# Patient Record
Sex: FEMALE | Race: WHITE | Employment: OTHER | URBAN - METROPOLITAN AREA
[De-identification: names, ages, dates, MRNs, and addresses within clinical notes are randomized per-mention and may not be internally consistent; named-entity substitution may affect disease eponyms.]

---

## 2017-01-16 ENCOUNTER — OFFICE VISIT (OUTPATIENT)
Dept: CARDIOLOGY | Age: 67
End: 2017-01-16

## 2017-01-16 VITALS
DIASTOLIC BLOOD PRESSURE: 82 MMHG | RESPIRATION RATE: 16 BRPM | WEIGHT: 128.2 LBS | SYSTOLIC BLOOD PRESSURE: 134 MMHG | OXYGEN SATURATION: 98 % | HEART RATE: 110 BPM | BODY MASS INDEX: 23.59 KG/M2 | HEIGHT: 62 IN

## 2017-01-16 DIAGNOSIS — I48.91 ATRIAL FIBRILLATION, NEW ONSET (HCC): Primary | ICD-10-CM

## 2017-01-16 DIAGNOSIS — R60.0 BILATERAL EDEMA OF LOWER EXTREMITY: ICD-10-CM

## 2017-01-16 DIAGNOSIS — I48.91 ATRIAL FIBRILLATION, NEW ONSET (HCC): ICD-10-CM

## 2017-01-16 DIAGNOSIS — E78.5 HYPERLIPIDEMIA WITH TARGET LDL LESS THAN 130: ICD-10-CM

## 2017-01-16 LAB
T4 FREE: 1.5 NG/DL (ref 0.93–1.7)
TSH SERPL DL<=0.05 MIU/L-ACNC: 2.15 UIU/ML (ref 0.27–4.2)

## 2017-01-16 PROCEDURE — 99214 OFFICE O/P EST MOD 30 MIN: CPT | Performed by: INTERNAL MEDICINE

## 2017-01-16 ASSESSMENT — ENCOUNTER SYMPTOMS
SHORTNESS OF BREATH: 0
CHEST TIGHTNESS: 0

## 2017-01-17 LAB — T3 UPTAKE: 35 % (ref 28–41)

## 2017-01-24 ENCOUNTER — ANESTHESIA EVENT (OUTPATIENT)
Dept: CARDIAC CATH/INVASIVE PROCEDURES | Age: 67
End: 2017-01-24
Payer: COMMERCIAL

## 2017-01-24 ENCOUNTER — ANESTHESIA (OUTPATIENT)
Dept: CARDIAC CATH/INVASIVE PROCEDURES | Age: 67
End: 2017-01-24
Payer: COMMERCIAL

## 2017-01-24 VITALS — DIASTOLIC BLOOD PRESSURE: 62 MMHG | OXYGEN SATURATION: 94 % | SYSTOLIC BLOOD PRESSURE: 109 MMHG

## 2017-01-24 PROCEDURE — 6360000002 HC RX W HCPCS: Performed by: NURSE ANESTHETIST, CERTIFIED REGISTERED

## 2017-01-24 PROCEDURE — 2500000003 HC RX 250 WO HCPCS: Performed by: NURSE ANESTHETIST, CERTIFIED REGISTERED

## 2017-01-24 RX ORDER — LIDOCAINE HYDROCHLORIDE 20 MG/ML
INJECTION, SOLUTION INFILTRATION; PERINEURAL PRN
Status: DISCONTINUED | OUTPATIENT
Start: 2017-01-24 | End: 2017-01-24 | Stop reason: SDUPTHER

## 2017-01-24 RX ORDER — PROPOFOL 10 MG/ML
INJECTION, EMULSION INTRAVENOUS PRN
Status: DISCONTINUED | OUTPATIENT
Start: 2017-01-24 | End: 2017-01-24 | Stop reason: SDUPTHER

## 2017-01-24 RX ADMIN — LIDOCAINE HYDROCHLORIDE 20 MG: 20 INJECTION, SOLUTION INFILTRATION; PERINEURAL at 14:32

## 2017-01-24 RX ADMIN — PROPOFOL 30 MG: 10 INJECTION, EMULSION INTRAVENOUS at 14:32

## 2017-02-06 ENCOUNTER — OFFICE VISIT (OUTPATIENT)
Dept: CARDIOLOGY | Age: 67
End: 2017-02-06

## 2017-02-06 VITALS
OXYGEN SATURATION: 94 % | SYSTOLIC BLOOD PRESSURE: 122 MMHG | BODY MASS INDEX: 24.35 KG/M2 | HEIGHT: 61 IN | WEIGHT: 129 LBS | DIASTOLIC BLOOD PRESSURE: 60 MMHG | HEART RATE: 73 BPM

## 2017-02-06 DIAGNOSIS — I48.91 ATRIAL FIBRILLATION STATUS POST CARDIOVERSION (HCC): ICD-10-CM

## 2017-02-06 DIAGNOSIS — J43.9 PULMONARY EMPHYSEMA, UNSPECIFIED EMPHYSEMA TYPE (HCC): ICD-10-CM

## 2017-02-06 DIAGNOSIS — E78.5 HYPERLIPIDEMIA WITH TARGET LDL LESS THAN 130: ICD-10-CM

## 2017-02-06 DIAGNOSIS — I48.91 ATRIAL FIBRILLATION, NEW ONSET (HCC): Primary | ICD-10-CM

## 2017-02-06 PROCEDURE — 99213 OFFICE O/P EST LOW 20 MIN: CPT | Performed by: INTERNAL MEDICINE

## 2017-02-06 ASSESSMENT — ENCOUNTER SYMPTOMS: RESPIRATORY NEGATIVE: 1

## 2017-04-03 ENCOUNTER — OFFICE VISIT (OUTPATIENT)
Dept: CARDIOLOGY | Age: 67
End: 2017-04-03

## 2017-04-03 VITALS
DIASTOLIC BLOOD PRESSURE: 62 MMHG | RESPIRATION RATE: 14 BRPM | SYSTOLIC BLOOD PRESSURE: 136 MMHG | HEART RATE: 81 BPM | HEIGHT: 61 IN | OXYGEN SATURATION: 97 % | BODY MASS INDEX: 24.55 KG/M2 | WEIGHT: 130 LBS

## 2017-04-03 DIAGNOSIS — R53.83 FATIGUE, UNSPECIFIED TYPE: ICD-10-CM

## 2017-04-03 DIAGNOSIS — Z79.899 ON AMIODARONE THERAPY: ICD-10-CM

## 2017-04-03 DIAGNOSIS — I48.91 ATRIAL FIBRILLATION, NEW ONSET (HCC): Primary | ICD-10-CM

## 2017-04-03 DIAGNOSIS — R60.0 BILATERAL EDEMA OF LOWER EXTREMITY: ICD-10-CM

## 2017-04-03 LAB
T3 TOTAL: 0.97 NG/ML (ref 0.8–2)
T4 FREE: 1.4 NG/DL (ref 0.93–1.7)
TSH SERPL DL<=0.05 MIU/L-ACNC: 1.8 UIU/ML (ref 0.27–4.2)

## 2017-04-03 PROCEDURE — 99213 OFFICE O/P EST LOW 20 MIN: CPT | Performed by: INTERNAL MEDICINE

## 2017-04-03 ASSESSMENT — ENCOUNTER SYMPTOMS
CHEST TIGHTNESS: 0
SHORTNESS OF BREATH: 0

## 2017-05-01 ENCOUNTER — OFFICE VISIT (OUTPATIENT)
Dept: CARDIOLOGY | Age: 67
End: 2017-05-01

## 2017-05-01 VITALS
BODY MASS INDEX: 24.55 KG/M2 | HEIGHT: 61 IN | DIASTOLIC BLOOD PRESSURE: 70 MMHG | WEIGHT: 130 LBS | SYSTOLIC BLOOD PRESSURE: 120 MMHG | HEART RATE: 72 BPM

## 2017-05-01 DIAGNOSIS — J43.9 PULMONARY EMPHYSEMA, UNSPECIFIED EMPHYSEMA TYPE (HCC): ICD-10-CM

## 2017-05-01 DIAGNOSIS — Z72.0 TOBACCO ABUSE: ICD-10-CM

## 2017-05-01 DIAGNOSIS — I48.91 ATRIAL FIBRILLATION, NEW ONSET (HCC): Primary | ICD-10-CM

## 2017-05-01 DIAGNOSIS — R60.0 BILATERAL EDEMA OF LOWER EXTREMITY: ICD-10-CM

## 2017-05-01 DIAGNOSIS — R53.83 FATIGUE, UNSPECIFIED TYPE: ICD-10-CM

## 2017-05-01 PROCEDURE — 99213 OFFICE O/P EST LOW 20 MIN: CPT | Performed by: INTERNAL MEDICINE

## 2017-05-01 RX ORDER — DILTIAZEM HYDROCHLORIDE 240 MG/1
240 CAPSULE, COATED, EXTENDED RELEASE ORAL DAILY
Qty: 90 CAPSULE | Refills: 3 | Status: SHIPPED | OUTPATIENT
Start: 2017-05-01 | End: 2017-06-14 | Stop reason: SDUPTHER

## 2017-05-01 RX ORDER — DILTIAZEM HYDROCHLORIDE 240 MG/1
240 CAPSULE, COATED, EXTENDED RELEASE ORAL DAILY
Qty: 30 CAPSULE | Refills: 6 | Status: SHIPPED | OUTPATIENT
Start: 2017-05-01 | End: 2017-05-01 | Stop reason: SDUPTHER

## 2017-05-01 ASSESSMENT — ENCOUNTER SYMPTOMS
APNEA: 0
CHEST TIGHTNESS: 0
COLOR CHANGE: 0
RESPIRATORY NEGATIVE: 1
SHORTNESS OF BREATH: 0

## 2017-05-04 ENCOUNTER — OFFICE VISIT (OUTPATIENT)
Dept: INTERNAL MEDICINE | Age: 67
End: 2017-05-04

## 2017-05-04 VITALS
SYSTOLIC BLOOD PRESSURE: 140 MMHG | HEART RATE: 70 BPM | DIASTOLIC BLOOD PRESSURE: 80 MMHG | WEIGHT: 128 LBS | TEMPERATURE: 97.3 F | HEIGHT: 62 IN | BODY MASS INDEX: 23.55 KG/M2 | OXYGEN SATURATION: 98 %

## 2017-05-04 DIAGNOSIS — R21 RASH AND NONSPECIFIC SKIN ERUPTION: ICD-10-CM

## 2017-05-04 DIAGNOSIS — B35.1 FUNGAL NAIL INFECTION: Primary | ICD-10-CM

## 2017-05-04 PROCEDURE — 99213 OFFICE O/P EST LOW 20 MIN: CPT | Performed by: INTERNAL MEDICINE

## 2017-05-04 RX ORDER — AMMONIUM LACTATE 12 G/100G
CREAM TOPICAL
Qty: 140 G | Refills: 4 | Status: SHIPPED | OUTPATIENT
Start: 2017-05-04 | End: 2017-06-03

## 2017-05-04 ASSESSMENT — ENCOUNTER SYMPTOMS
DIARRHEA: 0
RHINORRHEA: 0
RESPIRATORY NEGATIVE: 1
NAIL CHANGES: 1
GASTROINTESTINAL NEGATIVE: 1

## 2017-05-04 ASSESSMENT — PATIENT HEALTH QUESTIONNAIRE - PHQ9
2. FEELING DOWN, DEPRESSED OR HOPELESS: 0
SUM OF ALL RESPONSES TO PHQ QUESTIONS 1-9: 0
1. LITTLE INTEREST OR PLEASURE IN DOING THINGS: 0
SUM OF ALL RESPONSES TO PHQ9 QUESTIONS 1 & 2: 0

## 2017-06-14 RX ORDER — TERBINAFINE HYDROCHLORIDE 250 MG/1
TABLET ORAL
Refills: 0 | COMMUNITY
Start: 2017-06-03 | End: 2017-11-03 | Stop reason: ALTCHOICE

## 2017-06-14 RX ORDER — DILTIAZEM HYDROCHLORIDE 240 MG/1
240 CAPSULE, COATED, EXTENDED RELEASE ORAL DAILY
Qty: 30 CAPSULE | Refills: 3 | Status: SHIPPED | OUTPATIENT
Start: 2017-06-14 | End: 2017-11-13 | Stop reason: SDUPTHER

## 2017-06-20 LAB
ALT SERPL-CCNC: 14 U/L (ref 0–33)
AST SERPL-CCNC: 17 U/L (ref 0–35)

## 2017-07-18 ENCOUNTER — HOSPITAL ENCOUNTER (OUTPATIENT)
Dept: PULMONOLOGY | Age: 67
Discharge: HOME | End: 2017-07-18
Payer: COMMERCIAL

## 2017-07-18 ENCOUNTER — HOSPITAL ENCOUNTER (OUTPATIENT)
Dept: GENERAL RADIOLOGY | Age: 67
Discharge: HOME | End: 2017-07-18
Payer: COMMERCIAL

## 2017-07-18 DIAGNOSIS — J44.9 CHRONIC OBSTRUCTIVE PULMONARY DISEASE, UNSPECIFIED COPD TYPE (HCC): ICD-10-CM

## 2017-07-18 PROCEDURE — 71020 XR CHEST STANDARD TWO VW: CPT

## 2017-07-18 PROCEDURE — 6360000002 HC RX W HCPCS: Performed by: INTERNAL MEDICINE

## 2017-07-18 PROCEDURE — 94726 PLETHYSMOGRAPHY LUNG VOLUMES: CPT

## 2017-07-18 PROCEDURE — 94729 DIFFUSING CAPACITY: CPT

## 2017-07-18 PROCEDURE — 94060 EVALUATION OF WHEEZING: CPT

## 2017-07-18 RX ORDER — ALBUTEROL SULFATE 2.5 MG/3ML
2.5 SOLUTION RESPIRATORY (INHALATION) ONCE
Status: COMPLETED | OUTPATIENT
Start: 2017-07-18 | End: 2017-07-18

## 2017-07-18 RX ADMIN — ALBUTEROL SULFATE 2.5 MG: 2.5 SOLUTION RESPIRATORY (INHALATION) at 09:11

## 2017-09-28 ENCOUNTER — OFFICE VISIT (OUTPATIENT)
Dept: PULMONOLOGY | Age: 67
End: 2017-09-28

## 2017-09-28 VITALS
HEART RATE: 91 BPM | SYSTOLIC BLOOD PRESSURE: 122 MMHG | TEMPERATURE: 97.4 F | WEIGHT: 129 LBS | HEIGHT: 62 IN | DIASTOLIC BLOOD PRESSURE: 60 MMHG | BODY MASS INDEX: 23.74 KG/M2 | OXYGEN SATURATION: 91 %

## 2017-09-28 DIAGNOSIS — J44.1 COPD EXACERBATION (HCC): ICD-10-CM

## 2017-09-28 DIAGNOSIS — J20.9 ACUTE BRONCHITIS, UNSPECIFIED ORGANISM: ICD-10-CM

## 2017-09-28 DIAGNOSIS — R09.02 HYPOXEMIA: ICD-10-CM

## 2017-09-28 PROBLEM — J44.9 COPD (CHRONIC OBSTRUCTIVE PULMONARY DISEASE) (HCC): Status: ACTIVE | Noted: 2017-09-28

## 2017-09-28 PROCEDURE — 99215 OFFICE O/P EST HI 40 MIN: CPT | Performed by: INTERNAL MEDICINE

## 2017-09-28 RX ORDER — ALBUTEROL SULFATE 2.5 MG/3ML
2.5 SOLUTION RESPIRATORY (INHALATION) EVERY 6 HOURS PRN
COMMUNITY
End: 2018-09-13

## 2017-09-28 RX ORDER — ALBUTEROL SULFATE 90 UG/1
2 AEROSOL, METERED RESPIRATORY (INHALATION) EVERY 6 HOURS PRN
COMMUNITY
End: 2018-02-08 | Stop reason: ALTCHOICE

## 2017-09-28 RX ORDER — CEFUROXIME AXETIL 250 MG/1
250 TABLET ORAL 2 TIMES DAILY
Qty: 20 TABLET | Refills: 0 | Status: SHIPPED | OUTPATIENT
Start: 2017-09-28 | End: 2017-10-08

## 2017-09-28 RX ORDER — PREDNISONE 10 MG/1
TABLET ORAL
Qty: 30 TABLET | Refills: 0 | Status: SHIPPED | OUTPATIENT
Start: 2017-09-28 | End: 2017-11-03 | Stop reason: ALTCHOICE

## 2017-09-28 ASSESSMENT — ENCOUNTER SYMPTOMS
SINUS PRESSURE: 0
TROUBLE SWALLOWING: 0
SORE THROAT: 0
EYE ITCHING: 0
EYE DISCHARGE: 0
ABDOMINAL PAIN: 0
VOMITING: 0
DIARRHEA: 0
WHEEZING: 1
COUGH: 1
CHEST TIGHTNESS: 0
RHINORRHEA: 0
SHORTNESS OF BREATH: 1
NAUSEA: 0
VOICE CHANGE: 0

## 2017-10-11 ENCOUNTER — TELEPHONE (OUTPATIENT)
Dept: PULMONOLOGY | Age: 67
End: 2017-10-11

## 2017-10-11 NOTE — TELEPHONE ENCOUNTER
Pt finished the prednisone 10mg and cefuroxme axetil 250mg and she is still coughing and bringing up mucus.      Also she is having trouble having a bowel movement  Burning around the belly button and while walking, she is having pain in her lower back

## 2017-11-03 ENCOUNTER — OFFICE VISIT (OUTPATIENT)
Dept: INTERNAL MEDICINE | Age: 67
End: 2017-11-03

## 2017-11-03 VITALS
SYSTOLIC BLOOD PRESSURE: 130 MMHG | WEIGHT: 126 LBS | HEART RATE: 79 BPM | BODY MASS INDEX: 23.19 KG/M2 | TEMPERATURE: 98.1 F | OXYGEN SATURATION: 95 % | HEIGHT: 62 IN | DIASTOLIC BLOOD PRESSURE: 70 MMHG

## 2017-11-03 DIAGNOSIS — Z23 NEED FOR INFLUENZA VACCINATION: ICD-10-CM

## 2017-11-03 DIAGNOSIS — K59.00 CONSTIPATION, UNSPECIFIED CONSTIPATION TYPE: ICD-10-CM

## 2017-11-03 DIAGNOSIS — M54.50 ACUTE MIDLINE LOW BACK PAIN WITHOUT SCIATICA: Primary | ICD-10-CM

## 2017-11-03 PROCEDURE — 90662 IIV NO PRSV INCREASED AG IM: CPT | Performed by: INTERNAL MEDICINE

## 2017-11-03 PROCEDURE — 99213 OFFICE O/P EST LOW 20 MIN: CPT | Performed by: INTERNAL MEDICINE

## 2017-11-03 PROCEDURE — 90471 IMMUNIZATION ADMIN: CPT | Performed by: INTERNAL MEDICINE

## 2017-11-03 ASSESSMENT — ENCOUNTER SYMPTOMS
CHEST TIGHTNESS: 0
BLOOD IN STOOL: 0
CONSTIPATION: 1
HEMATOCHEZIA: 0
COUGH: 0
ABDOMINAL DISTENTION: 0
WHEEZING: 0
BACK PAIN: 1
BOWEL INCONTINENCE: 0
SHORTNESS OF BREATH: 1
ABDOMINAL PAIN: 0
NAUSEA: 0
BLOATING: 0

## 2017-11-03 NOTE — PATIENT INSTRUCTIONS
Patient Education        Constipation: Care Instructions  Your Care Instructions  Constipation means that you have a hard time passing stools (bowel movements). People pass stools from 3 times a day to once every 3 days. What is normal for you may be different. Constipation may occur with pain in the rectum and cramping. The pain may get worse when you try to pass stools. Sometimes there are small amounts of bright red blood on toilet paper or the surface of stools. This is because of enlarged veins near the rectum (hemorrhoids). A few changes in your diet and lifestyle may help you avoid ongoing constipation. Your doctor may also prescribe medicine to help loosen your stool. Some medicines can cause constipation. These include pain medicines and antidepressants. Tell your doctor about all the medicines you take. Your doctor may want to make a medicine change to ease your symptoms. Follow-up care is a key part of your treatment and safety. Be sure to make and go to all appointments, and call your doctor if you are having problems. It's also a good idea to know your test results and keep a list of the medicines you take. How can you care for yourself at home? · Drink plenty of fluids, enough so that your urine is light yellow or clear like water. If you have kidney, heart, or liver disease and have to limit fluids, talk with your doctor before you increase the amount of fluids you drink. · Include high-fiber foods in your diet each day. These include fruits, vegetables, beans, and whole grains. · Get at least 30 minutes of exercise on most days of the week. Walking is a good choice. You also may want to do other activities, such as running, swimming, cycling, or playing tennis or team sports. · Take a fiber supplement, such as Citrucel or Metamucil, every day. Read and follow all instructions on the label. · Schedule time each day for a bowel movement. A daily routine may help.  Take your time having your bowel movement. · Support your feet with a small step stool when you sit on the toilet. This helps flex your hips and places your pelvis in a squatting position. · Your doctor may recommend an over-the-counter laxative to relieve your constipation. Examples are Milk of Magnesia and MiraLax. Read and follow all instructions on the label. Do not use laxatives on a long-term basis. When should you call for help? Call your doctor now or seek immediate medical care if:  · You have new or worse belly pain. · You have new or worse nausea or vomiting. · You have blood in your stools. Watch closely for changes in your health, and be sure to contact your doctor if:  · Your constipation is getting worse. · You do not get better as expected. Where can you learn more? Go to https://[employer]. org and sign in to your [employer] account. Enter [phone] in the [employer] box to learn more about \"Constipation: Care Instructions. \"     If you do not have an account, please click on the \"Sign Up Now\" link. Current as of: March 20, 2017  Content Version: 11.3  © 9328-8723 [employer]. Care instructions adapted under license by [facility] ([facility]). If you have questions about a medical condition or this instruction, always ask your healthcare professional. [place] [address] any warranty or liability for your use of this information. Patient Education        Learning About Relief for Back Pain  What is back tension and strain? Back strain happens when you overstretch, or pull, a muscle in your back. You may hurt your back in an accident or when you exercise or lift something. Most back pain will get better with rest and time. You can take care of yourself at home to help your back heal.  What can you do first to relieve back pain? When you first feel back pain, try these steps:  · Walk.  Take a short walk (10 to 20 minutes) on a level surface (no slopes, hills, or stairs) every 2 to 3 hours. Walk only distances you can manage without pain, especially leg pain. · Relax. Find a comfortable position for rest. Some people are comfortable on the floor or a medium-firm bed with a small pillow under their head and another under their knees. Some people prefer to lie on their side with a pillow between their knees. Don't stay in one position for too long. · Try heat or ice. Try using a heating pad on a low or medium setting, or take a warm shower, for 15 to 20 minutes every 2 to 3 hours. Or you can buy single-use heat wraps that last up to 8 hours. You can also try an ice pack for 10 to 15 minutes every 2 to 3 hours. You can use an ice pack or a bag of frozen vegetables wrapped in a thin towel. There is not strong evidence that either heat or ice will help, but you can try them to see if they help. You may also want to try switching between heat and cold. · Take pain medicine exactly as directed. ¨ If the doctor gave you a prescription medicine for pain, take it as prescribed. ¨ If you are not taking a prescription pain medicine, ask your doctor if you can take an over-the-counter medicine. What else can you do? · Stretch and exercise. Exercises that increase flexibility may relieve your pain and make it easier for your muscles to keep your spine in a good, neutral position. And don't forget to keep walking. · Do self-massage. You can use self-massage to unwind after work or school or to energize yourself in the morning. You can easily massage your feet, hands, or neck. Self-massage works best if you are in comfortable clothes and are sitting or lying in a comfortable position. Use oil or lotion to massage bare skin. · Reduce stress. Back pain can lead to a vicious [place]aver: Distress about the pain tenses the muscles in your back, which in turn causes more pain. Learn how to relax your mind and your muscles to lower your stress. Where can you learn more?   Go to 30 seconds each time. 7. If you feel stable and secure with your leg raised, try raising the opposite arm straight out in front of you at the same time. Knee-to-chest exercise    1. Lie on your back with your knees bent and your feet flat on the floor. 2. Bring one knee to your chest, keeping the other foot flat on the floor (or keeping the other leg straight, whichever feels better on your lower back). 3. Keep your lower back pressed to the floor. Hold for at least 15 to 30 seconds. 4. Relax, and lower the knee to the starting position. 5. Repeat with the other leg. Repeat 2 to 4 times with each leg. 6. To get more stretch, put your other leg flat on the floor while pulling your knee to your chest.  Curl-ups    1. Lie on the floor on your back with your knees bent at a 90-degree angle. Your feet should be flat on the floor, about 12 inches from your buttocks. 2. Cross your arms over your chest. If this bothers your neck, try putting your hands behind your neck (not your head), with your elbows spread apart. 3. Slowly tighten your belly muscles and raise your shoulder blades off the floor. 4. Keep your head in line with your body, and do not press your chin to your chest.  5. Hold this position for 1 or 2 seconds, then slowly lower yourself back down to the floor. 6. Repeat 8 to 12 times. Pelvic tilt exercise    1. Lie on your back with your knees bent. 2. \"Brace\" your stomach. This means to tighten your muscles by pulling in and imagining your belly button moving toward your spine. You should feel like your back is pressing to the floor and your hips and pelvis are rocking back. 3. Hold for about 6 seconds while you breathe smoothly. 4. Repeat 8 to 12 times. Heel dig bridging    1. Lie on your back with both knees bent and your ankles bent so that only your heels are digging into the floor. Your knees should be bent about 90 degrees.   2. Then push your heels into the floor, squeeze your buttocks, \"Sign Up Now\" link. Current as of: March 21, 2017  Content Version: 11.3  © 5429-0440 [employer]. Care instructions adapted under license by [facility] ([facility]). If you have questions about a medical condition or this instruction, always ask your healthcare professional. [address] any warranty or liability for your use of this information. Patient Education        Acute Low Back Pain: Exercises  Your Care Instructions  Here are some examples of typical rehabilitation exercises for your condition. Start each exercise slowly. Ease off the exercise if you start to have pain. Your doctor or physical therapist will tell you when you can start these exercises and which ones will work best for you. When you are not being active, find a comfortable position for rest. Some people are comfortable on the floor or a medium-firm bed with a small pillow under their head and another under their knees. Some people prefer to lie on their side with a pillow between their knees. Don't stay in one position for too long. Take short walks (10 to 20 minutes) every 2 to 3 hours. Avoid slopes, hills, and stairs until you feel better. Walk only distances you can manage without pain, especially leg pain. How to do the exercises  Back stretches    5. Get down on your hands and knees on the floor. 6. Relax your head and allow it to droop. Round your back up toward the ceiling until you feel a nice stretch in your upper, middle, and lower back. Hold this stretch for as long as it feels comfortable, or about 15 to 30 seconds. 7. Return to the starting position with a flat back while you are on your hands and knees. 8. Let your back sway by pressing your stomach toward the floor. Lift your buttocks toward the ceiling. 9. Hold this position for 15 to 30 seconds. 10. Repeat 2 to 4 times. Follow-up care is a key part of your treatment and safety.  Be sure to make and go to all appointments, and call

## 2017-11-03 NOTE — PROGRESS NOTES
Gilda Jimenez is a 74 y.o. female with COPD, atrial fibrillation, who presents with     Chief Complaint   Patient presents with    Back Pain     new onset in the context of an episode of COPD exacerbation    Constipation     had some results with OTC docusate, questions the medication for COPD exacerbation    Immunizations     Since the past office visit, the patient has been seen by her pulmonologist for an episode of COPD exacerbation. She was treated with steroids, antibiotic. She feels better, breathing is stable. The following laboratory reports since the past visit were reviewed (the ones pertinent to today's visit were discussed with the patient):    No visits with results within 3 Month(s) from this visit. Latest known visit with results is:   Orders Only on 06/20/2017   Component Date Value Ref Range Status    ALT 06/20/2017 14  0 - 33 U/L Final       HPI:    Back Pain   This is a new problem. The current episode started more than 1 month ago. The problem occurs daily. The problem has been waxing and waning since onset. The pain is present in the lumbar spine and sacro-iliac. The quality of the pain is described as aching and cramping. The pain does not radiate. The pain is moderate. The pain is worse during the day. The symptoms are aggravated by position and twisting. Pertinent negatives include no abdominal pain, bladder incontinence, bowel incontinence, chest pain, numbness, tingling or weakness. Risk factors include menopause and sedentary lifestyle. She has tried bed rest for the symptoms. The treatment provided no relief. Constipation   The current episode started 1 to 4 weeks ago. The problem has been gradually improving since onset. The stool is described as firm. She does not exercise regularly. Associated symptoms include back pain. Pertinent negatives include no abdominal pain, bloating, difficulty urinating, hematochezia, melena or nausea.  Risk factors include change in medication Vitals:    11/03/17 1225 11/03/17 1252   BP: (!) 150/80 130/70   Site: Right Arm    Position: Sitting    Cuff Size: Medium Adult    Pulse: 79    Temp: 98.1 °F (36.7 °C)    TempSrc: Tympanic    SpO2: 95%    Weight: 126 lb (57.2 kg)    Height: 5' 1.75\" (1.568 m)        Physical Exam   Constitutional: She is oriented to person, place, and time. She appears well-nourished. She appears distressed (anxious). HENT:   Head: Atraumatic. Eyes: No scleral icterus. Neck: Neck supple. Cardiovascular: Regular rhythm, normal heart sounds and intact distal pulses. Pulmonary/Chest: Effort normal. She has no wheezes. She has no rales. Diminished breath sounds bilaterally     Abdominal: Soft. She exhibits no distension. There is no tenderness. There is no guarding. Musculoskeletal: Normal range of motion. Cervical back: Normal.        Thoracic back: Normal. She exhibits normal range of motion and no tenderness. Lumbar back: She exhibits tenderness (with palpation of the sacroiliac joints areas and above, lower lumbar back area, bilaterally). She exhibits normal range of motion, no deformity and no spasm. Back:    Neurological: She is alert and oriented to person, place, and time. Skin: Skin is warm. Psychiatric: Her behavior is normal. Her mood appears anxious. Her speech is rapid and/or pressured. Cognition and memory are normal. She does not express inappropriate judgment. She does not exhibit a depressed mood. She exhibits normal recent memory and normal remote memory. Good insight, motivation        Assessment:    Calli Alexandra was seen today for back pain, constipation and immunizations.     Diagnoses and all orders for this visit:    Acute midline low back pain without sciatica  Comments:  possible strain form cough  home back exercises, start massage with salicylate cream, call if no results  follow to resolution    Constipation, unspecified constipation type  Comments:  episodic, possibly due to less mobility during the acute illness  To focus on improving water and fiber intake, may use over-the-counter Senokot-S as needed    Need for influenza vaccination  -     INFLUENZA, HIGH DOSE, 65 YRS +, IM, PF, PREFILL SYR, 0.5ML (FLUZONE HD)        Plan:    Reviewed with the patient (/and caregiver if present): current health status, medications, activities and diet. See also orders and comments in the assessment section. The current medical regimen is effective;  continue present plan and medications. Today's diagnosis (in the context of chronic problems) was discussed with patient (/and caregiver if present), questions answered. Further workup and plan will be determined based on clinical progression and follow up test/ treatment results. Orders Placed This Encounter   Procedures    INFLUENZA, HIGH DOSE, 65 YRS +, IM, PF, PREFILL SYR, 0.5ML (FLUZONE HD)       Close follow up needed to evaluate treatment results and for care coordination. Return if symptoms worsen or fail to improve. I have reviewed the patient's medical and surgical, family and social history, health maintenance schedule, and updated the computerized patient record. Please note this report has been partially produced by using speech recognition hardware. It may contain errors related to the system, including grammar, punctuation and spelling as well as words and phrases that may seem inaccurate. For any questions or concerns, please feel free to contact me for clarification.         Electronically signed by Jeri Anderson MD

## 2017-11-13 ENCOUNTER — OFFICE VISIT (OUTPATIENT)
Dept: CARDIOLOGY | Age: 67
End: 2017-11-13

## 2017-11-13 VITALS
DIASTOLIC BLOOD PRESSURE: 70 MMHG | BODY MASS INDEX: 23.55 KG/M2 | OXYGEN SATURATION: 94 % | HEART RATE: 80 BPM | WEIGHT: 128 LBS | HEIGHT: 62 IN | SYSTOLIC BLOOD PRESSURE: 130 MMHG

## 2017-11-13 DIAGNOSIS — R60.0 BILATERAL EDEMA OF LOWER EXTREMITY: ICD-10-CM

## 2017-11-13 DIAGNOSIS — I48.91 ATRIAL FIBRILLATION, UNSPECIFIED TYPE (HCC): Primary | ICD-10-CM

## 2017-11-13 DIAGNOSIS — Z72.0 TOBACCO ABUSE: ICD-10-CM

## 2017-11-13 PROCEDURE — 99213 OFFICE O/P EST LOW 20 MIN: CPT | Performed by: INTERNAL MEDICINE

## 2017-11-13 RX ORDER — DILTIAZEM HYDROCHLORIDE 240 MG/1
240 CAPSULE, COATED, EXTENDED RELEASE ORAL DAILY
Qty: 30 CAPSULE | Refills: 11 | Status: SHIPPED | OUTPATIENT
Start: 2017-11-13 | End: 2018-04-11 | Stop reason: SDUPTHER

## 2017-11-13 RX ORDER — DILTIAZEM HYDROCHLORIDE 240 MG/1
240 CAPSULE, COATED, EXTENDED RELEASE ORAL DAILY
Qty: 30 CAPSULE | Refills: 5 | Status: SHIPPED | OUTPATIENT
Start: 2017-11-13 | End: 2017-11-13 | Stop reason: SDUPTHER

## 2017-11-13 ASSESSMENT — ENCOUNTER SYMPTOMS
SHORTNESS OF BREATH: 0
APNEA: 0
COLOR CHANGE: 0
CHEST TIGHTNESS: 0
RESPIRATORY NEGATIVE: 1

## 2017-11-13 NOTE — PROGRESS NOTES
her mother; Osteoporosis in her mother. Current Medications:    Current Outpatient Prescriptions:     rivaroxaban (XARELTO) 20 MG TABS tablet, Take 1 tablet by mouth daily (with breakfast), Disp: 30 tablet, Rfl: 11    diltiazem (CARDIZEM CD) 240 MG extended release capsule, Take 1 capsule by mouth daily, Disp: 30 capsule, Rfl: 11    albuterol (PROVENTIL) (2.5 MG/3ML) 0.083% nebulizer solution, Take 2.5 mg by nebulization every 6 hours as needed for Wheezing, Disp: , Rfl:     albuterol sulfate  (90 Base) MCG/ACT inhaler, Inhale 2 puffs into the lungs every 6 hours as needed for Wheezing, Disp: , Rfl:     umeclidinium-vilanterol (ANORO ELLIPTA) 62.5-25 MCG/INH AEPB inhaler, Inhale 1 puff into the lungs daily, Disp: 1 each, Rfl: 3    Vitamin D (CHOLECALCIFEROL) 1000 UNITS CAPS capsule, Take 1,000 Units by mouth daily. , Disp: , Rfl:       Review of Systems:  Review of Systems   Constitutional: Negative for appetite change, diaphoresis and fatigue. Respiratory: Negative. Negative for apnea, chest tightness and shortness of breath. Cardiovascular: Positive for palpitations. Negative for chest pain and leg swelling. Skin: Negative for color change, pallor, rash and wound. Neurological: Negative. Negative for dizziness, syncope, weakness, light-headedness and headaches. Psychiatric/Behavioral: Negative for agitation, behavioral problems and confusion. The patient is not nervous/anxious and is not hyperactive. All other systems reviewed and are negative. Objective  Vitals:    11/13/17 1523   BP: 130/70   Site: Left Arm   Position: Sitting   Cuff Size: Small Adult   Pulse: 80   SpO2: 94%   Weight: 128 lb (58.1 kg)   Height: 5' 1.75\" (1.568 m)         Physical Exam:  Physical Exam   Constitutional: She is oriented to person, place, and time. She appears well-developed and well-nourished. Cardiovascular: Normal rate, regular rhythm, normal heart sounds and intact distal pulses.

## 2017-12-05 ENCOUNTER — HOSPITAL ENCOUNTER (OUTPATIENT)
Dept: PULMONOLOGY | Age: 67
Discharge: HOME | End: 2017-12-05
Payer: COMMERCIAL

## 2017-12-05 PROCEDURE — 94729 DIFFUSING CAPACITY: CPT

## 2017-12-05 PROCEDURE — 94726 PLETHYSMOGRAPHY LUNG VOLUMES: CPT

## 2017-12-05 PROCEDURE — 94060 EVALUATION OF WHEEZING: CPT

## 2017-12-05 PROCEDURE — 6360000002 HC RX W HCPCS: Performed by: INTERNAL MEDICINE

## 2017-12-05 RX ORDER — ALBUTEROL SULFATE 2.5 MG/3ML
2.5 SOLUTION RESPIRATORY (INHALATION) ONCE
Status: COMPLETED | OUTPATIENT
Start: 2017-12-05 | End: 2017-12-05

## 2017-12-05 RX ADMIN — ALBUTEROL SULFATE 2.5 MG: 2.5 SOLUTION RESPIRATORY (INHALATION) at 16:23

## 2017-12-10 RX ORDER — IPRATROPIUM BROMIDE AND ALBUTEROL SULFATE 2.5; .5 MG/3ML; MG/3ML
SOLUTION RESPIRATORY (INHALATION)
Qty: 360 ML | Refills: 0 | Status: SHIPPED | OUTPATIENT
Start: 2017-12-10 | End: 2018-06-08 | Stop reason: ALTCHOICE

## 2017-12-12 PROCEDURE — 94726 PLETHYSMOGRAPHY LUNG VOLUMES: CPT | Performed by: INTERNAL MEDICINE

## 2017-12-12 PROCEDURE — 94729 DIFFUSING CAPACITY: CPT | Performed by: INTERNAL MEDICINE

## 2017-12-12 PROCEDURE — 94060 EVALUATION OF WHEEZING: CPT | Performed by: INTERNAL MEDICINE

## 2017-12-12 NOTE — PROCEDURES
[address]                       [address], [address] [facility]                                PULMONARY FUNCTION    PATIENT NAME: Vladislav Scruggs                    :        1950  MED REC NO:   [id number]                            ROOM:  ACCOUNT NO:   [de-identified]                           ADMIT DATE: 2017  PROVIDER:     Megan Patel MD            DATE OF PROCEDURE:  2017    REASON FOR STUDY:  Shortness of breath and cough. INTERPRETATION:  FVC is 1.88, 67% of predicted. FEV1 is 0.95, 44% of  predicted. FEV1/FVC 50%. FEF 25-75% 0.35, 19% of predicted. Postbronchodilator study shows no improvement. Lung volume study shows  residual volume 2.54, 122% of predicted. TLC 4.55, 95% of predicted. Diffusion capacity 8.93, 45% of predicted. Airway resistance is 1.95, 94%  of predicted. SUMMARY:  Severe obstructive pulmonary disease. There is significant  response to bronchodilator. Static lung volume study suggests  hyperinflation. Diffusion capacity severely impaired. Airway resistance  is normal.  Flow volume loop suggests obstructive pulmonary disease. Clinical correlation is requested.         Dionne Reyes MD    D: 2017 21:05:04       T: 2017 22:32:41     [id number]  Job#: [id number]     Doc#: [id number]    CC:

## 2017-12-13 ENCOUNTER — HOSPITAL ENCOUNTER (OUTPATIENT)
Dept: WOMENS IMAGING | Age: 67
Discharge: HOME | End: 2017-12-13
Payer: COMMERCIAL

## 2017-12-13 DIAGNOSIS — Z12.31 ENCOUNTER FOR SCREENING MAMMOGRAM FOR BREAST CANCER: ICD-10-CM

## 2017-12-13 PROCEDURE — 77063 BREAST TOMOSYNTHESIS BI: CPT

## 2017-12-19 ENCOUNTER — OFFICE VISIT (OUTPATIENT)
Dept: PULMONOLOGY | Age: 67
End: 2017-12-19

## 2017-12-19 VITALS
BODY MASS INDEX: 23.19 KG/M2 | HEART RATE: 82 BPM | OXYGEN SATURATION: 96 % | SYSTOLIC BLOOD PRESSURE: 118 MMHG | HEIGHT: 62 IN | TEMPERATURE: 95.6 F | WEIGHT: 126 LBS | DIASTOLIC BLOOD PRESSURE: 70 MMHG

## 2017-12-19 DIAGNOSIS — J44.1 COPD EXACERBATION (HCC): ICD-10-CM

## 2017-12-19 DIAGNOSIS — J44.9 CHRONIC OBSTRUCTIVE PULMONARY DISEASE, UNSPECIFIED COPD TYPE (HCC): Primary | ICD-10-CM

## 2017-12-19 PROCEDURE — 99214 OFFICE O/P EST MOD 30 MIN: CPT | Performed by: INTERNAL MEDICINE

## 2017-12-19 ASSESSMENT — ENCOUNTER SYMPTOMS
TROUBLE SWALLOWING: 0
NAUSEA: 0
VOICE CHANGE: 0
CHEST TIGHTNESS: 0
EYE DISCHARGE: 0
WHEEZING: 0
VOMITING: 0
RHINORRHEA: 0
ABDOMINAL PAIN: 0
SHORTNESS OF BREATH: 1
EYE ITCHING: 0
DIARRHEA: 0
COUGH: 0
SORE THROAT: 0
SINUS PRESSURE: 0

## 2017-12-19 NOTE — PROGRESS NOTES
spouse     Worked for [employer] and at [employer], fashion, family life     Caregiver of ill mother          Review of Systems   Constitutional: Negative for chills, diaphoresis, fatigue and fever. HENT: Negative for congestion, mouth sores, nosebleeds, postnasal drip, rhinorrhea, sinus pressure, sneezing, sore throat, trouble swallowing and voice change. Eyes: Negative for discharge, itching and visual disturbance. Respiratory: Positive for shortness of breath. Negative for cough, chest tightness and wheezing. Cardiovascular: Negative for chest pain, palpitations and leg swelling. Gastrointestinal: Negative for abdominal pain, diarrhea, nausea and vomiting. Genitourinary: Negative for difficulty urinating and hematuria. Musculoskeletal: Negative for arthralgias, joint swelling and myalgias. Skin: Negative for rash. Allergic/Immunologic: Negative for environmental allergies. Neurological: Negative for dizziness, tremors, weakness and headaches. Psychiatric/Behavioral: Negative for behavioral problems and sleep disturbance. Objective:     Vitals:    12/19/17 1101   BP: 118/70   Site: Left Arm   Position: Sitting   Cuff Size: Medium Adult   Pulse: 82   Temp: 95.6 °F (35.3 °C)   TempSrc: Temporal   SpO2: 96%   Weight: 126 lb (57.2 kg)   Height: 5' 1.75\" (1.568 m)         Physical Exam   Constitutional: She is oriented to person, place, and time. She appears well-developed and well-nourished. No distress. HENT:   Head: Normocephalic and atraumatic. Nose: Nose normal.   Mouth/Throat: Oropharynx is clear and moist.   Eyes: EOM are normal. Pupils are equal, round, and reactive to light. Neck: No JVD present. No tracheal deviation present. No thyromegaly present. Cardiovascular: Normal rate and regular rhythm. Exam reveals no gallop and no friction rub. No murmur heard. Pulmonary/Chest: No respiratory distress. She has no wheezes. She has no rales.  She trachea. No focal infiltrates. No effusions. Pneumothoraces. Impression NO ACUTE ACTIVE CARDIOPULMONARY PROCESS. RADIOGRAPHIC FINDINGS SUGGESTIVE OF COPD. CORRELATE CLINICALLY. PULMONARY FUNCTION     PATIENT NAME: Gerard DOMÍNGUEZ                    :        1950  MED REC NO:   [id number]                            ROOM:  ACCOUNT NO:   [de-identified]                           ADMIT DATE: 2017  PROVIDER:     Halima Rm MD                 DATE OF PROCEDURE:  2017     REASON FOR STUDY:  Shortness of breath and cough.     INTERPRETATION:  FVC is 1.88, 67% of predicted. FEV1 is 0.95, 44% of  predicted. FEV1/FVC 50%. FEF 25-75% 0.35, 19% of predicted. Postbronchodilator study shows no improvement. Lung volume study shows  residual volume 2.54, 122% of predicted. TLC 4.55, 95% of predicted. Diffusion capacity 8.93, 45% of predicted. Airway resistance is 1.95, 94%  of predicted.     SUMMARY:  Severe obstructive pulmonary disease. There is significant  response to bronchodilator. Static lung volume study suggests  hyperinflation. Diffusion capacity severely impaired. Airway resistance  is normal.  Flow volume loop suggests obstructive pulmonary disease. Clinical correlation is requested.           Odalys Ocampo MD     D: 2017 21:05:04       T: 2017 22:32:41     [id number]  Job#: [id number]     Doc#: [id number]               Assessment/Plan:     1. Chronic obstructive pulmonary disease, unspecified [facility] type [facility]  Patient had a chest x-ray shows COPD no active disease. PFT done shows severe obstructive pulmonary disease with FEV1 0.9 51% predicted. Both tests reviewed with patient. She is having short of breath with exertion no wheezing. She is currently on Anoro ellipta 1 puff daily and nebulizer with DuoNeb when necessary. Continue same.       Return in about 6 months (around 6/19/2018) for COPD.       Han Wallace MD

## 2017-12-20 RX ORDER — UMECLIDINIUM BROMIDE AND VILANTEROL TRIFENATATE 62.5; 25 UG/1; UG/1
1 POWDER RESPIRATORY (INHALATION) DAILY
Qty: 1 EACH | Refills: 5 | Status: SHIPPED | OUTPATIENT
Start: 2017-12-20 | End: 2018-09-22 | Stop reason: SDUPTHER

## 2018-02-08 ENCOUNTER — OFFICE VISIT (OUTPATIENT)
Dept: INTERNAL MEDICINE CLINIC | Age: 68
End: 2018-02-08
Payer: COMMERCIAL

## 2018-02-08 VITALS
SYSTOLIC BLOOD PRESSURE: 130 MMHG | OXYGEN SATURATION: 96 % | TEMPERATURE: 98 F | WEIGHT: 128 LBS | BODY MASS INDEX: 23.6 KG/M2 | DIASTOLIC BLOOD PRESSURE: 80 MMHG | HEART RATE: 74 BPM

## 2018-02-08 DIAGNOSIS — R10.9 FLANK PAIN: ICD-10-CM

## 2018-02-08 DIAGNOSIS — R10.9 FLANK PAIN: Primary | ICD-10-CM

## 2018-02-08 DIAGNOSIS — Z23 NEED FOR 23-POLYVALENT PNEUMOCOCCAL POLYSACCHARIDE VACCINE: ICD-10-CM

## 2018-02-08 LAB
BACTERIA: ABNORMAL /HPF
BILIRUBIN URINE: NEGATIVE
BLOOD, URINE: NEGATIVE
CLARITY: CLEAR
COLOR: YELLOW
GLUCOSE URINE: NEGATIVE MG/DL
KETONES, URINE: ABNORMAL MG/DL
LEUKOCYTE ESTERASE, URINE: ABNORMAL
NITRITE, URINE: NEGATIVE
PH UA: 6.5 (ref 5–9)
PROTEIN UA: NEGATIVE MG/DL
RBC UA: ABNORMAL /HPF (ref 0–2)
SPECIFIC GRAVITY UA: 1.01 (ref 1–1.03)
UROBILINOGEN, URINE: 0.2 E.U./DL
WBC UA: ABNORMAL /HPF (ref 0–5)

## 2018-02-08 PROCEDURE — 99212 OFFICE O/P EST SF 10 MIN: CPT | Performed by: INTERNAL MEDICINE

## 2018-02-08 ASSESSMENT — ENCOUNTER SYMPTOMS
BACK PAIN: 0
GASTROINTESTINAL NEGATIVE: 1
ABDOMINAL PAIN: 0
RESPIRATORY NEGATIVE: 1

## 2018-02-09 PROCEDURE — 90732 PPSV23 VACC 2 YRS+ SUBQ/IM: CPT | Performed by: INTERNAL MEDICINE

## 2018-02-09 PROCEDURE — 90471 IMMUNIZATION ADMIN: CPT | Performed by: INTERNAL MEDICINE

## 2018-02-09 NOTE — PROGRESS NOTES
time.   Psychiatric: Judgment normal. Her mood appears anxious. Her speech is rapid and/or pressured. Cognition and memory are normal. Cognition and memory are not impaired. She does not express inappropriate judgment. She does not exhibit a depressed mood. Assessment:    Angeline Frost was seen today for flank pain and immunizations. Diagnoses and all orders for this visit:    Flank pain  Comments:  probably musculoskeletal  advised stretching, massage with salycilate cream  UA to check for blood  Orders:  -     Urinalysis; Future    Need for 23-polyvalent pneumococcal polysaccharide vaccine    Other orders  -     Pneumococcal polysaccharide vaccine 23-valent greater than or equal to 3yo subcutaneous/IM        Plan:    Reviewed with the patient (/and caregiver if present): current health status, medications, activities and diet. See also orders and comments in the assessment section. Today's diagnosis (in the context of chronic problems) was discussed with patient (/and caregiver if present), questions answered. The current medical regimen is effective;  continue present plan and medications. Orders Placed This Encounter   Procedures    Urinalysis     Standing Status:   Future     Number of Occurrences:   1     Standing Expiration Date:   2/8/2019     Order Specific Question:   SPECIFY(EX-CATH,MIDSTREAM,CYSTO,ETC)? Answer:   Midstream   Further workup and plan will be determined based on clinical progression and follow up test/ treatment results. Close follow up needed to evaluate treatment results and for care coordination. Return in about 7 months (around 9/8/2018), or if symptoms worsen or fail to improve. I have reviewed the patient's medical and surgical, family and social history, health maintenance schedule, and updated the computerized patient record. Please note this report has been partially produced by using speech recognition hardware.  It may contain errors related to the

## 2018-03-14 ENCOUNTER — APPOINTMENT (OUTPATIENT)
Dept: GENERAL RADIOLOGY | Age: 68
End: 2018-03-14
Payer: COMMERCIAL

## 2018-03-14 ENCOUNTER — HOSPITAL ENCOUNTER (EMERGENCY)
Age: 68
Discharge: HOME | End: 2018-03-14
Attending: EMERGENCY MEDICINE
Payer: COMMERCIAL

## 2018-03-14 VITALS
HEART RATE: 80 BPM | SYSTOLIC BLOOD PRESSURE: 158 MMHG | HEIGHT: 62 IN | TEMPERATURE: 97.3 F | WEIGHT: 125 LBS | RESPIRATION RATE: 16 BRPM | OXYGEN SATURATION: 96 % | DIASTOLIC BLOOD PRESSURE: 74 MMHG | BODY MASS INDEX: 23 KG/M2

## 2018-03-14 DIAGNOSIS — S42.292A HUMERAL HEAD FRACTURE, LEFT, CLOSED, INITIAL ENCOUNTER: Primary | ICD-10-CM

## 2018-03-14 PROCEDURE — 73030 X-RAY EXAM OF SHOULDER: CPT

## 2018-03-14 PROCEDURE — 99283 EMERGENCY DEPT VISIT LOW MDM: CPT

## 2018-03-14 RX ORDER — IBUPROFEN 400 MG/1
800 TABLET ORAL ONCE
Status: DISCONTINUED | OUTPATIENT
Start: 2018-03-14 | End: 2018-03-14 | Stop reason: HOSPADM

## 2018-03-14 RX ORDER — HYDROCODONE BITARTRATE AND ACETAMINOPHEN 5; 325 MG/1; MG/1
1-2 TABLET ORAL EVERY 4 HOURS PRN
Qty: 15 TABLET | Refills: 0 | Status: SHIPPED | OUTPATIENT
Start: 2018-03-14 | End: 2018-03-17

## 2018-03-14 ASSESSMENT — PAIN SCALES - GENERAL
PAINLEVEL_OUTOF10: 3
PAINLEVEL_OUTOF10: 9
PAINLEVEL_OUTOF10: 4

## 2018-03-14 ASSESSMENT — ENCOUNTER SYMPTOMS
ABDOMINAL PAIN: 0
SHORTNESS OF BREATH: 0
SORE THROAT: 0
VOMITING: 0
CHEST TIGHTNESS: 0
NAUSEA: 0
EYE PAIN: 0

## 2018-03-14 ASSESSMENT — PAIN DESCRIPTION - PAIN TYPE
TYPE: ACUTE PAIN
TYPE: ACUTE PAIN

## 2018-03-14 ASSESSMENT — PAIN DESCRIPTION - ORIENTATION
ORIENTATION: LEFT
ORIENTATION: LEFT

## 2018-03-14 ASSESSMENT — PAIN DESCRIPTION - DESCRIPTORS: DESCRIPTORS: ACHING;TIGHTNESS;CONSTANT

## 2018-03-14 NOTE — ED PROVIDER NOTES
[address] [facility] ED  eMERGENCY dEPARTMENT eNCOUnter      Pt Name: Jeannine Rodríguez  MRN: [id number]  [place] 1950  Date of evaluation: 3/14/2018  Provider: Teodoro Rebollar DO    CHIEF COMPLAINT       Chief Complaint   Patient presents with    Shoulder Injury     left shoulder injury tripped at work, heard something pop in left shoulder no obvious deformity noted         HISTORY OF PRESENT ILLNESS   (Location/Symptom, Timing/Onset, Context/Setting, Quality, Duration, Modifying Factors, Severity)  Note limiting factors. Jeannine Rodríguez is a 69 y.o. female who presents to the emergency department . Patient presents after tripping at work on a cord. She landed on her left shoulder. She has a lot of pain and left shoulder and it hurts more when she tries to move it. She is right-hand dominant. Not injure anything else and did not hit her head. 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 REVIEW OF SYSTEMS    (2-9 systems for level 4, 10 or more for level 5)     Review of Systems   Constitutional: Negative for activity change, appetite change and fatigue. HENT: Negative for congestion and sore throat. Eyes: Negative for pain and visual disturbance. Respiratory: Negative for chest tightness and shortness of breath. Cardiovascular: Negative for chest pain. Gastrointestinal: Negative for abdominal pain, nausea and vomiting. Endocrine: Negative for polydipsia. Genitourinary: Negative for flank pain and urgency. Musculoskeletal: Positive for arthralgias and myalgias. Negative for gait problem and neck stiffness. Skin: Negative for rash. Neurological: Negative for weakness, light-headedness and headaches. Psychiatric/Behavioral: Negative for confusion and sleep disturbance. Except as noted above the remainder of the review of systems was reviewed and negative.        PAST MEDICAL HISTORY     Past Medical History:   Diagnosis Date    Bilateral edema of wrist.   Neurological: She is alert and oriented to person, place, and time. No cranial nerve deficit. Skin: Skin is warm and dry. No rash noted. She is not diaphoretic. Psychiatric: She has a normal mood and affect. Her behavior is normal.       DIAGNOSTIC RESULTS     EKG: All EKG's are interpreted by the Emergency Department Physician who either signs or Co-signs this chart in the absence of a cardiologist.        RADIOLOGY:   Non-plain film images such as CT, Ultrasound and MRI are read by the radiologist. Plain radiographic images are visualized and preliminarily interpreted by the emergency physician with the below findings:    Left shoulder shows a humeral head avulsion fracture. Interpretation per the Radiologist below, if available at the time of this note:    XR SHOULDER LEFT (MIN 2 VIEWS)   Final Result   ACUTE MINIMALLY DISPLACED HAIRLINE FRACTURE OF THE LATERAL ASPECT OF THE HUMERAL HEAD AND INVOLVING THE GREATER TUBEROSITY            ED BEDSIDE ULTRASOUND:   Performed by ED Physician - none    LABS:  Labs Reviewed - No data to display    All other labs were within normal range or not returned as of this dictation. EMERGENCY DEPARTMENT COURSE and DIFFERENTIAL DIAGNOSIS/MDM:   Vitals:    Vitals:    03/14/18 0923   BP: (!) 158/74   Pulse: 80   Resp: 16   Temp: 97.3 °F (36.3 °C)   TempSrc: Oral   SpO2: 96%   Weight: 125 lb (56.7 kg)   Height: 5' 1.75\" (1.568 m)       Patient presents after falling at work. She landed on her left shoulder and incurred a fracture to the greater tuberosity of the humeral head. Patient was sent directly to see orthopedics in the office. MDM      REASSESSMENT     ED Course          CRITICAL CARE TIME   Total Critical Care time was 0 minutes, excluding separately reportable procedures. There was a high probability of clinically significant/life threatening deterioration in the patient's condition which required my urgent intervention.

## 2018-03-14 NOTE — ED NOTES
called back, pt does NOT need to go to lab after D/C for any testing.      Leo Dwyer, JACKELYN  03/14/18 [id number]

## 2018-04-11 RX ORDER — DILTIAZEM HYDROCHLORIDE 240 MG/1
240 CAPSULE, COATED, EXTENDED RELEASE ORAL DAILY
Qty: 30 CAPSULE | Refills: 0 | Status: SHIPPED | OUTPATIENT
Start: 2018-04-11 | End: 2018-05-08 | Stop reason: SDUPTHER

## 2018-05-08 RX ORDER — RIVAROXABAN 20 MG/1
20 TABLET, FILM COATED ORAL
Qty: 30 TABLET | Refills: 11 | Status: SHIPPED | OUTPATIENT
Start: 2018-05-08 | End: 2018-05-14 | Stop reason: SDUPTHER

## 2018-05-08 RX ORDER — DILTIAZEM HYDROCHLORIDE 240 MG/1
240 CAPSULE, COATED, EXTENDED RELEASE ORAL DAILY
Qty: 90 CAPSULE | Refills: 3 | Status: SHIPPED | OUTPATIENT
Start: 2018-05-08 | End: 2018-12-12 | Stop reason: ALTCHOICE

## 2018-05-14 ENCOUNTER — OFFICE VISIT (OUTPATIENT)
Dept: CARDIOLOGY CLINIC | Age: 68
End: 2018-05-14
Payer: COMMERCIAL

## 2018-05-14 VITALS
BODY MASS INDEX: 22.82 KG/M2 | DIASTOLIC BLOOD PRESSURE: 80 MMHG | RESPIRATION RATE: 12 BRPM | HEIGHT: 62 IN | HEART RATE: 76 BPM | WEIGHT: 124 LBS | SYSTOLIC BLOOD PRESSURE: 112 MMHG

## 2018-05-14 DIAGNOSIS — I48.91 ATRIAL FIBRILLATION, UNSPECIFIED TYPE (HCC): Primary | ICD-10-CM

## 2018-05-14 PROCEDURE — 99213 OFFICE O/P EST LOW 20 MIN: CPT | Performed by: INTERNAL MEDICINE

## 2018-05-14 RX ORDER — PHENOL 1.4 %
1 AEROSOL, SPRAY (ML) MUCOUS MEMBRANE DAILY
COMMUNITY
End: 2020-05-22 | Stop reason: ALTCHOICE

## 2018-05-14 RX ORDER — MAGNESIUM OXIDE 400 MG/1
400 TABLET ORAL 2 TIMES DAILY
COMMUNITY

## 2018-06-08 ENCOUNTER — OFFICE VISIT (OUTPATIENT)
Dept: PULMONOLOGY | Age: 68
End: 2018-06-08
Payer: COMMERCIAL

## 2018-06-08 VITALS
HEART RATE: 75 BPM | HEIGHT: 61 IN | TEMPERATURE: 97.4 F | BODY MASS INDEX: 24.92 KG/M2 | SYSTOLIC BLOOD PRESSURE: 134 MMHG | OXYGEN SATURATION: 96 % | WEIGHT: 132 LBS | RESPIRATION RATE: 16 BRPM | DIASTOLIC BLOOD PRESSURE: 76 MMHG

## 2018-06-08 DIAGNOSIS — J44.9 CHRONIC OBSTRUCTIVE PULMONARY DISEASE, UNSPECIFIED COPD TYPE (HCC): Primary | ICD-10-CM

## 2018-06-08 PROCEDURE — 99213 OFFICE O/P EST LOW 20 MIN: CPT | Performed by: INTERNAL MEDICINE

## 2018-06-08 ASSESSMENT — ENCOUNTER SYMPTOMS
VOICE CHANGE: 0
ABDOMINAL PAIN: 0
COUGH: 0
WHEEZING: 0
SINUS PRESSURE: 0
CHEST TIGHTNESS: 0
SHORTNESS OF BREATH: 1
VOMITING: 0
SORE THROAT: 0
DIARRHEA: 0
NAUSEA: 0
RHINORRHEA: 0
TROUBLE SWALLOWING: 0
EYE ITCHING: 0
EYE DISCHARGE: 0

## 2018-07-10 ENCOUNTER — OFFICE VISIT (OUTPATIENT)
Dept: GASTROENTEROLOGY | Age: 68
End: 2018-07-10
Payer: COMMERCIAL

## 2018-07-10 VITALS
WEIGHT: 134 LBS | SYSTOLIC BLOOD PRESSURE: 144 MMHG | DIASTOLIC BLOOD PRESSURE: 74 MMHG | HEART RATE: 78 BPM | BODY MASS INDEX: 25.32 KG/M2

## 2018-07-10 DIAGNOSIS — Z79.01 LONG-TERM (CURRENT) USE OF ANTICOAGULANTS: ICD-10-CM

## 2018-07-10 DIAGNOSIS — Z80.0 FAMILY HISTORY OF COLON CANCER: ICD-10-CM

## 2018-07-10 DIAGNOSIS — Z86.010 HX OF COLONIC POLYPS: Primary | ICD-10-CM

## 2018-07-10 PROCEDURE — 99214 OFFICE O/P EST MOD 30 MIN: CPT | Performed by: INTERNAL MEDICINE

## 2018-07-13 ENCOUNTER — ANESTHESIA EVENT (OUTPATIENT)
Dept: ENDOSCOPY | Age: 68
End: 2018-07-13
Payer: COMMERCIAL

## 2018-07-13 ENCOUNTER — HOSPITAL ENCOUNTER (OUTPATIENT)
Age: 68
Setting detail: OUTPATIENT SURGERY
Discharge: HOME | End: 2018-07-13
Attending: INTERNAL MEDICINE | Admitting: INTERNAL MEDICINE
Payer: COMMERCIAL

## 2018-07-13 ENCOUNTER — ANESTHESIA (OUTPATIENT)
Dept: ENDOSCOPY | Age: 68
End: 2018-07-13
Payer: COMMERCIAL

## 2018-07-13 VITALS
BODY MASS INDEX: 24.17 KG/M2 | HEIGHT: 61 IN | WEIGHT: 128 LBS | TEMPERATURE: 98.4 F | RESPIRATION RATE: 16 BRPM | OXYGEN SATURATION: 99 % | HEART RATE: 83 BPM | DIASTOLIC BLOOD PRESSURE: 74 MMHG | SYSTOLIC BLOOD PRESSURE: 120 MMHG

## 2018-07-13 VITALS
RESPIRATION RATE: 10 BRPM | OXYGEN SATURATION: 99 % | DIASTOLIC BLOOD PRESSURE: 62 MMHG | SYSTOLIC BLOOD PRESSURE: 125 MMHG

## 2018-07-13 PROCEDURE — 7100000011 HC PHASE II RECOVERY - ADDTL 15 MIN: Performed by: INTERNAL MEDICINE

## 2018-07-13 PROCEDURE — 2500000003 HC RX 250 WO HCPCS: Performed by: NURSE ANESTHETIST, CERTIFIED REGISTERED

## 2018-07-13 PROCEDURE — 45385 COLONOSCOPY W/LESION REMOVAL: CPT | Performed by: INTERNAL MEDICINE

## 2018-07-13 PROCEDURE — 3700000000 HC ANESTHESIA ATTENDED CARE: Performed by: INTERNAL MEDICINE

## 2018-07-13 PROCEDURE — 7100000010 HC PHASE II RECOVERY - FIRST 15 MIN: Performed by: INTERNAL MEDICINE

## 2018-07-13 PROCEDURE — 3609027000 HC COLONOSCOPY: Performed by: INTERNAL MEDICINE

## 2018-07-13 PROCEDURE — 3700000001 HC ADD 15 MINUTES (ANESTHESIA): Performed by: INTERNAL MEDICINE

## 2018-07-13 PROCEDURE — 6360000002 HC RX W HCPCS: Performed by: NURSE ANESTHETIST, CERTIFIED REGISTERED

## 2018-07-13 RX ORDER — PROPOFOL 10 MG/ML
INJECTION, EMULSION INTRAVENOUS PRN
Status: DISCONTINUED | OUTPATIENT
Start: 2018-07-13 | End: 2018-07-13 | Stop reason: SDUPTHER

## 2018-07-13 RX ORDER — GLYCOPYRROLATE 1 MG/5 ML
SYRINGE (ML) INTRAVENOUS PRN
Status: DISCONTINUED | OUTPATIENT
Start: 2018-07-13 | End: 2018-07-13 | Stop reason: SDUPTHER

## 2018-07-13 RX ORDER — SODIUM CHLORIDE 9 MG/ML
INJECTION, SOLUTION INTRAVENOUS CONTINUOUS
Status: DISCONTINUED | OUTPATIENT
Start: 2018-07-13 | End: 2018-07-13 | Stop reason: HOSPADM

## 2018-07-13 RX ORDER — LIDOCAINE HYDROCHLORIDE 20 MG/ML
INJECTION, SOLUTION INFILTRATION; PERINEURAL PRN
Status: DISCONTINUED | OUTPATIENT
Start: 2018-07-13 | End: 2018-07-13 | Stop reason: SDUPTHER

## 2018-07-13 RX ORDER — ONDANSETRON 2 MG/ML
4 INJECTION INTRAMUSCULAR; INTRAVENOUS
Status: DISCONTINUED | OUTPATIENT
Start: 2018-07-13 | End: 2018-07-13 | Stop reason: HOSPADM

## 2018-07-13 RX ADMIN — PROPOFOL 20 MG: 10 INJECTION, EMULSION INTRAVENOUS at 11:03

## 2018-07-13 RX ADMIN — PROPOFOL 20 MG: 10 INJECTION, EMULSION INTRAVENOUS at 10:58

## 2018-07-13 RX ADMIN — PROPOFOL 20 MG: 10 INJECTION, EMULSION INTRAVENOUS at 11:01

## 2018-07-13 RX ADMIN — PROPOFOL 70 MG: 10 INJECTION, EMULSION INTRAVENOUS at 10:57

## 2018-07-13 RX ADMIN — PROPOFOL 20 MG: 10 INJECTION, EMULSION INTRAVENOUS at 11:05

## 2018-07-13 RX ADMIN — PROPOFOL 30 MG: 10 INJECTION, EMULSION INTRAVENOUS at 10:59

## 2018-07-13 RX ADMIN — Medication 0.1 MG: at 11:10

## 2018-07-13 RX ADMIN — LIDOCAINE HYDROCHLORIDE 50 MG: 20 INJECTION, SOLUTION INFILTRATION; PERINEURAL at 10:57

## 2018-07-13 ASSESSMENT — PAIN - FUNCTIONAL ASSESSMENT: PAIN_FUNCTIONAL_ASSESSMENT: 0-10

## 2018-07-13 ASSESSMENT — ENCOUNTER SYMPTOMS: SHORTNESS OF BREATH: 1

## 2018-07-13 NOTE — ANESTHESIA PRE PROCEDURE
Department of Anesthesiology  Preprocedure Note       Name:  Kathleen Mayo   Age:  69 y.o.  :  1950                                          MRN:  [id number]         Date:  2018      Surgeon: Casey Rico):  Deepak Garcia MD    Procedure: Procedure(s):  COLONOSCOPY    Medications prior to admission:   Prior to Admission medications    Medication Sig Start Date End Date Taking? Authorizing Provider   calcium carbonate 600 MG TABS tablet Take 1 tablet by mouth daily   Yes Historical Provider, MD   magnesium oxide (MAG-OX) 400 MG tablet Take 400 mg by mouth daily   Yes Historical Provider, MD   rivaroxaban (XARELTO) 20 MG TABS tablet Take 1 tablet by mouth daily (with breakfast) 18  Yes Sadia Mejia MD   diltiazem (CARDIZEM CD) 240 MG extended release capsule TAKE 1 CAPSULE BY MOUTH DAILY 18  Yes Sadia Mejia MD   ANORO ELLIPTA 62.5-25 MCG/INH AEPB inhaler INHALE 1 PUFF INTO THE LUNGS DAILY 17  Yes Krystal Lynn MD   Vitamin D (CHOLECALCIFEROL) 1000 UNITS CAPS capsule Take 1,000 Units by mouth daily.      Yes Historical Provider, MD   albuterol (PROVENTIL) (2.5 MG/3ML) 0.083% nebulizer solution Take 2.5 mg by nebulization every 6 hours as needed for Wheezing     Historical Provider, MD       Current medications:    Current Facility-Administered Medications   Medication Dose Route Frequency Provider Last Rate Last Dose    0.9 % sodium chloride infusion   Intravenous Continuous Deepak Garcia MD           Allergies:  No Known Allergies    Problem List:    Patient Active Problem List   Diagnosis Code    Hyperlipidemia with target LDL less than 130 E78.5    Vitamin D deficiency E55.9    Atrial fibrillation (HCC) I48.91    Pulmonary emphysema ([facility] [address] J43.9    Bilateral edema of lower extremity R60.0    Other plastic surgery for unacceptable cosmetic appearance Z41.1    Tobacco abuse Z72.0    Fatigue R53.83    COPD (chronic obstructive pulmonary disease) ([facility] [address] J44.9   

## 2018-09-13 ENCOUNTER — OFFICE VISIT (OUTPATIENT)
Dept: INTERNAL MEDICINE CLINIC | Age: 68
End: 2018-09-13
Payer: COMMERCIAL

## 2018-09-13 VITALS
BODY MASS INDEX: 25.3 KG/M2 | DIASTOLIC BLOOD PRESSURE: 86 MMHG | HEIGHT: 61 IN | HEART RATE: 70 BPM | WEIGHT: 134 LBS | SYSTOLIC BLOOD PRESSURE: 130 MMHG | TEMPERATURE: 97.1 F | OXYGEN SATURATION: 98 %

## 2018-09-13 DIAGNOSIS — M85.851 OSTEOPENIA OF NECKS OF BOTH FEMURS: ICD-10-CM

## 2018-09-13 DIAGNOSIS — Z91.81 AT HIGH RISK FOR FALLS: ICD-10-CM

## 2018-09-13 DIAGNOSIS — E55.9 VITAMIN D DEFICIENCY: ICD-10-CM

## 2018-09-13 DIAGNOSIS — E78.5 HYPERLIPIDEMIA WITH TARGET LDL LESS THAN 130: Primary | ICD-10-CM

## 2018-09-13 DIAGNOSIS — Z23 NEED FOR INFLUENZA VACCINATION: ICD-10-CM

## 2018-09-13 DIAGNOSIS — M85.852 OSTEOPENIA OF NECKS OF BOTH FEMURS: ICD-10-CM

## 2018-09-13 PROCEDURE — 99213 OFFICE O/P EST LOW 20 MIN: CPT | Performed by: INTERNAL MEDICINE

## 2018-09-13 PROCEDURE — 90471 IMMUNIZATION ADMIN: CPT | Performed by: INTERNAL MEDICINE

## 2018-09-13 PROCEDURE — 90662 IIV NO PRSV INCREASED AG IM: CPT | Performed by: INTERNAL MEDICINE

## 2018-09-13 ASSESSMENT — ENCOUNTER SYMPTOMS
SHORTNESS OF BREATH: 0
CHEST TIGHTNESS: 0
TROUBLE SWALLOWING: 0
ABDOMINAL PAIN: 0
COUGH: 0
WHEEZING: 0
HEARTBURN: 0
CHOKING: 0
SORE THROAT: 0
VOICE CHANGE: 0
BLOOD IN STOOL: 0

## 2018-09-13 ASSESSMENT — COPD QUESTIONNAIRES: COPD: 1

## 2018-09-13 ASSESSMENT — PATIENT HEALTH QUESTIONNAIRE - PHQ9
2. FEELING DOWN, DEPRESSED OR HOPELESS: 0
SUM OF ALL RESPONSES TO PHQ QUESTIONS 1-9: 0
1. LITTLE INTEREST OR PLEASURE IN DOING THINGS: 0
SUM OF ALL RESPONSES TO PHQ9 QUESTIONS 1 & 2: 0
SUM OF ALL RESPONSES TO PHQ QUESTIONS 1-9: 0

## 2018-09-13 NOTE — PROGRESS NOTES
Negative mg/dL Final    Urobilinogen, Urine 02/08/2018 0.2  <2.0 E.U./dL Final    Nitrite, Urine 02/08/2018 Negative  Negative Final    Leukocyte Esterase, Urine 02/08/2018 TRACE* Negative Final    WBC, UA 02/08/2018 6-10* 0 - 5 /HPF Final    RBC, UA 02/08/2018 0-2  0 - 2 /HPF Final    Bacteria, UA 02/08/2018 Few  /HPF Final       HPI:    Hyperlipidemia   This is a chronic problem. The current episode started more than 1 year ago. Recent lipid tests were reviewed and are variable. She has no history of diabetes, hypothyroidism or obesity. Pertinent negatives include no chest pain, focal weakness, leg pain, myalgias or shortness of breath. Current antihyperlipidemic treatment includes diet change. There are no compliance problems. Risk factors for coronary artery disease include post-menopausal, a sedentary lifestyle and stress. COPD   There is no cough, shortness of breath or wheezing. This is a chronic problem. The current episode started more than 1 year ago. The problem has been gradually improving. Pertinent negatives include no chest pain, dyspnea on exertion, fever, headaches, heartburn, malaise/fatigue, myalgias, orthopnea, PND, postnasal drip, sore throat, trouble swallowing or weight loss. Her symptoms are aggravated by nothing. Her symptoms are alleviated by beta-agonist and steroid inhaler. She reports complete improvement on treatment. Risk factors for lung disease include smoking/tobacco exposure. Her past medical history is significant for COPD. There is no history of bronchiectasis. Other   Pertinent negatives include no abdominal pain, chest pain, chills, congestion, coughing, fatigue, fever, headaches, myalgias, neck pain, rash, sore throat or weakness. More detail above in the chief complaint(s), interim history and below in the review of systems.      Past Medical History:   Diagnosis Date    COPD (chronic obstructive pulmonary disease) ([facility] [address] 2015    Dr Yuliet Sam Cyst of neck Medication Sig Dispense Refill    calcium carbonate 600 MG TABS tablet Take 1 tablet by mouth daily      magnesium oxide (MAG-OX) 400 MG tablet Take 400 mg by mouth daily      rivaroxaban (XARELTO) 20 MG TABS tablet Take 1 tablet by mouth daily (with breakfast) 30 tablet 11    diltiazem (CARDIZEM CD) 240 MG extended release capsule TAKE 1 CAPSULE BY MOUTH DAILY 90 capsule 3    ANORO ELLIPTA 62.5-25 MCG/INH AEPB inhaler INHALE 1 PUFF INTO THE LUNGS DAILY 1 each 5    Vitamin D (CHOLECALCIFEROL) 1000 UNITS CAPS capsule Take 1,000 Units by mouth daily. No current facility-administered medications on file prior to visit. Review of Systems   Constitutional: Negative for chills, fatigue, fever, malaise/fatigue and weight loss. HENT: Negative for congestion, nosebleeds, postnasal drip, sore throat, trouble swallowing and voice change. Respiratory: Negative for cough, choking, chest tightness, shortness of breath and wheezing. Cardiovascular: Negative for chest pain, dyspnea on exertion, palpitations, leg swelling and PND. Gastrointestinal: Negative for abdominal pain, blood in stool and heartburn. Endocrine: Negative for cold intolerance and heat intolerance. Genitourinary: Negative for dysuria, frequency, hematuria, pelvic pain and urgency. Musculoskeletal: Negative for myalgias, neck pain and neck stiffness. Skin: Negative for rash. Neurological: Negative for dizziness, focal weakness, syncope, weakness, light-headedness and headaches. Hematological: Does not bruise/bleed easily. Psychiatric/Behavioral: Negative for confusion, decreased concentration, dysphoric mood and sleep disturbance. The patient is nervous/anxious.         Vitals:    09/13/18 1546   BP: 130/86   Site: Right Upper Arm   Position: Sitting   Cuff Size: Medium Adult   Pulse: 70   Temp: 97.1 °F (36.2 °C)   TempSrc: Tympanic   SpO2: 98%   Weight: 134 lb (60.8 kg)   Height: 5' 1\" (1.549 m)       Physical Exam Constitutional: She is oriented to person, place, and time. She appears well-nourished. No distress. HENT:   Head: Atraumatic. Eyes: Pupils are equal, round, and reactive to light. Conjunctivae and EOM are normal. No scleral icterus. Neck: Normal range of motion. Neck supple. Normal carotid pulses and no JVD present. Carotid bruit is not present. No thyromegaly present. Cardiovascular: Regular rhythm, normal heart sounds and intact distal pulses. No extrasystoles are present. PMI is not displaced. Exam reveals no gallop. Pulses:       Carotid pulses are 2+ on the right side, and 2+ on the left side. Radial pulses are 2+ on the right side, and 2+ on the left side. Pulmonary/Chest: Effort normal. She has no rales. Diminished breath sounds bilaterally     Abdominal: Soft. She exhibits no distension. There is no tenderness. Musculoskeletal: Normal range of motion. Lymphadenopathy:     She has no cervical adenopathy. Neurological: She is alert and oriented to person, place, and time. Coordination normal.   Stance, gait well balanced and stable, Romberg negative. No focal motor neurological deficits. Skin: Skin is warm. No rash noted. Psychiatric: Her speech is normal and behavior is normal. Judgment normal. Her mood appears anxious. She is not slowed and not withdrawn. Thought content is not delusional. She does not exhibit a depressed mood. She exhibits normal recent memory and normal remote memory. Mood improved, appears calmer  She is attentive. Nursing note and vitals reviewed. Assessment:    Kimi Odell was seen today for hyperlipidemia, copd and immunizations. Diagnoses and all orders for this visit:    Hyperlipidemia with target LDL less than 130              No statin at this time due to no additional risk factors (such as diabetes, smoking or hypertension), continue to monitor lipids  -     Lipid Panel; Future  -     Cancel: Basic Metabolic Panel;  Future  -     CBC; Future  -     Comprehensive Metabolic Panel; Future    Osteopenia of necks of both femurs              Continue screening for osteoporosis, continue vitamin D supplement and increase calcium from dietary sources  -     DEXA BONE DENSITY AXIAL SKELETON; Future    Need for influenza vaccination  -     INFLUENZA, HIGH DOSE, 65 YRS +, IM, PF, PREFILL SYR, 0.5ML (FLUZONE HD)    Vitamin D deficiency               Has been taking supplements on a regular basis        Plan:    Reviewed with the patient (/and caregiver if present): current health status, medications, activities and diet. See also orders and comments in the assessment section. Today's diagnosis (in the context of chronic problems) was discussed with patient (/and caregiver if present), questions answered. The current medical regimen is effective;  continue present plan and medications. Laboratories ordered to monitor chronic illness status and to help direct management. Age appropriate screening tests ordered per current clinical guidelines and based on patient's age and risk factors. Orders Placed This Encounter   Procedures    DEXA BONE DENSITY AXIAL SKELETON     Standing Status:   Future     Standing Expiration Date:   9/13/2019    INFLUENZA, HIGH DOSE, 65 YRS +, IM, PF, PREFILL SYR, 0.5ML (FLUZONE HD)    Lipid Panel     Standing Status:   Future     Standing Expiration Date:   9/13/2019     Order Specific Question:   Is Patient Fasting?/# of Hours     Answer:   Yes, 12 Hours    CBC     Standing Status:   Future     Standing Expiration Date:   9/13/2019    Comprehensive Metabolic Panel     Standing Status:   Future     Standing Expiration Date:   9/13/2019     Further workup and plan will be determined based on clinical progression and follow up test/ treatment results. Close follow up needed to evaluate treatment results and for care coordination.    Return in about 6 months (around 3/13/2019), or if symptoms worsen or fail to

## 2018-09-15 DIAGNOSIS — E78.5 HYPERLIPIDEMIA WITH TARGET LDL LESS THAN 130: ICD-10-CM

## 2018-09-15 LAB
ALBUMIN SERPL-MCNC: 4.1 G/DL (ref 3.9–4.9)
ALP BLD-CCNC: 78 U/L (ref 40–130)
ALT SERPL-CCNC: 13 U/L (ref 0–33)
ANION GAP SERPL CALCULATED.3IONS-SCNC: 13 MEQ/L (ref 7–13)
AST SERPL-CCNC: 16 U/L (ref 0–35)
BILIRUB SERPL-MCNC: 0.3 MG/DL (ref 0–1.2)
BUN BLDV-MCNC: 17 MG/DL (ref 8–23)
CALCIUM SERPL-MCNC: 9.4 MG/DL (ref 8.6–10.2)
CHLORIDE BLD-SCNC: 105 MEQ/L (ref 98–107)
CHOLESTEROL, TOTAL: 229 MG/DL (ref 0–199)
CO2: 23 MEQ/L (ref 22–29)
CREAT SERPL-MCNC: 0.7 MG/DL (ref 0.5–0.9)
GFR AFRICAN AMERICAN: >60
GFR NON-AFRICAN AMERICAN: >60
GLOBULIN: 2.7 G/DL (ref 2.3–3.5)
GLUCOSE BLD-MCNC: 96 MG/DL (ref 74–109)
HCT VFR BLD CALC: 43.4 % (ref 37–47)
HDLC SERPL-MCNC: 78 MG/DL (ref 40–59)
HEMOGLOBIN: 14.8 G/DL (ref 12–16)
LDL CHOLESTEROL CALCULATED: 138 MG/DL (ref 0–129)
MCH RBC QN AUTO: 32.1 PG (ref 27–31.3)
MCHC RBC AUTO-ENTMCNC: 34.1 % (ref 33–37)
MCV RBC AUTO: 94 FL (ref 82–100)
PDW BLD-RTO: 13.3 % (ref 11.5–14.5)
PLATELET # BLD: 327 K/UL (ref 130–400)
POTASSIUM SERPL-SCNC: 4.4 MEQ/L (ref 3.5–5.1)
RBC # BLD: 4.62 M/UL (ref 4.2–5.4)
SODIUM BLD-SCNC: 141 MEQ/L (ref 132–144)
TOTAL PROTEIN: 6.8 G/DL (ref 6.4–8.1)
TRIGL SERPL-MCNC: 67 MG/DL (ref 0–200)
WBC # BLD: 5.2 K/UL (ref 4.8–10.8)

## 2018-09-19 ENCOUNTER — HOSPITAL ENCOUNTER (OUTPATIENT)
Dept: WOMENS IMAGING | Age: 68
Discharge: HOME | End: 2018-09-21
Payer: COMMERCIAL

## 2018-09-19 DIAGNOSIS — M85.851 OSTEOPENIA OF NECKS OF BOTH FEMURS: ICD-10-CM

## 2018-09-19 DIAGNOSIS — M85.852 OSTEOPENIA OF NECKS OF BOTH FEMURS: ICD-10-CM

## 2018-09-19 PROCEDURE — 77080 DXA BONE DENSITY AXIAL: CPT

## 2018-09-22 DIAGNOSIS — J44.1 COPD EXACERBATION (HCC): ICD-10-CM

## 2018-09-25 RX ORDER — UMECLIDINIUM BROMIDE AND VILANTEROL TRIFENATATE 62.5; 25 UG/1; UG/1
1 POWDER RESPIRATORY (INHALATION) DAILY
Qty: 1 EACH | Refills: 5 | Status: SHIPPED | OUTPATIENT
Start: 2018-09-25 | End: 2019-03-20 | Stop reason: SDUPTHER

## 2018-11-19 ENCOUNTER — OFFICE VISIT (OUTPATIENT)
Dept: CARDIOLOGY CLINIC | Age: 68
End: 2018-11-19
Payer: COMMERCIAL

## 2018-11-19 VITALS
BODY MASS INDEX: 25.3 KG/M2 | WEIGHT: 134 LBS | SYSTOLIC BLOOD PRESSURE: 157 MMHG | OXYGEN SATURATION: 90 % | HEART RATE: 99 BPM | HEIGHT: 61 IN | RESPIRATION RATE: 20 BRPM | DIASTOLIC BLOOD PRESSURE: 89 MMHG

## 2018-11-19 DIAGNOSIS — I48.91 ATRIAL FIBRILLATION, UNSPECIFIED TYPE (HCC): Primary | ICD-10-CM

## 2018-11-19 PROCEDURE — 99213 OFFICE O/P EST LOW 20 MIN: CPT | Performed by: INTERNAL MEDICINE

## 2018-11-19 ASSESSMENT — ENCOUNTER SYMPTOMS
APNEA: 0
COLOR CHANGE: 0
CHEST TIGHTNESS: 0
DIARRHEA: 0
NAUSEA: 0
BLOOD IN STOOL: 0
VOMITING: 0
SHORTNESS OF BREATH: 0

## 2018-11-19 NOTE — PROGRESS NOTES
[facility] CARDIOLOGY OFFICE FOLLOW-UP      Patient: Theresa Castanon  YOB: 1950  MRN: [id number]    Chief Complaint:  Chief Complaint   Patient presents with    6 Month Follow-Up    Atrial Fibrillation         Subjective/HPI:  11/19/18: Patient presents today for Follow-up of atrial fibrillation. Remains in sinus rhythm. Continues to work. On Cardizem and Xarelto only. No bleeding. She'll see me in 6 months. He wants to get off blood thinners. I told her not to do it right now. We will reconsider that option in 6 months. 5/14/18: Patient presents today for Follow-up of atrial fibrillation. Remains in sinus rhythm. On Xarelto. No bleeding. She is off amiodarone. Doing well. She fell down and hurt her left shoulder. Is healing up well. No syncope. See me in 6 months     11/13/17: Patient presents today for atrial fibrillation. In sinus rhythm. On Xarelto and Cardizem. No bleeding. Off amiodarone. See me in 6 months.     5/1/17: For follow-up of atrial fibrillation. Thyroid profile was normal. Amiodarone DC'd. Plan Cardizem and Xarelto. She is going to be going to [place] for road trip with her . Feels well. She is  status post cardioversion and remains in sinus rhythm.  See me in 6 months.              Past Medical History:   Diagnosis Date    COPD (chronic obstructive pulmonary disease) ([facility] [address] 2015    Dr Margret Laws Cyst of neck 2012    after plastic surgery    History of atrial fibrillation 2015    Dr Johnson Sánchez History of humerus fracture     LEFT    Hyperlipidemia LDL goal < 130     Osteopenia 2014    S/P colonoscopic polypectomy 2008, 2015, 2018    Dr Kenny Gonsalves    Smoking history     Vitamin D deficiency        Past Surgical History:   Procedure Laterality Date    ATRIAL ABLATION SURGERY      BREAST SURGERY  1975    staph infection, postpartum    COLONOSCOPY  7/22/15    w/polypectomy Dr Patricia Bell  2012    Dr Konstantin Ortega; HI RISK IND normal motor skills and normal reflexes. Gait normal.   Skin: Skin is warm and dry.        LABS:  CBC:   Lab Results   Component Value Date    WBC 5.2 09/15/2018    RBC 4.62 09/15/2018    HGB 14.8 09/15/2018    HCT 43.4 09/15/2018    MCV 94.0 09/15/2018    MCH 32.1 09/15/2018    MCHC 34.1 09/15/2018    RDW 13.3 09/15/2018     09/15/2018    MPV 9.3 10/24/2014     Lipids:  Lab Results   Component Value Date    CHOL 229 (H) 09/15/2018    CHOL 269 (H) 10/24/2014    CHOL 252 (H) 09/05/2013     Lab Results   Component Value Date    TRIG 67 09/15/2018    TRIG 65 10/24/2014    TRIG 86 09/05/2013     Lab Results   Component Value Date    HDL 78 (H) 09/15/2018    HDL 87 (H) 10/24/2014    HDL 87 (H) 09/05/2013     Lab Results   Component Value Date    LDLCALC 138 (H) 09/15/2018    LDLCALC 169 (H) 10/24/2014    LDLCALC 148 (H) 09/05/2013     No results found for: LABVLDL, VLDL  Lab Results   Component Value Date    CHOLHDLRATIO 3.2 07/10/2012     CMP:    Lab Results   Component Value Date     09/15/2018    K 4.4 09/15/2018     09/15/2018    CO2 23 09/15/2018    BUN 17 09/15/2018    CREATININE 0.70 09/15/2018    GFRAA >60.0 09/15/2018    LABGLOM >60.0 09/15/2018    GLUCOSE 96 09/15/2018    PROT 6.8 09/15/2018    LABALBU 4.1 09/15/2018    CALCIUM 9.4 09/15/2018    BILITOT 0.3 09/15/2018    ALKPHOS 78 09/15/2018    AST 16 09/15/2018    ALT 13 09/15/2018     BMP:    Lab Results   Component Value Date     09/15/2018    K 4.4 09/15/2018     09/15/2018    CO2 23 09/15/2018    BUN 17 09/15/2018    LABALBU 4.1 09/15/2018    CREATININE 0.70 09/15/2018    CALCIUM 9.4 09/15/2018    GFRAA >60.0 09/15/2018    LABGLOM >60.0 09/15/2018    GLUCOSE 96 09/15/2018     Magnesium:    Lab Results   Component Value Date    MG 2.3 12/26/2015     TSH:  Lab Results   Component Value Date    TSH 1.800 04/03/2017       Patient Active Problem List   Diagnosis    Hyperlipidemia with target LDL less than 130    Vitamin D

## 2018-11-21 ENCOUNTER — HOSPITAL ENCOUNTER (OUTPATIENT)
Dept: PULMONOLOGY | Age: 68
Discharge: HOME | End: 2018-11-21
Payer: COMMERCIAL

## 2018-11-21 DIAGNOSIS — J44.9 CHRONIC OBSTRUCTIVE PULMONARY DISEASE, UNSPECIFIED COPD TYPE (HCC): ICD-10-CM

## 2018-11-21 PROCEDURE — 94726 PLETHYSMOGRAPHY LUNG VOLUMES: CPT

## 2018-11-21 PROCEDURE — 94729 DIFFUSING CAPACITY: CPT

## 2018-11-21 PROCEDURE — 6360000002 HC RX W HCPCS: Performed by: INTERNAL MEDICINE

## 2018-11-21 PROCEDURE — 94060 EVALUATION OF WHEEZING: CPT

## 2018-11-21 PROCEDURE — 94729 DIFFUSING CAPACITY: CPT | Performed by: INTERNAL MEDICINE

## 2018-11-21 PROCEDURE — 94726 PLETHYSMOGRAPHY LUNG VOLUMES: CPT | Performed by: INTERNAL MEDICINE

## 2018-11-21 PROCEDURE — 94060 EVALUATION OF WHEEZING: CPT | Performed by: INTERNAL MEDICINE

## 2018-11-21 RX ORDER — ALBUTEROL SULFATE 2.5 MG/3ML
2.5 SOLUTION RESPIRATORY (INHALATION) ONCE
Status: COMPLETED | OUTPATIENT
Start: 2018-11-21 | End: 2018-11-21

## 2018-11-21 RX ADMIN — ALBUTEROL SULFATE 2.5 MG: 2.5 SOLUTION RESPIRATORY (INHALATION) at 09:08

## 2018-12-12 ENCOUNTER — OFFICE VISIT (OUTPATIENT)
Dept: PULMONOLOGY | Age: 68
End: 2018-12-12
Payer: COMMERCIAL

## 2018-12-12 VITALS
BODY MASS INDEX: 25.68 KG/M2 | HEIGHT: 61 IN | HEART RATE: 86 BPM | SYSTOLIC BLOOD PRESSURE: 134 MMHG | DIASTOLIC BLOOD PRESSURE: 64 MMHG | RESPIRATION RATE: 16 BRPM | OXYGEN SATURATION: 95 % | WEIGHT: 136 LBS | TEMPERATURE: 97.8 F

## 2018-12-12 DIAGNOSIS — Z87.891 PERSONAL HISTORY OF TOBACCO USE: ICD-10-CM

## 2018-12-12 DIAGNOSIS — J44.9 CHRONIC OBSTRUCTIVE PULMONARY DISEASE, UNSPECIFIED COPD TYPE (HCC): Primary | ICD-10-CM

## 2018-12-12 PROCEDURE — 99214 OFFICE O/P EST MOD 30 MIN: CPT | Performed by: INTERNAL MEDICINE

## 2018-12-12 PROCEDURE — G0296 VISIT TO DETERM LDCT ELIG: HCPCS | Performed by: INTERNAL MEDICINE

## 2018-12-12 ASSESSMENT — ENCOUNTER SYMPTOMS
SHORTNESS OF BREATH: 1
EYE DISCHARGE: 0
EYE ITCHING: 0
WHEEZING: 0
CHEST TIGHTNESS: 0
RHINORRHEA: 0
ABDOMINAL PAIN: 0
SINUS PRESSURE: 0
NAUSEA: 0
VOICE CHANGE: 0
SORE THROAT: 0
TROUBLE SWALLOWING: 0
COUGH: 0
DIARRHEA: 0
VOMITING: 0

## 2018-12-14 ENCOUNTER — HOSPITAL ENCOUNTER (OUTPATIENT)
Dept: WOMENS IMAGING | Age: 68
Discharge: HOME | End: 2018-12-16
Payer: COMMERCIAL

## 2018-12-14 DIAGNOSIS — Z12.39 ENCOUNTER FOR SCREENING BREAST EXAMINATION: ICD-10-CM

## 2018-12-14 PROCEDURE — 77067 SCR MAMMO BI INCL CAD: CPT

## 2019-01-02 ENCOUNTER — CLINICAL DOCUMENTATION (OUTPATIENT)
Dept: CASE MANAGEMENT | Age: 69
End: 2019-01-02

## 2019-01-04 ENCOUNTER — HOSPITAL ENCOUNTER (OUTPATIENT)
Dept: CT IMAGING | Age: 69
Discharge: HOME | End: 2019-01-06
Payer: COMMERCIAL

## 2019-01-04 DIAGNOSIS — Z87.891 PERSONAL HISTORY OF TOBACCO USE: ICD-10-CM

## 2019-01-04 DIAGNOSIS — J44.9 CHRONIC OBSTRUCTIVE PULMONARY DISEASE, UNSPECIFIED COPD TYPE (HCC): ICD-10-CM

## 2019-01-04 PROCEDURE — G0297 LDCT FOR LUNG CA SCREEN: HCPCS

## 2019-03-20 ENCOUNTER — OFFICE VISIT (OUTPATIENT)
Dept: PULMONOLOGY | Age: 69
End: 2019-03-20
Payer: COMMERCIAL

## 2019-03-20 VITALS
DIASTOLIC BLOOD PRESSURE: 74 MMHG | BODY MASS INDEX: 25.86 KG/M2 | OXYGEN SATURATION: 91 % | HEIGHT: 61 IN | WEIGHT: 137 LBS | SYSTOLIC BLOOD PRESSURE: 126 MMHG | RESPIRATION RATE: 16 BRPM | TEMPERATURE: 98 F | HEART RATE: 81 BPM

## 2019-03-20 DIAGNOSIS — J44.1 COPD EXACERBATION (HCC): ICD-10-CM

## 2019-03-20 DIAGNOSIS — J44.9 CHRONIC OBSTRUCTIVE PULMONARY DISEASE, UNSPECIFIED COPD TYPE (HCC): Primary | ICD-10-CM

## 2019-03-20 PROCEDURE — 99214 OFFICE O/P EST MOD 30 MIN: CPT | Performed by: INTERNAL MEDICINE

## 2019-03-20 ASSESSMENT — ENCOUNTER SYMPTOMS
ABDOMINAL PAIN: 0
EYE DISCHARGE: 0
VOMITING: 0
TROUBLE SWALLOWING: 0
SHORTNESS OF BREATH: 1
CHEST TIGHTNESS: 0
RHINORRHEA: 0
VOICE CHANGE: 0
COUGH: 0
NAUSEA: 0
EYE ITCHING: 0
DIARRHEA: 0
SORE THROAT: 0
SINUS PRESSURE: 0
WHEEZING: 0

## 2019-03-25 ENCOUNTER — OFFICE VISIT (OUTPATIENT)
Dept: INTERNAL MEDICINE CLINIC | Age: 69
End: 2019-03-25
Payer: COMMERCIAL

## 2019-03-25 VITALS
OXYGEN SATURATION: 95 % | DIASTOLIC BLOOD PRESSURE: 73 MMHG | TEMPERATURE: 98.2 F | HEIGHT: 61 IN | BODY MASS INDEX: 25.98 KG/M2 | HEART RATE: 82 BPM | SYSTOLIC BLOOD PRESSURE: 145 MMHG | WEIGHT: 137.6 LBS | RESPIRATION RATE: 16 BRPM

## 2019-03-25 DIAGNOSIS — M85.851 OSTEOPENIA OF NECKS OF BOTH FEMURS: ICD-10-CM

## 2019-03-25 DIAGNOSIS — S39.012A STRAIN OF LUMBAR REGION, INITIAL ENCOUNTER: Primary | ICD-10-CM

## 2019-03-25 DIAGNOSIS — E55.9 VITAMIN D DEFICIENCY: ICD-10-CM

## 2019-03-25 DIAGNOSIS — M85.852 OSTEOPENIA OF NECKS OF BOTH FEMURS: ICD-10-CM

## 2019-03-25 LAB — VITAMIN D 25-HYDROXY: 41.4 NG/ML (ref 30–100)

## 2019-03-25 PROCEDURE — 99213 OFFICE O/P EST LOW 20 MIN: CPT | Performed by: INTERNAL MEDICINE

## 2019-03-25 ASSESSMENT — ENCOUNTER SYMPTOMS
CHOKING: 0
BLOOD IN STOOL: 0
ABDOMINAL PAIN: 0
COUGH: 0
CHEST TIGHTNESS: 0
BACK PAIN: 1
SHORTNESS OF BREATH: 0
WHEEZING: 0

## 2019-03-25 ASSESSMENT — PATIENT HEALTH QUESTIONNAIRE - PHQ9
SUM OF ALL RESPONSES TO PHQ QUESTIONS 1-9: 0
2. FEELING DOWN, DEPRESSED OR HOPELESS: 0
SUM OF ALL RESPONSES TO PHQ QUESTIONS 1-9: 0
SUM OF ALL RESPONSES TO PHQ9 QUESTIONS 1 & 2: 0
1. LITTLE INTEREST OR PLEASURE IN DOING THINGS: 0

## 2019-05-20 ENCOUNTER — OFFICE VISIT (OUTPATIENT)
Dept: CARDIOLOGY CLINIC | Age: 69
End: 2019-05-20
Payer: COMMERCIAL

## 2019-05-20 VITALS
HEIGHT: 61 IN | BODY MASS INDEX: 25.86 KG/M2 | SYSTOLIC BLOOD PRESSURE: 136 MMHG | OXYGEN SATURATION: 96 % | WEIGHT: 137 LBS | HEART RATE: 88 BPM | DIASTOLIC BLOOD PRESSURE: 84 MMHG | RESPIRATION RATE: 22 BRPM

## 2019-05-20 DIAGNOSIS — I10 ESSENTIAL HYPERTENSION: Primary | ICD-10-CM

## 2019-05-20 DIAGNOSIS — I48.91 ATRIAL FIBRILLATION, UNSPECIFIED TYPE (HCC): ICD-10-CM

## 2019-05-20 DIAGNOSIS — R60.0 BILATERAL EDEMA OF LOWER EXTREMITY: ICD-10-CM

## 2019-05-20 DIAGNOSIS — R53.83 FATIGUE, UNSPECIFIED TYPE: ICD-10-CM

## 2019-05-20 DIAGNOSIS — E78.5 HYPERLIPIDEMIA WITH TARGET LDL LESS THAN 130: ICD-10-CM

## 2019-05-20 PROCEDURE — 99213 OFFICE O/P EST LOW 20 MIN: CPT | Performed by: INTERNAL MEDICINE

## 2019-05-20 ASSESSMENT — ENCOUNTER SYMPTOMS
DIARRHEA: 0
NAUSEA: 0
SHORTNESS OF BREATH: 0
VOMITING: 0
CHEST TIGHTNESS: 0
APNEA: 0
BLOOD IN STOOL: 0

## 2019-05-20 NOTE — PROGRESS NOTES
[facility] CARDIOLOGY OFFICE FOLLOW-UP      Patient: Greta Argueta  YOB: 1950  MRN: [id number]    Chief Complaint:  Chief Complaint   Patient presents with    6 Month Follow-Up    Atrial Fibrillation         Subjective/HPI:  5/20/19: Patient presents today for follow-up of atrial fibrillation. Remains in sinus rhythm. He wants to stop the Xarelto I told her not to do it at this point. Probably do a 30 day event monitor. And then decide at that point. He is retired from regular work. No chest pain angina congestive heart failure symptoms. See me in 3-6 months     11/19/18: Patient presents today for Follow-up of atrial fibrillation. Remains in sinus rhythm. Continues to work. On Cardizem and Xarelto only. No bleeding. She'll see me in 6 months. He wants to get off blood thinners. I told her not to do it right now. We will reconsider that option in 6 months. 5/14/18: Patient presents today for Follow-up of atrial fibrillation. Remains in sinus rhythm. On Xarelto. No bleeding. She is off amiodarone. Doing well. She fell down and hurt her left shoulder. Is healing up well. No syncope. See me in 6 months     11/13/17: Patient presents today for atrial fibrillation. In sinus rhythm. On Xarelto and Cardizem. No bleeding. Off amiodarone. See me in 6 months.     5/1/17: For follow-up of atrial fibrillation. Thyroid profile was normal. Amiodarone DC'd. Plan Cardizem and Xarelto. She is going to be going to [place] for road trip with her . Feels well. She is  status post cardioversion and remains in sinus rhythm.  See me in 6 months.         Past Medical History:   Diagnosis Date    COPD (chronic obstructive pulmonary disease) ([facility] [address] 2015    Dr Guy Fields Cyst of neck 2012    after plastic surgery    Emphysema of lung ([facility] [address]     History of atrial fibrillation 2015    Dr Eleazar Nicole History of humerus fracture     LEFT    Hyperlipidemia LDL goal < 130     Osteopenia 2014    S/P colonoscopic polypectomy 2008, 2015, 2018    Dr Corbin Beal    Smoking history     Vitamin D deficiency        Past Surgical History:   Procedure Laterality Date    ATRIAL ABLATION SURGERY      BREAST SURGERY  1975    staph infection, postpartum    COLONOSCOPY  7/22/15    w/polypectomy Dr Stone Navarro  2012    Dr Shaheen Zelaya; HI RISK IND N/A 7/13/2018    COLON[facility] by Rodolfo [facility]ulo [facility] at [facility]       Family History   Problem Relation Age of Onset    Osteoarthritis Mother     Osteoporosis Mother     Cancer Mother         anorectal     Colon Cancer Mother        Social History     Socioeconomic History    Marital status:      Spouse name: None    Number of children: 2    Years of education: None    Highest education level: None   Occupational History    Occupation: [employer], retired   Social Needs    Financial resource strain: None    Food insecurity:     Worry: None     Inability: None    Transportation needs:     Medical: None     Non-medical: None   Tobacco Use    Smoking status: Former Smoker     Packs/day: 1.00     Years: 50.00     Pack years: 50.00     Types: Cigarettes     Start date: 1964     Last attempt to quit: 12/16/2015     Years since quitting: 3.4    Smokeless tobacco: Never Used    Tobacco comment: pt will try smoking cessation   Substance and Sexual Activity    Alcohol use: No    Drug use: No    Sexual activity: None   Lifestyle    Physical activity:     Days per week: None     Minutes per session: None    Stress: None   Relationships    Social connections:     Talks on phone: None     Gets together: None     Attends Alevism service: None     Active member of club or organization: None     Attends meetings of clubs or organizations: None     Relationship status: None    Intimate partner violence:     Fear of current or ex partner: None     Emotionally abused: None     Physically abused: None     Forced sexual activity: None   Other Topics Concern    None   Social History Narrative    Born in [place], one brother in [place], keeps in touch    , 2 children, one son in [place], one daughter in [place]    5 grandchildren     Lives in a house in [place] with spouse     Worked for [employer] and full time at [employer], fashion, family life     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        No Known Allergies    Current Outpatient Medications   Medication Sig Dispense Refill    umeclidinium-vilanterol (ANORO ELLIPTA) 62.5-25 MCG/INH AEPB inhaler Inhale 1 puff into the lungs daily 1 each 5    calcium carbonate 600 MG TABS tablet Take 1 tablet by mouth daily      magnesium oxide (MAG-OX) 400 MG tablet Take 400 mg by mouth daily      rivaroxaban (XARELTO) 20 MG TABS tablet Take 1 tablet by mouth daily (with breakfast) 30 tablet 11    Vitamin D (CHOLECALCIFEROL) 1000 UNITS CAPS capsule Take 1,000 Units by mouth daily. No current facility-administered medications for this visit. Review of Systems:   Review of Systems   Constitutional: Negative for appetite change, diaphoresis and fatigue. HENT: Negative for nosebleeds. Respiratory: Negative for apnea, chest tightness and shortness of breath. Cardiovascular: Negative for chest pain, palpitations and leg swelling. Gastrointestinal: Negative for blood in stool, diarrhea, nausea and vomiting. Musculoskeletal: Negative for myalgias, neck pain and neck stiffness. Skin: Negative for color change, pallor, rash and wound. Neurological: Negative for dizziness, seizures, syncope, weakness, light-headedness, numbness and headaches. Hematological: Does not bruise/bleed easily. Psychiatric/Behavioral: Negative for agitation, behavioral problems and confusion. The patient is not nervous/anxious and is not hyperactive. All other systems reviewed and are negative.       Review of System is negative except for as mentioned above.      Physical Examination:    BP (!) 148/64 (Site: Right Upper Arm, Position: Sitting, Cuff Size: Medium Adult) Comment: Using patient's automatic bp machine  Pulse 88   Resp 22   Ht 5' 1\" (1.549 m)   Wt 137 lb (62.1 kg)   LMP  (LMP Unknown)   SpO2 96%   BMI 25.89 kg/m²    Physical Exam    LABS:  CBC:   Lab Results   Component Value Date    WBC 5.2 09/15/2018    RBC 4.62 09/15/2018    HGB 14.8 09/15/2018    HCT 43.4 09/15/2018    MCV 94.0 09/15/2018    MCH 32.1 09/15/2018    MCHC 34.1 09/15/2018    RDW 13.3 09/15/2018     09/15/2018    MPV 9.3 10/24/2014     Lipids:  Lab Results   Component Value Date    CHOL 229 (H) 09/15/2018    CHOL 269 (H) 10/24/2014    CHOL 252 (H) 09/05/2013     Lab Results   Component Value Date    TRIG 67 09/15/2018    TRIG 65 10/24/2014    TRIG 86 09/05/2013     Lab Results   Component Value Date    HDL 78 (H) 09/15/2018    HDL 87 (H) 10/24/2014    HDL 87 (H) 09/05/2013     Lab Results   Component Value Date    LDLCALC 138 (H) 09/15/2018    LDLCALC 169 (H) 10/24/2014    LDLCALC 148 (H) 09/05/2013     No results found for: LABVLDL, VLDL  Lab Results   Component Value Date    CHOLHDLRATIO 3.2 07/10/2012     CMP:    Lab Results   Component Value Date     09/15/2018    K 4.4 09/15/2018     09/15/2018    CO2 23 09/15/2018    BUN 17 09/15/2018    CREATININE 0.70 09/15/2018    GFRAA >60.0 09/15/2018    LABGLOM >60.0 09/15/2018    GLUCOSE 96 09/15/2018    PROT 6.8 09/15/2018    LABALBU 4.1 09/15/2018    CALCIUM 9.4 09/15/2018    BILITOT 0.3 09/15/2018    ALKPHOS 78 09/15/2018    AST 16 09/15/2018    ALT 13 09/15/2018     BMP:    Lab Results   Component Value Date     09/15/2018    K 4.4 09/15/2018     09/15/2018    CO2 23 09/15/2018    BUN 17 09/15/2018    LABALBU 4.1 09/15/2018    CREATININE 0.70 09/15/2018    CALCIUM 9.4 09/15/2018    GFRAA >60.0 09/15/2018    LABGLOM >60.0 09/15/2018    GLUCOSE 96 09/15/2018     Magnesium:    Lab Results   Component

## 2019-05-21 DIAGNOSIS — R53.83 FATIGUE, UNSPECIFIED TYPE: ICD-10-CM

## 2019-05-21 DIAGNOSIS — R60.0 BILATERAL EDEMA OF LOWER EXTREMITY: ICD-10-CM

## 2019-05-21 DIAGNOSIS — E78.5 HYPERLIPIDEMIA WITH TARGET LDL LESS THAN 130: ICD-10-CM

## 2019-05-21 LAB
ALBUMIN SERPL-MCNC: 4.1 G/DL (ref 3.5–4.6)
ALP BLD-CCNC: 71 U/L (ref 40–130)
ALT SERPL-CCNC: 14 U/L (ref 0–33)
ANION GAP SERPL CALCULATED.3IONS-SCNC: 13 MEQ/L (ref 9–15)
AST SERPL-CCNC: 18 U/L (ref 0–35)
BILIRUB SERPL-MCNC: 0.4 MG/DL (ref 0.2–0.7)
BUN BLDV-MCNC: 13 MG/DL (ref 8–23)
CALCIUM SERPL-MCNC: 9.7 MG/DL (ref 8.5–9.9)
CHLORIDE BLD-SCNC: 105 MEQ/L (ref 95–107)
CHOLESTEROL, TOTAL: 245 MG/DL (ref 0–199)
CO2: 25 MEQ/L (ref 20–31)
CREAT SERPL-MCNC: 0.76 MG/DL (ref 0.5–0.9)
GFR AFRICAN AMERICAN: >60
GFR NON-AFRICAN AMERICAN: >60
GLOBULIN: 2.8 G/DL (ref 2.3–3.5)
GLUCOSE BLD-MCNC: 94 MG/DL (ref 70–99)
HCT VFR BLD CALC: 43.8 % (ref 37–47)
HDLC SERPL-MCNC: 75 MG/DL (ref 40–59)
HEMOGLOBIN: 15.2 G/DL (ref 12–16)
LDL CHOLESTEROL CALCULATED: 148 MG/DL (ref 0–129)
MCH RBC QN AUTO: 32.3 PG (ref 27–31.3)
MCHC RBC AUTO-ENTMCNC: 34.6 % (ref 33–37)
MCV RBC AUTO: 93.1 FL (ref 82–100)
PDW BLD-RTO: 13.1 % (ref 11.5–14.5)
PLATELET # BLD: 334 K/UL (ref 130–400)
POTASSIUM SERPL-SCNC: 4 MEQ/L (ref 3.4–4.9)
RBC # BLD: 4.71 M/UL (ref 4.2–5.4)
SODIUM BLD-SCNC: 143 MEQ/L (ref 135–144)
T3 FREE: 3.3 PG/ML (ref 2–4.4)
T4 TOTAL: 7.7 UG/DL (ref 4.5–11.7)
TOTAL PROTEIN: 6.9 G/DL (ref 6.3–8)
TRIGL SERPL-MCNC: 111 MG/DL (ref 0–150)
TSH SERPL DL<=0.05 MIU/L-ACNC: 2.29 UIU/ML (ref 0.44–3.86)
WBC # BLD: 6.6 K/UL (ref 4.8–10.8)

## 2019-05-22 ASSESSMENT — ENCOUNTER SYMPTOMS: COLOR CHANGE: 0

## 2019-08-29 ENCOUNTER — OFFICE VISIT (OUTPATIENT)
Dept: CARDIOLOGY CLINIC | Age: 69
End: 2019-08-29
Payer: COMMERCIAL

## 2019-08-29 VITALS
HEART RATE: 86 BPM | OXYGEN SATURATION: 97 % | BODY MASS INDEX: 26.43 KG/M2 | WEIGHT: 140 LBS | DIASTOLIC BLOOD PRESSURE: 76 MMHG | SYSTOLIC BLOOD PRESSURE: 138 MMHG | RESPIRATION RATE: 22 BRPM | HEIGHT: 61 IN

## 2019-08-29 DIAGNOSIS — Z79.01 ANTICOAGULATED: ICD-10-CM

## 2019-08-29 DIAGNOSIS — I48.91 ATRIAL FIBRILLATION, UNSPECIFIED TYPE (HCC): ICD-10-CM

## 2019-08-29 DIAGNOSIS — I10 ESSENTIAL HYPERTENSION: Primary | ICD-10-CM

## 2019-08-29 PROCEDURE — 99214 OFFICE O/P EST MOD 30 MIN: CPT | Performed by: INTERNAL MEDICINE

## 2019-08-29 ASSESSMENT — ENCOUNTER SYMPTOMS
COLOR CHANGE: 0
NAUSEA: 0
BLOOD IN STOOL: 0
CHEST TIGHTNESS: 0
APNEA: 0
ABDOMINAL DISTENTION: 0
SHORTNESS OF BREATH: 0
TROUBLE SWALLOWING: 0
WHEEZING: 0
FACIAL SWELLING: 0
VOMITING: 0
DIARRHEA: 0
VOICE CHANGE: 0
ANAL BLEEDING: 0

## 2019-08-29 NOTE — PROGRESS NOTES
[facility] CARDIOLOGY OFFICE FOLLOW-UP      Patient: Kaylyn Gonzalez  YOB: 1950  MRN: [id number]    Chief Complaint:  Chief Complaint   Patient presents with    3 Month Follow-Up    Atrial Fibrillation    Hypertension         Subjective/HPI:  8/29/19: Patient presents today for follow-up of atrial fibrillation. She went to [place] to get stem cell injection. She had to pay $6000 cash there is an outfit in [place]. Run by a registered nurse practitioner. Cannot stop Xarelto. 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 Told her the risk of embolic event is high. Will leave at this dose and see me in 6 months     5/20/19: Patient presents today for follow-up of atrial fibrillation. Remains in sinus rhythm. He wants to stop the Xarelto I told her not to do it at this point. Probably do a 30 day event monitor. And then decide at that point. He is retired from regular work. No chest pain angina congestive heart failure symptoms. See me in 3-6 months     11/19/18: Patient presents today for Follow-up of atrial fibrillation. Remains in sinus rhythm. Continues to work. On Cardizem and Xarelto only. No bleeding. She'll see me in 6 months. He wants to get off blood thinners. I told her not to do it right now. We will reconsider that option in 6 months.     5/14/18: Patient presents today for Follow-up of atrial fibrillation. Remains in sinus rhythm. On Xarelto. No bleeding. She is off amiodarone. Doing well. She fell down and hurt her left shoulder. Is healing up well. No syncope. See me in 6 months     11/13/17: Patient presents today for atrial fibrillation. In sinus rhythm. On Xarelto and Cardizem. No bleeding. Off amiodarone. See me in 6 months.     5/1/17: For follow-up of atrial fibrillation. Thyroid profile was normal. Amiodarone DC'd. Plan Cardizem and Xarelto. She is going to be going to [place] for road trip with her . Feels well. stool, diarrhea, nausea and vomiting. Genitourinary: Negative for decreased urine volume and dysuria. Musculoskeletal: Negative for gait problem, myalgias, neck pain and neck stiffness. Skin: Negative for color change, pallor, rash and wound. Neurological: Negative for dizziness, seizures, syncope, facial asymmetry, weakness, light-headedness, numbness and headaches. Hematological: Does not bruise/bleed easily. Psychiatric/Behavioral: Negative for agitation, behavioral problems, confusion, hallucinations and suicidal ideas. The patient is not nervous/anxious. All other systems reviewed and are negative. Review of System is negative except for as mentioned above. Physical Examination:    /76 (Site: Left Upper Arm, Position: Sitting, Cuff Size: Medium Adult) Comment: with patient automatic BP machine  Pulse 86   Resp 22   Ht 5' 1\" (1.549 m)   Wt 140 lb (63.5 kg)   LMP  (LMP Unknown)   SpO2 97%   BMI 26.45 kg/m²    Physical Exam        Patient Active Problem List   Diagnosis    Hyperlipidemia with target LDL less than 130    Vitamin D deficiency    Atrial fibrillation (HCC)    Pulmonary emphysema (HCC)    Bilateral edema of lower extremity    Other plastic surgery for unacceptable cosmetic appearance    Tobacco abuse    Fatigue    COPD (chronic obstructive pulmonary disease) (HCC)    Acute bronchitis    Hypoxemia    SOB (shortness of breath)    Fracture of humerus, proximal    Benign neoplasm of sigmoid colon    Other hemorrhoids    Hx of colonic polyps    Family history of colon cancer    Osteopenia of necks of both femurs           No orders of the defined types were placed in this encounter. No orders of the defined types were placed in this encounter. Assessment:    1. Essential hypertension    2. Atrial fibrillation, unspecified type ([address]    3. Anticoagulated       Plan:   Stay on same medications. See me in 6 months.       This note was partially generated using Dragon voice recognition system, and there may be some incorrect words, spellings, punctuation that were not noticed in checking the note before saving.         Electronically signed by Teresa Marlow MD on 8/30/2019 at 3:29 PM

## 2019-09-24 ENCOUNTER — OFFICE VISIT (OUTPATIENT)
Dept: INTERNAL MEDICINE CLINIC | Age: 69
End: 2019-09-24
Payer: COMMERCIAL

## 2019-09-24 VITALS
TEMPERATURE: 97.5 F | HEART RATE: 69 BPM | DIASTOLIC BLOOD PRESSURE: 65 MMHG | OXYGEN SATURATION: 93 % | SYSTOLIC BLOOD PRESSURE: 133 MMHG | BODY MASS INDEX: 26.23 KG/M2 | WEIGHT: 138.8 LBS

## 2019-09-24 DIAGNOSIS — I48.0 PAROXYSMAL ATRIAL FIBRILLATION (HCC): ICD-10-CM

## 2019-09-24 DIAGNOSIS — R53.83 FATIGUE, UNSPECIFIED TYPE: Primary | ICD-10-CM

## 2019-09-24 DIAGNOSIS — R53.83 FATIGUE, UNSPECIFIED TYPE: ICD-10-CM

## 2019-09-24 LAB
ALBUMIN SERPL-MCNC: 4.1 G/DL (ref 3.5–4.6)
ALP BLD-CCNC: 76 U/L (ref 40–130)
ALT SERPL-CCNC: 28 U/L (ref 0–33)
ANION GAP SERPL CALCULATED.3IONS-SCNC: 14 MEQ/L (ref 9–15)
AST SERPL-CCNC: 19 U/L (ref 0–35)
BILIRUB SERPL-MCNC: 0.5 MG/DL (ref 0.2–0.7)
BUN BLDV-MCNC: 11 MG/DL (ref 8–23)
CALCIUM SERPL-MCNC: 9.7 MG/DL (ref 8.5–9.9)
CHLORIDE BLD-SCNC: 102 MEQ/L (ref 95–107)
CO2: 24 MEQ/L (ref 20–31)
CREAT SERPL-MCNC: 0.67 MG/DL (ref 0.5–0.9)
GFR AFRICAN AMERICAN: >60
GFR NON-AFRICAN AMERICAN: >60
GLOBULIN: 3.5 G/DL (ref 2.3–3.5)
GLUCOSE BLD-MCNC: 90 MG/DL (ref 70–99)
HCT VFR BLD CALC: 43.4 % (ref 37–47)
HEMOGLOBIN: 14.4 G/DL (ref 12–16)
MAGNESIUM: 2.4 MG/DL (ref 1.7–2.4)
MCH RBC QN AUTO: 31 PG (ref 27–31.3)
MCHC RBC AUTO-ENTMCNC: 33.2 % (ref 33–37)
MCV RBC AUTO: 93.4 FL (ref 82–100)
PDW BLD-RTO: 13.9 % (ref 11.5–14.5)
PLATELET # BLD: 449 K/UL (ref 130–400)
POTASSIUM SERPL-SCNC: 3.6 MEQ/L (ref 3.4–4.9)
RBC # BLD: 4.65 M/UL (ref 4.2–5.4)
SODIUM BLD-SCNC: 140 MEQ/L (ref 135–144)
TOTAL PROTEIN: 7.6 G/DL (ref 6.3–8)
WBC # BLD: 9.3 K/UL (ref 4.8–10.8)

## 2019-09-24 PROCEDURE — 99214 OFFICE O/P EST MOD 30 MIN: CPT | Performed by: INTERNAL MEDICINE

## 2019-09-24 RX ORDER — PRAVASTATIN SODIUM 20 MG
20 TABLET ORAL NIGHTLY
Qty: 90 TABLET | Refills: 1 | Status: SHIPPED | OUTPATIENT
Start: 2019-09-24 | End: 2019-09-30

## 2019-09-24 RX ORDER — METOPROLOL SUCCINATE 50 MG/1
50 TABLET, EXTENDED RELEASE ORAL DAILY
COMMUNITY
Start: 2019-09-16 | End: 2019-09-30 | Stop reason: DRUGHIGH

## 2019-09-24 ASSESSMENT — ENCOUNTER SYMPTOMS
SHORTNESS OF BREATH: 1
COUGH: 0
BLOOD IN STOOL: 0
CHOKING: 0
NAUSEA: 0
WHEEZING: 0
ABDOMINAL PAIN: 0
VOMITING: 0
TROUBLE SWALLOWING: 0

## 2019-09-25 ENCOUNTER — HOSPITAL ENCOUNTER (OUTPATIENT)
Dept: GENERAL RADIOLOGY | Age: 69
Discharge: HOME | End: 2019-09-27
Payer: COMMERCIAL

## 2019-09-25 ENCOUNTER — OFFICE VISIT (OUTPATIENT)
Dept: PULMONOLOGY | Age: 69
End: 2019-09-25
Payer: COMMERCIAL

## 2019-09-25 VITALS
TEMPERATURE: 99.5 F | BODY MASS INDEX: 25.79 KG/M2 | DIASTOLIC BLOOD PRESSURE: 68 MMHG | HEIGHT: 61 IN | WEIGHT: 136.6 LBS | SYSTOLIC BLOOD PRESSURE: 120 MMHG | RESPIRATION RATE: 16 BRPM | OXYGEN SATURATION: 95 % | HEART RATE: 84 BPM

## 2019-09-25 DIAGNOSIS — R06.02 SHORTNESS OF BREATH: ICD-10-CM

## 2019-09-25 DIAGNOSIS — I48.91 ATRIAL FIBRILLATION, UNSPECIFIED TYPE (HCC): ICD-10-CM

## 2019-09-25 DIAGNOSIS — J44.9 CHRONIC OBSTRUCTIVE PULMONARY DISEASE, UNSPECIFIED COPD TYPE (HCC): Primary | ICD-10-CM

## 2019-09-25 PROCEDURE — 99214 OFFICE O/P EST MOD 30 MIN: CPT | Performed by: INTERNAL MEDICINE

## 2019-09-25 PROCEDURE — 71046 X-RAY EXAM CHEST 2 VIEWS: CPT

## 2019-09-25 ASSESSMENT — ENCOUNTER SYMPTOMS
VOMITING: 0
SINUS PRESSURE: 0
DIARRHEA: 0
EYE ITCHING: 0
COUGH: 1
SHORTNESS OF BREATH: 1
CHEST TIGHTNESS: 0
TROUBLE SWALLOWING: 0
WHEEZING: 0
NAUSEA: 0
RHINORRHEA: 0
ABDOMINAL PAIN: 0
SORE THROAT: 0
EYE DISCHARGE: 0
VOICE CHANGE: 0

## 2019-09-25 NOTE — PROGRESS NOTES
Subjective:     Dereck Ellsworth is a 77 y.o. female who complains today of:     Chief Complaint   Patient presents with    Follow-up     6 Month F/U for COPD       HPI  She said she went for vacation and develop atrial fibrillation and she was in hospital.   She is started on metoprolol. She said she is more short of breat since she is on metoprolol. She is also taking xarelto. She  Is on Anoro ellipta 1 puff daily. C/o shortness of breath , worse with exertion. No  Wheezing   Dry cough   No Hemoptysis  No Chest tightness   No Chest pain with radiation  or pleuritic pain  No Fever or chills. No Rhinorrhea and postnasal drip. Allergies:  Patient has no known allergies.   Past Medical History:   Diagnosis Date    COPD (chronic obstructive pulmonary disease) ([facility] [address] 2015    Dr Nora Rivera Current use of long term anticoagulation     Cyst of neck 2012    after plastic surgery    Emphysema of lung ([facility] [address]     History of humerus fracture     LEFT    Hyperlipidemia LDL goal < 130     Intermittent atrial fibrillation [facility]) 2015    Dr Haroldo Hardy, Dr Juventino Ash Osteopenia 2014    S/P colonoscopic polypectomy 2008, 2015, 2018    Dr Sarah Thorne    Smoking history     Vitamin D deficiency      Past Surgical History:   Procedure Laterality Date    ATRIAL ABLATION SURGERY      BREAST SURGERY  1975    staph infection, postpartum    COLONOSCOPY  7/22/15    w/polypectomy Dr Fady Manrique  2012    Dr Sharon North; HI RISK IND N/A 7/13/2018    COLONOSCOPY performed by Pastor Beto MD at [facility]     Family History   Problem Relation Age of Onset    Osteoarthritis Mother     Osteoporosis Mother     Cancer Mother         anorectal     Colon Cancer Mother      Social History     Socioeconomic History    Marital status:      Spouse name: Not on file    Number of children: 2    Years of education: Not on file    Highest education level: Not on file Eyes: Negative for discharge, itching and visual disturbance. Respiratory: Positive for cough and shortness of breath. Negative for chest tightness and wheezing. Cardiovascular: Negative for chest pain, palpitations and leg swelling. Gastrointestinal: Negative for abdominal pain, diarrhea, nausea and vomiting. Genitourinary: Negative for difficulty urinating and hematuria. Musculoskeletal: Negative for arthralgias, joint swelling and myalgias. Skin: Negative for rash. Allergic/Immunologic: Negative for environmental allergies and food allergies. Neurological: Negative for dizziness, tremors, weakness and headaches. Psychiatric/Behavioral: Negative for behavioral problems and sleep disturbance. Objective:     Vitals:    09/25/19 1444   BP: 120/68   Site: Right Upper Arm   Position: Sitting   Cuff Size: Medium Adult   Pulse: 84   Resp: 16   Temp: 99.5 °F (37.5 °C)   TempSrc: Tympanic   SpO2: 95%   Weight: 136 lb 9.6 oz (62 kg)   Height: 5' 1\" (1.549 m)         Physical Exam   Constitutional: She is oriented to person, place, and time. She appears well-developed and well-nourished. No distress. HENT:   Head: Normocephalic and atraumatic. Nose: Nose normal.   Mouth/Throat: Oropharynx is clear and moist.   Eyes: Pupils are equal, round, and reactive to light. EOM are normal.   Neck: No JVD present. No tracheal deviation present. No thyromegaly present. Cardiovascular: Normal rate and regular rhythm. Exam reveals no gallop and no friction rub. No murmur heard. Pulmonary/Chest: No respiratory distress. She has no wheezes. She has no rales. She exhibits no tenderness. diminished Breath sound bilaterally. Abdominal: She exhibits no distension. There is no tenderness. There is no rebound. Musculoskeletal: Normal range of motion. She exhibits no edema. Lymphadenopathy:     She has no cervical adenopathy. Neurological: She is alert and oriented to person, place, and time. She will have CXR . Continue bronchodilator as before     2. Shortness of breath  Patient is having  Chronic shortness of breath which is likely due to Afib with RVR, initially and now probably medication related , continue  Bronchodilator, as before keep  Spo2 90% or above. - XR CHEST STANDARD (2 VW); Future    3. Atrial fibrillation, unspecified type ([facility] [address]  she is metoprolol, she is having more shortness of breath , she was on calcium channel blocker in past  , advised to discuss with cardiology to see if it can be switched again     Return in about 3 months (around 12/25/2019) for COPD, shortness of breath.       Cornelia Lino MD

## 2019-09-30 ENCOUNTER — TELEPHONE (OUTPATIENT)
Dept: PULMONOLOGY | Age: 69
End: 2019-09-30

## 2019-09-30 ENCOUNTER — OFFICE VISIT (OUTPATIENT)
Dept: CARDIOLOGY CLINIC | Age: 69
End: 2019-09-30
Payer: COMMERCIAL

## 2019-09-30 VITALS
RESPIRATION RATE: 18 BRPM | HEART RATE: 72 BPM | DIASTOLIC BLOOD PRESSURE: 82 MMHG | BODY MASS INDEX: 26.06 KG/M2 | HEIGHT: 61 IN | WEIGHT: 138 LBS | OXYGEN SATURATION: 91 % | SYSTOLIC BLOOD PRESSURE: 122 MMHG

## 2019-09-30 DIAGNOSIS — E78.5 HYPERLIPIDEMIA WITH TARGET LDL LESS THAN 130: ICD-10-CM

## 2019-09-30 DIAGNOSIS — Z79.01 ANTICOAGULATED: ICD-10-CM

## 2019-09-30 DIAGNOSIS — I48.91 ATRIAL FIBRILLATION, UNSPECIFIED TYPE (HCC): ICD-10-CM

## 2019-09-30 DIAGNOSIS — I10 ESSENTIAL HYPERTENSION: Primary | ICD-10-CM

## 2019-09-30 PROCEDURE — 99214 OFFICE O/P EST MOD 30 MIN: CPT | Performed by: INTERNAL MEDICINE

## 2019-09-30 RX ORDER — METOPROLOL SUCCINATE 50 MG/1
75 TABLET, EXTENDED RELEASE ORAL NIGHTLY
Qty: 135 TABLET | Refills: 3 | Status: SHIPPED | COMMUNITY
Start: 2019-09-30 | End: 2019-10-01 | Stop reason: SDUPTHER

## 2019-09-30 ASSESSMENT — ENCOUNTER SYMPTOMS
FACIAL SWELLING: 0
COLOR CHANGE: 0
VOMITING: 0
WHEEZING: 0
DIARRHEA: 0
CHEST TIGHTNESS: 0
SHORTNESS OF BREATH: 0
VOICE CHANGE: 0
NAUSEA: 0
APNEA: 0
BLOOD IN STOOL: 0
ANAL BLEEDING: 0
TROUBLE SWALLOWING: 0
ABDOMINAL DISTENTION: 0

## 2019-10-01 DIAGNOSIS — I48.91 ATRIAL FIBRILLATION, UNSPECIFIED TYPE (HCC): Primary | ICD-10-CM

## 2019-10-01 RX ORDER — METOPROLOL SUCCINATE 50 MG/1
TABLET, EXTENDED RELEASE ORAL
Qty: 135 TABLET | Refills: 3 | Status: SHIPPED | OUTPATIENT
Start: 2019-10-01 | End: 2019-10-08

## 2019-10-01 RX ORDER — METOPROLOL SUCCINATE 50 MG/1
75 TABLET, EXTENDED RELEASE ORAL NIGHTLY
Qty: 135 TABLET | Refills: 3 | Status: SHIPPED | OUTPATIENT
Start: 2019-10-01 | End: 2019-11-01 | Stop reason: DRUGHIGH

## 2019-10-08 ENCOUNTER — OFFICE VISIT (OUTPATIENT)
Dept: INTERNAL MEDICINE CLINIC | Age: 69
End: 2019-10-08
Payer: COMMERCIAL

## 2019-10-08 VITALS
RESPIRATION RATE: 14 BRPM | HEART RATE: 58 BPM | DIASTOLIC BLOOD PRESSURE: 79 MMHG | WEIGHT: 137.8 LBS | TEMPERATURE: 98.1 F | BODY MASS INDEX: 26.04 KG/M2 | SYSTOLIC BLOOD PRESSURE: 124 MMHG | OXYGEN SATURATION: 96 %

## 2019-10-08 DIAGNOSIS — R53.83 OTHER FATIGUE: Primary | ICD-10-CM

## 2019-10-08 DIAGNOSIS — Z23 NEED FOR INFLUENZA VACCINATION: ICD-10-CM

## 2019-10-08 DIAGNOSIS — E78.5 HYPERLIPIDEMIA LDL GOAL <100: Chronic | ICD-10-CM

## 2019-10-08 PROCEDURE — 90653 IIV ADJUVANT VACCINE IM: CPT | Performed by: INTERNAL MEDICINE

## 2019-10-08 PROCEDURE — 99212 OFFICE O/P EST SF 10 MIN: CPT | Performed by: INTERNAL MEDICINE

## 2019-10-08 PROCEDURE — 90471 IMMUNIZATION ADMIN: CPT | Performed by: INTERNAL MEDICINE

## 2019-10-08 ASSESSMENT — ENCOUNTER SYMPTOMS
VOICE CHANGE: 0
NAUSEA: 0
SHORTNESS OF BREATH: 1
TROUBLE SWALLOWING: 0
COUGH: 0
CHEST TIGHTNESS: 0
COLOR CHANGE: 0
CHANGE IN BOWEL HABIT: 0
ABDOMINAL PAIN: 0
BLOOD IN STOOL: 0
BACK PAIN: 0
WHEEZING: 0

## 2019-10-10 ENCOUNTER — TELEPHONE (OUTPATIENT)
Dept: CARDIOLOGY CLINIC | Age: 69
End: 2019-10-10

## 2019-11-01 ENCOUNTER — OFFICE VISIT (OUTPATIENT)
Dept: CARDIOLOGY CLINIC | Age: 69
End: 2019-11-01
Payer: COMMERCIAL

## 2019-11-01 VITALS
BODY MASS INDEX: 25.86 KG/M2 | OXYGEN SATURATION: 97 % | HEIGHT: 61 IN | DIASTOLIC BLOOD PRESSURE: 74 MMHG | RESPIRATION RATE: 12 BRPM | SYSTOLIC BLOOD PRESSURE: 127 MMHG | WEIGHT: 137 LBS | HEART RATE: 74 BPM

## 2019-11-01 DIAGNOSIS — I48.91 ATRIAL FIBRILLATION, UNSPECIFIED TYPE (HCC): Primary | ICD-10-CM

## 2019-11-01 DIAGNOSIS — I10 HYPERTENSION, UNSPECIFIED TYPE: ICD-10-CM

## 2019-11-01 PROCEDURE — 99214 OFFICE O/P EST MOD 30 MIN: CPT | Performed by: INTERNAL MEDICINE

## 2019-11-01 RX ORDER — METOPROLOL SUCCINATE 25 MG/1
12.5 TABLET, EXTENDED RELEASE ORAL NIGHTLY
COMMUNITY
End: 2020-03-06

## 2019-11-01 ASSESSMENT — ENCOUNTER SYMPTOMS
SHORTNESS OF BREATH: 0
ANAL BLEEDING: 0
ABDOMINAL PAIN: 0
VOICE CHANGE: 0
COLOR CHANGE: 0
NAUSEA: 0
BLOOD IN STOOL: 0
DIARRHEA: 0
VOMITING: 0
COUGH: 0
TROUBLE SWALLOWING: 0
CHEST TIGHTNESS: 0
ABDOMINAL DISTENTION: 0
WHEEZING: 0
FACIAL SWELLING: 0
APNEA: 0

## 2019-11-11 ENCOUNTER — HOSPITAL ENCOUNTER (OUTPATIENT)
Dept: NON INVASIVE DIAGNOSTICS | Age: 69
Discharge: HOME | End: 2019-11-11
Payer: COMMERCIAL

## 2019-11-11 ENCOUNTER — HOSPITAL ENCOUNTER (OUTPATIENT)
Dept: NUCLEAR MEDICINE | Age: 69
Discharge: HOME | End: 2019-11-13
Payer: COMMERCIAL

## 2019-11-11 VITALS — DIASTOLIC BLOOD PRESSURE: 60 MMHG | SYSTOLIC BLOOD PRESSURE: 98 MMHG

## 2019-11-11 DIAGNOSIS — I10 HYPERTENSION, UNSPECIFIED TYPE: ICD-10-CM

## 2019-11-11 DIAGNOSIS — I48.91 ATRIAL FIBRILLATION, UNSPECIFIED TYPE (HCC): ICD-10-CM

## 2019-11-11 LAB
LV EF: 60 %
LVEF MODALITY: NORMAL

## 2019-11-11 PROCEDURE — 6360000004 HC RX CONTRAST MEDICATION: Performed by: INTERNAL MEDICINE

## 2019-11-11 PROCEDURE — A9502 TC99M TETROFOSMIN: HCPCS | Performed by: INTERNAL MEDICINE

## 2019-11-11 PROCEDURE — 78452 HT MUSCLE IMAGE SPECT MULT: CPT

## 2019-11-11 PROCEDURE — 93306 TTE W/DOPPLER COMPLETE: CPT

## 2019-11-11 PROCEDURE — 93017 CV STRESS TEST TRACING ONLY: CPT

## 2019-11-11 PROCEDURE — 2580000003 HC RX 258: Performed by: INTERNAL MEDICINE

## 2019-11-11 PROCEDURE — 3430000000 HC RX DIAGNOSTIC RADIOPHARMACEUTICAL: Performed by: INTERNAL MEDICINE

## 2019-11-11 RX ORDER — SODIUM CHLORIDE 0.9 % (FLUSH) 0.9 %
10 SYRINGE (ML) INJECTION PRN
Status: DISCONTINUED | OUTPATIENT
Start: 2019-11-11 | End: 2019-11-14 | Stop reason: HOSPADM

## 2019-11-11 RX ADMIN — REGADENOSON 0.4 MG: 0.08 INJECTION, SOLUTION INTRAVENOUS at 09:39

## 2019-11-11 RX ADMIN — Medication 10 ML: at 09:40

## 2019-11-11 RX ADMIN — Medication 10 ML: at 09:39

## 2019-11-11 RX ADMIN — Medication 10 ML: at 08:21

## 2019-11-11 RX ADMIN — TETROFOSMIN 34.3 MILLICURIE: 1.38 INJECTION, POWDER, LYOPHILIZED, FOR SOLUTION INTRAVENOUS at 09:40

## 2019-11-11 RX ADMIN — TETROFOSMIN 11.9 MILLICURIE: 1.38 INJECTION, POWDER, LYOPHILIZED, FOR SOLUTION INTRAVENOUS at 08:21

## 2019-11-13 PROCEDURE — 93018 CV STRESS TEST I&R ONLY: CPT | Performed by: INTERNAL MEDICINE

## 2019-11-18 ENCOUNTER — TELEPHONE (OUTPATIENT)
Dept: CARDIOLOGY CLINIC | Age: 69
End: 2019-11-18

## 2019-12-16 ENCOUNTER — HOSPITAL ENCOUNTER (OUTPATIENT)
Dept: WOMENS IMAGING | Age: 69
Discharge: HOME | End: 2019-12-18
Payer: COMMERCIAL

## 2019-12-16 DIAGNOSIS — Z12.31 ENCOUNTER FOR SCREENING MAMMOGRAM FOR BREAST CANCER: ICD-10-CM

## 2019-12-16 PROCEDURE — 77067 SCR MAMMO BI INCL CAD: CPT

## 2019-12-17 ENCOUNTER — OFFICE VISIT (OUTPATIENT)
Dept: PULMONOLOGY | Age: 69
End: 2019-12-17
Payer: COMMERCIAL

## 2019-12-17 VITALS
HEART RATE: 83 BPM | DIASTOLIC BLOOD PRESSURE: 70 MMHG | OXYGEN SATURATION: 92 % | RESPIRATION RATE: 16 BRPM | SYSTOLIC BLOOD PRESSURE: 138 MMHG | TEMPERATURE: 98 F | BODY MASS INDEX: 27 KG/M2 | WEIGHT: 143 LBS | HEIGHT: 61 IN

## 2019-12-17 DIAGNOSIS — J44.9 CHRONIC OBSTRUCTIVE PULMONARY DISEASE, UNSPECIFIED COPD TYPE (HCC): Primary | ICD-10-CM

## 2019-12-17 DIAGNOSIS — I48.91 ATRIAL FIBRILLATION, UNSPECIFIED TYPE (HCC): ICD-10-CM

## 2019-12-17 DIAGNOSIS — J44.1 COPD EXACERBATION (HCC): ICD-10-CM

## 2019-12-17 DIAGNOSIS — R06.02 SHORTNESS OF BREATH: ICD-10-CM

## 2019-12-17 PROCEDURE — 99214 OFFICE O/P EST MOD 30 MIN: CPT | Performed by: INTERNAL MEDICINE

## 2019-12-17 ASSESSMENT — ENCOUNTER SYMPTOMS
VOICE CHANGE: 0
RHINORRHEA: 0
EYE DISCHARGE: 0
SHORTNESS OF BREATH: 1
VOMITING: 0
DIARRHEA: 0
COUGH: 1
CHEST TIGHTNESS: 0
ABDOMINAL PAIN: 0
TROUBLE SWALLOWING: 0
SINUS PRESSURE: 0
SORE THROAT: 0
WHEEZING: 0
NAUSEA: 0
EYE ITCHING: 0

## 2020-02-10 ENCOUNTER — TELEPHONE (OUTPATIENT)
Dept: PULMONOLOGY | Age: 70
End: 2020-02-10

## 2020-02-17 ENCOUNTER — TELEPHONE (OUTPATIENT)
Dept: CASE MANAGEMENT | Age: 70
End: 2020-02-17

## 2020-02-17 NOTE — TELEPHONE ENCOUNTER
Physician documentation on smoking history and CT Lung Screening reviewed. All required documentation complete. Patient is a former smoker (quit 2015- 5 years) with a 50 pack year history (1 ppd x 50 years) per physician documentation.     Clearance Boor left voicemail requesting return call if she has any questions regarding her upcoming CT Lung Screening exam.

## 2020-02-18 ENCOUNTER — HOSPITAL ENCOUNTER (OUTPATIENT)
Dept: PULMONOLOGY | Age: 70
Discharge: HOME | End: 2020-02-18
Payer: COMMERCIAL

## 2020-02-18 PROCEDURE — 94729 DIFFUSING CAPACITY: CPT | Performed by: INTERNAL MEDICINE

## 2020-02-18 PROCEDURE — 94729 DIFFUSING CAPACITY: CPT

## 2020-02-18 PROCEDURE — 94726 PLETHYSMOGRAPHY LUNG VOLUMES: CPT | Performed by: INTERNAL MEDICINE

## 2020-02-18 PROCEDURE — 6360000002 HC RX W HCPCS: Performed by: INTERNAL MEDICINE

## 2020-02-18 PROCEDURE — 94726 PLETHYSMOGRAPHY LUNG VOLUMES: CPT

## 2020-02-18 PROCEDURE — 94060 EVALUATION OF WHEEZING: CPT

## 2020-02-18 PROCEDURE — 94060 EVALUATION OF WHEEZING: CPT | Performed by: INTERNAL MEDICINE

## 2020-02-18 RX ORDER — ALBUTEROL SULFATE 2.5 MG/3ML
2.5 SOLUTION RESPIRATORY (INHALATION) ONCE
Status: COMPLETED | OUTPATIENT
Start: 2020-02-18 | End: 2020-02-18

## 2020-02-18 RX ADMIN — ALBUTEROL SULFATE 2.5 MG: 2.5 SOLUTION RESPIRATORY (INHALATION) at 12:57

## 2020-03-05 ENCOUNTER — HOSPITAL ENCOUNTER (OUTPATIENT)
Dept: CT IMAGING | Age: 70
Discharge: HOME | End: 2020-03-07
Payer: COMMERCIAL

## 2020-03-05 ENCOUNTER — TELEPHONE (OUTPATIENT)
Dept: ENDOCRINOLOGY | Age: 70
End: 2020-03-05

## 2020-03-05 PROCEDURE — G0297 LDCT FOR LUNG CA SCREEN: HCPCS

## 2020-03-05 NOTE — TELEPHONE ENCOUNTER
Patient calling to inform you that she has been constipated for over a wekk. She wanted to know if you can write a RX for a stool softener. Send RX to listed [facility].

## 2020-03-06 ENCOUNTER — OFFICE VISIT (OUTPATIENT)
Dept: INTERNAL MEDICINE CLINIC | Age: 70
End: 2020-03-06
Payer: COMMERCIAL

## 2020-03-06 VITALS
OXYGEN SATURATION: 98 % | RESPIRATION RATE: 16 BRPM | HEIGHT: 61 IN | BODY MASS INDEX: 27.11 KG/M2 | WEIGHT: 143.6 LBS | DIASTOLIC BLOOD PRESSURE: 60 MMHG | TEMPERATURE: 98.2 F | HEART RATE: 76 BPM | SYSTOLIC BLOOD PRESSURE: 118 MMHG

## 2020-03-06 DIAGNOSIS — R53.83 OTHER FATIGUE: ICD-10-CM

## 2020-03-06 LAB — TSH REFLEX: 2.05 UIU/ML (ref 0.44–3.86)

## 2020-03-06 PROCEDURE — 99214 OFFICE O/P EST MOD 30 MIN: CPT | Performed by: INTERNAL MEDICINE

## 2020-03-06 RX ORDER — POTASSIUM CHLORIDE 20 MEQ/1
20 TABLET, EXTENDED RELEASE ORAL DAILY
Qty: 3 TABLET | Refills: 0 | Status: SHIPPED | OUTPATIENT
Start: 2020-03-06 | End: 2020-04-07 | Stop reason: SDUPTHER

## 2020-03-06 RX ORDER — PRAVASTATIN SODIUM 20 MG
TABLET ORAL
COMMUNITY
Start: 2020-02-29 | End: 2020-05-05

## 2020-03-06 RX ORDER — METHYLPREDNISOLONE 4 MG/1
TABLET ORAL
COMMUNITY
Start: 2020-02-28 | End: 2020-03-10

## 2020-03-06 RX ORDER — LORAZEPAM 0.5 MG/1
0.5 TABLET ORAL EVERY 8 HOURS PRN
Qty: 15 TABLET | Refills: 0 | Status: SHIPPED | OUTPATIENT
Start: 2020-03-06 | End: 2020-03-11

## 2020-03-06 RX ORDER — FUROSEMIDE 20 MG/1
20 TABLET ORAL DAILY
Qty: 3 TABLET | Refills: 0 | Status: SHIPPED | OUTPATIENT
Start: 2020-03-06 | End: 2020-03-23 | Stop reason: DRUGHIGH

## 2020-03-06 RX ORDER — AMOXICILLIN AND CLAVULANATE POTASSIUM 875; 125 MG/1; MG/1
TABLET, FILM COATED ORAL
COMMUNITY
Start: 2020-02-28 | End: 2020-03-10

## 2020-03-06 RX ORDER — AMIODARONE HYDROCHLORIDE 200 MG/1
TABLET ORAL
COMMUNITY
Start: 2020-02-28 | End: 2020-06-16 | Stop reason: SDUPTHER

## 2020-03-06 ASSESSMENT — PATIENT HEALTH QUESTIONNAIRE - PHQ9
1. LITTLE INTEREST OR PLEASURE IN DOING THINGS: 1
SUM OF ALL RESPONSES TO PHQ QUESTIONS 1-9: 2
2. FEELING DOWN, DEPRESSED OR HOPELESS: 1
SUM OF ALL RESPONSES TO PHQ9 QUESTIONS 1 & 2: 2
SUM OF ALL RESPONSES TO PHQ QUESTIONS 1-9: 2

## 2020-03-06 ASSESSMENT — ENCOUNTER SYMPTOMS
CHEST TIGHTNESS: 0
ABDOMINAL DISTENTION: 0
CHOKING: 0
NAUSEA: 0
CHANGE IN BOWEL HABIT: 0
TROUBLE SWALLOWING: 0
WHEEZING: 0
VOICE CHANGE: 0
SHORTNESS OF BREATH: 1
VOMITING: 0
COUGH: 0
EYE PAIN: 0
ABDOMINAL PAIN: 0
CONSTIPATION: 1
COLOR CHANGE: 1
DIARRHEA: 0

## 2020-03-10 RX ORDER — DILTIAZEM HYDROCHLORIDE 240 MG/1
240 CAPSULE, COATED, EXTENDED RELEASE ORAL EVERY MORNING
Qty: 90 CAPSULE | Refills: 1
Start: 2020-03-10 | End: 2020-05-26

## 2020-03-10 RX ORDER — DILTIAZEM HYDROCHLORIDE 120 MG/1
120 CAPSULE, COATED, EXTENDED RELEASE ORAL NIGHTLY
Qty: 30 CAPSULE | Refills: 3
Start: 2020-03-10 | End: 2020-07-29 | Stop reason: SDUPTHER

## 2020-03-18 ENCOUNTER — TELEPHONE (OUTPATIENT)
Dept: ENDOCRINOLOGY | Age: 70
End: 2020-03-18

## 2020-03-18 NOTE — TELEPHONE ENCOUNTER
Patient was recently admitted to [facility] due to water rention on her heart admitted 03/13-03/17    Scheduled appointment for 03/24/2020 at 10:45     Labs done yesterday 03/17/2020

## 2020-03-19 ENCOUNTER — OFFICE VISIT (OUTPATIENT)
Dept: PULMONOLOGY | Age: 70
End: 2020-03-19
Payer: COMMERCIAL

## 2020-03-19 VITALS
OXYGEN SATURATION: 92 % | RESPIRATION RATE: 16 BRPM | TEMPERATURE: 99 F | DIASTOLIC BLOOD PRESSURE: 60 MMHG | WEIGHT: 135 LBS | SYSTOLIC BLOOD PRESSURE: 120 MMHG | BODY MASS INDEX: 25.49 KG/M2 | HEIGHT: 61 IN | HEART RATE: 77 BPM

## 2020-03-19 PROBLEM — I50.9 CONGESTIVE HEART FAILURE (HCC): Status: ACTIVE | Noted: 2020-03-19

## 2020-03-19 PROCEDURE — 99215 OFFICE O/P EST HI 40 MIN: CPT | Performed by: INTERNAL MEDICINE

## 2020-03-19 RX ORDER — UMECLIDINIUM BROMIDE AND VILANTEROL TRIFENATATE 62.5; 25 UG/1; UG/1
1 POWDER RESPIRATORY (INHALATION) DAILY
Qty: 1 EACH | Refills: 5 | Status: SHIPPED | OUTPATIENT
Start: 2020-03-19 | End: 2020-12-18

## 2020-03-19 ASSESSMENT — ENCOUNTER SYMPTOMS
EYE DISCHARGE: 0
SORE THROAT: 0
NAUSEA: 0
EYE ITCHING: 0
SHORTNESS OF BREATH: 1
ABDOMINAL PAIN: 0
SINUS PRESSURE: 0
TROUBLE SWALLOWING: 0
VOMITING: 0
COUGH: 0
VOICE CHANGE: 0
RHINORRHEA: 0
WHEEZING: 0
CHEST TIGHTNESS: 0
DIARRHEA: 0

## 2020-03-19 NOTE — PROGRESS NOTES
Subjective:     Hanna Selby is a 68 y.o. female who complains today of:     Chief Complaint   Patient presents with    Follow-up     three month f/u for PFT results. HPI  Patient said she was seen by dr. Lugene Paget due to shortness of breath and CHF. She is on life vest. She was in [facility] and she was in lasix and lost lot of fluid , feeling better. She had CXR done on 3/15/20 show Significant improvement in the findings of congestive failure with pulmonary edema compared to the prior exam. There are residual relatively small bilateral pleural effusions    C/o shortness of breath with exertion, climbing stair. No  Wheezing   No Cough  Sputum  No Hemoptysis  No Chest tightness   No Chest pain with radiation  or pleuritic pain  No Fever or chills. No Rhinorrhea and postnasal drip. No orthopnea or leg edema    Using bronchodilator with anoro ellipta 1 puff daily. Allergies:  Patient has no known allergies. Past Medical History:   Diagnosis Date    Abnormal echocardiogram 2020    possible PFO?  further eval suggested    Chronic atrial fibrillation 2015    Dr Azeem Self, Dr Barrington Bryant, Dr Michelle Nails (chronic obstructive pulmonary disease) [facility]) 2015    Dr Marifer Delgado Current use of long term anticoagulation     Cyst of neck 2012    after plastic surgery    Emphysema of lung ([address]     History of compression fracture of vertebral column 2020    T2, L1    History of humerus fracture     LEFT    History of left heart catheterization 03/2020    at [facility], normal LVEF with impaired relaxation, mild AV calcification and mild MV thickening, normal RV    History of sustained ventricular tachycardia 03/2020    had to wear a LifeVest, Dr Cass Jordan Hyperlipidemia LDL goal < 130     Osteopenia 2014    S/P colonoscopic polypectomy 2008, 2015, 2018    Dr Laury Flood    Smoking history     Vitamin D deficiency      Past Surgical History:   Procedure Laterality Date    ATRIAL ABLATION SURGERY  BREAST SURGERY  1975    staph infection, postpartum    COLONOSCOPY  7/22/15    w/polypectomy Dr Geneva Blackburn      Dr Jen Elliott; HI RISK IND N/A 2018    COLONOSCOPY performed by Alexsandra Oconnor [facility] at [facility]     Family History   Problem Relation Age of Onset    Osteoarthritis Mother     Osteoporosis Mother     Cancer Mother         anorectal     Colon Cancer Mother      Social History     Socioeconomic History    Marital status:      Spouse name: Not on file    Number of children: 2    Years of education: Not on file    Highest education level: Not on file   Occupational History    Occupation: [email or web address], retired   Social Needs    Financial resource strain: Not on file    Food insecurity     Worry: Not on file     Inability: Not on file   [employer] needs     Medical: Not on file     Non-medical: Not on file   Tobacco Use    Smoking status: Former Smoker     Packs/day: 1.00     Years: 50.00     Pack years: 50.00     Types: Cigarettes     Start date:      Last attempt to quit: 2015     Years since quittin.2    Smokeless tobacco: Never Used    Tobacco comment: pt will try smoking cessation   Substance and Sexual Activity    Alcohol use: No    Drug use: No    Sexual activity: Not on file   Lifestyle    Physical activity     Days per week: Not on file     Minutes per session: Not on file    Stress: Not on file   Relationships    Social connections     Talks on phone: Not on file     Gets together: Not on file     Attends Zoroastrianism service: Not on file     Active member of club or organization: Not on file     Attends meetings of clubs or organizations: Not on file     Relationship status: Not on file    Intimate partner violence     Fear of current or ex partner: Not on file     Emotionally abused: Not on file     Physically abused: Not on file     Forced sexual activity: Not on file   Other Topics Concern    Not on file   Social History Narrative    Born in [place], one brother in [facility], keeps in touch    , 2 children, one son in [facility], one daughter in [place]    5 grandchildren     Lives in a house in [place] with spouse     Worked for [employer] and full time at [employer], fashion, 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 HENT: Negative for congestion, mouth sores, nosebleeds, postnasal drip, rhinorrhea, sinus pressure, sneezing, sore throat, trouble swallowing and voice change. Eyes: Negative for discharge, itching and visual disturbance. Respiratory: Positive for shortness of breath. Negative for cough, chest tightness and wheezing. Cardiovascular: Negative for chest pain, palpitations and leg swelling. Gastrointestinal: Negative for abdominal pain, diarrhea, nausea and vomiting. Genitourinary: Negative for difficulty urinating and hematuria. Musculoskeletal: Negative for arthralgias, joint swelling and myalgias. Skin: Negative for rash. Allergic/Immunologic: Negative for environmental allergies and food allergies. Neurological: Negative for dizziness, tremors, weakness and headaches. Psychiatric/Behavioral: Negative for behavioral problems and sleep disturbance.         :     Vitals:    03/19/20 0849   BP: 120/60   Pulse: 77   Resp: 16   Temp: 99 °F (37.2 °C)   TempSrc: Temporal   SpO2: 92%   Weight: 135 lb (61.2 kg)   Height: 5' 1\" (1.549 m)     Wt Readings from Last 3 Encounters:   03/19/20 135 lb (61.2 kg)   03/06/20 143 lb 9.6 oz (65.1 kg)   12/17/19 143 lb (64.9 kg)         Physical Exam  Constitutional:       General: She is not in acute distress. Appearance: She is well-developed. She is not diaphoretic. HENT:      Head: Normocephalic and atraumatic.       Nose: Nose normal.   Eyes:      Pupils: Pupils are equal, round, and reactive to light. Neck:      Thyroid: No thyromegaly. Vascular: No JVD. Trachea: No tracheal deviation. Cardiovascular:      Rate and Rhythm: Normal rate and regular rhythm. Heart sounds: No murmur. No friction rub. No gallop. Pulmonary:      Effort: No respiratory distress. Breath sounds: Rales (few bibasilar ) present. No wheezing. Comments: diminished Breath sound bilaterally. Chest:      Chest wall: No tenderness. Abdominal:      General: There is no distension. Tenderness: There is no abdominal tenderness. There is no rebound. Musculoskeletal: Normal range of motion. Lymphadenopathy:      Cervical: No cervical adenopathy. Skin:     General: Skin is warm and dry. Neurological:      Mental Status: She is alert and oriented to person, place, and time. Coordination: Coordination normal.         Current Outpatient Medications   Medication Sig Dispense Refill    dilTIAZem (CARDIZEM CD) 120 MG extended release capsule Take 1 capsule by mouth nightly 30 capsule 3    dilTIAZem (CARDIZEM CD) 240 MG extended release capsule Take 1 capsule by mouth every morning 90 capsule 1    amiodarone (CORDARONE) 200 MG tablet 1 TAB BY MOUTH 2 TIMES A DAY THRU 3/4/20 THEN 1 TABLET DAILY STARTING 3/5/20      pravastatin (PRAVACHOL) 20 MG tablet TAKE 1 TABLET BY MOUTH EVERY DAY AT NIGHT      umeclidinium-vilanterol (ANORO ELLIPTA) 62.5-25 MCG/INH AEPB inhaler Inhale 1 puff into the lungs daily 1 each 5    rivaroxaban (XARELTO) 20 MG TABS tablet TAKE 1 TABLET BY MOUTH EVERY DAY WITH BREAKFAST 90 tablet 3    b complex vitamins tablet Take 1 tablet by mouth daily      calcium carbonate 600 MG TABS tablet Take 1 tablet by mouth daily      magnesium oxide (MAG-OX) 400 MG tablet Take 400 mg by mouth daily      Vitamin D (CHOLECALCIFEROL) 1000 UNITS CAPS capsule Take 1,000 Units by mouth daily.         furosemide (LASIX) 20 MG tablet Take 1 tablet by mouth daily for 3 days 3 tablet 0    potassium chloride (KLOR-CON M) 20 MEQ extended release tablet Take 1 tablet by mouth daily for 3 days 3 tablet 0     No current facility-administered medications for this visit. Results for orders placed during the hospital encounter of 19   XR CHEST STANDARD (2 VW)    Narrative Patient MRN: [id number]    : 1950    Age:  77 years    Gender: Female    Order Date: 2019 3:34 PM.     Exam: XR CHEST (2 VW)    Number of Views: 2     Indication:  Shortness of breath    Comparison: 2017    Findings: Thoracic aortic vascular calcifications. Streaky opacification bilateral lung bases. No pneumothorax. Cardiac mediastinal silhouette within normal limits. Impression Impression:    1. Streaky bibasilar opacifications in the lung bases could be reflective of developing infiltrate/pneumonia and/or scarring/atelectasis. 2. Thoracic aortic vascular calcifications. Results for orders placed during the hospital encounter of 16   CT Chest W Contrast    Narrative CT CHEST     REASON FOR EXAM  HEMOPTYSIS     COMPARISONS  none     FINDINGS  Multiplanar images were obtained following the IV injection  of 75 cc Isovue-370. A focal area of consolidation involving the right  lower lobe posteriorly is seen. There is a suggestion of air  bronchogram present. Mild atelectasis at the left lung base is seen. Remainder lungs are clear. No pleural effusions are seen. No definite  pulmonary nodules are identified. Emphysematous changes are seen. No  pneumothorax is noted. Heart appears normal in size. No pericardial  effusion is seen. No mediastinal, hilar or axillary adenopathy is  noted. Bone windows demonstrate no gross osseous abnormalities. Surrounding soft tissues are unremarkable. Limited images of the upper  abdomen demonstrate no contributory findings. IMPRESSION  FOCAL AREA OF CONSOLIDATION INVOLVING THE RIGHT LOWER LOBE  IS SEEN.  SHORT-TERM INTERVAL FOLLOWUP CT WITHOUT

## 2020-03-23 ENCOUNTER — OFFICE VISIT (OUTPATIENT)
Dept: CARDIOLOGY CLINIC | Age: 70
End: 2020-03-23
Payer: COMMERCIAL

## 2020-03-23 VITALS
WEIGHT: 128.2 LBS | SYSTOLIC BLOOD PRESSURE: 124 MMHG | BODY MASS INDEX: 24.2 KG/M2 | RESPIRATION RATE: 22 BRPM | HEIGHT: 61 IN | OXYGEN SATURATION: 97 % | HEART RATE: 86 BPM | DIASTOLIC BLOOD PRESSURE: 84 MMHG

## 2020-03-23 PROBLEM — J90 PLEURAL EFFUSION: Status: ACTIVE | Noted: 2020-03-23

## 2020-03-23 PROBLEM — R91.8 BILATERAL PULMONARY INFILTRATES ON CXR: Status: ACTIVE | Noted: 2020-03-23

## 2020-03-23 PROBLEM — I31.8 PERICARDIAL CALCIFICATION: Status: ACTIVE | Noted: 2020-03-23

## 2020-03-23 PROBLEM — I31.39 PERICARDIAL EFFUSION: Status: ACTIVE | Noted: 2020-03-23

## 2020-03-23 PROCEDURE — 99214 OFFICE O/P EST MOD 30 MIN: CPT | Performed by: INTERNAL MEDICINE

## 2020-03-23 RX ORDER — FUROSEMIDE 40 MG/1
TABLET ORAL
COMMUNITY
Start: 2020-03-17 | End: 2020-04-07 | Stop reason: SDUPTHER

## 2020-03-23 ASSESSMENT — ENCOUNTER SYMPTOMS
CHEST TIGHTNESS: 0
VOICE CHANGE: 0
FACIAL SWELLING: 0
BLOOD IN STOOL: 0
SHORTNESS OF BREATH: 0
ANAL BLEEDING: 0
TROUBLE SWALLOWING: 0
DIARRHEA: 0
APNEA: 0
VOMITING: 0
NAUSEA: 0
ABDOMINAL DISTENTION: 0
COLOR CHANGE: 0
WHEEZING: 0

## 2020-03-23 NOTE — PROGRESS NOTES
[facility] CARDIOLOGY OFFICE FOLLOW-UP      Patient: Peyton Cisneros  YOB: 1950  MRN: [id number]    Chief Complaint:  Chief Complaint   Patient presents with    Follow-Up from [place] [address] ER    Chest Pain    Congestive Heart Failure         Subjective/HPI:  3/23/2020: Patient presents today for follow-up of recent hospitalization at [facility] for CHF atrial fibrillation. Was seen by multiple people. Christophersen cath which was negative. Was evaluated by Dr. Paulette Mae. Echocardiogram was read by . Showed preserved LV ejection fraction. Had a cardiac MRI. Bottom line is currently on amiodarone 200 mg a day he used to take that before and quit taking it 3 4 years ago. On Xarelto. She is somewhat noncompliant with medication. Could not tolerate beta-blocker. Currently she is on Cardizem 240 in the morning and 120 at night and was put on Lasix and potassium. She is feeling better. Supposed to see  for follow-up of event monitor. \"  Keep the medication the same. I will see her in 2 weeks. 11/1/19: Patient presents today for follow-up of atrial fibrillation. Remains in sinus rhythm. Could not tolerate the Toprol dose. Currently only on 12.5 daily. Makes extremely tired. Blood pressure remains well controlled. On Xarelto. I told her not to take it off no bleeding. She has a history of being noncompliant in the past with medication. I had long talk with her about the need to take anticoagulants or risk of stroke.  She will have a echo test and lexiscan stress test.  She will see me in February 9/30/19: Patient presents today for follow-up of recent hospitalization in [place] when she was admitted for rapid atrial fibrillation.  Reportedly had cardioversion.  Was discharged home on Toprol 50 mg 1 and half tablet a day.  And Xarelto.  Please her shortness of breath with Toprol.  Reportedly had a history of COPD which I doubt.  The rest of the trip to [place] was unremarkable she will continue with the same medication.  Take Toprol 50 mg at night and 25 mg in the morning.  She will see me in 1 month     8/29/19: Patient presents today for follow-up of atrial fibrillation.  [facility] to get stem cell injection.  She had to pay $6000 cash there is an outfit in [address] [place] [address] by a registered nurse practitioner. Moi [place]ks stop [address] she works hard at think she does go into atrial fibrillation weakness at the mall.  Told her the risk of embolic event is high.  Will leave at this dose and see me in 6 months     5/20/19: Patient presents today for follow-up of atrial fibrillation. Remains in sinus rhythm. He wants to stop the Xarelto I told her not to do it at this point. Probably do a 30 day event monitor. And then decide at that point. He is retired from regular work. No chest pain angina congestive heart failure symptoms. See me in 3-6 months     11/19/18: Patient presents today for Follow-up of atrial fibrillation. Remains in sinus rhythm. Continues to work. On Cardizem and Xarelto only. No bleeding. She'll see me in 6 months. He wants to get off blood thinners. I told her not to do it right now. We will reconsider that option in 6 months.     5/14/18: Patient presents today for Follow-up of atrial fibrillation. Remains in sinus rhythm. On Xarelto. No bleeding. She is off amiodarone. Doing well. She fell down and hurt her left shoulder. Is healing up well. No syncope. See me in 6 months     11/13/17: Patient presents today for atrial fibrillation. In sinus rhythm. On Xarelto and Cardizem. No bleeding. Off amiodarone. See me in 6 months.     5/1/17: For follow-up of atrial fibrillation. Thyroid profile was normal. Amiodarone DC'd. Plan Cardizem and Xarelto. She is going to be going to [place] for road trip with her . Feels well. She is  status post cardioversion and remains in sinus rhythm.  See me in 6 months.         Past Medical History:   Diagnosis Date    Abnormal echocardiogram 2020    possible PFO?  further eval suggested    Chronic atrial fibrillation 2015    Dr Celia Romo, Dr Arturo Salazar, Dr Carolyn Escoto (chronic obstructive pulmonary disease) [facility]) 2015    Dr Jil Jackson Current use of long term anticoagulation     Cyst of neck 2012    after plastic surgery    Emphysema of lung ([address]     History of compression fracture of vertebral column 2020    T2, L1    History of humerus fracture     LEFT    History of left heart catheterization 03/2020    at [facility], normal LVEF with impaired relaxation, mild AV calcification and mild MV thickening, normal RV    History of sustained ventricular tachycardia 03/2020    had to wear a LifeVest, Dr Ines Riley Hyperlipidemia LDL goal < 130     Osteopenia 2014    S/P colonoscopic polypectomy 2008, 2015, 2018    Dr Pam Armstrong    Smoking history     Vitamin D deficiency        Past Surgical History:   Procedure Laterality Date    ATRIAL ABLATION SURGERY      BREAST SURGERY  1975    staph infection, postpartum    COLONOSCOPY  7/22/15    w/polypectomy Dr Ritu Glasgow  2012    Dr Marcia Kumari; HI RISK IND N/A 7/13/2018    COLON[facility] by De[facility] at [facility]       Family History   Problem Relation Age of Onset    Osteoarthritis Mother     Osteoporosis Mother     Cancer Mother         anorectal     Colon Cancer Mother        Social History     Socioeconomic History    Marital status:      Spouse name: None    Number of children: 2    Years of education: None    Highest education level: None   Occupational History    Occupation: [employer], retired   Social Needs    Financial resource strain: None    Food insecurity     Worry: None     Inability: None    Transportation needs     Medical: None     Non-medical: None   Tobacco Use    Smoking status: Former Smoker     Packs/day: 1.00     Years: 50.00 MEQ extended release tablet Take 1 tablet by mouth daily for 3 days 3 tablet 0    rivaroxaban (XARELTO) 20 MG TABS tablet TAKE 1 TABLET BY MOUTH EVERY DAY WITH BREAKFAST 90 tablet 3    b complex vitamins tablet Take 1 tablet by mouth daily      calcium carbonate 600 MG TABS tablet Take 1 tablet by mouth daily      magnesium oxide (MAG-OX) 400 MG tablet Take 400 mg by mouth daily      Vitamin D (CHOLECALCIFEROL) 1000 UNITS CAPS capsule Take 1,000 Units by mouth daily. No current facility-administered medications for this visit. Review of Systems:   Review of Systems   Constitutional: Negative for activity change, appetite change, diaphoresis, fatigue and unexpected weight change. HENT: Negative for facial swelling, nosebleeds, trouble swallowing and voice change. Respiratory: Negative for apnea, chest tightness, shortness of breath and wheezing. Cardiovascular: Negative for chest pain, palpitations and leg swelling. Gastrointestinal: Negative for abdominal distention, anal bleeding, blood in stool, diarrhea, nausea and vomiting. Genitourinary: Negative for decreased urine volume and dysuria. Musculoskeletal: Negative for gait problem, myalgias, neck pain and neck stiffness. Skin: Negative for color change, pallor, rash and wound. Neurological: Negative for dizziness, seizures, syncope, facial asymmetry, weakness, light-headedness, numbness and headaches. Hematological: Does not bruise/bleed easily. Psychiatric/Behavioral: Negative for agitation, behavioral problems, confusion, hallucinations and suicidal ideas. The patient is not nervous/anxious. All other systems reviewed and are negative. Review of System is negative except for as mentioned above.       Physical Examination:    /84 (Site: Left Upper Arm, Position: Sitting, Cuff Size: Medium Adult)   Pulse 86   Resp 22   Ht 5' 1\" (1.549 m)   Wt 128 lb 3.2 oz (58.2 kg)   LMP  (LMP Unknown)   SpO2 97%   BMI 24.22 kg/m²    Physical Exam   Constitutional: She appears healthy. No distress. HENT:   Nose: Nose normal.   Mouth/Throat: Dentition is normal. Oropharynx is clear. Eyes: Pupils are equal, round, and reactive to light. Conjunctivae are normal.   Neck: Normal range of motion and thyroid normal. Neck supple. Cardiovascular: Regular rhythm, S1 normal, S2 normal, normal heart sounds, intact distal pulses and normal pulses. PMI is not displaced. No murmur heard. Pulmonary/Chest: She has no wheezes. She has no rales. She exhibits no tenderness. Abdominal: Soft. Bowel sounds are normal. She exhibits no distension and no mass. There is no splenomegaly or hepatomegaly. There is no abdominal tenderness. No hernia. Neurological: She is alert and oriented to person, place, and time. She has normal motor skills. Gait normal.   Skin: Skin is warm and dry. No cyanosis. No jaundice. Nails show no clubbing. Patient Active Problem List   Diagnosis    Hyperlipidemia with target LDL less than 130    Vitamin D deficiency    Atrial fibrillation ([address]    Pulmonary emphysema (HCC)    Bilateral edema of lower extremity    Other plastic surgery for unacceptable cosmetic appearance    Tobacco abuse    Fatigue    Chronic obstructive pulmonary disease (HCC)    Acute bronchitis    Hypoxemia    Shortness of breath    Fracture of humerus, proximal    Benign neoplasm of sigmoid colon    Other hemorrhoids    Hx of colonic polyps    Family history of colon cancer    Osteopenia of necks of both femurs    Congestive heart failure (HCC)    Bilateral pulmonary infiltrates on CXR    Pericardial calcification    Pericardial effusion    Pleural effusion           No orders of the defined types were placed in this encounter. No orders of the defined types were placed in this encounter. Assessment:    1. Atrial fibrillation, unspecified type ([address]    2. Hypertension, unspecified type    3. Anticoagulated    4. Non-compliance       Plan:   Stay on same medications. See me in 2 weeks. This note was partially generated using Dragon voice recognition system, and there may be some incorrect words, spellings, punctuation that were not noticed in checking the note before saving.         Electronically signed by Carolee Scott MD on 3/24/2020 at 10:12 AM

## 2020-03-24 ENCOUNTER — OFFICE VISIT (OUTPATIENT)
Dept: INTERNAL MEDICINE CLINIC | Age: 70
End: 2020-03-24
Payer: COMMERCIAL

## 2020-03-24 VITALS
RESPIRATION RATE: 12 BRPM | WEIGHT: 133 LBS | DIASTOLIC BLOOD PRESSURE: 71 MMHG | BODY MASS INDEX: 25.13 KG/M2 | HEART RATE: 79 BPM | OXYGEN SATURATION: 95 % | TEMPERATURE: 98.7 F | SYSTOLIC BLOOD PRESSURE: 136 MMHG

## 2020-03-24 PROCEDURE — 99213 OFFICE O/P EST LOW 20 MIN: CPT | Performed by: INTERNAL MEDICINE

## 2020-03-24 ASSESSMENT — ENCOUNTER SYMPTOMS
RHINORRHEA: 0
SHORTNESS OF BREATH: 1
CHEST TIGHTNESS: 0
ABDOMINAL DISTENTION: 0
TROUBLE SWALLOWING: 0
ABDOMINAL PAIN: 0
HEARTBURN: 0
COUGH: 0

## 2020-03-24 ASSESSMENT — COPD QUESTIONNAIRES: COPD: 1

## 2020-03-24 NOTE — PROGRESS NOTES
g/dL Final    Platelets 93/15/2307 414  150 - 450 x10E9/L Final    RDW-CV 03/14/2020 15.6* 11.5 - 14 % Final    Glucose 03/14/2020 140* 74 - 99 mg/dL Final    Sodium 03/14/2020 138  136 - 145 mmol/L Final    Potassium 03/14/2020 3.9  3.5 - 5.3 mmol/L Final    Chloride 03/14/2020 103  98 - 107 mmol/L Final    HCO3 03/14/2020 27  21 - 32 mmol/L Final    Anion Gap 03/14/2020 12  10 - 20 mmol/L Final    Urea Nitrogen 03/14/2020 17  6 - 23 mg/dL Final    CREATININE 03/14/2020 0.93  0.50 - 1.0 mg/dL Final    GFR Non- 03/14/2020 59* >60 mL/min/1.73m2 Final    GFR  03/14/2020 71  >60 mL/min/1.73m2 Final    Calcium 03/14/2020 8.6  8.6 - 10.3 mg/dL Final    WBC 03/15/2020 15.2* 4.4 - 11.3 x10E9/L Final    RBC 03/15/2020 2.93* 4.00 - 5.2 x10E12/L Final    Hemoglobin 03/15/2020 9.3* 12.0 - 16 g/dL Final    Hematocrit 03/15/2020 29.8* 36.0 - 46 % Final    MCV 03/15/2020 102* 80 - 100 fL Final    MCHC 03/15/2020 31.2* 32.0 - 36 g/dL Final    Platelets 77/47/4732 445  150 - 450 x10E9/L Final    RDW-CV 03/15/2020 16.0* 11.5 - 14 % Final    Glucose 03/15/2020 102* 74 - 99 mg/dL Final    Sodium 03/15/2020 140  136 - 145 mmol/L Final    Potassium 03/15/2020 3.8  3.5 - 5.3 mmol/L Final    Chloride 03/15/2020 105  98 - 107 mmol/L Final    HCO3 03/15/2020 32  21 - 32 mmol/L Final    Anion Gap 03/15/2020 7* 10 - 20 mmol/L Final    Urea Nitrogen 03/15/2020 18  6 - 23 mg/dL Final    CREATININE 03/15/2020 0.81  0.50 - 1.0 mg/dL Final    GFR Non- 03/15/2020 >60  >60 mL/min/1.73m2 Final    GFR  03/15/2020 >60  >60 mL/min/1.73m2 Final    Calcium 03/15/2020 8.6  8.6 - 10.3 mg/dL Final    Glucose 03/15/2020 105* 74 - 99 mg/dL Final    Sodium 03/15/2020 139  136 - 145 mmol/L Final    Potassium 03/15/2020 3.4* 3.5 - 5.3 mmol/L Final    Chloride 03/15/2020 99  98 - 107 mmol/L Final    HCO3 03/15/2020 31  21 - 32 mmol/L Final    Anion Gap 03/15/2020 12  10 - 20 mmol/L Final    Urea Nitrogen 03/15/2020 26* 6 - 23 mg/dL Final    CREATININE 03/15/2020 1.02  0.50 - 1.0 mg/dL Final    GFR Non- 03/15/2020 53* >60 mL/min/1.73m2 Final    GFR  03/15/2020 64  >60 mL/min/1.73m2 Final    Calcium 03/15/2020 8.9  8.6 - 10.3 mg/dL Final    WBC 03/16/2020 10.3  4.4 - 11.3 x10E9/L Final    RBC 03/16/2020 3.43* 4.00 - 5.2 x10E12/L Final    Hemoglobin 03/16/2020 10.9* 12.0 - 16 g/dL Final    Hematocrit 03/16/2020 35.1* 36.0 - 46 % Final    MCV 03/16/2020 102* 80 - 100 fL Final    MCHC 03/16/2020 31.1* 32.0 - 36 g/dL Final    Platelets 55/43/9880 542* 150 - 450 x10E9/L Final    RDW-CV 03/16/2020 15.7* 11.5 - 14 % Final    Glucose 03/16/2020 98  74 - 99 mg/dL Final    Sodium 03/16/2020 139  136 - 145 mmol/L Final    Potassium 03/16/2020 3.3* 3.5 - 5.3 mmol/L Final    Chloride 03/16/2020 99  98 - 107 mmol/L Final    HCO3 03/16/2020 33* 21 - 32 mmol/L Final    Anion Gap 03/16/2020 10  10 - 20 mmol/L Final    Urea Nitrogen 03/16/2020 23  6 - 23 mg/dL Final    CREATININE 03/16/2020 0.87  0.50 - 1.0 mg/dL Final    GFR Non- 03/16/2020 >60  >60 mL/min/1.73m2 Final    GFR  03/16/2020 >60  >60 mL/min/1.73m2 Final    Calcium 03/16/2020 8.9  8.6 - 10.3 mg/dL Final    WBC 03/17/2020 7.4  4.4 - 11.3 x10E9/L Final    RBC 03/17/2020 3.54* 4.00 - 5.2 x10E12/L Final    Hemoglobin 03/17/2020 11.1* 12.0 - 16 g/dL Final    Hematocrit 03/17/2020 35.6* 36.0 - 46 % Final    MCV 03/17/2020 101* 80 - 100 fL Final    MCHC 03/17/2020 31.2* 32.0 - 36 g/dL Final    Platelets 00/19/0889 522* 150 - 450 x10E9/L Final    RDW-CV 03/17/2020 15.3* 11.5 - 14 % Final    Glucose 03/17/2020 104* 74 - 99 mg/dL Final    Sodium 03/17/2020 139  136 - 145 mmol/L Final    Potassium 03/17/2020 3.5  3.5 - 5.3 mmol/L Final    Chloride 03/17/2020 99  98 - 107 mmol/L Final    HCO3 03/17/2020 32  21 - 32 mmol/L Final    Anion Gap cessation   Substance and Sexual Activity    Alcohol use: No    Drug use: No    Sexual activity: Not on file   Lifestyle    Physical activity     Days per week: Not on file     Minutes per session: Not on file    Stress: Not on file   Relationships    Social connections     Talks on phone: Not on file     Gets together: Not on file     Attends Advent service: Not on file     Active member of club or organization: Not on file     Attends meetings of clubs or organizations: Not on file     Relationship status: Not on file    Intimate partner violence     Fear of current or ex partner: Not on file     Emotionally abused: Not on file     Physically abused: Not on file     Forced sexual activity: Not on file   Other Topics Concern    Not on file   Social History Narrative    Born in [place], one brother in [facility], keeps in touch    , 2 children, one son in [place], one daughter in [place]    5 grandchildren     Lives in a house in [place] with spouse     Worked for [employer] and full time at [employer], [employer], fashion, family life     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        Family History   Problem Relation Age of Onset    Osteoarthritis Mother     Osteoporosis Mother     Cancer Mother         anorectal     Colon Cancer Mother        Family and socialhistory were reviewed and pertinent changes documented. ALLERGIES    Patient has no known allergies.     Current Outpatient Medications on File Prior to Visit   Medication Sig Dispense Refill    furosemide (LASIX) 40 MG tablet TAKE 1 TABLET BY MOUTH EVERY DAY      umeclidinium-vilanterol (ANORO ELLIPTA) 62.5-25 MCG/INH AEPB inhaler Inhale 1 puff into the lungs daily 1 each 5    dilTIAZem (CARDIZEM CD) 120 MG extended release capsule Take 1 capsule by mouth nightly 30 capsule 3    dilTIAZem (CARDIZEM CD) 240 MG extended release capsule Take 1 capsule by mouth every morning 90 capsule 1    amiodarone (CORDARONE) 200 MG tablet 1 TAB BY Exam  Constitutional:       General: She is not in acute distress. Appearance: She is normal weight. She is not ill-appearing. HENT:      Head: Atraumatic. Nose: No congestion. Mouth/Throat:      Mouth: Mucous membranes are moist.   Eyes:      Extraocular Movements: Extraocular movements intact. Conjunctiva/sclera: Conjunctivae normal.   Neck:      Musculoskeletal: Neck supple. Cardiovascular:      Rate and Rhythm: Normal rate and regular rhythm. Occasional extrasystoles are present. Pulses: Normal pulses. Heart sounds: Normal heart sounds. No gallop. Pulmonary:      Effort: Pulmonary effort is normal. No respiratory distress. Breath sounds: No wheezing or rales. Comments: Diminished breath sounds bilaterally    Abdominal:      General: There is no distension. Skin:     General: Skin is warm. Neurological:      General: No focal deficit present. Mental Status: Mental status is at baseline. Motor: No weakness. Gait: Gait normal.   Psychiatric:         Attention and Perception: Attention normal.         Mood and Affect: Mood is anxious. Mood is not depressed. Affect is not labile. Speech: Speech is rapid and pressured. Behavior: Behavior is cooperative. Thought Content: Thought content is not delusional.         Cognition and Memory: Cognition is not impaired. Memory is not impaired. Judgment: Judgment is not inappropriate. Assessment:    Hoda Pineda was seen today for follow-up from hospital, copd and hyperlipidemia. Diagnoses and all orders for this visit:    Chronic obstructive pulmonary disease, unspecified COPD type ([address]            Stable, continue current inhalers, patient advised to stay in the house as much as possible due to the current coronavirus pandemic. Hyperlipidemia, unspecified hyperlipidemia type              Controlled on the current low-dose pravastatin which is well-tolerated.   Continue to

## 2020-04-07 ENCOUNTER — VIRTUAL VISIT (OUTPATIENT)
Dept: CARDIOLOGY CLINIC | Age: 70
End: 2020-04-07
Payer: COMMERCIAL

## 2020-04-07 PROCEDURE — 99443 PR PHYS/QHP TELEPHONE EVALUATION 21-30 MIN: CPT | Performed by: INTERNAL MEDICINE

## 2020-04-07 RX ORDER — POTASSIUM CHLORIDE 20 MEQ/1
20 TABLET, EXTENDED RELEASE ORAL DAILY
Qty: 30 TABLET | Refills: 5 | Status: SHIPPED | OUTPATIENT
Start: 2020-04-07 | End: 2020-05-14 | Stop reason: SDUPTHER

## 2020-04-07 RX ORDER — FUROSEMIDE 40 MG/1
TABLET ORAL
Qty: 30 TABLET | Refills: 5 | Status: SHIPPED | OUTPATIENT
Start: 2020-04-07 | End: 2020-08-13 | Stop reason: SDUPTHER

## 2020-04-07 NOTE — PROGRESS NOTES
Aletha Castrejon is a 68 y.o. female evaluated via telephone on 4/7/2020. Follow-up of atrial fibrillation. Doing better. Reduce Lasix to 40 mg and potassium 20 mEq Monday Wednesday and Friday. Continue with amiodarone for now. Diltiazem 240 and 120 in the evening. No swelling no chest pain no syncope dizziness. We will refer her to cardiac rehab. See me in 3 weeks       Consent:  She and/or health care decision maker is aware that that she may receive a bill for this telephone service, depending on her insurance coverage, and has provided verbal consent to proceed: Yes      Documentation:  I communicated with the patient and/or health care decision maker about telephone call visit. Details of this discussion including any medical advice provided:  Dr. Miguel Wong      I affirm this is a Patient Initiated Episode with an Established Patient who has not had a related appointment within my department in the past 7 days or scheduled within the next 24 hours. Total Time: minutes: 21-30 minutes    Note: not billable if this call serves to triage the patient into an appointment for the relevant concern      [address] [place] [address]      Patient: Aletha Castrejon  YOB: 1950  MRN: [id number]    Chief Complaint:  Chief Complaint   Patient presents with    Follow-up     2 week f/u    Atrial Fibrillation    Hypertension    Numbness     Bilateral Leg numbness and tingling for about 1 week now         Subjective/HPI:  3/23/2020: Patient presents today for follow-up of recent hospitalization at [facility] for CHF atrial fibrillation. Was seen by multiple people. Christophersen cath which was negative. Was evaluated by Dr. Levy Rodas. Echocardiogram was read by . Showed preserved LV ejection fraction. Had a cardiac MRI. Bottom line is currently on amiodarone 200 mg a day he used to take that before and quit taking it 3 4 years ago. On Xarelto.   She is swelling, nosebleeds, trouble swallowing and voice change. Respiratory: Negative for apnea, chest tightness, shortness of breath and wheezing. Cardiovascular: Negative for chest pain, palpitations and leg swelling. Gastrointestinal: Negative for abdominal distention, anal bleeding, blood in stool, diarrhea, nausea and vomiting. Genitourinary: Negative for decreased urine volume and dysuria. Musculoskeletal: Negative for gait problem, myalgias, neck pain and neck stiffness. Skin: Negative for color change, pallor, rash and wound. Neurological: Negative for dizziness, seizures, syncope, facial asymmetry, weakness, light-headedness, numbness and headaches. Hematological: Does not bruise/bleed easily. Psychiatric/Behavioral: Negative for agitation, behavioral problems, confusion, hallucinations and suicidal ideas. The patient is not nervous/anxious. All other systems reviewed and are negative. Review of System is negative except for as mentioned above. Physical Examination:    LMP  (LMP Unknown)    Physical Exam   Constitutional: She appears healthy. No distress. HENT:   Nose: Nose normal.   Mouth/Throat: Dentition is normal. Oropharynx is clear. Eyes: Pupils are equal, round, and reactive to light. Conjunctivae are normal.   Neck: Normal range of motion and thyroid normal. Neck supple. Cardiovascular: Regular rhythm, S1 normal, S2 normal, normal heart sounds, intact distal pulses and normal pulses. PMI is not displaced. No murmur heard. Pulmonary/Chest: She has no wheezes. She has no rales. She exhibits no tenderness. Abdominal: Soft. Bowel sounds are normal. She exhibits no distension and no mass. There is no splenomegaly or hepatomegaly. There is no abdominal tenderness. No hernia. Neurological: She is alert and oriented to person, place, and time. She has normal motor skills. Gait normal.   Skin: Skin is warm and dry. No cyanosis. No jaundice. Nails show no clubbing.

## 2020-04-20 DIAGNOSIS — E78.5 HYPERLIPIDEMIA WITH TARGET LDL LESS THAN 130: ICD-10-CM

## 2020-04-20 LAB
CHOLESTEROL, TOTAL: 221 MG/DL (ref 0–199)
HDLC SERPL-MCNC: 88 MG/DL (ref 40–59)
LDL CHOLESTEROL CALCULATED: 110 MG/DL (ref 0–129)
TRIGL SERPL-MCNC: 115 MG/DL (ref 0–150)

## 2020-04-28 ENCOUNTER — VIRTUAL VISIT (OUTPATIENT)
Dept: CARDIOLOGY CLINIC | Age: 70
End: 2020-04-28
Payer: COMMERCIAL

## 2020-04-28 PROCEDURE — 99443 PR PHYS/QHP TELEPHONE EVALUATION 21-30 MIN: CPT | Performed by: INTERNAL MEDICINE

## 2020-04-28 NOTE — PROGRESS NOTES
Problem Relation Age of Onset    Osteoarthritis Mother     Osteoporosis Mother     Cancer Mother         anorectal     Colon Cancer Mother        Social History     Socioeconomic History    Marital status:      Spouse name: None    Number of children: 2    Years of education: None    Highest education level: None   Occupational History    Occupation: [employer], retired   Social Needs    Financial resource strain: None    Food insecurity     Worry: None     Inability: None    Transportation needs     Medical: None     Non-medical: None   Tobacco Use    Smoking status: Former Smoker     Packs/day: 1.00     Years: 50.00     Pack years: 50.00     Types: Cigarettes     Start date:      Last attempt to quit: 2015     Years since quittin.3    Smokeless tobacco: Never Used    Tobacco comment: pt will try smoking cessation   Substance and Sexual Activity    Alcohol use: No    Drug use: No    Sexual activity: None   Lifestyle    Physical activity     Days per week: None     Minutes per session: None    Stress: None   Relationships    Social connections     Talks on phone: None     Gets together: None     Attends Worship service: None     Active member of club or organization: None     Attends meetings of clubs or organizations: None     Relationship status: None    Intimate partner violence     Fear of current or ex partner: None     Emotionally abused: None     Physically abused: None     Forced sexual activity: None   Other Topics Concern    None   Social History Narrative    Born in [facility] [place], one brother in [facility], keeps in touch    , 2 children, one son in [place], one daughter in [place]    5 grandchildren     Lives in a house in [facility] with spouse     Worked for [employer] and full time at [employer], [employer], fashion, family life     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        No Known Allergies    Current Outpatient Medications   Medication Sig

## 2020-05-05 ENCOUNTER — OFFICE VISIT (OUTPATIENT)
Dept: INTERNAL MEDICINE CLINIC | Age: 70
End: 2020-05-05
Payer: COMMERCIAL

## 2020-05-05 VITALS
WEIGHT: 136 LBS | OXYGEN SATURATION: 96 % | BODY MASS INDEX: 25.7 KG/M2 | SYSTOLIC BLOOD PRESSURE: 136 MMHG | TEMPERATURE: 98.6 F | HEART RATE: 67 BPM | DIASTOLIC BLOOD PRESSURE: 65 MMHG | RESPIRATION RATE: 12 BRPM

## 2020-05-05 PROCEDURE — 99213 OFFICE O/P EST LOW 20 MIN: CPT | Performed by: INTERNAL MEDICINE

## 2020-05-05 RX ORDER — POTASSIUM CHLORIDE 1.5 G/1.77G
20 POWDER, FOR SOLUTION ORAL DAILY
COMMUNITY
End: 2020-06-08 | Stop reason: SDUPTHER

## 2020-05-05 ASSESSMENT — ENCOUNTER SYMPTOMS
SHORTNESS OF BREATH: 1
TROUBLE SWALLOWING: 0
WHEEZING: 0
ABDOMINAL DISTENTION: 0
ORTHOPNEA: 0
ABDOMINAL PAIN: 0
COLOR CHANGE: 0
SPUTUM PRODUCTION: 1

## 2020-05-05 NOTE — PROGRESS NOTES
03/15/2020 31.2* 32.0 - 36 g/dL Final    Platelets 24/84/2141 445  150 - 450 x10E9/L Final    RDW-CV 03/15/2020 16.0* 11.5 - 14 % Final    Glucose 03/15/2020 102* 74 - 99 mg/dL Final    Sodium 03/15/2020 140  136 - 145 mmol/L Final    Potassium 03/15/2020 3.8  3.5 - 5.3 mmol/L Final    Chloride 03/15/2020 105  98 - 107 mmol/L Final    HCO3 03/15/2020 32  21 - 32 mmol/L Final    Anion Gap 03/15/2020 7* 10 - 20 mmol/L Final    Urea Nitrogen 03/15/2020 18  6 - 23 mg/dL Final    CREATININE 03/15/2020 0.81  0.50 - 1.0 mg/dL Final    GFR Non- 03/15/2020 >60  >60 mL/min/1.73m2 Final    GFR  03/15/2020 >60  >60 mL/min/1.73m2 Final    Calcium 03/15/2020 8.6  8.6 - 10.3 mg/dL Final    Glucose 03/15/2020 105* 74 - 99 mg/dL Final    Sodium 03/15/2020 139  136 - 145 mmol/L Final    Potassium 03/15/2020 3.4* 3.5 - 5.3 mmol/L Final    Chloride 03/15/2020 99  98 - 107 mmol/L Final    HCO3 03/15/2020 31  21 - 32 mmol/L Final    Anion Gap 03/15/2020 12  10 - 20 mmol/L Final    Urea Nitrogen 03/15/2020 26* 6 - 23 mg/dL Final    CREATININE 03/15/2020 1.02  0.50 - 1.0 mg/dL Final    GFR Non- 03/15/2020 53* >60 mL/min/1.73m2 Final    GFR  03/15/2020 64  >60 mL/min/1.73m2 Final    Calcium 03/15/2020 8.9  8.6 - 10.3 mg/dL Final    WBC 03/16/2020 10.3  4.4 - 11.3 x10E9/L Final    RBC 03/16/2020 3.43* 4.00 - 5.2 x10E12/L Final    Hemoglobin 03/16/2020 10.9* 12.0 - 16 g/dL Final    Hematocrit 03/16/2020 35.1* 36.0 - 46 % Final    MCV 03/16/2020 102* 80 - 100 fL Final    MCHC 03/16/2020 31.1* 32.0 - 36 g/dL Final    Platelets 98/55/8454 542* 150 - 450 x10E9/L Final    RDW-CV 03/16/2020 15.7* 11.5 - 14 % Final    Glucose 03/16/2020 98  74 - 99 mg/dL Final    Sodium 03/16/2020 139  136 - 145 mmol/L Final    Potassium 03/16/2020 3.3* 3.5 - 5.3 mmol/L Final    Chloride 03/16/2020 99  98 - 107 mmol/L Final    HCO3 03/16/2020 33* 21 - 32 mmol/L Final Medication Sig Dispense Refill    potassium chloride (KLOR-CON) 20 MEQ packet Take 20 mEq by mouth daily      furosemide (LASIX) 40 MG tablet TAKE 1 TABLET BY MOUTH EVERY DAY 30 tablet 5    potassium chloride (KLOR-CON M) 20 MEQ extended release tablet Take 1 tablet by mouth daily 30 tablet 5    umeclidinium-vilanterol (ANORO ELLIPTA) 62.5-25 MCG/INH AEPB inhaler Inhale 1 puff into the lungs daily 1 each 5    dilTIAZem (CARDIZEM CD) 120 MG extended release capsule Take 1 capsule by mouth nightly 30 capsule 3    dilTIAZem (CARDIZEM CD) 240 MG extended release capsule Take 1 capsule by mouth every morning 90 capsule 1    amiodarone (CORDARONE) 200 MG tablet 1 TAB BY MOUTH 2 TIMES A DAY THRU 3/4/20 THEN 1 TABLET DAILY STARTING 3/5/20      rivaroxaban (XARELTO) 20 MG TABS tablet TAKE 1 TABLET BY MOUTH EVERY DAY WITH BREAKFAST 90 tablet 3    b complex vitamins tablet Take 1 tablet by mouth daily      calcium carbonate 600 MG TABS tablet Take 1 tablet by mouth daily      magnesium oxide (MAG-OX) 400 MG tablet Take 400 mg by mouth daily      Vitamin D (CHOLECALCIFEROL) 1000 UNITS CAPS capsule Take 1,000 Units by mouth daily. No current facility-administered medications on file prior to visit. Review of Systems   Constitutional: Negative for chills, diaphoresis, fatigue, fever and unexpected weight change. HENT: Negative for nosebleeds and trouble swallowing. Respiratory: Positive for sputum production and shortness of breath. Negative for wheezing. Cardiovascular: Negative for chest pain, palpitations, orthopnea, claudication, leg swelling, syncope and PND. Gastrointestinal: Negative for abdominal distention and abdominal pain. Endocrine: Negative for cold intolerance and heat intolerance. Genitourinary: Negative for dysuria and hematuria. Musculoskeletal: Negative for gait problem, joint swelling, myalgias and neck stiffness. Skin: Negative for color change. Allergic/Immunologic: Negative for immunocompromised state. Neurological: Negative for tremors, focal weakness, syncope, light-headedness and headaches. Hematological: Bruises/bleeds easily. Psychiatric/Behavioral: Negative for decreased concentration and dysphoric mood. The patient is nervous/anxious. Vitals:    05/05/20 0938   BP: 136/65   Pulse: 67   Resp: 12   Temp: 98.6 °F (37 °C)   SpO2: 96%   Weight: 136 lb (61.7 kg)       Physical Exam  Constitutional:       General: She is not in acute distress. Appearance: Normal appearance. She is normal weight. HENT:      Head: Atraumatic. Nose: No congestion. Eyes:      Extraocular Movements: Extraocular movements intact. Conjunctiva/sclera: Conjunctivae normal.   Neck:      Musculoskeletal: Neck supple. Normal range of motion. No muscular tenderness. Thyroid: No thyromegaly. Trachea: Trachea normal.        Comments: Palpation of the submental area reveals presence of a 2 x 1 am oval soft tissue nodule, slightly adherent to the skin, no overlying skin changes. Cardiovascular:      Rate and Rhythm: Normal rate and regular rhythm. Pulses: Normal pulses. Heart sounds: Normal heart sounds. No gallop. Pulmonary:      Effort: Pulmonary effort is normal. No respiratory distress. Breath sounds: No wheezing. Comments: Diminished breath sounds bilaterally  Dry bibasilar crackles bilaterally, 1/4 way up  Abdominal:      General: There is no distension. Musculoskeletal: Normal range of motion. Lymphadenopathy:      Cervical: No cervical adenopathy. Skin:     General: Skin is warm. Coloration: Skin is not pale. Neurological:      General: No focal deficit present. Mental Status: She is oriented to person, place, and time. Mental status is at baseline. Psychiatric:         Attention and Perception: Attention normal.         Mood and Affect: Mood is anxious.          Speech: Speech normal. Behavior: Behavior is cooperative. Cognition and Memory: Cognition normal.         Judgment: Judgment is not inappropriate. Assessment:    [facility] was seen today for shortness of breath, hyperlipidemia and other. Diagnoses and all orders for this visit:    Shortness of breath              Repeat chest x-ray in view of history of recent cardiac decompensation, pleural effusions, COPD with exacerbation. Patient determined her pulse ox at home has been normal.  No physical signs of cardiac or pulmonary decompensation. Bibasilar dry crackles partially cleared with deep inspiration, possibly fibrosis/atelectasis  -     XR CHEST STANDARD (2 VW); Future    Hyperlipidemia, unspecified hyperlipidemia type              The patient has a high HDL and acceptable LDL of below 130 while on a low-dose statin. Due to previous problems with compliance, we discussed going off statin for 3 to 6 months, monitor lipid profile, continue healthy lifestyle  -     Lipid Panel; Future    Neck mass              Ruled as an \"neck cyst\" dating back to 2012 after plastic surgery, reevaluate due to new symptoms  -     CT SOFT TISSUE NECK W WO CONTRAST; Future        Plan:    See all orders and comments in the assessment section. Reviewed with the patient (/and caregiver if present): current health status, medications, activities and diet. Today's diagnosis (in the context ofchronic problems) was discussed with patient (/and caregiver if present), questions answered. The following tests/ consults were ordered for diagnostic workup of the current complaints/ physical exam findings, after discussion and with patient's consent. Will contact patient for discussion and further management planning once reports available for review.     Orders Placed This Encounter   Procedures    XR CHEST STANDARD (2 VW)     Standing Status:   Future     Standing Expiration Date:   5/5/2021    CT SOFT TISSUE NECK W WO CONTRAST

## 2020-05-11 ENCOUNTER — HOSPITAL ENCOUNTER (OUTPATIENT)
Dept: GENERAL RADIOLOGY | Age: 70
Discharge: HOME | End: 2020-05-13
Payer: COMMERCIAL

## 2020-05-11 ENCOUNTER — HOSPITAL ENCOUNTER (OUTPATIENT)
Dept: CT IMAGING | Age: 70
Discharge: HOME | End: 2020-05-13
Payer: COMMERCIAL

## 2020-05-11 VITALS
HEIGHT: 62 IN | RESPIRATION RATE: 16 BRPM | HEART RATE: 62 BPM | DIASTOLIC BLOOD PRESSURE: 77 MMHG | SYSTOLIC BLOOD PRESSURE: 133 MMHG | BODY MASS INDEX: 25.03 KG/M2 | WEIGHT: 136 LBS

## 2020-05-11 PROCEDURE — 6360000004 HC RX CONTRAST MEDICATION: Performed by: INTERNAL MEDICINE

## 2020-05-11 PROCEDURE — 70491 CT SOFT TISSUE NECK W/DYE: CPT

## 2020-05-11 PROCEDURE — 71046 X-RAY EXAM CHEST 2 VIEWS: CPT

## 2020-05-11 RX ORDER — PRAVASTATIN SODIUM 10 MG
10 TABLET ORAL DAILY
Qty: 90 TABLET | Refills: 2 | Status: SHIPPED | OUTPATIENT
Start: 2020-05-11 | End: 2020-11-11 | Stop reason: SDUPTHER

## 2020-05-11 RX ADMIN — IOPAMIDOL 75 ML: 612 INJECTION, SOLUTION INTRAVENOUS at 08:21

## 2020-05-14 ENCOUNTER — OFFICE VISIT (OUTPATIENT)
Dept: CARDIOLOGY CLINIC | Age: 70
End: 2020-05-14
Payer: COMMERCIAL

## 2020-05-14 VITALS
HEIGHT: 61 IN | SYSTOLIC BLOOD PRESSURE: 124 MMHG | OXYGEN SATURATION: 96 % | HEART RATE: 71 BPM | DIASTOLIC BLOOD PRESSURE: 82 MMHG | RESPIRATION RATE: 20 BRPM | WEIGHT: 136.6 LBS | BODY MASS INDEX: 25.79 KG/M2

## 2020-05-14 PROCEDURE — 99214 OFFICE O/P EST MOD 30 MIN: CPT | Performed by: INTERNAL MEDICINE

## 2020-05-14 ASSESSMENT — ENCOUNTER SYMPTOMS
FACIAL SWELLING: 0
VOICE CHANGE: 0
CHEST TIGHTNESS: 0
SHORTNESS OF BREATH: 0
VOMITING: 0
ANAL BLEEDING: 0
DIARRHEA: 0
NAUSEA: 0
APNEA: 0
TROUBLE SWALLOWING: 0
ABDOMINAL DISTENTION: 0
COLOR CHANGE: 0
WHEEZING: 0
BLOOD IN STOOL: 0

## 2020-05-14 NOTE — PROGRESS NOTES
[facility] CARDIOLOGY OFFICE FOLLOW-UP      Patient: Perla Guerra  YOB: 1950  MRN: [id number]    Chief Complaint:  Chief Complaint   Patient presents with    Follow-up     2 week f/u    Atrial Fibrillation    Congestive Heart Failure         Subjective/HPI:  5/14/2020: Patient presents today for follow-up of atrial fibrillation. Remains in sinus rhythm. Last visit was with Barney Resendez will follow with Dr. Ruthann Johnson cardiac rehab has not called her. Otherwise feeling well blood pressure remains controlled. No palpitation down the amiodarone to Monday and Friday only and see me in 3 months       4/28/2020 VIRTUAL: Perla Guerra is a 68 y.o. female evaluated via telephone on 4/28/2020. For follow-up of atrial fibrillation. Was admitted to [facility]. Discharge. Echo shows normal ejection fraction. 60%. Mild concentric left ventricle hypertrophy. He did start walking yesterday with her . Got a little winded. I think she is in sinus rhythm. Will reduce amiodarone to Monday Wednesday and Friday. Increase Lasix and potassium to every day. See me in 2 weeks. Continue Cardizem 240 in the morning and 120 at night. Continue Xarelto         3/23/2020: Patient presents today for follow-up of recent hospitalization at [facility] for CHF atrial fibrillation. Was seen by multiple people. Litoersen cath which was negative. Was evaluated by Dr. Lyle Mckoy. Echocardiogram was read by . Showed preserved LV ejection fraction. Had a cardiac MRI. Bottom line is currently on amiodarone 200 mg a day he used to take that before and quit taking it 3 4 years ago. On Xarelto. She is somewhat noncompliant with medication. Could not tolerate beta-blocker. Currently she is on Cardizem 240 in the morning and 120 at night and was put on Lasix and potassium. She is feeling better. Supposed to see  for follow-up of event monitor. \"  Keep the medication the same.   I will see her in 2 Cardizem and Xarelto only. No bleeding. She'll see me in 6 months. He wants to get off blood thinners. I told her not to do it right now. We will reconsider that option in 6 months.     5/14/18: Patient presents today for Follow-up of atrial fibrillation. Remains in sinus rhythm. On Xarelto. No bleeding. She is off amiodarone. Doing well. She fell down and hurt her left shoulder. Is healing up well. No syncope. See me in 6 months     11/13/17: Patient presents today for atrial fibrillation. In sinus rhythm. On Xarelto and Cardizem. No bleeding. Off amiodarone. See me in 6 months.     5/1/17: For follow-up of atrial fibrillation. Thyroid profile was normal. Amiodarone DC'd. Plan Cardizem and Xarelto. She is going to be going to [place] for road trip with her . Feels well. She is  status post cardioversion and remains in sinus rhythm. See me in 6 months.           Past Medical History:   Diagnosis Date    Abnormal echocardiogram 2020    possible PFO?  further eval suggested    COPD (chronic obstructive pulmonary disease) ([address] 2015    Dr Yeimi Mendez Current use of long term anticoagulation     Cyst of neck 2012    after plastic surgery    Emphysema of lung ([address]     History of compression fracture of vertebral column 2020    T2, L1    History of humerus fracture     LEFT    History of left heart catheterization 03/2020    at [facility], normal LVEF with impaired relaxation, mild AV calcification and mild MV thickening, normal RV    History of sustained ventricular tachycardia 03/2020    had to wear a LifeVest, Dr Juanis Mitchell Hyperlipidemia LDL goal < 130     Intermittent atrial fibrillation [facility]) 2015    Dr Elissa Homans, Dr Jen Garces, Dr Suleman Garcia Osteopenia 2014    S/P colonoscopic polypectomy 2008, 2015, 2018    Dr Gianluca Washburn    Smoking history     Vitamin D deficiency        Past Surgical History:   Procedure Laterality Date    ATRIAL ABLATION SURGERY      BREAST SURGERY  1975    staph infection, postpartum    Genitourinary: Negative for decreased urine volume and dysuria. Musculoskeletal: Negative for gait problem, myalgias, neck pain and neck stiffness. Skin: Negative for color change, pallor, rash and wound. Neurological: Negative for dizziness, seizures, syncope, facial asymmetry, weakness, light-headedness, numbness and headaches. Hematological: Does not bruise/bleed easily. Psychiatric/Behavioral: Negative for agitation, behavioral problems, confusion, hallucinations and suicidal ideas. The patient is not nervous/anxious. All other systems reviewed and are negative. Review of System is negative except for as mentioned above. Physical Examination:    /82 (Site: Left Upper Arm, Position: Sitting, Cuff Size: Medium Adult)   Pulse 71   Resp 20   Ht 5' 1\" (1.549 m)   Wt 136 lb 9.6 oz (62 kg)   LMP  (LMP Unknown)   SpO2 96%   BMI 25.81 kg/m²    Physical Exam   Constitutional: She appears healthy. No distress. HENT:   Nose: Nose normal.   Mouth/Throat: Dentition is normal. Oropharynx is clear. Eyes: Pupils are equal, round, and reactive to light. Conjunctivae are normal.   Neck: Normal range of motion and thyroid normal. Neck supple. Cardiovascular: Regular rhythm, S1 normal, S2 normal, normal heart sounds, intact distal pulses and normal pulses. PMI is not displaced. No murmur heard. Pulmonary/Chest: She has no wheezes. She has no rales. She exhibits no tenderness. Abdominal: Soft. Bowel sounds are normal. She exhibits no distension and no mass. There is no splenomegaly or hepatomegaly. There is no abdominal tenderness. No hernia. Neurological: She is alert and oriented to person, place, and time. She has normal motor skills. Gait normal.   Skin: Skin is warm and dry. No cyanosis. No jaundice. Nails show no clubbing.            Patient Active Problem List   Diagnosis    Hyperlipidemia with target LDL less than 130    Vitamin D deficiency    Atrial fibrillation ([address]   

## 2020-05-22 ENCOUNTER — OFFICE VISIT (OUTPATIENT)
Dept: PULMONOLOGY | Age: 70
End: 2020-05-22
Payer: COMMERCIAL

## 2020-05-22 VITALS
BODY MASS INDEX: 26.06 KG/M2 | HEART RATE: 72 BPM | TEMPERATURE: 97.8 F | RESPIRATION RATE: 16 BRPM | HEIGHT: 61 IN | OXYGEN SATURATION: 95 % | DIASTOLIC BLOOD PRESSURE: 72 MMHG | SYSTOLIC BLOOD PRESSURE: 130 MMHG | WEIGHT: 138 LBS

## 2020-05-22 PROCEDURE — 99214 OFFICE O/P EST MOD 30 MIN: CPT | Performed by: INTERNAL MEDICINE

## 2020-05-22 ASSESSMENT — ENCOUNTER SYMPTOMS
SORE THROAT: 0
VOICE CHANGE: 0
TROUBLE SWALLOWING: 0
NAUSEA: 0
CHEST TIGHTNESS: 0
SINUS PRESSURE: 0
SHORTNESS OF BREATH: 1
ABDOMINAL PAIN: 0
VOMITING: 0
RHINORRHEA: 0
EYE DISCHARGE: 0
DIARRHEA: 0
WHEEZING: 0
COUGH: 0
EYE ITCHING: 0

## 2020-05-22 NOTE — PROGRESS NOTES
Osteoarthritis Mother     Osteoporosis Mother     Cancer Mother         anorectal     Colon Cancer Mother      Social History     Socioeconomic History    Marital status:      Spouse name: Not on file    Number of children: 2    Years of education: Not on file    Highest education level: Not on file   Occupational History    Occupation: [employer], retired   Social Needs    Financial resource strain: Not on file    Food insecurity     Worry: Not on file     Inability: Not on file   [employer] needs     Medical: Not on file     Non-medical: Not on file   Tobacco Use    Smoking status: Former Smoker     Packs/day: 1.00     Years: 50.00     Pack years: 50.00     Types: Cigarettes     Start date:      Last attempt to quit: 2015     Years since quittin.4    Smokeless tobacco: Never Used    Tobacco comment: pt will try smoking cessation   Substance and Sexual Activity    Alcohol use: No    Drug use: No    Sexual activity: Not on file   Lifestyle    Physical activity     Days per week: Not on file     Minutes per session: Not on file    Stress: Not on file   Relationships    Social connections     Talks on phone: Not on file     Gets together: Not on file     Attends Baptism service: Not on file     Active member of club or organization: Not on file     Attends meetings of clubs or organizations: Not on file     Relationship status: Not on file    Intimate partner violence     Fear of current or ex partner: Not on file     Emotionally abused: Not on file     Physically abused: Not on file     Forced sexual activity: Not on file   Other Topics Concern    Not on file   Social History Narrative    Born in [facility] [place], one brother in [place], keeps in touch    , 2 children, one son in [place], one daughter in [place]    5 grandchildren     Lives in a house in [place] with spouse     Worked for [employer] and full time at [employer], [employer], rales. Comments: diminished Breath sound bilaterally. Chest:      Chest wall: No tenderness. Abdominal:      General: There is no distension. Tenderness: There is no abdominal tenderness. There is no rebound. Musculoskeletal: Normal range of motion. Lymphadenopathy:      Cervical: No cervical adenopathy. Skin:     General: Skin is warm and dry. Neurological:      Mental Status: She is alert and oriented to person, place, and time. Coordination: Coordination normal.         Current Outpatient Medications   Medication Sig Dispense Refill    pravastatin (PRAVACHOL) 10 MG tablet Take 1 tablet by mouth daily 90 tablet 2    potassium chloride (KLOR-CON) 20 MEQ packet Take 20 mEq by mouth daily      furosemide (LASIX) 40 MG tablet TAKE 1 TABLET BY MOUTH EVERY DAY 30 tablet 5    umeclidinium-vilanterol (ANORO ELLIPTA) 62.5-25 MCG/INH AEPB inhaler Inhale 1 puff into the lungs daily 1 each 5    dilTIAZem (CARDIZEM CD) 120 MG extended release capsule Take 1 capsule by mouth nightly 30 capsule 3    dilTIAZem (CARDIZEM CD) 240 MG extended release capsule Take 1 capsule by mouth every morning 90 capsule 1    amiodarone (CORDARONE) 200 MG tablet 1 TAB BY MOUTH 2 TIMES A DAY THRU 3/4/20 THEN 1 TABLET DAILY STARTING 3/5/20      rivaroxaban (XARELTO) 20 MG TABS tablet TAKE 1 TABLET BY MOUTH EVERY DAY WITH BREAKFAST 90 tablet 3    b complex vitamins tablet Take 1 tablet by mouth daily      magnesium oxide (MAG-OX) 400 MG tablet Take 400 mg by mouth daily      Vitamin D (CHOLECALCIFEROL) 1000 UNITS CAPS capsule Take 1,000 Units by mouth daily. No current facility-administered medications for this visit.         Results for orders placed during the hospital encounter of 05/11/20   XR CHEST STANDARD (2 VW)    Narrative EXAMINATION: XR CHEST (2 VW)    CLINICAL HISTORY: SHORTNESS OF BREATH    COMPARISONS: CT CHEST, MARCH 5, 2020, CHEST RADIOGRAPH, SEPTEMBER 25, 2019    FINDINGS: Osseous

## 2020-05-26 RX ORDER — DILTIAZEM HYDROCHLORIDE 240 MG/1
CAPSULE, COATED, EXTENDED RELEASE ORAL
Qty: 90 CAPSULE | Refills: 1 | Status: SHIPPED | OUTPATIENT
Start: 2020-05-26 | End: 2020-08-13 | Stop reason: SDUPTHER

## 2020-05-28 ENCOUNTER — TELEPHONE (OUTPATIENT)
Dept: CARDIOLOGY CLINIC | Age: 70
End: 2020-05-28

## 2020-05-28 NOTE — TELEPHONE ENCOUNTER
The patient is wondering if Dr. Janene Soliz is intending on her starting cardiac rehab. He discussed with her back in March around the time [facility] closed all those rehab services due to the coronavirus impact.  She is wondering if he wants her to attempt that now and if so she will  need a referral.

## 2020-06-08 RX ORDER — POTASSIUM CHLORIDE 1.5 G/1.77G
20 POWDER, FOR SOLUTION ORAL DAILY
Qty: 90 EACH | Refills: 2 | Status: SHIPPED | OUTPATIENT
Start: 2020-06-08 | End: 2020-06-09 | Stop reason: CLARIF

## 2020-06-08 NOTE — TELEPHONE ENCOUNTER
lov 5/14/20  Requested Prescriptions     Signed Prescriptions Disp Refills    potassium chloride (KLOR-CON) 20 MEQ packet 90 each 2     Sig: Take 20 mEq by mouth daily     Authorizing Provider: Aubrey Velazquez     Ordering User: Haley Samayoa

## 2020-06-09 RX ORDER — POTASSIUM CHLORIDE 20 MEQ/1
20 TABLET, EXTENDED RELEASE ORAL DAILY
Qty: 30 TABLET | Refills: 5 | Status: SHIPPED | OUTPATIENT
Start: 2020-06-09 | End: 2020-07-16 | Stop reason: SDUPTHER

## 2020-06-16 RX ORDER — AMIODARONE HYDROCHLORIDE 200 MG/1
200 TABLET ORAL
Qty: 26 TABLET | Refills: 3 | Status: SHIPPED | OUTPATIENT
Start: 2020-06-18 | End: 2021-06-16

## 2020-06-30 ENCOUNTER — HOSPITAL ENCOUNTER (OUTPATIENT)
Dept: PULMONOLOGY | Age: 70
Setting detail: THERAPIES SERIES
Discharge: HOME | End: 2020-06-30
Payer: COMMERCIAL

## 2020-06-30 PROCEDURE — G0424 PULMONARY REHAB W EXER: HCPCS

## 2020-07-02 ENCOUNTER — HOSPITAL ENCOUNTER (OUTPATIENT)
Dept: PULMONOLOGY | Age: 70
Setting detail: THERAPIES SERIES
Discharge: HOME | End: 2020-07-02
Payer: COMMERCIAL

## 2020-07-02 ENCOUNTER — APPOINTMENT (OUTPATIENT)
Dept: PULMONOLOGY | Age: 70
End: 2020-07-02
Payer: COMMERCIAL

## 2020-07-02 PROCEDURE — G0424 PULMONARY REHAB W EXER: HCPCS

## 2020-07-07 ENCOUNTER — HOSPITAL ENCOUNTER (OUTPATIENT)
Dept: PULMONOLOGY | Age: 70
Setting detail: THERAPIES SERIES
Discharge: HOME | End: 2020-07-07
Payer: COMMERCIAL

## 2020-07-07 PROCEDURE — G0424 PULMONARY REHAB W EXER: HCPCS

## 2020-07-09 ENCOUNTER — HOSPITAL ENCOUNTER (OUTPATIENT)
Dept: PULMONOLOGY | Age: 70
Setting detail: THERAPIES SERIES
Discharge: HOME | End: 2020-07-09
Payer: COMMERCIAL

## 2020-07-09 PROCEDURE — G0424 PULMONARY REHAB W EXER: HCPCS

## 2020-07-14 ENCOUNTER — HOSPITAL ENCOUNTER (OUTPATIENT)
Dept: PULMONOLOGY | Age: 70
Setting detail: THERAPIES SERIES
Discharge: HOME | End: 2020-07-14
Payer: COMMERCIAL

## 2020-07-14 PROCEDURE — G0424 PULMONARY REHAB W EXER: HCPCS

## 2020-07-16 ENCOUNTER — HOSPITAL ENCOUNTER (OUTPATIENT)
Dept: PULMONOLOGY | Age: 70
Setting detail: THERAPIES SERIES
Discharge: HOME | End: 2020-07-16
Payer: COMMERCIAL

## 2020-07-16 PROCEDURE — G0424 PULMONARY REHAB W EXER: HCPCS

## 2020-07-16 RX ORDER — POTASSIUM CHLORIDE 20 MEQ/1
20 TABLET, EXTENDED RELEASE ORAL DAILY
Qty: 90 TABLET | Refills: 1 | Status: SHIPPED | OUTPATIENT
Start: 2020-08-01 | End: 2020-10-08 | Stop reason: CLARIF

## 2020-07-21 ENCOUNTER — HOSPITAL ENCOUNTER (OUTPATIENT)
Dept: PULMONOLOGY | Age: 70
Setting detail: THERAPIES SERIES
Discharge: HOME | End: 2020-07-21
Payer: COMMERCIAL

## 2020-07-21 PROCEDURE — G0424 PULMONARY REHAB W EXER: HCPCS

## 2020-07-23 ENCOUNTER — HOSPITAL ENCOUNTER (OUTPATIENT)
Dept: PULMONOLOGY | Age: 70
Setting detail: THERAPIES SERIES
Discharge: HOME | End: 2020-07-23
Payer: COMMERCIAL

## 2020-07-23 PROCEDURE — G0424 PULMONARY REHAB W EXER: HCPCS

## 2020-07-24 ENCOUNTER — OFFICE VISIT (OUTPATIENT)
Dept: PULMONOLOGY | Age: 70
End: 2020-07-24
Payer: COMMERCIAL

## 2020-07-24 VITALS
BODY MASS INDEX: 26.06 KG/M2 | WEIGHT: 138 LBS | SYSTOLIC BLOOD PRESSURE: 126 MMHG | HEIGHT: 61 IN | OXYGEN SATURATION: 96 % | HEART RATE: 66 BPM | RESPIRATION RATE: 16 BRPM | DIASTOLIC BLOOD PRESSURE: 60 MMHG | TEMPERATURE: 97.8 F

## 2020-07-24 PROBLEM — J98.11 ATELECTASIS, LEFT: Status: ACTIVE | Noted: 2020-07-24

## 2020-07-24 PROCEDURE — 99214 OFFICE O/P EST MOD 30 MIN: CPT | Performed by: INTERNAL MEDICINE

## 2020-07-24 ASSESSMENT — ENCOUNTER SYMPTOMS
WHEEZING: 0
VOMITING: 0
SHORTNESS OF BREATH: 1
SINUS PRESSURE: 0
SORE THROAT: 0
ABDOMINAL PAIN: 0
VOICE CHANGE: 0
NAUSEA: 0
COUGH: 0
EYE ITCHING: 0
EYE DISCHARGE: 0
CHEST TIGHTNESS: 0
DIARRHEA: 0
TROUBLE SWALLOWING: 0
RHINORRHEA: 0

## 2020-07-24 NOTE — PROGRESS NOTES
Subjective:     Thuy Roldan is a 68 y.o. female who complains today of:     Chief Complaint   Patient presents with    Follow-up     f/u for CXR and PFT results. HPI  Patient had CXR and PFT done   She is on bronchodilator with with anoro ellipta 1 puff daily.   C/o shortness of breath  with exertion. No Wheezing. No Cough with  Sputum  No Hemoptysis  No Chest tightness   No Chest pain with radiation  or pleuritic pain  No Fever or chills. No Rhinorrhea and postnasal drip.  amiodarone and she is taking 200 mg 2 times a week , following with dr. Jaden Webb    Allergies:  Patient has no known allergies. Past Medical History:   Diagnosis Date    Abnormal echocardiogram 2020    possible PFO?  further eval suggested    COPD (chronic obstructive pulmonary disease) (HCC) 2015    Dr Jori Gilliam Current use of long term anticoagulation     Cyst of neck 2012    after plastic surgery    Emphysema of lung ([address]     History of compression fracture of vertebral column 2020    T2, L1    History of humerus fracture     LEFT    History of left heart catheterization 03/2020    at [address] [place] [address], normal LVEF with impaired relaxation, mild AV calcification and mild MV thickening, normal RV    History of sustained ventricular tachycardia 03/2020    had to wear a LifeVest, Dr Keturah Norton Hyperlipidemia LDL goal < 130     Intermittent atrial fibrillation [facility]) 2015    Dr Marva Lazo, Dr Jamel Ennis, Dr Ernest Bumpers Osteopenia 2014    S/P colonoscopic polypectomy 2008, 2015, 2018    Dr Terri Marcos    Smoking history     Vitamin D deficiency      Past Surgical History:   Procedure Laterality Date    ATRIAL ABLATION SURGERY      BREAST SURGERY  1975    staph infection, postpartum    COLONOSCOPY  7/22/15    w/polypectomy Dr Reilly Norton  2012    Dr Juanis Hahn; HI RISK IND N/A 7/13/2018    COLONOSCOPY performed by Dain Hollis MD at [facility]     Family History   Problem Relation Age of Onset    Osteoarthritis Mother     Osteoporosis Mother     Cancer Mother         anorectal     Colon Cancer Mother      Social History     Socioeconomic History    Marital status:      Spouse name: Not on file    Number of children: 2    Years of education: Not on file    Highest education level: Not on file   Occupational History    Occupation: [email or web address], retired   Social Needs    Financial resource strain: Not on file    Food insecurity     Worry: Not on file     Inability: Not on file   [employer] needs     Medical: Not on file     Non-medical: Not on file   Tobacco Use    Smoking status: Former Smoker     Packs/day: 1.00     Years: 50.00     Pack years: 50.00     Types: Cigarettes     Start date:      Last attempt to quit: 2015     Years since quittin.6    Smokeless tobacco: Never Used    Tobacco comment: pt will try smoking cessation   Substance and Sexual Activity    Alcohol use: No    Drug use: No    Sexual activity: Not on file   Lifestyle    Physical activity     Days per week: Not on file     Minutes per session: Not on file    Stress: Not on file   Relationships    Social connections     Talks on phone: Not on file     Gets together: Not on file     Attends Jainism service: Not on file     Active member of club or organization: Not on file     Attends meetings of clubs or organizations: Not on file     Relationship status: Not on file    Intimate partner violence     Fear of current or ex partner: Not on file     Emotionally abused: Not on file     Physically abused: Not on file     Forced sexual activity: Not on file   Other Topics Concern    Not on file   Social History Narrative    Born in [place], one brother in [place], keeps in touch    , 2 children, one son in [place], one daughter in [place]    5 grandchildren     Lives in a house in [place] with spouse     Worked for [employer] and full time at [employer], [employer] shopping, fashion, 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 HENT: Negative for congestion, mouth sores, nosebleeds, postnasal drip, rhinorrhea, sinus pressure, sneezing, sore throat, trouble swallowing and voice change. Eyes: Negative for discharge, itching and visual disturbance. Respiratory: Positive for shortness of breath. Negative for cough, chest tightness and wheezing. Cardiovascular: Negative for chest pain, palpitations and leg swelling. Gastrointestinal: Negative for abdominal pain, diarrhea, nausea and vomiting. Genitourinary: Negative for difficulty urinating and hematuria. Musculoskeletal: Negative for arthralgias, joint swelling and myalgias. Skin: Negative for rash. Allergic/Immunologic: Negative for environmental allergies and food allergies. Neurological: Negative for dizziness, tremors, weakness and headaches. Psychiatric/Behavioral: Negative for behavioral problems and sleep disturbance.         :     Vitals:    07/24/20 0859   BP: 126/60   Pulse: 66   Resp: 16   Temp: 97.8 °F (36.6 °C)   TempSrc: Temporal   SpO2: 96%   Weight: 138 lb (62.6 kg)   Height: 5' 1\" (1.549 m)     Wt Readings from Last 3 Encounters:   07/24/20 138 lb (62.6 kg)   05/22/20 138 lb (62.6 kg)   05/14/20 136 lb 9.6 oz (62 kg)         Physical Exam  Constitutional:       General: She is not in acute distress. Appearance: She is well-developed. She is not diaphoretic. HENT:      Head: Normocephalic and atraumatic. Nose: Nose normal.   Eyes:      Pupils: Pupils are equal, round, and reactive to light. Neck:      Thyroid: No thyromegaly. Vascular: No JVD. Trachea: No tracheal deviation. Cardiovascular:      Rate and Rhythm: Normal rate and regular rhythm. Heart sounds: No murmur. No friction rub. No gallop. Pulmonary:      Effort: No respiratory distress. Breath sounds:  No wheezing or rales. Comments: diminished Breath sound bilaterally. Chest:      Chest wall: No tenderness. Abdominal:      General: There is no distension. Tenderness: There is no abdominal tenderness. There is no rebound. Musculoskeletal: Normal range of motion. Lymphadenopathy:      Cervical: No cervical adenopathy. Skin:     General: Skin is warm and dry. Neurological:      Mental Status: She is alert and oriented to person, place, and time. Coordination: Coordination normal.         Current Outpatient Medications   Medication Sig Dispense Refill    Calcium Carb-Cholecalciferol (CALCIUM + D3 PO) Take by mouth      [START ON 8/1/2020] potassium chloride (KLOR-CON M) 20 MEQ extended release tablet Take 1 tablet by mouth daily 90 tablet 1    amiodarone (CORDARONE) 200 MG tablet Take 1 tablet by mouth Twice a Week 26 tablet 3    dilTIAZem (CARDIZEM CD) 240 MG extended release capsule TAKE 1 CAPSULE BY MOUTH EVERY DAY 90 capsule 1    pravastatin (PRAVACHOL) 10 MG tablet Take 1 tablet by mouth daily 90 tablet 2    furosemide (LASIX) 40 MG tablet TAKE 1 TABLET BY MOUTH EVERY DAY 30 tablet 5    umeclidinium-vilanterol (ANORO ELLIPTA) 62.5-25 MCG/INH AEPB inhaler Inhale 1 puff into the lungs daily 1 each 5    dilTIAZem (CARDIZEM CD) 120 MG extended release capsule Take 1 capsule by mouth nightly 30 capsule 3    rivaroxaban (XARELTO) 20 MG TABS tablet TAKE 1 TABLET BY MOUTH EVERY DAY WITH BREAKFAST 90 tablet 3    b complex vitamins tablet Take 1 tablet by mouth daily      magnesium oxide (MAG-OX) 400 MG tablet Take 400 mg by mouth daily      Vitamin D (CHOLECALCIFEROL) 1000 UNITS CAPS capsule Take 1,000 Units by mouth daily. No current facility-administered medications for this visit.         Results for orders placed during the hospital encounter of 05/11/20   XR CHEST STANDARD (2 VW)    Narrative EXAMINATION: XR CHEST (2 VW)    CLINICAL HISTORY: SHORTNESS OF BREATH    COMPARISONS: CT CHEST, MARCH 5, 2020, CHEST RADIOGRAPH, SEPTEMBER 25, 2019    FINDINGS: Osseous structures intact. Cardiopericardial silhouette normal. Aorta calcified. Pulmonary vasculature normal. Ill-defined area increased opacity left lung base posteriorly. Diaphragms flattened. Impression EMPHYSEMA. LEFT LOWER LUNG SUBSEGMENTAL ATELECTASIS/PNEUMONIA. Results for orders placed during the hospital encounter of 08/23/16   CT Chest W Contrast    Narrative CT CHEST     REASON FOR EXAM  HEMOPTYSIS     COMPARISONS  none     FINDINGS  Multiplanar images were obtained following the IV injection  of 75 cc Isovue-370. A focal area of consolidation involving the right  lower lobe posteriorly is seen. There is a suggestion of air  bronchogram present. Mild atelectasis at the left lung base is seen. Remainder lungs are clear. No pleural effusions are seen. No definite  pulmonary nodules are identified. Emphysematous changes are seen. No  pneumothorax is noted. Heart appears normal in size. No pericardial  effusion is seen. No mediastinal, hilar or axillary adenopathy is  noted. Bone windows demonstrate no gross osseous abnormalities. Surrounding soft tissues are unremarkable. Limited images of the upper  abdomen demonstrate no contributory findings. IMPRESSION  FOCAL AREA OF CONSOLIDATION INVOLVING THE RIGHT LOWER LOBE  IS SEEN. SHORT-TERM INTERVAL FOLLOWUP CT WITHOUT CONTRAST IN 3-4 MONTHS  IS SUGGESTED TO NOTE RESOLUTION. MILD ATELECTATIC CHANGES AT THE LEFT  LUNG BASE. NO OTHER SIGNIFICANT ABNORMALITIES ARE SEEN. All CT scans at this facility use dose modulation, iterative  reconstruction, and/or weight based dosing when appropriate to reduce  radiation dose to as low as reasonably achievable. Luisa Camara M.D. Released Amy Camara M.D.        Released Date Time- 08/24/16 1306   This document has been electronically signed. ------------------------------------------------------------------------------     Leonidas Love MD     3/4/2020  9:04 AM                        [address] [place] [address]                 [address] [facility], [address] [facility]                      PULMONARY FUNCTION   Michelle Fuentes   68 y.o.   female   Height 62 in   Weight 129 lb       Referring provider   Kilo Galvan MD     Reading provider   Leonidas Love         Diagnosis: FOWLER Yes, Cough  No, wheezing No   Smoking quit 4 years ago     Spirometry   FVC            2.04 L   75%  Post bronchodilator 1.97 L  72%   Change -3 %   FEV1          0.87 L  42%   Post bronchodilator  0.96 L  46%     Change 9%   FEV1/FVC  43  %             Post bronchodilator  49 %   TLR15-39% 0.27 L  15%  Post bronchodilator  0.34 L  19%   Change   26%     Lung volume   SVC           2.06 L  75%   RV              2.54 L 120%   TLC            4.60  L 96%   RV/TLC      55 %     DLCO           45 %         Test interpretation       Spirometry meets ATS criteria for severe obstructive airway   disease with no response to bronchodilator   Lung volume shows air-trapping   Diffusion capacity is moderately reduced         Clinical correlation is recommended     Shanta Joseph [facility], 3/4/2020 9:03 AM   Assessment/Plan:     1. Chronic obstructive pulmonary disease, unspecified COPD type ([address]  She had PFT show severe COPD. CXR show left base atelactasis. She is on bronchodilator with with anoro ellipta 1 puff daily. C/o shortness of breath  with exertion. No Wheezing. No Cough with  Sputum. Continue bronchodilator as before. 2. Shortness of breath  Patient is having  Chronic shortness of breath which is likely due to COPD, continue  Bronchodilator,  as before. keep  Spo2 90% or above. 3. Atelectasis, left  CXR show left base atelectasis, advised deep breathing exercise. Return in about 3 months (around 10/24/2020) for COPD.       Santiago Edgar MD

## 2020-07-27 ENCOUNTER — OFFICE VISIT (OUTPATIENT)
Dept: FAMILY MEDICINE CLINIC | Age: 70
End: 2020-07-27
Payer: COMMERCIAL

## 2020-07-27 ENCOUNTER — TELEPHONE (OUTPATIENT)
Dept: FAMILY MEDICINE CLINIC | Age: 70
End: 2020-07-27

## 2020-07-27 VITALS
HEIGHT: 61 IN | DIASTOLIC BLOOD PRESSURE: 62 MMHG | WEIGHT: 137 LBS | SYSTOLIC BLOOD PRESSURE: 121 MMHG | RESPIRATION RATE: 15 BRPM | HEART RATE: 66 BPM | BODY MASS INDEX: 25.86 KG/M2 | TEMPERATURE: 97.7 F | OXYGEN SATURATION: 97 %

## 2020-07-27 DIAGNOSIS — Z12.73 SCREENING FOR OVARIAN CANCER: ICD-10-CM

## 2020-07-27 DIAGNOSIS — I10 ESSENTIAL HYPERTENSION: ICD-10-CM

## 2020-07-27 DIAGNOSIS — Z87.891 FORMER TOBACCO USE: ICD-10-CM

## 2020-07-27 DIAGNOSIS — Z12.4 SCREENING FOR CERVICAL CANCER: ICD-10-CM

## 2020-07-27 DIAGNOSIS — E78.5 HYPERLIPIDEMIA, UNSPECIFIED HYPERLIPIDEMIA TYPE: ICD-10-CM

## 2020-07-27 DIAGNOSIS — Z86.79 HISTORY OF ATRIAL FIBRILLATION: ICD-10-CM

## 2020-07-27 DIAGNOSIS — Z12.39 SCREENING FOR BREAST CANCER: ICD-10-CM

## 2020-07-27 LAB
ALBUMIN SERPL-MCNC: 4.3 G/DL (ref 3.5–4.6)
ALP BLD-CCNC: 90 U/L (ref 40–130)
ALT SERPL-CCNC: 19 U/L (ref 0–33)
ANION GAP SERPL CALCULATED.3IONS-SCNC: 14 MEQ/L (ref 9–15)
AST SERPL-CCNC: 22 U/L (ref 0–35)
BILIRUB SERPL-MCNC: 0.4 MG/DL (ref 0.2–0.7)
BUN BLDV-MCNC: 16 MG/DL (ref 8–23)
CALCIUM SERPL-MCNC: 10 MG/DL (ref 8.5–9.9)
CHLORIDE BLD-SCNC: 101 MEQ/L (ref 95–107)
CHOLESTEROL, FASTING: 214 MG/DL (ref 0–199)
CO2: 25 MEQ/L (ref 20–31)
CREAT SERPL-MCNC: 0.97 MG/DL (ref 0.5–0.9)
GFR AFRICAN AMERICAN: >60
GFR NON-AFRICAN AMERICAN: 56.5
GLOBULIN: 2.7 G/DL (ref 2.3–3.5)
GLUCOSE BLD-MCNC: 98 MG/DL (ref 70–99)
HDLC SERPL-MCNC: 94 MG/DL (ref 40–59)
LDL CHOLESTEROL CALCULATED: 104 MG/DL (ref 0–129)
POTASSIUM SERPL-SCNC: 4.4 MEQ/L (ref 3.4–4.9)
SODIUM BLD-SCNC: 140 MEQ/L (ref 135–144)
TOTAL PROTEIN: 7 G/DL (ref 6.3–8)
TRIGLYCERIDE, FASTING: 79 MG/DL (ref 0–150)

## 2020-07-27 PROCEDURE — G0296 VISIT TO DETERM LDCT ELIG: HCPCS | Performed by: INTERNAL MEDICINE

## 2020-07-27 PROCEDURE — 99214 OFFICE O/P EST MOD 30 MIN: CPT | Performed by: INTERNAL MEDICINE

## 2020-07-27 ASSESSMENT — ENCOUNTER SYMPTOMS
BACK PAIN: 0
EYE PAIN: 0
SHORTNESS OF BREATH: 0
ABDOMINAL PAIN: 0

## 2020-07-27 NOTE — TELEPHONE ENCOUNTER
Please let her know I would like her to get the Thyroid US I ordered for her to make sure no growth or cancer risk  I will reach out to her after this is done  Also keep follow up with dr Aimee Strong as he directed her to do so.

## 2020-07-27 NOTE — PROGRESS NOTES
thySubjective:      Patient ID: Ricardo Verma is a 68 y.o. female who presents today with:  Chief Complaint   Patient presents with    Establish Care     prev PCP Sebastian IVY    History of atrial fibrillation-On amiodarone, and cardizem. Chronic issue. On xarelto. No palpitations. Hypertension-Chronic, essential. Not compliant with low sodium diet. Known obesity. Male Sex. Compliant with therapy. Chronic, improved with statin therapy. FeMale Sex. Compliant with therapy. Past Medical History:   Diagnosis Date    Abnormal echocardiogram 2020    possible PFO?  further eval suggested    COPD (chronic obstructive pulmonary disease) (HCC) 2015    Dr Belinda Estrada Current use of long term anticoagulation     Cyst of neck 2012    after plastic surgery    Em[facility] lung ([facility] [address]     History of compression fracture of vertebral column 2020    T2, L1    History of humerus fracture     LEFT    History of left heart catheterization 03/2020    at [facility], normal LVEF with impaired relaxation, mild AV calcification and mild MV thickening, normal RV    History of sustained ventricular tachycardia 03/2020    had to wear a LifeVest, Dr Erin Gabriel Hyperlipidemia LDL goal < 130     Intermittent atrial fibrillation [facility]) 2015    Dr Odilon Reinoso, Dr Chandrika Triplett, Dr Christen Gill Osteopenia 2014    S/P colonoscopic polypectomy 2008, 2015, 2018    Dr Sascah Oden    Smoking history     Vitamin D deficiency      Past Surgical History:   Procedure Laterality Date    ATRIAL ABLATION SURGERY      BREAST SURGERY  1975    staph infection, postpartum    COLONOSCOPY  7/22/15    w/polypectomy Dr Vijaya Martinez  2012    Dr Zuleyma Thomas; HI RISK IND N/A 7/13/2018    COLONOSCOPY performed by Winston Ovalles MD at [address] [place] [address] Marital status:      Spouse name: Not on file    Number of children: 2    Years of education: Not on file    Highest education level: Not on file   Occupational History    Occupation: [address] [employer], retired   Social Needs    Financial resource strain: Not on file    Food insecurity     Worry: Not on file     Inability: Not on file   [employer] needs     Medical: Not on file     Non-medical: Not on file   Tobacco Use    Smoking status: Former Smoker     Packs/day: 1.00     Years: 50.00     Pack years: 50.00     Types: Cigarettes     Start date:      Last attempt to quit: 2015     Years since quittin.6    Smokeless tobacco: Never Used    Tobacco comment: pt will try smoking cessation   Substance and Sexual Activity    Alcohol use: No    Drug use: No    Sexual activity: Not on file   Lifestyle    Physical activity     Days per week: Not on file     Minutes per session: Not on file    Stress: Not on file   Relationships    Social connections     Talks on phone: Not on file     Gets together: Not on file     Attends Holiness service: Not on file     Active member of club or organization: Not on file     Attends meetings of clubs or organizations: Not on file     Relationship status: Not on file    Intimate partner violence     Fear of current or ex partner: Not on file     Emotionally abused: Not on file     Physically abused: Not on file     Forced sexual activity: Not on file   Other Topics Concern    Not on file   Social History Narrative    Born in [place], one brother in [place], keeps in touch    , 2 children, one son in [place], one daughter in [place]    5 grandchildren     Lives in a house in [place] with spouse     Worked for [employer] and full time at [employer], [employer], fashion, family life     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      No Known Allergies  Current Outpatient Medications on File Prior to Visit   Medication Sig Dispense Refill    Calcium Carb-Cholecalciferol (CALCIUM + D3 PO) Take by mouth      [START ON 2020] potassium chloride (KLOR-CON M) 20 MEQ extended release tablet Take 1 tablet by mouth daily 90 tablet 1    amiodarone (CORDARONE) 200 MG tablet Take 1 tablet by mouth Twice a Week 26 tablet 3    dilTIAZem (CARDIZEM CD) 240 MG extended release capsule TAKE 1 CAPSULE BY MOUTH EVERY DAY 90 capsule 1    pravastatin (PRAVACHOL) 10 MG tablet Take 1 tablet by mouth daily 90 tablet 2    furosemide (LASIX) 40 MG tablet TAKE 1 TABLET BY MOUTH EVERY DAY 30 tablet 5    umeclidinium-vilanterol (ANORO ELLIPTA) 62.5-25 MCG/INH AEPB inhaler Inhale 1 puff into the lungs daily 1 each 5    dilTIAZem (CARDIZEM CD) 120 MG extended release capsule Take 1 capsule by mouth nightly 30 capsule 3    rivaroxaban (XARELTO) 20 MG TABS tablet TAKE 1 TABLET BY MOUTH EVERY DAY WITH BREAKFAST 90 tablet 3    b complex vitamins tablet Take 1 tablet by mouth daily      magnesium oxide (MAG-OX) 400 MG tablet Take 400 mg by mouth daily      Vitamin D (CHOLECALCIFEROL) 1000 UNITS CAPS capsule Take 1,000 Units by mouth daily. No current facility-administered medications on file prior to visit. I have personally reviewed the ROS, PMH, PFH, and social history     Review of Systems   Constitutional: Negative for chills and fever. HENT: Negative for congestion. Eyes: Negative for pain. Respiratory: Negative for shortness of breath. Cardiovascular: Negative for chest pain. Gastrointestinal: Negative for abdominal pain. Genitourinary: Negative for hematuria. Musculoskeletal: Negative for back pain. Allergic/Immunologic: Negative for immunocompromised state. Neurological: Negative for headaches. Psychiatric/Behavioral: Negative for hallucinations.        Objective:   /62 (Site: Left Upper Arm, Position: Sitting, Cuff Size: Large Adult)   Pulse 66   Temp 97.7 °F (36.5 °C) (Oral)   Resp 15   Ht 5' 1\" (1.549 m)   Wt 137 lb (62.1 kg)   LMP  (LMP Unknown)   SpO2 97%   BMI 25.89 kg/m²     Physical Exam  Constitutional:       Appearance: She is well-developed. HENT:      Head: Normocephalic. Eyes:      Pupils: Pupils are equal, round, and reactive to light. Neck:      Trachea: No tracheal deviation. Cardiovascular:      Rate and Rhythm: Normal rate and regular rhythm. Heart sounds: Normal heart sounds. No murmur. No friction rub. No gallop. Pulmonary:      Effort: No respiratory distress. Abdominal:      General: Bowel sounds are normal. There is no distension. Palpations: Abdomen is soft. Tenderness: There is no rebound. Skin:     General: Skin is warm and dry. Neurological:      Mental Status: She is oriented to person, place, and time. Assessment:       Diagnosis Orders   1. Hyperlipidemia, unspecified hyperlipidemia type     2. History of atrial fibrillation     3. Essential hypertension     4. Screening for ovarian cancer     5. Screening for cervical cancer           Plan:    vc  See you in a few weeks. Continue chronic medications  Can't verify she was adequately screened for cervical, get one more pap, get pelvic  Risk of cancer explained  See orders  Follow up in 2 weeks. Discuss vaccinations at next visit. Sees dr Celestina Beal for cyst in neck. Suspected thyroid nodule on right get US, risk of cancer, see other TE as well.,   Thyroid US    No orders of the defined types were placed in this encounter. No orders of the defined types were placed in this encounter. If anything should change or worsen call ASAP, don't wait for next scheduled appointment. Return in about 2 weeks (around 8/10/2020) for Chronic condition management/appointment, worsening symptoms, call ASAP for appointment.       Tori Garcia MD            Low Dose CT (LDCT) Lung Screening criteria met   Age 50-69   Pack year smoking >30   Still smoking or less than 15 year since quit   No sign or symptoms of lung cancer   > 11 months since last LDCT     Risks and benefits of lung cancer screening with LDCT scans

## 2020-07-28 ENCOUNTER — HOSPITAL ENCOUNTER (OUTPATIENT)
Dept: PULMONOLOGY | Age: 70
Setting detail: THERAPIES SERIES
Discharge: HOME | End: 2020-07-28
Payer: COMMERCIAL

## 2020-07-28 ENCOUNTER — TELEPHONE (OUTPATIENT)
Dept: FAMILY MEDICINE CLINIC | Age: 70
End: 2020-07-28

## 2020-07-28 PROCEDURE — G0424 PULMONARY REHAB W EXER: HCPCS

## 2020-07-28 NOTE — TELEPHONE ENCOUNTER
Patient is requesting medication refill.  Please approve or deny this request.    Rx requested:  Requested Prescriptions      No prescriptions requested or ordered in this encounter         Last Office Visit:   5/14/2020      Next Visit Date:  Future Appointments   Date Time Provider Stewart Hernandez   7/30/2020  9:00 AM [facility] PULMONARY ROOM 1 [facility]   8/4/2020  9:00  [address]   8/5/2020 10:30 AM Diane Malik, APRN - CNP [facility]   8/6/2020  9:00  [address]   8/7/2020 11:30 AM [place] ULTRASOUND 3 [facility] ULTRA [facility]   8/11/2020  9:00  [address]   8/12/2020  3:30 PM Champ Echeverria  [place] [facility]   8/13/2020  9:00  [address]   8/13/2020 10:45 AM Claus Worrell  [address] [facility] [address]   8/18/2020  9:00 AM [facility] PULMONARY ROOM 1 [facility]   8/20/2020  9:00  [address]   8/25/2020  9:00  [address]   8/27/2020  9:00  [address]   9/1/2020  9:00  [address]   9/3/2020  9:00  [address]   9/8/2020  9:00 AM [address] [facility]   10/26/2020  9:45 AM [address] [facility] [address], MD [address]

## 2020-07-28 NOTE — TELEPHONE ENCOUNTER
Per Juventino Wolfe, the Dx on orders for CT Lung Screen and order for Mary Digital Screening need changed because Medicare will not cover current Dx codes on both of these orders.

## 2020-07-29 RX ORDER — DILTIAZEM HYDROCHLORIDE 120 MG/1
120 CAPSULE, COATED, EXTENDED RELEASE ORAL NIGHTLY
Qty: 90 CAPSULE | Refills: 2 | Status: SHIPPED | OUTPATIENT
Start: 2020-07-29 | End: 2020-08-13 | Stop reason: SDUPTHER

## 2020-07-30 ENCOUNTER — HOSPITAL ENCOUNTER (OUTPATIENT)
Dept: PULMONOLOGY | Age: 70
Setting detail: THERAPIES SERIES
Discharge: HOME | End: 2020-07-30
Payer: COMMERCIAL

## 2020-07-30 PROCEDURE — G0424 PULMONARY REHAB W EXER: HCPCS

## 2020-08-04 ENCOUNTER — HOSPITAL ENCOUNTER (OUTPATIENT)
Dept: PULMONOLOGY | Age: 70
Setting detail: THERAPIES SERIES
Discharge: HOME | End: 2020-08-04
Payer: COMMERCIAL

## 2020-08-04 PROCEDURE — G0424 PULMONARY REHAB W EXER: HCPCS

## 2020-08-05 ENCOUNTER — OFFICE VISIT (OUTPATIENT)
Dept: FAMILY MEDICINE CLINIC | Age: 70
End: 2020-08-05
Payer: COMMERCIAL

## 2020-08-05 VITALS
TEMPERATURE: 97.5 F | BODY MASS INDEX: 25.86 KG/M2 | HEART RATE: 66 BPM | WEIGHT: 137 LBS | SYSTOLIC BLOOD PRESSURE: 128 MMHG | RESPIRATION RATE: 16 BRPM | OXYGEN SATURATION: 96 % | DIASTOLIC BLOOD PRESSURE: 80 MMHG | HEIGHT: 61 IN

## 2020-08-05 DIAGNOSIS — Z01.419 ENCOUNTER FOR WELL WOMAN EXAM WITH ROUTINE GYNECOLOGICAL EXAM: ICD-10-CM

## 2020-08-05 PROCEDURE — 99397 PER PM REEVAL EST PAT 65+ YR: CPT | Performed by: NURSE PRACTITIONER

## 2020-08-05 ASSESSMENT — ENCOUNTER SYMPTOMS
SHORTNESS OF BREATH: 0
NAUSEA: 0
EYE PAIN: 0
VOMITING: 0
BACK PAIN: 0
ABDOMINAL PAIN: 0

## 2020-08-05 NOTE — PROGRESS NOTES
Subjective:     Patient ID: Kishor Freitas is a 68 y.o. female who presentstoday for:  Chief Complaint   Patient presents with    Gynecologic Exam     Pt. is here for her annual pap per Dr. Falguni Mckinley. Pt. denies any issues or concerns. HPI  Patient here for PAP denies any concerns. Past Medical History:   Diagnosis Date    Abnormal echocardiogram 2020    possible PFO? further eval suggested    COPD (chronic obstructive pulmonary disease) ([facility] [address] 2015    Dr Danielle Knight Current use of long term anticoagulation     Cyst of neck 2012    after plastic surgery    Emphysema of lung ([facility] [address]     History of compression fracture of vertebral column 2020    T2, L1    History of humerus fracture     LEFT    History of left heart catheterization 03/2020    at [facility], normal LVEF with impaired relaxation, mild AV calcification and mild MV thickening, normal RV    History of sustained ventricular tachycardia 03/2020    had to wear a LifeVest, Dr Morenita Christensen Hyperlipidemia LDL goal < 130     Intermittent atrial fibrillation [facility]) 2015    Dr Ryley Nunn, Dr Eri Ferrera, Dr Lyndy Cockayne Osteopenia 2014    S[facility] 2008, 2015, 2018    [facility]    Smoking history     Vitamin D deficiency      Current Outpatient Medications on File Prior to Visit   Medication Sig Dispense Refill    dilTIAZem (CARDIZEM CD) 120 MG extended release capsule Take 1 capsule by mouth nightly 90 capsule 2    Handicap Placard MISC by Does not apply route Good for 5 years.  Dispense 1 1 each 0    Calcium Carb-Cholecalciferol (CALCIUM + D3 PO) Take by mouth      potassium chloride (KLOR-CON M) 20 MEQ extended release tablet Take 1 tablet by mouth daily 90 tablet 1    amiodarone (CORDARONE) 200 MG tablet Take 1 tablet by mouth Twice a Week 26 tablet 3    dilTIAZem (CARDIZEM CD) 240 MG extended release capsule TAKE 1 CAPSULE BY MOUTH EVERY DAY 90 capsule 1    pravastatin (PRAVACHOL) 10 MG tablet Take 1 tablet by mouth daily 90 tablet 2    furosemide (LASIX) 40 MG tablet TAKE 1 TABLET BY MOUTH EVERY DAY 30 tablet 5    umeclidinium-vilanterol (ANORO ELLIPTA) 62.5-25 MCG/INH AEPB inhaler Inhale 1 puff into the lungs daily 1 each 5    rivaroxaban (XARELTO) 20 MG TABS tablet TAKE 1 TABLET BY MOUTH EVERY DAY WITH BREAKFAST 90 tablet 3    b complex vitamins tablet Take 1 tablet by mouth daily      magnesium oxide (MAG-OX) 400 MG tablet Take 400 mg by mouth daily      Vitamin D (CHOLECALCIFEROL) 1000 UNITS CAPS capsule Take 1,000 Units by mouth daily. No current facility-administered medications on file prior to visit.       Past Surgical History:   Procedure Laterality Date    ATRIAL ABLATION SURGERY      BREAST SURGERY      staph infection, postpartum    COLONOSCOPY  7/22/15    w/polypectomy Dr Bruce Falling      Dr Katha Aase; HI RISK IND N/A 2018    COLONOSCOPY performed by [email or web address] [facility] grayson at [facility]        Family History   Problem Relation Age of Onset    Osteoarthritis Mother     Osteoporosis Mother     Cancer Mother         anorectal     Colon Cancer Mother      Social History     Socioeconomic History    Marital status:      Spouse name: Not on file    Number of children: 2    Years of education: Not on file    Highest education level: Not on file   Occupational History    Occupation: [employer], retired   Social Needs    Financial resource strain: Not on file    Food insecurity     Worry: Not on file     Inability: Not on file   [employer] needs     Medical: Not on file     Non-medical: Not on file   Tobacco Use    Smoking status: Former Smoker     Packs/day: 1.00     Years: 50.00     Pack years: 50.00     Types: Cigarettes     Start date:      Last attempt to quit: 2015     Years since quittin.6    Smokeless tobacco: Never Used    Tobacco comment: pt will try smoking cessation   Substance and Sexual Activity    Alcohol use: No    Drug use: No    Sexual activity: Not on file   Lifestyle    Physical activity     Days per week: Not on file     Minutes per session: Not on file    Stress: Not on file   Relationships    Social connections     Talks on phone: Not on file     Gets together: Not on file     Attends Latter day service: Not on file     Active member of club or organization: Not on file     Attends meetings of clubs or organizations: Not on file     Relationship status: Not on file    Intimate partner violence     Fear of current or ex partner: Not on file     Emotionally abused: Not on file     Physically abused: Not on file     Forced sexual activity: Not on file   Other Topics Concern    Not on file   Social History Narrative    Born in [place], one brother in [place], keeps in touch    , 2 children, one son in [place], one daughter in [place]    5 grandchildren     Lives in a house in [place] with spouse     Worked for [employer] and full time at [employer], [employer], fashion, family life     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      Allergies:  Patient has no known allergies. Review of Systems   Constitutional: Negative for chills, fever and unexpected weight change. HENT: Negative for congestion. Eyes: Negative for pain. Respiratory: Negative for shortness of breath. Cardiovascular: Negative for chest pain, palpitations and leg swelling. Gastrointestinal: Negative for abdominal pain, nausea and vomiting. Genitourinary: Negative. Negative for decreased urine volume, hematuria, pelvic pain, vaginal bleeding, vaginal discharge and vaginal pain. Musculoskeletal: Negative for back pain. Allergic/Immunologic: Negative for immunocompromised state. Neurological: Negative for headaches. Psychiatric/Behavioral: Negative for hallucinations.        Objective:    /80 (Site: Left Upper Arm, Position: Sitting, Cuff Size: Medium Adult)   Pulse 66   Temp 97.5 °F (36.4 °C) (Temporal)   Resp 16   Ht 5' 1\" (1.549 m)   Wt 137 lb (62.1 kg)   LMP  (LMP Unknown)   SpO2 96%   Breastfeeding No   BMI 25.89 kg/m²     Physical Exam  Constitutional:       Appearance: Normal appearance. HENT:      Head: Atraumatic. Right Ear: External ear normal.      Left Ear: External ear normal.      Mouth/Throat:      Mouth: Mucous membranes are moist.   Eyes:      Conjunctiva/sclera: Conjunctivae normal.   Neck:      Musculoskeletal: Neck supple. Cardiovascular:      Rate and Rhythm: Normal rate and regular rhythm. Heart sounds: Normal heart sounds. Pulmonary:      Effort: Pulmonary effort is normal.      Breath sounds: Normal breath sounds. Abdominal:      General: Bowel sounds are normal. There is no distension. Palpations: Abdomen is soft. Tenderness: There is no abdominal tenderness. Hernia: There is no hernia in the left inguinal area or right inguinal area. Genitourinary:     Labia:         Right: No rash, tenderness, lesion or injury. Left: No rash, tenderness, lesion or injury. Urethra: No prolapse, urethral pain, urethral swelling or urethral lesion. Vagina: Normal.      Cervix: Normal.      Uterus: Normal.       Adnexa: Right adnexa normal and left adnexa normal.      Rectum: No external hemorrhoid. Musculoskeletal: Normal range of motion. General: No swelling or tenderness. Lymphadenopathy:      Lower Body: No right inguinal adenopathy. No left inguinal adenopathy. Skin:     General: Skin is warm and dry. Neurological:      General: No focal deficit present. Mental Status: She is alert and oriented to person, place, and time. Psychiatric:         Mood and Affect: Mood normal.         Behavior: Behavior normal.         Assessment & Plan:       Diagnosis Orders   1.  Encounter for well woman exam with routine gynecological exam  PAP SMEAR     Orders Placed This Encounter   Procedures    PAP SMEAR     Patient History:    No LMP recorded (lmp unknown). Patient is postmenopausal.  OBGYN Status: Postmenopausal  Past Surgical History:  No date: ATRIAL ABLATION SURGERY  1975: BREAST SURGERY      Comment:  staph infection, postpartum  7/22/15: COLONOSCOPY      Comment:  w/polypectomy Dr Dimas Medina  2012: COSMETIC SURGERY      Comment:  Dr Liana Beyer  2018: Raciel Freeman; HI RISK IND; N/A      Comment:  COLONOSCOPY performed by Wilma Gimenez MD at [address] Use      Smoking status: Former Smoker        Packs/day: 1.00        Years: 50.00        Pack years: 48        Types: Cigarettes        Start date:         Quit date: 2015        Years since quittin.6      Smokeless tobacco: Never Used      Tobacco comment: pt will try smoking cessation       Standing Status:   Future     Standing Expiration Date:   2021     Order Specific Question:   Collection Type     Answer: Thin Prep     Order Specific Question:   Prior Abnormal Pap Test     Answer:   No     Order Specific Question:   Screening or Diagnostic     Answer:   Screening     Order Specific Question:   HPV Requested? Answer:   Yes - If Abnormal Reflex HPV     Order Specific Question:   High Risk Patient     Answer:   N/A     No orders of the defined types were placed in this encounter. There are no discontinued medications. Return if symptoms worsen or fail to improve. Reviewed with the patient: currentclinical status, medications, activities and diet. Side effects, adverse effects of the medicationprescribed today, as well as treatment plan/ rationale and result expectations havebeen discussed with the patient who expresses understanding and desires to proceed. Pt instructions reviewed and given to patient.     Close follow up to evaluate treatment resultsand for coordination of care.   I have reviewed the patient's medical historyin detail and updated the computerized patient record.     Colletta Cava, APRN - CNP

## 2020-08-06 ENCOUNTER — HOSPITAL ENCOUNTER (OUTPATIENT)
Dept: PULMONOLOGY | Age: 70
Setting detail: THERAPIES SERIES
Discharge: HOME | End: 2020-08-06
Payer: COMMERCIAL

## 2020-08-06 PROCEDURE — G0424 PULMONARY REHAB W EXER: HCPCS

## 2020-08-07 ENCOUNTER — HOSPITAL ENCOUNTER (OUTPATIENT)
Dept: ULTRASOUND IMAGING | Age: 70
Discharge: HOME | End: 2020-08-09
Payer: COMMERCIAL

## 2020-08-07 PROCEDURE — 76536 US EXAM OF HEAD AND NECK: CPT

## 2020-08-11 ENCOUNTER — HOSPITAL ENCOUNTER (OUTPATIENT)
Dept: PULMONOLOGY | Age: 70
Setting detail: THERAPIES SERIES
Discharge: HOME | End: 2020-08-11
Payer: COMMERCIAL

## 2020-08-11 PROCEDURE — G0424 PULMONARY REHAB W EXER: HCPCS

## 2020-08-12 ENCOUNTER — OFFICE VISIT (OUTPATIENT)
Dept: FAMILY MEDICINE CLINIC | Age: 70
End: 2020-08-12
Payer: COMMERCIAL

## 2020-08-12 VITALS
HEIGHT: 61 IN | BODY MASS INDEX: 26.21 KG/M2 | WEIGHT: 138.8 LBS | DIASTOLIC BLOOD PRESSURE: 51 MMHG | SYSTOLIC BLOOD PRESSURE: 123 MMHG | OXYGEN SATURATION: 96 % | HEART RATE: 57 BPM | TEMPERATURE: 97.8 F

## 2020-08-12 PROCEDURE — 99214 OFFICE O/P EST MOD 30 MIN: CPT | Performed by: INTERNAL MEDICINE

## 2020-08-12 ASSESSMENT — ENCOUNTER SYMPTOMS
ABDOMINAL PAIN: 0
SHORTNESS OF BREATH: 0
BACK PAIN: 0
EYE PAIN: 0

## 2020-08-12 NOTE — PROGRESS NOTES
Subjective:      Patient ID: Crystal Mix is a 68 y.o. female who presents today with:  Chief Complaint   Patient presents with    Results     Patient presents today to discuss lab results. HPI   Elevated creatinine, occurred on or around 03/2020  She denies regular nsaid use  Known atrial fibrillation  bp well controlled  Compliant  Chronic, improved with statin therapy. FeMale Sex. Compliant with therapy. Multiple benign appearing thyroid nodule seen on US, she denies any symptoms from them. 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 Past Medical History:   Diagnosis Date    Abnormal echocardiogram 2020    possible PFO?  further eval suggested    COPD (chronic obstructive pulmonary disease) ([facility] [address] 2015    Dr Slick Greenwood Current use of long term anticoagulation     Cyst of neck 2012    after plastic surgery    Emphysema of lung ([facility] [address]     History of compression fracture of vertebral column 2020    T2, L1    History of humerus fracture     LEFT    History of left heart catheterization 03/2020    at [facility], normal LVEF with impaired relaxation, mild AV calcification and mild MV thickening, normal RV    History of sustained ventricular tachycardia 03/2020    had to wear a LifeVest, Dr Pavel Bennett Hyperlipidemia LDL goal < 130     Intermittent atrial fibrillation [facility]) 2015    Dr José Miguel Aguilar, Dr Maria Antonia Rivera, Dr Bart Gallo Osteopenia 2014    S/P colonoscopic polypectomy 2008, 2015, 2018    Dr Tim Cuenca    Smoking history     Vitamin D deficiency      Past Surgical History:   Procedure Laterality Date    ATRIAL ABLATION SURGERY      BREAST SURGERY  1975    staph infection, postpartum    COLONOSCOPY  7/22/15    w/polypectomy Dr Elly East  2012    Dr Sneha Coello; HI RISK IND N/A 7/13/2018    COLONOSCOPY performed by Neal Gr MD at [address] [place] [address] Date:   8/12/2021     HEAD NECK SOFT TISSUE THYROID     Standing Status:   Future     Standing Expiration Date:   2/12/2022     ANDRES KOSTAS REST AND POST TREAD COMPLETE     Standing Status:   Future     Standing Expiration Date:   8/12/2021    Urinalysis     Standing Status:   Future     Standing Expiration Date:   8/12/2021    Lipid Panel     Standing Status:   Future     Standing Expiration Date:   8/12/2021     Order Specific Question:   Is Patient Fasting?/# of Hours     Answer:   yes 8    Comprehensive Metabolic Panel     Standing Status:   Future     Standing Expiration Date:   8/12/2021     No orders of the defined types were placed in this encounter. If anything should change or worsen call ASAP, don't wait for next scheduled appointment. Return in about 6 months (around 2/12/2021) for Chronic condition management/appointment, worsening symptoms, call ASAP for appointment.       Austin Bryson MD

## 2020-08-13 ENCOUNTER — HOSPITAL ENCOUNTER (OUTPATIENT)
Dept: PULMONOLOGY | Age: 70
Setting detail: THERAPIES SERIES
Discharge: HOME | End: 2020-08-13
Payer: COMMERCIAL

## 2020-08-13 ENCOUNTER — OFFICE VISIT (OUTPATIENT)
Dept: CARDIOLOGY CLINIC | Age: 70
End: 2020-08-13
Payer: COMMERCIAL

## 2020-08-13 VITALS
BODY MASS INDEX: 26.01 KG/M2 | SYSTOLIC BLOOD PRESSURE: 118 MMHG | HEART RATE: 70 BPM | OXYGEN SATURATION: 95 % | WEIGHT: 137.8 LBS | RESPIRATION RATE: 20 BRPM | HEIGHT: 61 IN | DIASTOLIC BLOOD PRESSURE: 82 MMHG

## 2020-08-13 DIAGNOSIS — R09.89 DECREASED PULSE: ICD-10-CM

## 2020-08-13 DIAGNOSIS — N18.30 CKD (CHRONIC KIDNEY DISEASE) STAGE 3, GFR 30-59 ML/MIN (HCC): ICD-10-CM

## 2020-08-13 DIAGNOSIS — E78.5 HYPERLIPIDEMIA, UNSPECIFIED HYPERLIPIDEMIA TYPE: ICD-10-CM

## 2020-08-13 DIAGNOSIS — E04.2 MULTIPLE THYROID NODULES: ICD-10-CM

## 2020-08-13 LAB
ALBUMIN SERPL-MCNC: 4.1 G/DL (ref 3.5–4.6)
ALP BLD-CCNC: 88 U/L (ref 40–130)
ALT SERPL-CCNC: 16 U/L (ref 0–33)
ANION GAP SERPL CALCULATED.3IONS-SCNC: 12 MEQ/L (ref 9–15)
AST SERPL-CCNC: 19 U/L (ref 0–35)
BILIRUB SERPL-MCNC: 0.4 MG/DL (ref 0.2–0.7)
BILIRUBIN URINE: NEGATIVE
BLOOD, URINE: NEGATIVE
BUN BLDV-MCNC: 16 MG/DL (ref 8–23)
CALCIUM SERPL-MCNC: 9.5 MG/DL (ref 8.5–9.9)
CHLORIDE BLD-SCNC: 104 MEQ/L (ref 95–107)
CHOLESTEROL, TOTAL: 216 MG/DL (ref 0–199)
CLARITY: ABNORMAL
CO2: 27 MEQ/L (ref 20–31)
COLOR: ABNORMAL
CREAT SERPL-MCNC: 0.98 MG/DL (ref 0.5–0.9)
GFR AFRICAN AMERICAN: >60
GFR NON-AFRICAN AMERICAN: 55.8
GLOBULIN: 2.9 G/DL (ref 2.3–3.5)
GLUCOSE BLD-MCNC: 91 MG/DL (ref 70–99)
GLUCOSE URINE: NEGATIVE MG/DL
HDLC SERPL-MCNC: 93 MG/DL (ref 40–59)
KETONES, URINE: ABNORMAL MG/DL
LDL CHOLESTEROL CALCULATED: 102 MG/DL (ref 0–129)
LEUKOCYTE ESTERASE, URINE: NEGATIVE
NITRITE, URINE: NEGATIVE
PH UA: 6 (ref 5–9)
POTASSIUM SERPL-SCNC: 4.3 MEQ/L (ref 3.4–4.9)
PROTEIN UA: NEGATIVE MG/DL
SODIUM BLD-SCNC: 143 MEQ/L (ref 135–144)
SPECIFIC GRAVITY UA: 1.02 (ref 1–1.03)
TOTAL PROTEIN: 7 G/DL (ref 6.3–8)
TRIGL SERPL-MCNC: 105 MG/DL (ref 0–150)
UROBILINOGEN, URINE: 0.2 E.U./DL

## 2020-08-13 PROCEDURE — 99214 OFFICE O/P EST MOD 30 MIN: CPT | Performed by: INTERNAL MEDICINE

## 2020-08-13 PROCEDURE — G0424 PULMONARY REHAB W EXER: HCPCS

## 2020-08-13 RX ORDER — FUROSEMIDE 40 MG/1
TABLET ORAL
Qty: 90 TABLET | Refills: 3 | Status: SHIPPED | OUTPATIENT
Start: 2020-08-13 | End: 2021-05-10 | Stop reason: SDUPTHER

## 2020-08-13 RX ORDER — DILTIAZEM HYDROCHLORIDE 120 MG/1
120 CAPSULE, COATED, EXTENDED RELEASE ORAL NIGHTLY
Qty: 90 CAPSULE | Refills: 3 | Status: SHIPPED | OUTPATIENT
Start: 2020-08-13 | End: 2020-10-26 | Stop reason: DRUGHIGH

## 2020-08-13 RX ORDER — DILTIAZEM HYDROCHLORIDE 240 MG/1
CAPSULE, COATED, EXTENDED RELEASE ORAL
Qty: 90 CAPSULE | Refills: 3 | Status: SHIPPED | OUTPATIENT
Start: 2020-08-13 | End: 2020-09-17 | Stop reason: SDUPTHER

## 2020-08-13 ASSESSMENT — ENCOUNTER SYMPTOMS
VOMITING: 0
DIARRHEA: 0
NAUSEA: 0
TROUBLE SWALLOWING: 0
SHORTNESS OF BREATH: 0
BLOOD IN STOOL: 0
COLOR CHANGE: 0
VOICE CHANGE: 0
WHEEZING: 0
APNEA: 0
ANAL BLEEDING: 0
CHEST TIGHTNESS: 0
FACIAL SWELLING: 0
ABDOMINAL DISTENTION: 0

## 2020-08-13 NOTE — PATIENT INSTRUCTIONS
Patient Education        Medicines to Avoid With Kidney Disease: Care Instructions  Your Care Instructions     Kidney disease means that your kidneys are not able to get rid of waste from the blood. So they can't keep your body's fluids and chemicals in balance. Usually, the kidneys get rid of waste from the blood through the urine. And they balance the fluids in the body. When your kidneys don't work as they should, you have to be careful about some medicines. They may harm your kidneys. Your doctor may tell you not to take them. Or he or she may change the dose. Medicines for pain and swelling, such as ibuprofen (Advil or Motrin) or naproxen (Aleve), can cause harm. So can some antibiotics and antacids. And you need to be careful about some drugs that treat cancer, lower blood pressure, or get rid of water from the body. Some herbal products could cause harm too. Follow-up care is a key part of your treatment and safety. Be sure to make and go to all appointments, and call your doctor if you are having problems. It's also a good idea to know your test results and keep a list of the medicines you take. How can you care for yourself at home? · Tell your doctor all the prescription, herbal, or over-the-counter medicines you take. Do not take any new ones unless you talk to your doctor first.  · Do not take anti-inflammatory medicines. These include ibuprofen (Advil, Motrin) and naproxen (Aleve). You can use acetaminophen (Tylenol) for pain. · Do not take two or more pain medicines at the same time unless the doctor told you to. Many pain medicines have acetaminophen, which is Tylenol. Too much acetaminophen (Tylenol) can be harmful. · Tell all doctors and others who work with your health care that you have kidney disease. · Wear medical alert jewelry that lists your health problem. You can buy this at most drugstores. Where can you learn more? Go to https://channel.[employer]. org and sign in to your [facility] account. Enter X498 in the [facility] box to learn more about \"Medicines to Avoid With Kidney Disease: Care Instructions. \"     If you do not have an account, please click on the \"Sign Up Now\" link. Current as of: August 12, 2019               Content Version: 12.5  © 8038-0800 [employer]wise, Incorporated. Care instructions adapted under license by [facility] ([facility]). If you have questions about a medical condition or this instruction, always ask your healthcare professional. [address] any warranty or liability for your use of this information.

## 2020-08-13 NOTE — PROGRESS NOTES
[facility] CARDIOLOGY OFFICE FOLLOW-UP      Patient: Randal Root  YOB: 1950  MRN: [id number]    Chief Complaint:  Chief Complaint   Patient presents with    3 Month Follow-Up    Atrial Fibrillation    Hypertension         Subjective/HPI:  8/13/2020: Patient presents today for follow-up of atrial fibrillation. He had EP procedure done by Dr. Marylee Shavers. She was told she needs an ICD. Last echo showed preserved LV ejection fraction. She is doing well. He is on diltiazem 240 in the morning or 120 at night amiodarone 200 mg twice a week Monday and Friday and pravastatin 10 mg daily. Lasix and potassium. Renal function is borderline. GFR is high 50s. Had ultrasound of the neck which showed some thyroid cysts. She now sees . Used to see Dr Luda Downey. I am going to see her in 2 months again. Probably repeat an echocardiogram at that time. No restrictions. 5/14/2020: Patient presents today for follow-up of atrial fibrillation. Remains in sinus rhythm. Last visit was with Davina Double will follow with Dr. Leighton Dela Cruz cardiac rehab has not called her. Otherwise feeling well blood pressure remains controlled. No palpitation down the amiodarone to Monday and Friday only and see me in 3 months        4/28/2020 VIRTUAL: Evelyn Fuentes is a 68 y. o. female evaluated via telephone on 4/28/2020.  For follow-up of atrial fibrillation.  Was admitted to [address] [facility] [address] shows normal ejection fraction.  60%.   Mild concentric left ventricle hypertrophy.  He did start walking yesterday with her .  Got a little winded.  I think she is in sinus rhythm.  Will reduce amiodarone to Monday Wednesday and Friday.  Increase Lasix and potassium to every day.  See me in 2 weeks.  Continue Cardizem 240 in the morning and 120 at night.  Continue Xarelto           3/23/2020: Patient presents today for follow-up of recent hospitalization at [facility] for CHF atrial fibrillation.  Was seen by multiple people.  [facility] cath which was negative.  Was evaluated by Dr. Zeyad Farahter was read by .  Showed preserved LV ejection fraction.  Had a cardiac MRI.  Bottom line is currently on amiodarone 200 mg a day he used to take that before and quit taking it 3 4 years ago. Avila Coughlin is somewhat noncompliant with medication.  Could not tolerate beta-blocker.  Currently she is on Cardizem 240 in the morning and 120 at night and was put on Lasix and potassium.  She is feeling better.  Supposed to see  for follow-up of event monitor. \"  Keep the medication the same.  I will see her in 2 weeks.     11/1/19: Patient presents today for follow-up of atrial fibrillation.  Remains in sinus rhythm.  Could not tolerate the Toprol dose.  Currently only on 12.5 daily.  Makes extremely tired.  Blood pressure remains well controlled. Matthieu White told her not to take it off no bleeding.  She has a history of being noncompliant in the past with medication.  I had long talk with her about the need to take anticoagulants or risk of stroke.  She will have a echo test and lexiscan stress test.  She will see me in February 9/30/19: Patient presents today for follow-up of recent hospitalization in [place] when she was admitted for rapid atrial fibrillation.  Reportedly had cardioversion.  Was discharged home on Toprol 50 mg 1 and half tablet a day.  And Xarelto.  Please her shortness of breath with Toprol.  Reportedly had a history of COPD which I doubt.  The rest of the trip to [place] was unremarkable she will continue with the same medication.  Take Toprol 50 mg at night and 25 mg in the morning.  She will see me in 1 month     8/29/19: Patient presents today for follow-up of atrial fibrillation.  She went to [facility]. to get stem cell injection.  She had to pay $6000 cash there is an outfit in [address] [place] [address] by a registered nurse practitioner. [place] stop [address] [facility] she works hard at think she does go into atrial fibrillation weakness at the mall.  Told her the risk of embolic event is high.  Will leave at this dose and see me in 6 months     5/20/19: Patient presents today for follow-up of atrial fibrillation. Remains in sinus rhythm. He wants to stop the Xarelto I told her not to do it at this point. Probably do a 30 day event monitor. And then decide at that point. He is retired from regular work. No chest pain angina congestive heart failure symptoms. See me in 3-6 months     11/19/18: Patient presents today for Follow-up of atrial fibrillation. Remains in sinus rhythm. Continues to work. On Cardizem and Xarelto only. No bleeding. She'll see me in 6 months. He wants to get off blood thinners. I told her not to do it right now. We will reconsider that option in 6 months.     5/14/18: Patient presents today for Follow-up of atrial fibrillation. Remains in sinus rhythm. On Xarelto. No bleeding. She is off amiodarone. Doing well. She fell down and hurt her left shoulder. Is healing up well. No syncope. See me in 6 months     11/13/17: Patient presents today for atrial fibrillation. In sinus rhythm. On Xarelto and Cardizem. No bleeding. Off amiodarone. See me in 6 months.     5/1/17: For follow-up of atrial fibrillation. Thyroid profile was normal. Amiodarone DC'd. Plan Cardizem and Xarelto. She is going to be going to [place] for road trip with her . Feels well. She is  status post cardioversion and remains in sinus rhythm. See me in 6 months. Past Medical History:   Diagnosis Date    Abnormal echocardiogram 2020    possible PFO?  further eval suggested    COPD (chronic obstructive pulmonary disease) ([address] 2015    Dr Kirby Gomez Current use of long term anticoagulation     Cyst of neck 2012    after plastic surgery    Emphysema of lung ([address]     History of compression fracture of vertebral column 2020    T2, L1    History of humerus fracture     LEFT    History of left heart catheterization 2020    at [facility], normal LVEF with impaired relaxation, mild AV calcification and mild MV thickening, normal RV    History of sustained ventricular tachycardia 2020    had to wear a LifeVest, Dr Jill Rosa Hyperlipidemia LDL goal < 130     Intermittent atrial fibrillation [facility])     Dr Suzi Becerra, Dr Andrew Go, Dr Maria Isabel Johnson Osteopenia 2014    S/P colonoscopic polypectomy , ,     Dr Eva Mayes    Smoking history     Vitamin D deficiency        Past Surgical History:   Procedure Laterality Date    ATRIAL ABLATION SURGERY      BREAST SURGERY  1975    staph infection, postpartum    COLONOSCOPY  7/22/15    w/polypectomy Dr April Garrison      Dr Neves Simple; HI RISK IND N/A 2018    COLONOSCOPY performed by Antonio [facility]os MD at [facility]       Family History   Problem Relation Age of Onset    Osteoarthritis Mother     Osteoporosis Mother     Cancer Mother         anorectal     Colon Cancer Mother        Social History     Socioeconomic History    Marital status:      Spouse name: None    Number of children: 2    Years of education: None    Highest education level: None   Occupational History    Occupation: [employer], retired   Social Needs    Financial resource strain: None    Food insecurity     Worry: None     Inability: None    Transportation needs     Medical: None     Non-medical: None   Tobacco Use    Smoking status: Former Smoker     Packs/day: 1.00     Years: 50.00     Pack years: 50.00     Types: Cigarettes     Start date:      Last attempt to quit: 2015     Years since quittin.6    Smokeless tobacco: Never Used    Tobacco comment: pt will try smoking cessation   Substance and Sexual Activity    Alcohol use: No    Drug use: No    Sexual activity: None   Lifestyle    Physical activity     Days per week: None     Minutes per session: None    Stress: None Relationships    Social connections     Talks on phone: None     Gets together: None     Attends Mosque service: None     Active member of club or organization: None     Attends meetings of clubs or organizations: None     Relationship status: None    Intimate partner violence     Fear of current or ex partner: None     Emotionally abused: None     Physically abused: None     Forced sexual activity: None   Other Topics Concern    None   Social History Narrative    Born in [place], one brother in [place] [facility], keeps in touch    , 2 children, one son in [place], one daughter in [place]    5 grandchildren     Lives in a house in [place] with spouse     Worked for [employer] and full time at [employer], [employer], fashion, family life     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        No Known Allergies    Current Outpatient Medications   Medication Sig Dispense Refill    dilTIAZem (CARDIZEM CD) 120 MG extended release capsule Take 1 capsule by mouth nightly 90 capsule 3    dilTIAZem (CARDIZEM CD) 240 MG extended release capsule TAKE 1 CAPSULE BY MOUTH EVERY DAY 90 capsule 3    furosemide (LASIX) 40 MG tablet TAKE 1 TABLET BY MOUTH EVERY DAY 90 tablet 3    Handicap Placard MISC by Does not apply route Good for 5 years.  Dispense 1 1 each 0    Calcium Carb-Cholecalciferol (CALCIUM + D3 PO) Take by mouth      potassium chloride (KLOR-CON M) 20 MEQ extended release tablet Take 1 tablet by mouth daily 90 tablet 1    amiodarone (CORDARONE) 200 MG tablet Take 1 tablet by mouth Twice a Week 26 tablet 3    pravastatin (PRAVACHOL) 10 MG tablet Take 1 tablet by mouth daily 90 tablet 2    umeclidinium-vilanterol (ANORO ELLIPTA) 62.5-25 MCG/INH AEPB inhaler Inhale 1 puff into the lungs daily 1 each 5    rivaroxaban (XARELTO) 20 MG TABS tablet TAKE 1 TABLET BY MOUTH EVERY DAY WITH BREAKFAST 90 tablet 3    b complex vitamins tablet Take 1 tablet by mouth daily      magnesium oxide (MAG-OX) 400 MG tablet Take 400 mg by mouth daily      Vitamin D (CHOLECALCIFEROL) 1000 UNITS CAPS capsule Take 1,000 Units by mouth daily. No current facility-administered medications for this visit. Review of Systems:   Review of Systems   Constitutional: Negative for activity change, appetite change, diaphoresis, fatigue and unexpected weight change. HENT: Negative for facial swelling, nosebleeds, trouble swallowing and voice change. Respiratory: Negative for apnea, chest tightness, shortness of breath and wheezing. Cardiovascular: Negative for chest pain, palpitations and leg swelling. Gastrointestinal: Negative for abdominal distention, anal bleeding, blood in stool, diarrhea, nausea and vomiting. Genitourinary: Negative for decreased urine volume and dysuria. Musculoskeletal: Negative for gait problem, myalgias, neck pain and neck stiffness. Skin: Negative for color change, pallor, rash and wound. Neurological: Negative for dizziness, seizures, syncope, facial asymmetry, weakness, light-headedness, numbness and headaches. Hematological: Does not bruise/bleed easily. Psychiatric/Behavioral: Negative for agitation, behavioral problems, confusion, hallucinations and suicidal ideas. The patient is not nervous/anxious. All other systems reviewed and are negative. Review of System is negative except for as mentioned above. Physical Examination:    /82 (Site: Right Upper Arm, Position: Sitting, Cuff Size: Medium Adult)   Pulse 70   Resp 20   Ht 5' 1\" (1.549 m)   Wt 137 lb 12.8 oz (62.5 kg)   LMP  (LMP Unknown)   SpO2 95%   BMI 26.04 kg/m²    Physical Exam   Constitutional: She appears healthy. No distress. HENT:   Nose: Nose normal.   Mouth/Throat: Dentition is normal. Oropharynx is clear. Eyes: Pupils are equal, round, and reactive to light. Conjunctivae are normal.   Neck: Normal range of motion and thyroid normal. Neck supple.    Cardiovascular: Regular rhythm, S1 normal, S2 normal, normal heart sounds, intact distal pulses and normal pulses. PMI is not displaced. No murmur heard. Pulmonary/Chest: She has no wheezes. She has no rales. She exhibits no tenderness. Abdominal: Soft. Bowel sounds are normal. She exhibits no distension and no mass. There is no splenomegaly or hepatomegaly. There is no abdominal tenderness. No hernia. Neurological: She is alert and oriented to person, place, and time. She has normal motor skills. Gait normal.   Skin: Skin is warm and dry. No cyanosis. No jaundice. Nails show no clubbing. Patient Active Problem List   Diagnosis    Hyperlipidemia with target LDL less than 130    Vitamin D deficiency    Atrial fibrillation ([address]    Pulmonary emphysema (HCC)    Bilateral edema of lower extremity    Other plastic surgery for unacceptable cosmetic appearance    Tobacco abuse    Fatigue    Chronic obstructive pulmonary disease (HCC)    Acute bronchitis    Hypoxemia    Shortness of breath    Fracture of humerus, proximal    Benign neoplasm of sigmoid colon    Other hemorrhoids    Hx of colonic polyps    Family history of colon cancer    Osteopenia of necks of both femurs    Congestive heart failure (HCC)    Bilateral pulmonary infiltrates on CXR    Pericardial calcification    Pericardial effusion    Pleural effusion    Atelectasis, left           No orders of the defined types were placed in this encounter. Orders Placed This Encounter   Medications    dilTIAZem (CARDIZEM CD) 120 MG extended release capsule     Sig: Take 1 capsule by mouth nightly     Dispense:  90 capsule     Refill:  3    dilTIAZem (CARDIZEM CD) 240 MG extended release capsule     Sig: TAKE 1 CAPSULE BY MOUTH EVERY DAY     Dispense:  90 capsule     Refill:  3    furosemide (LASIX) 40 MG tablet     Sig: TAKE 1 TABLET BY MOUTH EVERY DAY     Dispense:  90 tablet     Refill:  3               Assessment:    1. Essential hypertension    2.  Atrial fibrillation, unspecified type [facility])         Plan:     Stay on same medications. See me in 1 month. This note was partially generated using Dragon voice recognition system, and there may be some incorrect words, spellings, punctuation that were not noticed in checking the note before saving.         Electronically signed by Candace Sage MD on 8/13/2020 at 2:30 PM

## 2020-08-14 ENCOUNTER — TELEPHONE (OUTPATIENT)
Dept: FAMILY MEDICINE CLINIC | Age: 70
End: 2020-08-14

## 2020-08-14 NOTE — TELEPHONE ENCOUNTER
I received a message about low dose ct and mammogram not being linked with the correct codes  I don't think she's on medicare as her primary   Verify insurance info. I used new codes  It doesn't prompt me on the correct ones to use  Route back once they can provide these codes if she has medicare as primary.

## 2020-08-14 NOTE — TELEPHONE ENCOUNTER
Patient is having ultra sounds done in September and she is wanting to know if she is able to take her medication before the appointments. Please advise and call the patient.

## 2020-08-18 ENCOUNTER — HOSPITAL ENCOUNTER (OUTPATIENT)
Dept: PULMONOLOGY | Age: 70
Setting detail: THERAPIES SERIES
Discharge: HOME | End: 2020-08-18
Payer: COMMERCIAL

## 2020-08-18 PROCEDURE — G0424 PULMONARY REHAB W EXER: HCPCS

## 2020-08-20 ENCOUNTER — HOSPITAL ENCOUNTER (OUTPATIENT)
Dept: PULMONOLOGY | Age: 70
Setting detail: THERAPIES SERIES
Discharge: HOME | End: 2020-08-20
Payer: COMMERCIAL

## 2020-08-20 PROCEDURE — G0424 PULMONARY REHAB W EXER: HCPCS

## 2020-08-24 ENCOUNTER — TELEPHONE (OUTPATIENT)
Dept: FAMILY MEDICINE CLINIC | Age: 70
End: 2020-08-24

## 2020-08-25 ENCOUNTER — HOSPITAL ENCOUNTER (OUTPATIENT)
Dept: PULMONOLOGY | Age: 70
Setting detail: THERAPIES SERIES
Discharge: HOME | End: 2020-08-25
Payer: COMMERCIAL

## 2020-08-25 PROCEDURE — G0424 PULMONARY REHAB W EXER: HCPCS

## 2020-08-25 NOTE — TELEPHONE ENCOUNTER
CXR was reviewed with patient  in last visit . No need to do  anything for left base Atelectasis. Deep breathing exercise.

## 2020-08-27 ENCOUNTER — HOSPITAL ENCOUNTER (OUTPATIENT)
Dept: PULMONOLOGY | Age: 70
Setting detail: THERAPIES SERIES
Discharge: HOME | End: 2020-08-27
Payer: COMMERCIAL

## 2020-08-27 PROCEDURE — G0424 PULMONARY REHAB W EXER: HCPCS

## 2020-09-01 ENCOUNTER — HOSPITAL ENCOUNTER (OUTPATIENT)
Dept: PULMONOLOGY | Age: 70
Setting detail: THERAPIES SERIES
Discharge: HOME | End: 2020-09-01
Payer: COMMERCIAL

## 2020-09-01 PROCEDURE — G0424 PULMONARY REHAB W EXER: HCPCS

## 2020-09-02 ENCOUNTER — TELEPHONE (OUTPATIENT)
Dept: FAMILY MEDICINE CLINIC | Age: 70
End: 2020-09-02

## 2020-09-02 ENCOUNTER — HOSPITAL ENCOUNTER (OUTPATIENT)
Dept: ULTRASOUND IMAGING | Age: 70
Discharge: HOME | End: 2020-09-04
Payer: COMMERCIAL

## 2020-09-02 PROCEDURE — 93924 LWR XTR VASC STDY BILAT: CPT | Performed by: INTERNAL MEDICINE

## 2020-09-02 PROCEDURE — 76770 US EXAM ABDO BACK WALL COMP: CPT

## 2020-09-02 PROCEDURE — 93924 LWR XTR VASC STDY BILAT: CPT

## 2020-09-02 NOTE — TELEPHONE ENCOUNTER
The expected date of an [address] is when I want it done  The expected date of the thyroid US is 08/2021  Please discuss with scheduling if they want ir ordered differently or if staff need to pay attention to that date.

## 2020-09-03 ENCOUNTER — HOSPITAL ENCOUNTER (OUTPATIENT)
Dept: PULMONOLOGY | Age: 70
Setting detail: THERAPIES SERIES
Discharge: HOME | End: 2020-09-03
Payer: COMMERCIAL

## 2020-09-03 PROCEDURE — G0424 PULMONARY REHAB W EXER: HCPCS

## 2020-09-08 ENCOUNTER — HOSPITAL ENCOUNTER (OUTPATIENT)
Dept: PULMONOLOGY | Age: 70
Setting detail: THERAPIES SERIES
Discharge: HOME | End: 2020-09-08
Payer: COMMERCIAL

## 2020-09-08 PROCEDURE — G0424 PULMONARY REHAB W EXER: HCPCS

## 2020-09-10 ENCOUNTER — HOSPITAL ENCOUNTER (OUTPATIENT)
Dept: PULMONOLOGY | Age: 70
Setting detail: THERAPIES SERIES
Discharge: HOME | End: 2020-09-10
Payer: COMMERCIAL

## 2020-09-10 PROCEDURE — G0424 PULMONARY REHAB W EXER: HCPCS

## 2020-09-15 ENCOUNTER — HOSPITAL ENCOUNTER (OUTPATIENT)
Dept: PULMONOLOGY | Age: 70
Setting detail: THERAPIES SERIES
Discharge: HOME | End: 2020-09-15
Payer: COMMERCIAL

## 2020-09-15 PROCEDURE — G0424 PULMONARY REHAB W EXER: HCPCS

## 2020-09-17 ENCOUNTER — HOSPITAL ENCOUNTER (OUTPATIENT)
Dept: PULMONOLOGY | Age: 70
Setting detail: THERAPIES SERIES
Discharge: HOME | End: 2020-09-17
Payer: COMMERCIAL

## 2020-09-17 ENCOUNTER — OFFICE VISIT (OUTPATIENT)
Dept: CARDIOLOGY CLINIC | Age: 70
End: 2020-09-17
Payer: COMMERCIAL

## 2020-09-17 VITALS
BODY MASS INDEX: 26.24 KG/M2 | SYSTOLIC BLOOD PRESSURE: 118 MMHG | HEIGHT: 61 IN | OXYGEN SATURATION: 98 % | RESPIRATION RATE: 22 BRPM | HEART RATE: 67 BPM | DIASTOLIC BLOOD PRESSURE: 78 MMHG | WEIGHT: 139 LBS

## 2020-09-17 PROCEDURE — G0424 PULMONARY REHAB W EXER: HCPCS

## 2020-09-17 PROCEDURE — 99214 OFFICE O/P EST MOD 30 MIN: CPT | Performed by: INTERNAL MEDICINE

## 2020-09-17 RX ORDER — DILTIAZEM HYDROCHLORIDE 240 MG/1
CAPSULE, COATED, EXTENDED RELEASE ORAL
Qty: 90 CAPSULE | Refills: 3 | Status: SHIPPED | OUTPATIENT
Start: 2020-09-17 | End: 2021-05-10 | Stop reason: SDUPTHER

## 2020-09-17 RX ORDER — DILTIAZEM HYDROCHLORIDE 120 MG/1
120 CAPSULE, COATED, EXTENDED RELEASE ORAL NIGHTLY
Qty: 90 CAPSULE | Refills: 3 | Status: CANCELLED | OUTPATIENT
Start: 2020-09-17

## 2020-09-17 ASSESSMENT — ENCOUNTER SYMPTOMS
SHORTNESS OF BREATH: 0
DIARRHEA: 0
CHEST TIGHTNESS: 0
BLOOD IN STOOL: 0
APNEA: 0
VOMITING: 0
NAUSEA: 0

## 2020-09-17 NOTE — PROGRESS NOTES
[facility] CARDIOLOGY OFFICE FOLLOW-UP      Patient: Darya Solis  YOB: 1950  MRN: [id number]    Chief Complaint:  Chief Complaint   Patient presents with    1 Month Follow-Up    Atrial Fibrillation    Hypertension         Subjective/HPI:  9/17/2020: Patient presents today for follow-up of atrial fibrillation hypertension. Doing well. Occasionally gets tired. On amiodarone twice a week. We will reduce the dose of Cardizem the evening dose will be stopped continue with the 240. Continue with Xarelto. See me in 2 months probably needs 48-hour Holter     8/13/2020: Patient presents today for follow-up of atrial fibrillation. He had EP procedure done by Dr. Anh Vines. She was told she needs an ICD. Last echo showed preserved LV ejection fraction. She is doing well. He is on diltiazem 240 in the morning or 120 at night amiodarone 200 mg twice a week Monday and Friday and pravastatin 10 mg daily. Lasix and potassium. Renal function is borderline. GFR is high 50s. Had ultrasound of the neck which showed some thyroid cysts. She now sees . Used to see Dr Uzair Zepeda. I am going to see her in 2 months again. Probably repeat an echocardiogram at that time. No restrictions.        5/14/2020: Patient presents today for follow-up of atrial fibrillation.  Remains in sinus rhythm.  Last visit was with  will follow with Dr. Heather Calhoun cardiac rehab has not called her.  Otherwise feeling well blood pressure remains controlled.  No palpitation down the amiodarone to Monday and Friday only and see me in 3 months        4/28/2020 VIRTUAL: Michelle Fuentes is a 68 y. o. female evaluated via telephone on 4/28/2020.  For follow-up of atrial fibrillation.  Was admitted to [address] [facility] [address] shows normal ejection fraction.  60%.   Mild concentric left ventricle hypertrophy.  He did start walking yesterday with her .  Got a little winded.  I think she is in sinus rhythm.  Will reduce 25 mg in the morning.  She will see me in 1 month     8/29/19: Patient presents today for follow-up of atrial fibrillation.  She went to [facility]. to get stem cell injection.  She had to pay $6000 cash there is an outfit in [address] [place] [address] by a registered nurse practitioner. Michelle Hernandez stop [address] she works hard at think she does go into atrial fibrillation weakness at the mall.  Told her the risk of embolic event is high.  Will leave at this dose and see me in 6 months     5/20/19: Patient presents today for follow-up of atrial fibrillation. Remains in sinus rhythm. He wants to stop the Xarelto I told her not to do it at this point. Probably do a 30 day event monitor. And then decide at that point. He is retired from regular work. No chest pain angina congestive heart failure symptoms. See me in 3-6 months     11/19/18: Patient presents today for Follow-up of atrial fibrillation. Remains in sinus rhythm. Continues to work. On Cardizem and Xarelto only. No bleeding. She'll see me in 6 months. He wants to get off blood thinners. I told her not to do it right now. We will reconsider that option in 6 months.     5/14/18: Patient presents today for Follow-up of atrial fibrillation. Remains in sinus rhythm. On Xarelto. No bleeding. She is off amiodarone. Doing well. She fell down and hurt her left shoulder. Is healing up well. No syncope. See me in 6 months     11/13/17: Patient presents today for atrial fibrillation. In sinus rhythm. On Xarelto and Cardizem. No bleeding. Off amiodarone. See me in 6 months.     5/1/17: For follow-up of atrial fibrillation. Thyroid profile was normal. Amiodarone DC'd. Plan Cardizem and Xarelto. She is going to be going to [place] for road trip with her . Feels well. She is  status post cardioversion and remains in sinus rhythm. See me in 6 months.         Past Medical History:   Diagnosis Date    Abnormal echocardiogram 2020    possible PFO?  further eval suggested    COPD (chronic obstructive pulmonary disease) ([facility] [address] 2015    Dr Kirby Gomez Current use of long term anticoagulation     Cyst of neck 2012    after plastic surgery    Emphysema of lung ([facility] [address]     History of compression fracture of vertebral column 2020    T2, L1    History of humerus fracture     LEFT    History of left heart catheterization 03/2020    at [facility], normal LVEF with impaired relaxation, mild AV calcification and mild MV thickening, normal RV    History of sustained ventricular tachycardia 03/2020    had to wear a LifeVest, Dr Vargas Speaks Hyperlipidemia LDL goal < 130     Intermittent atrial fibrillation [facility]) 2015    Dr Aravind Ramires, Dr Bravo Medrano, Dr Haider Innocent Osteopenia 2014    S/P colonoscopic polypectomy 2008, 2015, 2018    Dr Patricia Portillo    Smoking history     Vitamin D deficiency        Past Surgical History:   Procedure Laterality Date    ATRIAL ABLATION SURGERY      BREAST SURGERY  1975    staph infection, postpartum    COLONOSCOPY  7/22/15    w/polypectomy Dr Jluis Ashley  2012    Dr Teresa Degroot; HI RISK IND N/A 7/13/2018    COLONOSCOPY performed by Pilo Strong MD at [facility]       Family History   Problem Relation Age of Onset    Osteoarthritis Mother     Osteoporosis Mother     Cancer Mother         anorectal     Colon Cancer Mother        Social History     Socioeconomic History    Marital status:      Spouse name: None    Number of children: 2    Years of education: None    Highest education level: None   Occupational History    Occupation: [employer], retired   Social Needs    Financial resource strain: None    Food insecurity     Worry: None     Inability: None    Transportation needs     Medical: None     Non-medical: None   Tobacco Use    Smoking status: Former Smoker     Packs/day: 1.00     Years: 50.00     Pack years: 50.00     Types: Cigarettes     Start date: 1964     Last attempt to quit: 12/16/2015 Years since quittin.7    Smokeless tobacco: Never Used    Tobacco comment: pt will try smoking cessation   Substance and Sexual Activity    Alcohol use: No    Drug use: No    Sexual activity: None   Lifestyle    Physical activity     Days per week: None     Minutes per session: None    Stress: None   Relationships    Social connections     Talks on phone: None     Gets together: None     Attends Congregation service: None     Active member of club or organization: None     Attends meetings of clubs or organizations: None     Relationship status: None    Intimate partner violence     Fear of current or ex partner: None     Emotionally abused: None     Physically abused: None     Forced sexual activity: None   Other Topics Concern    None   Social History Narrative    Born in [place], one brother in [place] [facility] [facility], keeps in touch    , 2 children, one son in [place], one daughter in [place]    5 grandchildren     Lives in a house in [place] with spouse     Worked for [employer] and full time at [employer], [employer], fashion, family life     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        No Known Allergies    Current Outpatient Medications   Medication Sig Dispense Refill    dilTIAZem (CARDIZEM CD) 240 MG extended release capsule TAKE 1 CAPSULE BY MOUTH EVERY DAY 90 capsule 3    dilTIAZem (CARDIZEM CD) 120 MG extended release capsule Take 1 capsule by mouth nightly 90 capsule 3    furosemide (LASIX) 40 MG tablet TAKE 1 TABLET BY MOUTH EVERY DAY 90 tablet 3    Handicap Placard MISC by Does not apply route Good for 5 years.  Dispense 1 1 each 0    Calcium Carb-Cholecalciferol (CALCIUM + D3 PO) Take by mouth      potassium chloride (KLOR-CON M) 20 MEQ extended release tablet Take 1 tablet by mouth daily 90 tablet 1    amiodarone (CORDARONE) 200 MG tablet Take 1 tablet by mouth Twice a Week 26 tablet 3    pravastatin (PRAVACHOL) 10 MG tablet Take 1 tablet by mouth daily 90 tablet 2    umeclidinium-vilanterol (ANORO ELLIPTA) 62.5-25 MCG/INH AEPB inhaler Inhale 1 puff into the lungs daily 1 each 5    rivaroxaban (XARELTO) 20 MG TABS tablet TAKE 1 TABLET BY MOUTH EVERY DAY WITH BREAKFAST 90 tablet 3    b complex vitamins tablet Take 1 tablet by mouth daily      magnesium oxide (MAG-OX) 400 MG tablet Take 400 mg by mouth daily      Vitamin D (CHOLECALCIFEROL) 1000 UNITS CAPS capsule Take 1,000 Units by mouth daily. No current facility-administered medications for this visit. Review of Systems:   Review of Systems   Constitutional: Negative for activity change, appetite change, diaphoresis, fatigue and unexpected weight change. HENT: Negative for facial swelling, nosebleeds, trouble swallowing and voice change. Respiratory: Negative for apnea, chest tightness, shortness of breath and wheezing. Cardiovascular: Negative for chest pain, palpitations and leg swelling. Gastrointestinal: Negative for abdominal distention, anal bleeding, blood in stool, diarrhea, nausea and vomiting. Genitourinary: Negative for decreased urine volume and dysuria. Musculoskeletal: Negative for gait problem, myalgias, neck pain and neck stiffness. Skin: Negative for color change, pallor, rash and wound. Neurological: Negative for dizziness, seizures, syncope, facial asymmetry, weakness, light-headedness, numbness and headaches. Hematological: Does not bruise/bleed easily. Psychiatric/Behavioral: Negative for agitation, behavioral problems, confusion, hallucinations and suicidal ideas. The patient is not nervous/anxious. All other systems reviewed and are negative. Review of System is negative except for as mentioned above.       Physical Examination:    /78 (Site: Right Upper Arm, Position: Sitting, Cuff Size: Medium Adult)   Pulse 67   Resp 22   Ht 5' 1\" (1.549 m)   Wt 139 lb (63 kg)   LMP  (LMP Unknown)   SpO2 98%   BMI 26.26 kg/m²    Physical Exam Constitutional: She appears healthy. No distress. HENT:   Nose: Nose normal.   Mouth/Throat: Dentition is normal. Oropharynx is clear. Eyes: Pupils are equal, round, and reactive to light. Conjunctivae are normal.   Neck: Normal range of motion and thyroid normal. Neck supple. Cardiovascular: Regular rhythm, S1 normal, S2 normal, normal heart sounds, intact distal pulses and normal pulses. PMI is not displaced. No murmur heard. Pulmonary/Chest: She has no wheezes. She has no rales. She exhibits no tenderness. Abdominal: Soft. Bowel sounds are normal. She exhibits no distension and no mass. There is no splenomegaly or hepatomegaly. There is no abdominal tenderness. No hernia. Neurological: She is alert and oriented to person, place, and time. She has normal motor skills. Gait normal.   Skin: Skin is warm and dry. No cyanosis. No jaundice. Nails show no clubbing. Patient Active Problem List   Diagnosis    Hyperlipidemia with target LDL less than 130    Vitamin D deficiency    Atrial fibrillation ([facility] [address]    Pulmonary emphysema (HCC)    Bilateral edema of lower extremity    Other plastic surgery for unacceptable cosmetic appearance    Tobacco abuse    Fatigue    Chronic obstructive pulmonary disease (HCC)    Acute bronchitis    Hypoxemia    Shortness of breath    Fracture of humerus, proximal    Benign neoplasm of sigmoid colon    Other hemorrhoids    Hx of colonic polyps    Family history of colon cancer    Osteopenia of necks of both femurs    Congestive heart failure (HCC)    Bilateral pulmonary infiltrates on CXR    Pericardial calcification    Pericardial effusion    Pleural effusion    Atelectasis, left           No orders of the defined types were placed in this encounter.         Orders Placed This Encounter   Medications    dilTIAZem (CARDIZEM CD) 240 MG extended release capsule     Sig: TAKE 1 CAPSULE BY MOUTH EVERY DAY     Dispense:  90 capsule     Refill:  3 Assessment:    1. Essential hypertension    2. Atrial fibrillation, unspecified type ([address]         Plan:   Patient to HOLD NIGHT DOSE of Cardizem. Stay on same medications. See me in 1 month. This note was partially generated using Dragon voice recognition system, and there may be some incorrect words, spellings, punctuation that were not noticed in checking the note before saving.         Electronically signed by Elzbieta Estrada MD on 9/22/2020 at 11:37 AM

## 2020-09-21 ASSESSMENT — ENCOUNTER SYMPTOMS
ABDOMINAL DISTENTION: 0
ANAL BLEEDING: 0
TROUBLE SWALLOWING: 0
WHEEZING: 0
VOICE CHANGE: 0
FACIAL SWELLING: 0
COLOR CHANGE: 0

## 2020-09-22 ENCOUNTER — HOSPITAL ENCOUNTER (OUTPATIENT)
Dept: PULMONOLOGY | Age: 70
Setting detail: THERAPIES SERIES
Discharge: HOME | End: 2020-09-22
Payer: COMMERCIAL

## 2020-09-22 PROCEDURE — G0424 PULMONARY REHAB W EXER: HCPCS

## 2020-09-24 ENCOUNTER — HOSPITAL ENCOUNTER (OUTPATIENT)
Dept: PULMONOLOGY | Age: 70
Setting detail: THERAPIES SERIES
Discharge: HOME | End: 2020-09-24
Payer: COMMERCIAL

## 2020-09-24 PROCEDURE — G0424 PULMONARY REHAB W EXER: HCPCS

## 2020-09-29 ENCOUNTER — HOSPITAL ENCOUNTER (OUTPATIENT)
Dept: PULMONOLOGY | Age: 70
Setting detail: THERAPIES SERIES
Discharge: HOME | End: 2020-09-29
Payer: COMMERCIAL

## 2020-09-29 PROCEDURE — G0424 PULMONARY REHAB W EXER: HCPCS

## 2020-10-01 ENCOUNTER — HOSPITAL ENCOUNTER (OUTPATIENT)
Dept: PULMONOLOGY | Age: 70
Setting detail: THERAPIES SERIES
Discharge: HOME | End: 2020-10-01
Payer: COMMERCIAL

## 2020-10-01 PROCEDURE — G0424 PULMONARY REHAB W EXER: HCPCS

## 2020-10-06 ENCOUNTER — HOSPITAL ENCOUNTER (OUTPATIENT)
Dept: PULMONOLOGY | Age: 70
Setting detail: THERAPIES SERIES
Discharge: HOME | End: 2020-10-06
Payer: COMMERCIAL

## 2020-10-06 PROCEDURE — G0424 PULMONARY REHAB W EXER: HCPCS

## 2020-10-08 ENCOUNTER — HOSPITAL ENCOUNTER (OUTPATIENT)
Dept: PULMONOLOGY | Age: 70
Setting detail: THERAPIES SERIES
Discharge: HOME | End: 2020-10-08
Payer: COMMERCIAL

## 2020-10-08 ENCOUNTER — OFFICE VISIT (OUTPATIENT)
Dept: CARDIOLOGY CLINIC | Age: 70
End: 2020-10-08
Payer: COMMERCIAL

## 2020-10-08 VITALS
HEIGHT: 61 IN | RESPIRATION RATE: 20 BRPM | SYSTOLIC BLOOD PRESSURE: 122 MMHG | BODY MASS INDEX: 26.32 KG/M2 | DIASTOLIC BLOOD PRESSURE: 84 MMHG | WEIGHT: 139.4 LBS | HEART RATE: 74 BPM | OXYGEN SATURATION: 95 %

## 2020-10-08 PROCEDURE — 90694 VACC AIIV4 NO PRSRV 0.5ML IM: CPT | Performed by: INTERNAL MEDICINE

## 2020-10-08 PROCEDURE — 90471 IMMUNIZATION ADMIN: CPT | Performed by: INTERNAL MEDICINE

## 2020-10-08 PROCEDURE — G0424 PULMONARY REHAB W EXER: HCPCS

## 2020-10-08 PROCEDURE — 99214 OFFICE O/P EST MOD 30 MIN: CPT | Performed by: INTERNAL MEDICINE

## 2020-10-08 RX ORDER — POTASSIUM CHLORIDE 20 MEQ/1
20 TABLET, EXTENDED RELEASE ORAL DAILY
Qty: 30 TABLET | Refills: 5 | Status: SHIPPED | OUTPATIENT
Start: 2020-10-08 | End: 2020-12-02 | Stop reason: SDUPTHER

## 2020-10-08 ASSESSMENT — ENCOUNTER SYMPTOMS
TROUBLE SWALLOWING: 0
VOICE CHANGE: 0
CHEST TIGHTNESS: 0
VOMITING: 0
WHEEZING: 0
APNEA: 0
FACIAL SWELLING: 0
DIARRHEA: 0
COLOR CHANGE: 0
SHORTNESS OF BREATH: 0
BLOOD IN STOOL: 0
ABDOMINAL DISTENTION: 0
ANAL BLEEDING: 0
NAUSEA: 0

## 2020-10-08 NOTE — PROGRESS NOTES
Vaccine Information Sheet, \"Influenza - Inactivated\" OR \"Live - Intranasal\"  given to [facility]. Patient responses:    Have you ever had a reaction to a flu vaccine? No  Are you able to eat eggs without adverse effects? Yes  Do you have any current illness? No  Have you ever had Guillian Orlando Syndrome? No    Flu vaccine given per order. Please see immunization tab.

## 2020-10-08 NOTE — PROGRESS NOTES
[facility] CARDIOLOGY OFFICE FOLLOW-UP      Patient: Ilya Sanon  YOB: 1950  MRN: [id number]    Chief Complaint:  Chief Complaint   Patient presents with    1 Month Follow-Up    Atrial Fibrillation    Hypertension         Subjective/HPI:  10/8/2020: Patient presents today for follow-up of atrial fibrillation. Remains in a sinus rhythm we did cut down the dose of Cardizem from 240+122 just to 240 a day. Doing well. Continue with the Lasix and the potassium. She wants KCl rather than a K. Dur. She wants to cut down the amiodarone I told her that what happened to her last time and she got into recurrent atrial fibrillation. She is only on Monday and Friday. We will get a flu shot today and see me in 3 months       9/17/2020: Patient presents today for follow-up of atrial fibrillation hypertension. Doing well. Occasionally gets tired. On amiodarone twice a week. We will reduce the dose of Cardizem the evening dose will be stopped continue with the 240. Continue with Xarelto. See me in 2 months probably needs 48-hour Holter     8/13/2020: Patient presents today for follow-up of atrial fibrillation.  He had EP procedure done by Dr. Ancelmo Hastings. Magno Tracy was told she needs an ICD.  Last echo showed preserved LV ejection fraction. Magno Tracy is doing well.  He is on diltiazem 240 in the morning or 120 at night amiodarone 200 mg twice a week Monday and Friday and pravastatin 10 mg daily.  Lasix and potassium.  Renal function is borderline.   GFR is high 50s.  Had ultrasound of the neck which showed some thyroid cysts.  She now sees .  Used to see Dr Yuen Gather am going to see her in 2 months again. [facility] repeat an echocardiogram at that time.  No restrictions.        5/14/2020: Patient presents today for follow-up of atrial fibrillation.  Remains in sinus rhythm.  Last visit was with Alec He follow with Dr. Lesley Curling cardiac rehab has not called her.  Otherwise feeling well blood pressure remains controlled.  No palpitation down the amiodarone to Monday and Friday only and see me in 3 months        4/28/2020 VIRTUAL: Michelle Fuentes is a 68 y. o. female evaluated via telephone on 4/28/2020.  For follow-up of atrial fibrillation.  Was admitted to [address] [facility] shows normal ejection fraction.  60%.  Mild concentric left ventricle hypertrophy.  He did start walking yesterday with her .  Got a little winded.  I think she is in sinus rhythm.  Will reduce amiodarone to Monday Wednesday and Friday.  Increase Lasix and potassium to every day.  See me in 2 weeks.  Continue Cardizem 240 in the morning and 120 at night.  Continue Xarelto           3/23/2020: Patient presents today for follow-up of recent hospitalization at [facility] for CHF atrial fibrillation.  Was seen by multiple people.  Christyasersen cath which was negative.  Was evaluated by Dr. Sid Hatch was read by .  Showed preserved LV ejection fraction.  Had a cardiac MRI.  Bottom line is currently on amiodarone 200 mg a day he used to take that before and quit taking it 3 4 years ago. Isamar Ross is somewhat noncompliant with medication.  Could not tolerate beta-blocker.  Currently she is on Cardizem 240 in the morning and 120 at night and was put on Lasix and potassium.  She is feeling better.  Supposed to see  for follow-up of event monitor. \"  Keep the medication the same.  I will see her in 2 weeks.     11/1/19: Patient presents today for follow-up of atrial fibrillation.  Remains in sinus rhythm.  Could not tolerate the Toprol dose.  Currently only on 12.5 daily.  Makes extremely tired.  Blood pressure remains well controlled. Sohan Churches told her not to take it off no bleeding.  She has a history of being noncompliant in the past with medication.  I had long talk with her about the need to take anticoagulants or risk of stroke.  She will have a echo test and lexiscan stress test.  She will see me in February 9/30/19: Patient presents today for follow-up of recent hospitalization in [place] when she was admitted for rapid atrial fibrillation.  Reportedly had cardioversion.  Was discharged home on Toprol 50 mg 1 and half tablet a day.  And Xarelto.  Please her shortness of breath with Toprol.  Reportedly had a history of COPD which I doubt.  The rest of the trip to [place] was unremarkable she will continue with the same medication.  Take Toprol 50 mg at night and 25 mg in the morning.  She will see me in 1 month     8/29/19: Patient presents today for follow-up of atrial fibrillation.  [facility] to get stem cell injection.  She had to pay $6000 cash there is an outfit in [address] [place] [address] by a registered nurse practitioner. [employer] [place] [address] [employer] she works hard at think she does go into atrial fibrillation weakness at the mall.  Told her the risk of embolic event is high.  Will leave at this dose and see me in 6 months     5/20/19: Patient presents today for follow-up of atrial fibrillation. Remains in sinus rhythm. He wants to stop the Xarelto I told her not to do it at this point. Probably do a 30 day event monitor. And then decide at that point. He is retired from regular work. No chest pain angina congestive heart failure symptoms. See me in 3-6 months     11/19/18: Patient presents today for Follow-up of atrial fibrillation. Remains in sinus rhythm. Continues to work. On Cardizem and Xarelto only. No bleeding. She'll see me in 6 months. He wants to get off blood thinners. I told her not to do it right now. We will reconsider that option in 6 months.     5/14/18: Patient presents today for Follow-up of atrial fibrillation. Remains in sinus rhythm. On Xarelto. No bleeding. She is off amiodarone. Doing well. She fell down and hurt her left shoulder. Is healing up well. No syncope. See me in 6 months     11/13/17: Patient presents today for atrial fibrillation.  In sinus rhythm. On Xarelto and Cardizem. No bleeding. Off amiodarone. See me in 6 months.     5/1/17: For follow-up of atrial fibrillation. Thyroid profile was normal. Amiodarone DC'd. Plan Cardizem and Xarelto. She is going to be going to [place] for road trip with her . Feels well. She is  status post cardioversion and remains in sinus rhythm. See me in 6 months.             Past Medical History:   Diagnosis Date    Abnormal echocardiogram 2020    possible PFO?  further eval suggested    COPD (chronic obstructive pulmonary disease) (HCC) 2015    Dr Venessa Nava Current use of long term anticoagulation     Cyst of neck 2012    after plastic surgery    Emphysema of lung ([address]     History of compression fracture of vertebral column 2020    T2, L1    History of humerus fracture     LEFT    History of left heart catheterization 03/2020    at [facility], normal LVEF with impaired relaxation, mild AV calcification and mild MV thickening, normal RV    History of sustained ventricular tachycardia 03/2020    had to wear a LifeVest, Dr Melgoza Cornea Hyperlipidemia LDL goal < 130     Intermittent atrial fibrillation [facility]) 2015    Dr Tess Almazan, Dr Pedro Metz, Dr Taylor Avalos Osteopenia 2014    S/P colonoscopic polypectomy 2008, 2015, 2018    Dr Warren Prieto    Smoking history     Vitamin D deficiency        Past Surgical History:   Procedure Laterality Date    ATRIAL ABLATION SURGERY      BREAST SURGERY  1975    staph infection, postpartum    COLONOSCOPY  7/22/15    w/polypectomy Dr Toni Chatterjee  2012    Dr Landen Christianson; HI RISK IND N/A 7/13/2018    COLONOSCOPY performed by Debby Garcia MD at [facility]       Family History   Problem Relation Age of Onset    Osteoarthritis Mother     Osteoporosis Mother     Cancer Mother         anorectal     Colon Cancer Mother        Social History     Socioeconomic History    Marital status:      Spouse name: None    Number of children: 2    Years of education: None    Highest education level: None   Occupational History    Occupation: [employer], retired   Social Needs    Financial resource strain: None    Food insecurity     Worry: None     Inability: None    Transportation needs     Medical: None     Non-medical: None   Tobacco Use    Smoking status: Former Smoker     Packs/day: 1.00     Years: 50.00     Pack years: 50.00     Types: Cigarettes     Start date:      Last attempt to quit: 2015     Years since quittin.8    Smokeless tobacco: Never Used    Tobacco comment: pt will try smoking cessation   Substance and Sexual Activity    Alcohol use: No    Drug use: No    Sexual activity: None   Lifestyle    Physical activity     Days per week: None     Minutes per session: None    Stress: None   Relationships    Social connections     Talks on phone: None     Gets together: None     Attends Anabaptist service: None     Active member of club or organization: None     Attends meetings of clubs or organizations: None     Relationship status: None    Intimate partner violence     Fear of current or ex partner: None     Emotionally abused: None     Physically abused: None     Forced sexual activity: None   Other Topics Concern    None   Social History Narrative    Born in [place], one brother in [place], keeps in touch    , 2 children, one son in [place], one daughter in [place]    5 grandchildren     Lives in a house in [place] with spouse     Worked for [employer] and full time at [employer], [employer], fashion, family life     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        No Known Allergies    Current Outpatient Medications   Medication Sig Dispense Refill    dilTIAZem (CARDIZEM CD) 240 MG extended release capsule TAKE 1 CAPSULE BY MOUTH EVERY DAY 90 capsule 3    dilTIAZem (CARDIZEM CD) 120 MG extended release capsule Take 1 capsule by mouth nightly 90 capsule 3    furosemide (LASIX) 40 MG tablet TAKE 1 TABLET BY MOUTH EVERY DAY 90 tablet 3    Handicap Placard MISC by Does not apply route Good for 5 years. Dispense 1 1 each 0    Calcium Carb-Cholecalciferol (CALCIUM + D3 PO) Take by mouth      potassium chloride (KLOR-CON M) 20 MEQ extended release tablet Take 1 tablet by mouth daily 90 tablet 1    amiodarone (CORDARONE) 200 MG tablet Take 1 tablet by mouth Twice a Week 26 tablet 3    pravastatin (PRAVACHOL) 10 MG tablet Take 1 tablet by mouth daily 90 tablet 2    umeclidinium-vilanterol (ANORO ELLIPTA) 62.5-25 MCG/INH AEPB inhaler Inhale 1 puff into the lungs daily 1 each 5    rivaroxaban (XARELTO) 20 MG TABS tablet TAKE 1 TABLET BY MOUTH EVERY DAY WITH BREAKFAST 90 tablet 3    b complex vitamins tablet Take 1 tablet by mouth daily      magnesium oxide (MAG-OX) 400 MG tablet Take 400 mg by mouth daily      Vitamin D (CHOLECALCIFEROL) 1000 UNITS CAPS capsule Take 1,000 Units by mouth daily. No current facility-administered medications for this visit. Review of Systems:   Review of Systems   Constitutional: Negative for activity change, appetite change, diaphoresis, fatigue and unexpected weight change. HENT: Negative for facial swelling, nosebleeds, trouble swallowing and voice change. Respiratory: Negative for apnea, chest tightness, shortness of breath and wheezing. Cardiovascular: Negative for chest pain, palpitations and leg swelling. Gastrointestinal: Negative for abdominal distention, anal bleeding, blood in stool, diarrhea, nausea and vomiting. Genitourinary: Negative for decreased urine volume and dysuria. Musculoskeletal: Negative for gait problem, myalgias, neck pain and neck stiffness. Skin: Negative for color change, pallor, rash and wound. Neurological: Negative for dizziness, seizures, syncope, facial asymmetry, weakness, light-headedness, numbness and headaches. Hematological: Does not bruise/bleed easily.    Psychiatric/Behavioral: Negative for agitation, behavioral problems, confusion, hallucinations and suicidal ideas. The patient is not nervous/anxious. All other systems reviewed and are negative. Review of System is negative except for as mentioned above. Physical Examination:    /84 (Site: Right Upper Arm, Position: Sitting, Cuff Size: Medium Adult)   Pulse 74   Resp 20   Ht 5' 1\" (1.549 m)   Wt 139 lb 6.4 oz (63.2 kg)   LMP  (LMP Unknown)   SpO2 95%   BMI 26.34 kg/m²    Physical Exam   Constitutional: She appears healthy. No distress. HENT:   Nose: Nose normal.   Mouth/Throat: Dentition is normal. Oropharynx is clear. Eyes: Pupils are equal, round, and reactive to light. Conjunctivae are normal.   Neck: Normal range of motion and thyroid normal. Neck supple. Cardiovascular: Regular rhythm, S1 normal, S2 normal, normal heart sounds, intact distal pulses and normal pulses. PMI is not displaced. No murmur heard. Pulmonary/Chest: She has no wheezes. She has no rales. She exhibits no tenderness. Abdominal: Soft. Bowel sounds are normal. She exhibits no distension and no mass. There is no splenomegaly or hepatomegaly. There is no abdominal tenderness. No hernia. Neurological: She is alert and oriented to person, place, and time. She has normal motor skills. Gait normal.   Skin: Skin is warm and dry. No cyanosis. No jaundice. Nails show no clubbing.            Patient Active Problem List   Diagnosis    Hyperlipidemia with target LDL less than 130    Vitamin D deficiency    Atrial fibrillation ([address]    Pulmonary emphysema (HCC)    Bilateral edema of lower extremity    Other plastic surgery for unacceptable cosmetic appearance    Tobacco abuse    Fatigue    Chronic obstructive pulmonary disease (HCC)    Acute bronchitis    Hypoxemia    Shortness of breath    Fracture of humerus, proximal    Benign neoplasm of sigmoid colon    Other hemorrhoids    Hx of colonic polyps    Family history of colon cancer    Osteopenia of necks of both femurs    Congestive heart failure (HCC)    Bilateral pulmonary infiltrates on CXR    Pericardial calcification    Pericardial effusion    Pleural effusion    Atelectasis, left           No orders of the defined types were placed in this encounter. No orders of the defined types were placed in this encounter. Assessment:    1. Atrial fibrillation, unspecified type ([facility]    2. Anticoagulated    3. Essential hypertension    4. Non-compliance    5. Need for influenza vaccination      Plan:   Patient given High Dose Flu injection    Per patient request we prescribed new Potassium trying to get her the one she originally takes. Stay on same medications. See me in 3 months. This note was partially generated using Dragon voice recognition system, and there may be some incorrect words, spellings, punctuation that were not noticed in checking the note before saving.         Electronically signed by Denilson Joseph MD on 10/8/2020 at 3:28 PM

## 2020-10-13 ENCOUNTER — HOSPITAL ENCOUNTER (OUTPATIENT)
Dept: PULMONOLOGY | Age: 70
Setting detail: THERAPIES SERIES
Discharge: HOME | End: 2020-10-13
Payer: COMMERCIAL

## 2020-10-13 PROCEDURE — G0424 PULMONARY REHAB W EXER: HCPCS

## 2020-10-15 ENCOUNTER — HOSPITAL ENCOUNTER (OUTPATIENT)
Dept: PULMONOLOGY | Age: 70
Setting detail: THERAPIES SERIES
Discharge: HOME | End: 2020-10-15
Payer: COMMERCIAL

## 2020-10-15 PROCEDURE — G0424 PULMONARY REHAB W EXER: HCPCS

## 2020-10-20 ENCOUNTER — HOSPITAL ENCOUNTER (OUTPATIENT)
Dept: PULMONOLOGY | Age: 70
Setting detail: THERAPIES SERIES
Discharge: HOME | End: 2020-10-20
Payer: COMMERCIAL

## 2020-10-20 PROCEDURE — G0424 PULMONARY REHAB W EXER: HCPCS

## 2020-10-22 ENCOUNTER — HOSPITAL ENCOUNTER (OUTPATIENT)
Dept: PULMONOLOGY | Age: 70
Setting detail: THERAPIES SERIES
Discharge: HOME | End: 2020-10-22
Payer: COMMERCIAL

## 2020-10-22 PROCEDURE — G0424 PULMONARY REHAB W EXER: HCPCS

## 2020-10-26 ENCOUNTER — OFFICE VISIT (OUTPATIENT)
Dept: PULMONOLOGY | Age: 70
End: 2020-10-26
Payer: COMMERCIAL

## 2020-10-26 VITALS
HEIGHT: 61 IN | BODY MASS INDEX: 26.43 KG/M2 | HEART RATE: 71 BPM | OXYGEN SATURATION: 93 % | DIASTOLIC BLOOD PRESSURE: 72 MMHG | WEIGHT: 140 LBS | SYSTOLIC BLOOD PRESSURE: 120 MMHG | TEMPERATURE: 97.6 F | RESPIRATION RATE: 16 BRPM

## 2020-10-26 PROCEDURE — 99214 OFFICE O/P EST MOD 30 MIN: CPT | Performed by: INTERNAL MEDICINE

## 2020-10-26 ASSESSMENT — ENCOUNTER SYMPTOMS
EYE DISCHARGE: 0
RHINORRHEA: 0
VOICE CHANGE: 0
CHEST TIGHTNESS: 0
WHEEZING: 0
DIARRHEA: 0
NAUSEA: 0
COUGH: 0
EYE ITCHING: 0
ABDOMINAL PAIN: 0
SINUS PRESSURE: 0
SHORTNESS OF BREATH: 1
VOMITING: 0
TROUBLE SWALLOWING: 0
SORE THROAT: 0

## 2020-10-26 NOTE — PROGRESS NOTES
Subjective:     Gertrudis Carty is a 68 y.o. female who complains today of:     Chief Complaint   Patient presents with    Follow-up     three month f/u for COPD. HPI  Patient is using bronchodilator with with anoro ellipta 1 puff daily.   C/o shortness of breath  with exertion. No  Wheezing   No Cough or   Sputum  No Hemoptysis  No Chest tightness   No Chest pain with radiation  or pleuritic pain  No Fever or chills. No Rhinorrhea and postnasal drip. She is on amiodarone and she is taking 200 mg 2 times a week , following with dr. Travon Ervin    Allergies:  Patient has no known allergies. Past Medical History:   Diagnosis Date    Abnormal echocardiogram 2020    possible PFO?  further eval suggested    COPD (chronic obstructive pulmonary disease) (HCC) 2015    Dr Parris Hodgkins Current use of long term anticoagulation     Cyst of neck 2012    after plastic surgery    Emphysema of lung ([address]     History of compression fracture of vertebral column 2020    T2, L1    History of humerus fracture     LEFT    History of left heart catheterization 03/2020    at [facility], normal LVEF with impaired relaxation, mild AV calcification and mild MV thickening, normal RV    History of sustained ventricular tachycardia 03/2020    had to wear a LifeVest, Dr Rubens Corral Hyperlipidemia LDL goal < 130     Intermittent atrial fibrillation [facility]) 2015    Dr Annie Angeles, Dr Kendall Banegas, Dr Teodora Cueto Osteopenia 2014    S/P colonoscopic polypectomy 2008, 2015, 2018    Dr Jory Weber    Smoking history     Vitamin D deficiency      Past Surgical History:   Procedure Laterality Date    ATRIAL ABLATION SURGERY      BREAST SURGERY  1975    staph infection, postpartum    COLONOSCOPY  7/22/15    w/polypectomy Dr Vincent Brand  2012    Dr Fredrick Keller; HI RISK IND N/A 7/13/2018    COL[facility] by Cami Campbell [facility] at [facility]     Family History   Problem Relation Age of Onset    Osteoarthritis Mother     Osteoporosis Mother     Cancer Mother         anorectal     Colon Cancer Mother      Social History     Socioeconomic History    Marital status:      Spouse name: Not on file    Number of children: 2    Years of education: Not on file    Highest education level: Not on file   Occupational History    Occupation: [employer], retired   Social Needs    Financial resource strain: Not on file    Food insecurity     Worry: Not on file     Inability: Not on file   [employer] needs     Medical: Not on file     Non-medical: Not on file   Tobacco Use    Smoking status: Former Smoker     Packs/day: 1.00     Years: 50.00     Pack years: 50.00     Types: Cigarettes     Start date:      Last attempt to quit: 2015     Years since quittin.8    Smokeless tobacco: Never Used    Tobacco comment: pt will try smoking cessation   Substance and Sexual Activity    Alcohol use: No    Drug use: No    Sexual activity: Not on file   Lifestyle    Physical activity     Days per week: Not on file     Minutes per session: Not on file    Stress: Not on file   Relationships    Social connections     Talks on phone: Not on file     Gets together: Not on file     Attends Evangelical service: Not on file     Active member of club or organization: Not on file     Attends meetings of clubs or organizations: Not on file     Relationship status: Not on file    Intimate partner violence     Fear of current or ex partner: Not on file     Emotionally abused: Not on file     Physically abused: Not on file     Forced sexual activity: Not on file   Other Topics Concern    Not on file   Social History Narrative    Born in [place], one brother in [facility] [place] [facility], keeps in touch    , 2 children, one son in [place], one daughter in [place]    5 grandchildren     Lives in a house in [place] with spouse     Worked for [employer] and full time at [employer], [employer], fashion, 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 HENT: Negative for congestion, mouth sores, nosebleeds, postnasal drip, rhinorrhea, sinus pressure, sneezing, sore throat, trouble swallowing and voice change. Eyes: Negative for discharge, itching and visual disturbance. Respiratory: Positive for shortness of breath. Negative for cough, chest tightness and wheezing. Cardiovascular: Negative for chest pain, palpitations and leg swelling. Gastrointestinal: Negative for abdominal pain, diarrhea, nausea and vomiting. Genitourinary: Negative for difficulty urinating and hematuria. Musculoskeletal: Negative for arthralgias, joint swelling and myalgias. Skin: Negative for rash. Allergic/Immunologic: Negative for environmental allergies and food allergies. Neurological: Negative for dizziness, tremors, weakness and headaches. Psychiatric/Behavioral: Negative for behavioral problems and sleep disturbance.         :     Vitals:    10/26/20 0945   BP: 120/72   Pulse: 71   Resp: 16   Temp: 97.6 °F (36.4 °C)   TempSrc: Temporal   SpO2: 93%   Weight: 140 lb (63.5 kg)   Height: 5' 1\" (1.549 m)     Wt Readings from Last 3 Encounters:   10/26/20 140 lb (63.5 kg)   10/08/20 139 lb 6.4 oz (63.2 kg)   09/17/20 139 lb (63 kg)         Physical Exam  Constitutional:       General: She is not in acute distress. Appearance: She is well-developed. She is not diaphoretic. HENT:      Head: Normocephalic and atraumatic. Nose: Nose normal.   Eyes:      Pupils: Pupils are equal, round, and reactive to light. Neck:      Thyroid: No thyromegaly. Vascular: No JVD. Trachea: No tracheal deviation. Cardiovascular:      Rate and Rhythm: Normal rate and regular rhythm. Heart sounds: No murmur. No friction rub. No gallop. Pulmonary:      Effort: No respiratory distress. Breath sounds:  No wheezing or rales. Comments: diminished Breath sound bilaterally. Chest:      Chest wall: No tenderness. Abdominal:      General: There is no distension. Tenderness: There is no abdominal tenderness. There is no rebound. Musculoskeletal: Normal range of motion. Lymphadenopathy:      Cervical: No cervical adenopathy. Skin:     General: Skin is warm and dry. Neurological:      Mental Status: She is alert and oriented to person, place, and time. Coordination: Coordination normal.         Current Outpatient Medications   Medication Sig Dispense Refill    potassium chloride (KLOR-CON M) 20 MEQ extended release tablet Take 1 tablet by mouth daily 30 tablet 5    dilTIAZem (CARDIZEM CD) 240 MG extended release capsule TAKE 1 CAPSULE BY MOUTH EVERY DAY 90 capsule 3    furosemide (LASIX) 40 MG tablet TAKE 1 TABLET BY MOUTH EVERY DAY 90 tablet 3    Handicap Placard MISC by Does not apply route Good for 5 years. Dispense 1 1 each 0    Calcium Carb-Cholecalciferol (CALCIUM + D3 PO) Take by mouth      amiodarone (CORDARONE) 200 MG tablet Take 1 tablet by mouth Twice a Week 26 tablet 3    pravastatin (PRAVACHOL) 10 MG tablet Take 1 tablet by mouth daily 90 tablet 2    umeclidinium-vilanterol (ANORO ELLIPTA) 62.5-25 MCG/INH AEPB inhaler Inhale 1 puff into the lungs daily 1 each 5    rivaroxaban (XARELTO) 20 MG TABS tablet TAKE 1 TABLET BY MOUTH EVERY DAY WITH BREAKFAST 90 tablet 3    b complex vitamins tablet Take 1 tablet by mouth daily      magnesium oxide (MAG-OX) 400 MG tablet Take 400 mg by mouth daily      Vitamin D (CHOLECALCIFEROL) 1000 UNITS CAPS capsule Take 1,000 Units by mouth daily. No current facility-administered medications for this visit.         Results for orders placed during the hospital encounter of 05/11/20   XR CHEST STANDARD (2 VW)    Narrative EXAMINATION: XR CHEST (2 VW)    CLINICAL HISTORY: SHORTNESS OF BREATH    COMPARISONS: CT CHEST, MARCH 5, 2020, CHEST RADIOGRAPH, SEPTEMBER 25, 2019    FINDINGS: Osseous structures intact. Cardiopericardial silhouette normal. Aorta calcified. Pulmonary vasculature normal. Ill-defined area increased opacity left lung base posteriorly. Diaphragms flattened. Impression EMPHYSEMA. LEFT LOWER LUNG SUBSEGMENTAL ATELECTASIS/PNEUMONIA. Results for orders placed during the hospital encounter of 08/23/16   CT Chest W Contrast    Narrative CT CHEST     REASON FOR EXAM  HEMOPTYSIS     COMPARISONS  none     FINDINGS  Multiplanar images were obtained following the IV injection  of 75 cc Isovue-370. A focal area of consolidation involving the right  lower lobe posteriorly is seen. There is a suggestion of air  bronchogram present. Mild atelectasis at the left lung base is seen. Remainder lungs are clear. No pleural effusions are seen. No definite  pulmonary nodules are identified. Emphysematous changes are seen. No  pneumothorax is noted. Heart appears normal in size. No pericardial  effusion is seen. No mediastinal, hilar or axillary adenopathy is  noted. Bone windows demonstrate no gross osseous abnormalities. Surrounding soft tissues are unremarkable. Limited images of the upper  abdomen demonstrate no contributory findings. IMPRESSION  FOCAL AREA OF CONSOLIDATION INVOLVING THE RIGHT LOWER LOBE  IS SEEN. SHORT-TERM INTERVAL FOLLOWUP CT WITHOUT CONTRAST IN 3-4 MONTHS  IS SUGGESTED TO NOTE RESOLUTION. MILD ATELECTATIC CHANGES AT THE LEFT  LUNG BASE. NO OTHER SIGNIFICANT ABNORMALITIES ARE SEEN. All CT scans at this facility use dose modulation, iterative  reconstruction, and/or weight based dosing when appropriate to reduce  radiation dose to as low as reasonably achievable. Jerome Roque M.D. Released Hesham Roque M.D. Released Date Time- 08/24/16 8459   This document has been electronically signed. ------------------------------------------------------------------------------   ]    Assessment/Plan:     1. Chronic obstructive pulmonary disease, unspecified COPD type ([facility]  Patient is using bronchodilator with with anoro ellipta 1 puff daily.   C/o shortness of breath  with exertion. No  Wheezing . No Cough or Sputum. She is going to pulmonary rehab and doing better. Continue same . 2. Shortness of breath  Patient is having  Chronic shortness of breath which is much better , continue  Bronchodilator  as before. keep  Spo2 90% or above. 3. Atrial fibrillation, unspecified type ([facility]  She is on amiodarone and diltiazem, on xarelto , following with cardiology    4. Congestive heart failure, unspecified HF chronicity, unspecified heart failure type ([facility]  She is on furosemide. Return in about 4 months (around 2/26/2021) for COPD.       Beau Ferraro MD

## 2020-10-27 ENCOUNTER — HOSPITAL ENCOUNTER (OUTPATIENT)
Dept: PULMONOLOGY | Age: 70
Setting detail: THERAPIES SERIES
Discharge: HOME | End: 2020-10-27
Payer: COMMERCIAL

## 2020-10-27 PROCEDURE — G0424 PULMONARY REHAB W EXER: HCPCS

## 2020-10-29 ENCOUNTER — HOSPITAL ENCOUNTER (OUTPATIENT)
Dept: PULMONOLOGY | Age: 70
Setting detail: THERAPIES SERIES
Discharge: HOME | End: 2020-10-29
Payer: COMMERCIAL

## 2020-10-29 PROCEDURE — G0424 PULMONARY REHAB W EXER: HCPCS

## 2020-11-03 ENCOUNTER — HOSPITAL ENCOUNTER (OUTPATIENT)
Dept: PULMONOLOGY | Age: 70
Setting detail: THERAPIES SERIES
Discharge: HOME | End: 2020-11-03
Payer: COMMERCIAL

## 2020-11-03 PROCEDURE — G0424 PULMONARY REHAB W EXER: HCPCS

## 2020-11-05 ENCOUNTER — HOSPITAL ENCOUNTER (OUTPATIENT)
Dept: PULMONOLOGY | Age: 70
Setting detail: THERAPIES SERIES
Discharge: HOME | End: 2020-11-05
Payer: COMMERCIAL

## 2020-11-05 PROCEDURE — G0424 PULMONARY REHAB W EXER: HCPCS

## 2020-11-10 ENCOUNTER — HOSPITAL ENCOUNTER (OUTPATIENT)
Dept: PULMONOLOGY | Age: 70
Setting detail: THERAPIES SERIES
Discharge: HOME | End: 2020-11-10
Payer: COMMERCIAL

## 2020-11-10 PROCEDURE — G0424 PULMONARY REHAB W EXER: HCPCS

## 2020-11-11 ENCOUNTER — TELEPHONE (OUTPATIENT)
Dept: FAMILY MEDICINE CLINIC | Age: 70
End: 2020-11-11

## 2020-11-11 ENCOUNTER — OFFICE VISIT (OUTPATIENT)
Dept: FAMILY MEDICINE CLINIC | Age: 70
End: 2020-11-11
Payer: COMMERCIAL

## 2020-11-11 VITALS
HEIGHT: 61 IN | TEMPERATURE: 97.8 F | RESPIRATION RATE: 15 BRPM | OXYGEN SATURATION: 97 % | DIASTOLIC BLOOD PRESSURE: 71 MMHG | SYSTOLIC BLOOD PRESSURE: 135 MMHG | WEIGHT: 140 LBS | BODY MASS INDEX: 26.43 KG/M2 | HEART RATE: 74 BPM

## 2020-11-11 PROCEDURE — 99214 OFFICE O/P EST MOD 30 MIN: CPT | Performed by: INTERNAL MEDICINE

## 2020-11-11 RX ORDER — ZOSTER VACCINE RECOMBINANT, ADJUVANTED 50 MCG/0.5
0.5 KIT INTRAMUSCULAR SEE ADMIN INSTRUCTIONS
Qty: 0.5 ML | Refills: 0 | Status: SHIPPED | OUTPATIENT
Start: 2020-11-11 | End: 2021-05-10 | Stop reason: ALTCHOICE

## 2020-11-11 RX ORDER — PRAVASTATIN SODIUM 10 MG
10 TABLET ORAL DAILY
Qty: 90 TABLET | Refills: 3 | Status: SHIPPED | OUTPATIENT
Start: 2020-11-11 | End: 2021-03-09

## 2020-11-11 ASSESSMENT — ENCOUNTER SYMPTOMS
BACK PAIN: 0
EYE PAIN: 0
SHORTNESS OF BREATH: 0
ABDOMINAL PAIN: 0

## 2020-11-11 NOTE — TELEPHONE ENCOUNTER
Patient called because her AVS says that her blood work needs to be completed by or around 11/11/2020. She thought she had to have b/w done a week before her next scheduled appt on 5/12/2021.   Please advise, thank you

## 2020-11-11 NOTE — PROGRESS NOTES
Subjective:      Patient ID: Moises Yancey is a 68 y.o. female who presents today with:  Chief Complaint   Patient presents with    Follow-up    Discuss Labs       HPI      Chronic, improved with statin therapy. . fEMale Sex. Compliant with therapy. Hypertension-Chronic, essential. Not compliant with low sodium diet. fEMale Sex. Compliant with therapy. Copd  Chronic, doing pulmonary rehab. On anoro  Which helps  Compliant    Past Medical History:   Diagnosis Date    Abnormal echocardiogram 2020    possible PFO?  further eval suggested    COPD (chronic obstructive pulmonary disease) (HCC) 2015    Dr Jeremias Johnson Current use of long term anticoagulation     Cyst of neck 2012    after plastic surgery    [address] [address] ([facility]     History of compression fracture of vertebral column 2020    T2, L1    History of humerus fracture     LEFT    History of left heart catheterization 03/2020    at [facility], normal LVEF with impaired relaxation, mild AV calcification and mild MV thickening, normal RV    History of sustained ventricular tachycardia 03/2020    had to wear a LifeVest, Dr Amrik Florez Hyperlipidemia LDL goal < 130     Intermittent atrial fibrillation [facility]) 2015    Dr Talya Turner, Dr Trino Lewis, Dr Brooklynn Valerio Osteopenia 2014    S/P colonoscopic polypectomy 2008, 2015, 2018    Dr Spring Pinedo    Smoking history     Vitamin D deficiency      Past Surgical History:   Procedure Laterality Date    ATRIAL ABLATION SURGERY      BREAST SURGERY  1975    staph infection, postpartum    COLONOSCOPY  7/22/15    w/polypectomy Dr Genet Duque  2012    Dr Rut Cazares; HI RISK IND N/A 7/13/2018    COLONOSCOPY performed by Bright Dick MD at [address] [place] [address] Marital status:      Spouse name: Not on file    Number of children: 2    Years of education: Not on file    Highest education level: Not on file   Occupational History    Occupation: [employer], retired   Social Needs    Financial resource strain: Not on file    Food insecurity     Worry: Not on file     Inability: Not on file   [employer] needs     Medical: Not on file     Non-medical: Not on file   Tobacco Use    Smoking status: Former Smoker     Packs/day: 1.00     Years: 50.00     Pack years: 50.00     Types: Cigarettes     Start date:      Last attempt to quit: 2015     Years since quittin.9    Smokeless tobacco: Never Used    Tobacco comment: pt will try smoking cessation   Substance and Sexual Activity    Alcohol use: No    Drug use: No    Sexual activity: Not on file   Lifestyle    Physical activity     Days per week: Not on file     Minutes per session: Not on file    Stress: Not on file   Relationships    Social connections     Talks on phone: Not on file     Gets together: Not on file     Attends Anabaptism service: Not on file     Active member of club or organization: Not on file     Attends meetings of clubs or organizations: Not on file     Relationship status: Not on file    Intimate partner violence     Fear of current or ex partner: Not on file     Emotionally abused: Not on file     Physically abused: Not on file     Forced sexual activity: Not on file   Other Topics Concern    Not on file   Social History Narrative    Born in [place], one brother in [place], keeps in touch    , 2 children, one son in [place], one daughter in [place]    5 grandchildren     Lives in a house in [place] with spouse     Worked for [employer] and full time at [employer], [employer], fashion, family life     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      No Known Allergies  Current Outpatient Medications on File Prior to Visit   Medication Sig Dispense Refill    Psyllium (METAMUCIL FIBER PO) Take by mouth      potassium chloride (KLOR-CON M) 20 MEQ extended release tablet Take 1 tablet by mouth daily 30 tablet 5    dilTIAZem (CARDIZEM CD) Future     Standing Expiration Date:   11/11/2021    Lipid Panel     Standing Status:   Future     Standing Expiration Date:   11/11/2021     Order Specific Question:   Is Patient Fasting?/# of Hours     Answer:   10     Orders Placed This Encounter   Medications    zoster recombinant adjuvanted vaccine (SHINGRIX) 50 MCG/0.5ML SUSR injection     Sig: Inject 0.5 mLs into the muscle See Admin Instructions 1 dose now and repeat in 2-6 months     Dispense:  0.5 mL     Refill:  0    pravastatin (PRAVACHOL) 10 MG tablet     Sig: Take 1 tablet by mouth daily     Dispense:  90 tablet     Refill:  3   flu shot 2020 she has had. Leidyix  Understands risk of stroke and mi risk reduction with increasing statin. ANDRES Normal.   Repeat lipid panel in 3 to 6 months  If anything should change or worsen call ASAP, don't wait for next scheduled appointment. Return for Chronic condition management/appointment, worsening symptoms, call ASAP for appointment.       Maritza Solomon MD

## 2020-11-12 ENCOUNTER — HOSPITAL ENCOUNTER (OUTPATIENT)
Dept: PULMONOLOGY | Age: 70
Setting detail: THERAPIES SERIES
Discharge: HOME | End: 2020-11-12
Payer: COMMERCIAL

## 2020-11-12 PROCEDURE — G0424 PULMONARY REHAB W EXER: HCPCS

## 2020-11-15 ENCOUNTER — TELEPHONE (OUTPATIENT)
Dept: FAMILY MEDICINE CLINIC | Age: 70
End: 2020-11-15

## 2020-11-15 NOTE — TELEPHONE ENCOUNTER
If HPV is not ordered will the pap automatically reflex to it?   I'm seeing more paps down without hpv and just curious why  Thank you

## 2020-11-16 NOTE — TELEPHONE ENCOUNTER
In the pap order there are options to order HPV as follows:    1. N/A    2. YES    3. YES - ASCUS reflex HPV    4. YES - If abnormal pap reflex HPV         You need to choose one of these options in the orders for each pap. I have my MA always choose # YES so that EVERY pap has an associated HPV results. Let me know if that helps :)  Thanks again for all your referrals !!     [facility]

## 2020-11-17 ENCOUNTER — HOSPITAL ENCOUNTER (OUTPATIENT)
Dept: PULMONOLOGY | Age: 70
Setting detail: THERAPIES SERIES
Discharge: HOME | End: 2020-11-17
Payer: COMMERCIAL

## 2020-11-17 PROCEDURE — G0424 PULMONARY REHAB W EXER: HCPCS

## 2020-11-19 ENCOUNTER — HOSPITAL ENCOUNTER (OUTPATIENT)
Dept: PULMONOLOGY | Age: 70
Setting detail: THERAPIES SERIES
Discharge: HOME | End: 2020-11-19
Payer: COMMERCIAL

## 2020-11-19 PROCEDURE — G0424 PULMONARY REHAB W EXER: HCPCS

## 2020-11-24 ENCOUNTER — HOSPITAL ENCOUNTER (OUTPATIENT)
Dept: PULMONOLOGY | Age: 70
Setting detail: THERAPIES SERIES
Discharge: HOME | End: 2020-11-24
Payer: COMMERCIAL

## 2020-11-24 PROCEDURE — G0424 PULMONARY REHAB W EXER: HCPCS

## 2020-12-02 RX ORDER — POTASSIUM CHLORIDE 20 MEQ/1
20 TABLET, EXTENDED RELEASE ORAL DAILY
Qty: 30 TABLET | Refills: 6 | Status: SHIPPED | OUTPATIENT
Start: 2020-12-02 | End: 2021-03-30 | Stop reason: SDUPTHER

## 2020-12-20 RX ORDER — UMECLIDINIUM BROMIDE AND VILANTEROL TRIFENATATE 62.5; 25 UG/1; UG/1
POWDER RESPIRATORY (INHALATION)
Qty: 1 EACH | Refills: 2 | Status: SHIPPED | OUTPATIENT
Start: 2020-12-20 | End: 2021-04-20

## 2021-01-06 ENCOUNTER — TELEPHONE (OUTPATIENT)
Dept: FAMILY MEDICINE CLINIC | Age: 71
End: 2021-01-06

## 2021-01-06 ENCOUNTER — HOSPITAL ENCOUNTER (OUTPATIENT)
Dept: WOMENS IMAGING | Age: 71
Discharge: HOME | End: 2021-01-08
Payer: COMMERCIAL

## 2021-01-06 DIAGNOSIS — R92.8 ABNORMAL MAMMOGRAM: Primary | ICD-10-CM

## 2021-01-06 DIAGNOSIS — Z12.31 ENCOUNTER FOR SCREENING MAMMOGRAM FOR MALIGNANT NEOPLASM OF BREAST: ICD-10-CM

## 2021-01-06 PROCEDURE — 77067 SCR MAMMO BI INCL CAD: CPT

## 2021-01-06 NOTE — TELEPHONE ENCOUNTER
Pt returned call, Per Dr. Walker Pisano patient needs diag mammogram and US, possible risk of Breast cancer (not necessarily) . Patient states she will call Inaging services and get an appointment ASAP. Patient has no further questions.

## 2021-01-07 ENCOUNTER — OFFICE VISIT (OUTPATIENT)
Dept: CARDIOLOGY CLINIC | Age: 71
End: 2021-01-07
Payer: COMMERCIAL

## 2021-01-07 ENCOUNTER — TELEPHONE (OUTPATIENT)
Dept: FAMILY MEDICINE CLINIC | Age: 71
End: 2021-01-07

## 2021-01-07 VITALS
WEIGHT: 139.2 LBS | OXYGEN SATURATION: 94 % | HEIGHT: 61 IN | BODY MASS INDEX: 26.28 KG/M2 | HEART RATE: 78 BPM | DIASTOLIC BLOOD PRESSURE: 80 MMHG | RESPIRATION RATE: 20 BRPM | SYSTOLIC BLOOD PRESSURE: 122 MMHG

## 2021-01-07 DIAGNOSIS — I48.91 ATRIAL FIBRILLATION, UNSPECIFIED TYPE (HCC): Primary | ICD-10-CM

## 2021-01-07 DIAGNOSIS — Z79.01 ANTICOAGULATED: ICD-10-CM

## 2021-01-07 PROCEDURE — 99214 OFFICE O/P EST MOD 30 MIN: CPT | Performed by: INTERNAL MEDICINE

## 2021-01-07 ASSESSMENT — ENCOUNTER SYMPTOMS
SHORTNESS OF BREATH: 0
BLOOD IN STOOL: 0
WHEEZING: 0
VOMITING: 0
COLOR CHANGE: 0
TROUBLE SWALLOWING: 0
APNEA: 0
ABDOMINAL DISTENTION: 0
ANAL BLEEDING: 0
DIARRHEA: 0
NAUSEA: 0
FACIAL SWELLING: 0
CHEST TIGHTNESS: 0
VOICE CHANGE: 0

## 2021-01-07 NOTE — PROGRESS NOTES
[facility] CARDIOLOGY OFFICE FOLLOW-UP      Patient: Suzan Arenas  YOB: 1950  MRN: [id number]    Chief Complaint:  Chief Complaint   Patient presents with    3 Month Follow-Up    Atrial Fibrillation    Hypertension         Subjective/HPI:  1/7/21: Patient presents today for follow-up of atrial fibrillation. The dose of Cardizem has been reduced to 240. Also on amiodarone twice a week now. She wants to get off it. I told her not to do so. In the past we did that and she went back into atrial fibrillation significant issues. She is anticoagulated. No bleeding. On Lasix every day 40 mg pravastatin for dyslipidemia. He quit smoking 5 years ago. Continue with Xarelto, and amiodarone and Cardizem 240. See me in 6 months last TSH was normal     10/8/2020: Patient presents today for follow-up of atrial fibrillation. Remains in a sinus rhythm we did cut down the dose of Cardizem from 240+122 just to 240 a day. Doing well. Continue with the Lasix and the potassium. She wants KCl rather than a K. Dur. She wants to cut down the amiodarone I told her that what happened to her last time and she got into recurrent atrial fibrillation. She is only on Monday and Friday.   We will get a flu shot today and see me in 3 months        9/17/2020: Patient presents today for follow-up of atrial fibrillation hypertension.  Doing well.  Occasionally gets tired.  On amiodarone twice a week.  We will reduce the dose of Cardizem the evening dose will be stopped continue with the 240.  Continue with Xarelto.  See me in 2 months probably needs 48-hour Holter     8/13/2020: Patient presents today for follow-up of atrial fibrillation.  He had EP procedure done by Dr. Teresa Negrete. Jose Raul Bruna was told she needs an ICD.  Last echo showed preserved LV ejection fraction. Jose Raul Mcfarland is doing well. Aldo Steward is on diltiazem 240 in the morning or 120 at night amiodarone 200 mg twice a week Monday and Friday and pravastatin 10 mg daily.  Lasix and potassium.  Renal function is borderline.  GFR is high 50s.  Had ultrasound of the neck which showed some thyroid cysts.  She now sees .  Used to see Dr Genna Sawyer am going to see her in 2 months again. [facility] repeat an echocardiogram at that time.  No restrictions.        5/14/2020: Patient presents today for follow-up of atrial fibrillation.  Remains in sinus rhythm.  Last visit was with Hyun Guerrero follow with Dr. Antolin Melton cardiac rehab has not called her.  Otherwise feeling well blood pressure remains controlled.  No palpitation down the amiodarone to Monday and Friday only and see me in 3 months        4/28/2020 VIRTUAL: Michelle Fuentes is a 68 y. o. female evaluated via telephone on 4/28/2020.  For follow-up of atrial fibrillation.  Was admitted to [address] [facility] shows normal ejection fraction.  60%.  Mild concentric left ventricle hypertrophy.  He did start walking yesterday with her .  Got a little winded.  I think she is in sinus rhythm.  Will reduce amiodarone to Monday Wednesday and Friday.  Increase Lasix and potassium to every day.  See me in 2 weeks.  Continue Cardizem 240 in the morning and 120 at night.  Continue Xarelto           3/23/2020: Patient presents today for follow-up of recent hospitalization at [facility] for CHF atrial fibrillation.  Was seen by multiple people.  Merna cath which was negative.  Was evaluated by Dr. Neil Morning was read by .  Showed preserved LV ejection fraction.  Had a cardiac MRI.  Bottom line is currently on amiodarone 200 mg a day he used to take that before and quit taking it 3 4 years ago. Eliseo Beal is somewhat noncompliant with medication.  Could not tolerate beta-blocker.  Currently she is on Cardizem 240 in the morning and 120 at night and was put on Lasix and potassium.  She is feeling better.  Supposed to see  for follow-up of event monitor. \"  Keep the medication the same.  I will see her in 2 weeks.     11/1/19: Patient presents today for follow-up of atrial fibrillation.  Remains in sinus rhythm.  Could not tolerate the Toprol dose.  Currently only on 12.5 daily.  Makes extremely tired.  Blood pressure remains well controlled. Mariya Stokes told her not to take it off no bleeding.  She has a history of being noncompliant in the past with medication.  I had long talk with her about the need to take anticoagulants or risk of stroke. She will have a echo test and lexiscan stress test.  She will see me in February 9/30/19: Patient presents today for follow-up of recent hospitalization in [place] when she was admitted for rapid atrial fibrillation.  Reportedly had cardioversion.  Was discharged home on Toprol 50 mg 1 and half tablet a day.  And Xarelto.  Please her shortness of breath with Toprol.  Reportedly had a history of COPD which I doubt.  The rest of the trip to [place] was unremarkable she will continue with the same medication.  Take Toprol 50 mg at night and 25 mg in the morning.  She will see me in 1 month     8/29/19: Patient presents today for follow-up of atrial fibrillation.  She went [facility]. to get stem cell injection.  She had to pay $6000 cash there is an outfit in [address] [place] [address] by a registered nurse practitioner. Meeta Bradshaw stop [address] she works hard at think she does go into atrial fibrillation weakness at the mall.  Told her the risk of embolic event is high.  Will leave at this dose and see me in 6 months     5/20/19: Patient presents today for follow-up of atrial fibrillation. Remains in sinus rhythm. He wants to stop the Xarelto I told her not to do it at this point. Probably do a 30 day event monitor. And then decide at that point. He is retired from regular work. No chest pain angina congestive heart failure symptoms. See me in 3-6 months     11/19/18: Patient presents today for Follow-up of atrial fibrillation. Remains in sinus rhythm.  Continues to work. On Cardizem and Xarelto only. No bleeding. She'll see me in 6 months. He wants to get off blood thinners. I told her not to do it right now. We will reconsider that option in 6 months.     5/14/18: Patient presents today for Follow-up of atrial fibrillation. Remains in sinus rhythm. On Xarelto. No bleeding. She is off amiodarone. Doing well. She fell down and hurt her left shoulder. Is healing up well. No syncope. See me in 6 months     11/13/17: Patient presents today for atrial fibrillation. In sinus rhythm. On Xarelto and Cardizem. No bleeding. Off amiodarone. See me in 6 months.     5/1/17: For follow-up of atrial fibrillation. Thyroid profile was normal. Amiodarone DC'd. Plan Cardizem and Xarelto. She is going to be going to [place] for road trip with her . Feels well. She is  status post cardioversion and remains in sinus rhythm. See me in 6 months. Past Medical History:   Diagnosis Date    Abnormal echocardiogram 2020    possible PFO?  further eval suggested    COPD (chronic obstructive pulmonary disease) ([address] 2015    Dr Miguel Aguilar Current use of long term anticoagulation     Cyst of neck 2012    after plastic surgery    Emphysema of lung ([facility]     History of compression fracture of vertebral column 2020    T2, L1    History of humerus fracture     LEFT    History of left heart catheterization 03/2020    at [address] [place] [address], normal LVEF with impaired relaxation, mild AV calcification and mild MV thickening, normal RV    History of sustained ventricular tachycardia 03/2020    had to wear a LifeVest, Dr Cesar Max Hyperlipidemia LDL goal < 130     Intermittent atrial fibrillation [facility]) 2015    Dr Agata Jennings, Dr Delfina Wright, Dr Antonio Benites Osteopenia 2014    S/P colonoscopic polypectomy 2008, 2015, 2018    Dr Day Villegas    Smoking history     Vitamin D deficiency        Past Surgical History:   Procedure Laterality Date    ATRIAL ABLATION SURGERY      BREAST SURGERY  1975    staph infection, postpartum  COLONOSCOPY  7/22/15    w/polypectomy Dr Gavin Prajapati      Dr Ratna Johnson; HI RISK IND N/A 2018    COLONOSCOPY performed by Soheila Stevens MD at [facility]       Family History   Problem Relation Age of Onset    Osteoarthritis Mother     Osteoporosis Mother     Cancer Mother         anorectal     Colon Cancer Mother        Social History     Socioeconomic History    Marital status:      Spouse name: None    Number of children: 2    Years of education: None    Highest education level: None   Occupational History    Occupation: [employer], retired   Social Needs    Financial resource strain: None    Food insecurity     Worry: None     Inability: None    Transportation needs     Medical: None     Non-medical: None   Tobacco Use    Smoking status: Former Smoker     Packs/day: 1.00     Years: 50.00     Pack years: 50.00     Types: Cigarettes     Start date:      Quit date: 2015     Years since quittin.0    Smokeless tobacco: Never Used    Tobacco comment: pt will try smoking cessation   Substance and Sexual Activity    Alcohol use: No    Drug use: No    Sexual activity: None   Lifestyle    Physical activity     Days per week: None     Minutes per session: None    Stress: None   Relationships    Social connections     Talks on phone: None     Gets together: None     Attends Taoism service: None     Active member of club or organization: None     Attends meetings of clubs or organizations: None     Relationship status: None    Intimate partner violence     Fear of current or ex partner: None     Emotionally abused: None     Physically abused: None     Forced sexual activity: None   Other Topics Concern    None   Social History Narrative    Born in [facility], one brother in [place], keeps in touch    , 2 children, one son in [place], one daughter in [place]    5 grandchildren     Lives in a house wheezing. Cardiovascular: Negative for chest pain, palpitations and leg swelling. Gastrointestinal: Negative for abdominal distention, anal bleeding, blood in stool, diarrhea, nausea and vomiting. Genitourinary: Negative for decreased urine volume and dysuria. Musculoskeletal: Negative for gait problem, myalgias, neck pain and neck stiffness. Skin: Negative for color change, pallor, rash and wound. Neurological: Negative for dizziness, seizures, syncope, facial asymmetry, weakness, light-headedness, numbness and headaches. Hematological: Does not bruise/bleed easily. Psychiatric/Behavioral: Negative for agitation, behavioral problems, confusion, hallucinations and suicidal ideas. The patient is not nervous/anxious. All other systems reviewed and are negative. Review of System is negative except for as mentioned above. Physical Examination:    /80 (Site: Left Upper Arm, Position: Sitting, Cuff Size: Medium Adult)   Pulse 78   Resp 20   Ht 5' 1\" (1.549 m)   Wt 139 lb 3.2 oz (63.1 kg)   LMP  (LMP Unknown)   SpO2 94%   BMI 26.30 kg/m²    Physical Exam   Constitutional: She appears healthy. No distress. HENT:   Nose: Nose normal.   Mouth/Throat: Dentition is normal. Oropharynx is clear. Eyes: Pupils are equal, round, and reactive to light. Conjunctivae are normal.   Neck: Normal range of motion and thyroid normal. Neck supple. Cardiovascular: Regular rhythm, S1 normal, S2 normal, normal heart sounds, intact distal pulses and normal pulses. PMI is not displaced. No murmur heard. Pulmonary/Chest: She has no wheezes. She has no rales. She exhibits no tenderness. Abdominal: Soft. Bowel sounds are normal. She exhibits no distension and no mass. There is no splenomegaly or hepatomegaly. There is no abdominal tenderness. No hernia. Neurological: She is alert and oriented to person, place, and time. She has normal motor skills. Gait normal.   Skin: Skin is warm and dry.  No cyanosis. No jaundice. Nails show no clubbing. Patient Active Problem List   Diagnosis    Hyperlipidemia with target LDL less than 130    Vitamin D deficiency    Atrial fibrillation ([facility]    Pulmonary emphysema (HCC)    Bilateral edema of lower extremity    Other plastic surgery for unacceptable cosmetic appearance    Tobacco abuse    Fatigue    Chronic obstructive pulmonary disease (HCC)    Acute bronchitis    Hypoxemia    Shortness of breath    Fracture of humerus, proximal    Benign neoplasm of sigmoid colon    Other hemorrhoids    Hx of colonic polyps    Family history of colon cancer    Osteopenia of necks of both femurs    Congestive heart failure (HCC)    Bilateral pulmonary infiltrates on CXR    Pericardial calcification    Pericardial effusion    Pleural effusion    Atelectasis, left           No orders of the defined types were placed in this encounter. Orders Placed This Encounter   Medications    rivaroxaban (XARELTO) 20 MG TABS tablet     Sig: TAKE 1 TABLET BY MOUTH EVERY DAY WITH BREAKFAST     Dispense:  90 tablet     Refill:  3             Assessment/Orders:       ICD-10-CM    1. Atrial fibrillation, unspecified type ([facility]  I48.91    2. Anticoagulated  Z79.01        Orders Placed This Encounter   Medications    rivaroxaban (XARELTO) 20 MG TABS tablet     Sig: TAKE 1 TABLET BY MOUTH EVERY DAY WITH BREAKFAST     Dispense:  90 tablet     Refill:  3       Medications Discontinued During This Encounter   Medication Reason    rivaroxaban (XARELTO) 20 MG TABS tablet REORDER       No orders of the defined types were placed in this encounter. Plan:    Stay on same medications. See me in 4 months.         Electronically signed by: Paola Harper MD  1/11/2021 10:52 AM

## 2021-01-07 NOTE — TELEPHONE ENCOUNTER
Olivia Vazquez returned call to office. She states she is scheduled Monday 1/11/2021 at the [facility]. She has no questions at this time.

## 2021-01-11 ENCOUNTER — TELEPHONE (OUTPATIENT)
Dept: FAMILY MEDICINE CLINIC | Age: 71
End: 2021-01-11

## 2021-01-11 ENCOUNTER — HOSPITAL ENCOUNTER (OUTPATIENT)
Dept: ULTRASOUND IMAGING | Age: 71
Discharge: HOME | End: 2021-01-13
Payer: COMMERCIAL

## 2021-01-11 ENCOUNTER — HOSPITAL ENCOUNTER (OUTPATIENT)
Dept: WOMENS IMAGING | Age: 71
Discharge: HOME | End: 2021-01-13
Payer: COMMERCIAL

## 2021-01-11 DIAGNOSIS — R92.8 ABNORMAL MAMMOGRAM: ICD-10-CM

## 2021-01-11 DIAGNOSIS — R92.8 ABNORMAL MAMMOGRAM: Primary | ICD-10-CM

## 2021-01-11 PROCEDURE — 77065 DX MAMMO INCL CAD UNI: CPT

## 2021-01-11 PROCEDURE — 76642 ULTRASOUND BREAST LIMITED: CPT

## 2021-01-11 NOTE — TELEPHONE ENCOUNTER
Abnormal mammogram  birads 4  Recommend biopsy  When can she see dr Marky Gaston? After you get her an appt  Manny patient and let her know  If any issues with this let me know personally.

## 2021-01-13 ENCOUNTER — TELEPHONE (OUTPATIENT)
Dept: SURGERY | Age: 71
End: 2021-01-13

## 2021-01-13 NOTE — TELEPHONE ENCOUNTER
FOR A REGULAR MAMMOGRAM TESTING - NO NEED TO STOP ANY MEDICATIONS      IF IT IS A BIOPSY MAMMOGRAM THEN YES, HOLD XARELTO 3 DAYS PRIOR

## 2021-01-13 NOTE — TELEPHONE ENCOUNTER
Dr. Bianca Galicia,  Could she stop her xarelto prior to a potential biopsy, if so how long?  thanks

## 2021-01-13 NOTE — TELEPHONE ENCOUNTER
Patient is scheduled next week 01/19 for abnormal mammogram, birads 4 and would like to know if she needs to stop her Xarelto.  Please advise

## 2021-01-13 NOTE — TELEPHONE ENCOUNTER
Michell, she would need to stop it but could you check with her provider that prescribes it if she could stop it

## 2021-01-19 ENCOUNTER — OFFICE VISIT (OUTPATIENT)
Dept: SURGERY | Age: 71
End: 2021-01-19
Payer: COMMERCIAL

## 2021-01-19 VITALS
BODY MASS INDEX: 25.95 KG/M2 | TEMPERATURE: 97.1 F | SYSTOLIC BLOOD PRESSURE: 136 MMHG | DIASTOLIC BLOOD PRESSURE: 78 MMHG | HEART RATE: 76 BPM | WEIGHT: 141 LBS | HEIGHT: 62 IN | RESPIRATION RATE: 20 BRPM | OXYGEN SATURATION: 98 %

## 2021-01-19 DIAGNOSIS — R92.8 ABNORMAL MAMMOGRAM OF LEFT BREAST: ICD-10-CM

## 2021-01-19 DIAGNOSIS — R92.8 ABNORMAL MAMMOGRAM OF LEFT BREAST: Primary | ICD-10-CM

## 2021-01-19 DIAGNOSIS — E66.3 OVERWEIGHT (BMI 25.0-29.9): ICD-10-CM

## 2021-01-19 DIAGNOSIS — N63.20 LEFT BREAST MASS: ICD-10-CM

## 2021-01-19 PROCEDURE — 19083 BX BREAST 1ST LESION US IMAG: CPT | Performed by: SURGERY

## 2021-01-19 PROCEDURE — 99204 OFFICE O/P NEW MOD 45 MIN: CPT | Performed by: SURGERY

## 2021-01-19 RX ORDER — LIDOCAINE HYDROCHLORIDE AND EPINEPHRINE 10; 10 MG/ML; UG/ML
10 INJECTION, SOLUTION INFILTRATION; PERINEURAL ONCE
Status: COMPLETED | OUTPATIENT
Start: 2021-01-19 | End: 2021-01-19

## 2021-01-19 RX ADMIN — LIDOCAINE HYDROCHLORIDE AND EPINEPHRINE 10 ML: 10; 10 INJECTION, SOLUTION INFILTRATION; PERINEURAL at 09:38

## 2021-01-19 RX ADMIN — Medication 2 MEQ: at 09:38

## 2021-01-19 ASSESSMENT — ENCOUNTER SYMPTOMS
COUGH: 0
NAUSEA: 0
ABDOMINAL PAIN: 0
VOMITING: 0
SHORTNESS OF BREATH: 0
COLOR CHANGE: 0
SORE THROAT: 0
CHEST TIGHTNESS: 0

## 2021-01-19 NOTE — PROGRESS NOTES
NEW BREAST PATIENT         SERVICE DATE: 1/19/21  SERVICE TIME:  9:11 AM EST    REFERRED BY:  Clemente Yusuf MD  REASON FOR TODAY'S VISIT:    Chief Complaint   Patient presents with    New Patient    Abnormal Ultrasound    Abnormal Mammogram     BIRADS 4 Left Breast, denies feeling any changes in bilat breasts, denies skin changes, denies nipple drainage or breast pain bilat      CHAPERONE WAS OFFERED, PATIENT RESPONDED: no    HISTORY AND CHIEF COMPLAINT:  Annamarie Barker is a 65 y.o.  female who is here for a New Patient, Abnormal Ultrasound, and Abnormal Mammogram (BIRADS 4 Left Breast, denies feeling any changes in bilat breasts, denies skin changes, denies nipple drainage or breast pain bilat)  she gets her mammograms regularly  She quit smoking 5 years ago  She is on xarelto  She had surgery around left nipple due to mastitis in [place]  Her past breast history (prior to this encounter) is as follows: Abnormal mammogram:   Yes, Left Breast BIRADS 4  Abnormal Breast US:  Yes, Left Breast BIRADS 4  Breast biopsy:    No  Breast cysts:    No  Breast surgery:    Yes, Left Breast I & D for staph in 1975  Breast cancer              No  History of Breastfeeding: No   Currently Breastfeeding: No      RISK FACTORS FOR BREAST CANCER:  Family History of Breast Cancer: No.  History of ovarian cancer: no  Ashkenazi Ancestry: no  Age at the birth of first child: 19  Age at the onset of menses: 4  Age at menopause: 37   Hormonal therapy: no  Postmenopausal obesity: no BMI 26    BRA SIZE: 34DD    Past Medical History:   Diagnosis Date    Abnormal echocardiogram 2020    possible PFO?  further eval suggested    COPD (chronic obstructive pulmonary disease) ([facility] [address] 2015    Dr Karlie Julian Current use of long term anticoagulation     Cyst of neck 2012    after plastic surgery    Emphysema of lung ([facility] [address]     History of compression fracture of vertebral column 2020    T2, L1    History of humerus fracture     LEFT    History of left heart catheterization 2020    at [facility], normal LVEF with impaired relaxation, mild AV calcification and mild MV thickening, normal RV    History of sustained ventricular tachycardia 2020    had to wear a LifeVest, Dr Manuel Glasgow Hyperlipidemia LDL goal < 130     Intermittent atrial fibrillation [facility])     Dr Vi Bowie, Dr Deo Kerns, Dr Stacy Rubalcava Osteopenia 2014    S/P colonoscopic polypectomy , ,     Dr Rodriguez Jackson    Smoking history     Vitamin D deficiency      Past Surgical History:   Procedure Laterality Date    ATRIAL ABLATION SURGERY      BREAST SURGERY  1975    staph infection, postpartum    COLONOSCOPY  7/22/15    w/polypectomy Dr Paulette Mahmood      Dr Lindsay Kc; [place] RISK IND N/A 2018    COLONOSCOPY performed by Dandre Suarez MD at [facility]     Family History   Problem Relation Age of Onset    Osteoarthritis Mother     Osteoporosis Mother     Cancer Mother         anorectal     Colon Cancer Mother      Social History     Socioeconomic History    Marital status:      Spouse name: Not on file    Number of children: 2    Years of education: Not on file    Highest education level: Not on file   Occupational History    Occupation: [employer], retired   Social Needs    Financial resource strain: Not on file    Food insecurity     Worry: Not on file     Inability: Not on file   [employer] needs     Medical: Not on file     Non-medical: Not on file   Tobacco Use    Smoking status: Former Smoker     Packs/day: 1.00     Years: 50.00     Pack years: 50.00     Types: Cigarettes     Start date:      Quit date: 2015     Years since quittin.0    Smokeless tobacco: Never Used    Tobacco comment: pt will try smoking cessation   Substance and Sexual Activity    Alcohol use: No    Drug use: No    Sexual activity: Not on file   Lifestyle    Physical activity Days per week: Not on file     Minutes per session: Not on file    Stress: Not on file   Relationships    Social connections     Talks on phone: Not on file     Gets together: Not on file     Attends Jewish service: Not on file     Active member of club or organization: Not on file     Attends meetings of clubs or organizations: Not on file     Relationship status: Not on file    Intimate partner violence     Fear of current or ex partner: Not on file     Emotionally abused: Not on file     Physically abused: Not on file     Forced sexual activity: Not on file   Other Topics Concern    Not on file   Social History Narrative    Born in [place], one brother in [facility], keeps in touch    , 2 children, one son in [place], one daughter in [place]    5 grandchildren     Lives in a house in [place] with spouse     Worked for [employer] and full time at [employer], [employer], fashion, family life     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        Review of Systems   Constitutional: Negative for activity change, appetite change, chills, diaphoresis, fatigue, fever and unexpected weight change. HENT: Negative for congestion, ear pain, hearing loss, mouth sores, nosebleeds and sore throat. Respiratory: Negative for cough, chest tightness and shortness of breath. Cardiovascular: Negative for chest pain, palpitations and leg swelling. Gastrointestinal: Negative for abdominal pain, nausea and vomiting. Endocrine: Negative for cold intolerance, heat intolerance, polydipsia, polyphagia and polyuria. Genitourinary: Negative for difficulty urinating, menstrual problem and vaginal bleeding. Musculoskeletal: Negative for neck pain and neck stiffness. Skin: Negative for color change, pallor, rash and wound. Allergic/Immunologic: Negative for environmental allergies and immunocompromised state. Neurological: Negative for weakness. Hematological: Does not bruise/bleed easily. Psychiatric/Behavioral: Negative for agitation, confusion, sleep disturbance and suicidal ideas. The patient is not nervous/anxious. Have you ever tested positive for AIDS? no  Have you ever tested positive for Hepatitis? no    ANTICOAGULANT MEDICATIONS:  Xarelto    SOCIAL HISTORY   Marital status:   Occupation:  Retired  Tobacco use: Chantel Tsang  reports that she quit smoking about 5 years ago. Her smoking use included cigarettes. She started smoking about 57 years ago. She has a 50.00 pack-year smoking history. She has never used smokeless tobacco.  Alcohol use: Chantel Tsang  reports no history of alcohol use. Drug use: Chantel Tsang  reports no history of drug use. Caffeine intake: 2 cups of caffeinated coffee per day(s)  Exercise:  moderately active    BP (!) 144/72   Pulse 76   Temp 97.1 °F (36.2 °C)   Resp 20   Ht 5' 1.75\" (1.568 m)   Wt 141 lb (64 kg)   LMP  (LMP Unknown)   SpO2 98%   BMI 26.00 kg/m²     Physical Exam  Vitals signs reviewed. Constitutional:       Appearance: Normal appearance. She is well-developed. HENT:      Head: Normocephalic and atraumatic. Nose: Nose normal.   Eyes:      Conjunctiva/sclera: Conjunctivae normal.      Right eye: No hemorrhage. Left eye: No hemorrhage. Neck:      Musculoskeletal: Normal range of motion and neck supple. Cardiovascular:      Rate and Rhythm: Normal rate. Pulmonary:      Effort: Pulmonary effort is normal. No respiratory distress. Chest:      Breasts: Breasts are asymmetrical (slight due to surgery on left breast). Right: No inverted nipple, mass, nipple discharge, skin change or tenderness. Left: Mass present. No inverted nipple, nipple discharge, skin change or tenderness. Comments: 1 cm mass in UIQ, irregular, mobile    Musculoskeletal: Normal range of motion. Comments: Normal Range of motion in upper and lower extremities. Lymphadenopathy:      Cervical: No cervical adenopathy.       Right cervical: No superficial, deep or posterior cervical adenopathy. Left cervical: No superficial, deep or posterior cervical adenopathy. Upper Body:      Right upper body: No supraclavicular, axillary or pectoral adenopathy. Left upper body: No supraclavicular, axillary or pectoral adenopathy. Skin:     General: Skin is warm and dry. Findings: No abrasion, bruising, erythema or lesion. Neurological:      Mental Status: She is alert and oriented to person, place, and time. She is not disoriented. Psychiatric:         Speech: Speech normal.         Behavior: Behavior normal. Behavior is cooperative. Thought Content: Thought content normal.         Judgment: Judgment normal.       RADIOGRAPHIC FINDINGS:    Mary Digital Screen W Or Wo Cad Bilateral    Result Date: 1/6/2021   BILATERAL DIGITAL SCREENING MAMMOGRAM: COMPARISONS:  Dating back to 11/4/2015 CLINICAL HISTORY:  Screening. FINDINGS: Routine two view mammography was performed and compared to previous study. No additional images were obtained There are scattered fibroglandular densities. No new suspicious microcalcifications are visualized to suggest malignancy. There are stable areas of asymmetry in both breasts. No suspicious mass right breast. No evidence of skin thickening. Left breast: There is an 8 mm retroareolar nodule which is located slightly medial and superior to the nipple. Recommend additional mammographic tomographic imaging and ultrasound. CAD analysis was performed and used in the interpretation. LEFT BREAST: BI-RADS 0: NEED ADDITIONAL IMAGING EVALUATION. RIGHT BREAST: BI-RADS 2: BENIGN FINDINGS. BREAST DENSITY: SCATTERED FIBROGLANDULAR DENSITIES. Board Certified Radiologists. Accredited by the ACR and FDA. MAMMOGRAPHY IS VERY IMPORTANT TO YOUR HEALTH. THE AMERICAN CANCER SOCIETY GUIDELINES RECOMMEND THAT WOMEN 36YEARS OF AGE AND OLDER SHOULD HAVE A MAMMOGRAM EVERY YEAR.  A REMINDER LETTER WILL BE SENT AT THE APPROPRIATE TIME. Mary Digital Diagnostic W Or Wo Cad Left    Result Date: 1/11/2021  Exam: Diagnostic left breast mammogram. Targeted left breast sonogram Clinical history:R92.8 Abnormal mammogram ICD10 COMPARISON: January 6, 2021, FINDINGS:  3-D tomosynthesis performed. Images obtained in the CC, MLO, and 90 degree mediolateral projection. Targeted left breast sonogram in the upper inner quadrant performed. Scattered breast parenchyma on the left side noted. An ill-defined 8 x 7 mm nodular density has developed in the upper inner left breast. Nipple marker is in place on the left side. Ultrasound reveals a 0.8 cm daria shaped irregular hypoechoic mass in the left breast along the 10:00 axis II cm from the nipple. There is posterior shadowing and small spicules associated with this hypoechoic mass. Neoplasm is suspected. Left breast biopsy with ultrasound guidance recommended. Postbiopsy mammogram with clip in place would confirm this represents the new lesion seen on the left breast mammogram. I have discussed my findings with the patient who understands my concern for developing neoplasm. Follow-up with nurse navigator is in progress. CAD analysis was performed and used in the interpretation. BI-RADS 4: SUSPICIOUS FINDING--BIOPSY SHOULD BE CONSIDERED. Board Certified Radiologists. Accredited by the ACR and FDA. MAMMOGRAPHY IS VERY IMPORTANT TO YOUR HEALTH. THE AMERICAN CANCER SOCIETY GUIDELINES RECOMMEND THAT WOMEN 36YEARS OF AGE AND OLDER SHOULD HAVE A MAMMOGRAM EVERY YEAR. A REMINDER LETTER WILL BE SENT AT THE APPROPRIATE TIME. BI-RADS 1: NEGATIVE MAMMOGRAM. BI-RADS 2: BENIGN FINDINGS. BI-RADS 3: PROBABLY BENIGN--SHORT INTERVAL FOLLOW-UP SUGGESTED. BI-RADS 4: SUSPICIOUS FINDING--BIOPSY SHOULD BE CONSIDERED. BI-RADS 5: HIGHLY SUGGESTIVE OF MALIGNANCY--APPROPRIATE ACTION SHOULD BE TAKEN. BI-RADS 0: NEED ADDITIONAL IMAGING EVALUATION. Board Certified Radiologists.   Accredited by the ACR and FDA. MAMMOGRAPHY IS VERY IMPORTANT TO YOUR HEALTH. THE AMERICAN CANCER SOCIETY GUIDELINES RECOMMEND THAT WOMEN 36YEARS OF AGE AND OLDER SHOULD HAVE A MAMMOGRAM EVERY YEAR. A REMINDER LETTER WILL BE SENT AT THE APPROPRIATE TIME.  THIS FACILITY UTILIZES A REMINDER SYSTEM TO ENSURE ALL PATIENTS RECEIVE REMINDER NOTIFICATIONS AT THE APPROPRIATE TIME BASED ON THE RECOMMENDATIONS OF THIS EXAM. THIS INCLUDES REMINDERS FOR ROUTINE  SCREENING MAMMOGRAMS, DIAGNOSTIC MAMMOGRAMS IN WHICH THE PATIENT IS ASKED TO RETURN FOR ADDITIONAL VIEWS, OR OTHER BREAST IMAGING INTERVENTIONS WHEN APPROPRIATE. THE PATIENT WILL BE PLACED IN THE APPROPRIATE REMINDER SYSTEM INCLUDING A REMINDER AT THE APPROPRIATE TIME FOR ANY PENDING ADDITIONAL VIEWS. Us Breast Limited Left    Result Date: 1/11/2021  Exam: Diagnostic left breast mammogram. Targeted left breast sonogram Clinical history:R92.8 Abnormal mammogram ICD10 COMPARISON: January 6, 2021, FINDINGS:  3-D tomosynthesis performed. Images obtained in the CC, MLO, and 90 degree mediolateral projection. Targeted left breast sonogram in the upper inner quadrant performed. Scattered breast parenchyma on the left side noted. An ill-defined 8 x 7 mm nodular density has developed in the upper inner left breast. Nipple marker is in place on the left side. Ultrasound reveals a 0.8 cm daria shaped irregular hypoechoic mass in the left breast along the 10:00 axis II cm from the nipple. There is posterior shadowing and small spicules associated with this hypoechoic mass. Neoplasm is suspected. Left breast biopsy with ultrasound guidance recommended. Postbiopsy mammogram with clip in place would confirm this represents the new lesion seen on the left breast mammogram. I have discussed my findings with the patient who understands my concern for developing neoplasm. Follow-up with nurse navigator is in progress. CAD analysis was performed and used in the interpretation.      BI-RADS 4: SUSPICIOUS FINDING--BIOPSY SHOULD BE CONSIDERED. Board Certified Radiologists. Accredited by the ACR and FDA. MAMMOGRAPHY IS VERY IMPORTANT TO YOUR HEALTH. THE AMERICAN CANCER SOCIETY GUIDELINES RECOMMEND THAT WOMEN 36YEARS OF AGE AND OLDER SHOULD HAVE A MAMMOGRAM EVERY YEAR. A REMINDER LETTER WILL BE SENT AT THE APPROPRIATE TIME. BI-RADS 1: NEGATIVE MAMMOGRAM. BI-RADS 2: BENIGN FINDINGS. BI-RADS 3: PROBABLY BENIGN--SHORT INTERVAL FOLLOW-UP SUGGESTED. BI-RADS 4: SUSPICIOUS FINDING--BIOPSY SHOULD BE CONSIDERED. BI-RADS 5: HIGHLY SUGGESTIVE OF MALIGNANCY--APPROPRIATE ACTION SHOULD BE TAKEN. BI-RADS 0: NEED ADDITIONAL IMAGING EVALUATION. Board Certified Radiologists. Accredited by the ACR and FDA. MAMMOGRAPHY IS VERY IMPORTANT TO YOUR HEALTH. THE AMERICAN CANCER SOCIETY GUIDELINES RECOMMEND THAT WOMEN 36YEARS OF AGE AND OLDER SHOULD HAVE A MAMMOGRAM EVERY YEAR. A REMINDER LETTER WILL BE SENT AT THE APPROPRIATE TIME.  THIS FACILITY UTILIZES A REMINDER SYSTEM TO ENSURE ALL PATIENTS RECEIVE REMINDER NOTIFICATIONS AT THE APPROPRIATE TIME BASED ON THE RECOMMENDATIONS OF THIS EXAM. THIS INCLUDES REMINDERS FOR ROUTINE  SCREENING MAMMOGRAMS, DIAGNOSTIC MAMMOGRAMS IN WHICH THE PATIENT IS ASKED TO RETURN FOR ADDITIONAL VIEWS, OR OTHER BREAST IMAGING INTERVENTIONS WHEN APPROPRIATE. THE PATIENT WILL BE PLACED IN THE APPROPRIATE REMINDER SYSTEM INCLUDING A REMINDER AT THE APPROPRIATE TIME FOR ANY PENDING ADDITIONAL VIEWS. ASSESSMENT       IMPRESSION :      ICD-10-CM    1. Abnormal mammogram of left breast  R92.8 lidocaine-EPINEPHrine 1 percent-1:820769 injection 10 mL     sodium bicarbonate 8.4 % injection 2 mEq     Surgical Pathology   2. Left breast mass  N63.20    3. Overweight (BMI 25.0-29. 9)  E66.3         PLAN:    We discussed the clinical exam and the imaging findings. I recommended an ultrasound guided fine needle aspiration and possible core needle biopsy and possible clip placement.   If the lesion is found to be solid or more tissue is needed for diagnosis an ultrasound guided core biopsy will be performed with the need for follow-up versus full removal of the lesion in the Operating Room. She was told that 95% of the time we can obtain a diagnosis. If the results are non-diagnostic she may require a more extensive biopsy. The patient agreed to undergo the ultrasound guided fine needle aspiration and biopsy in the office setting understanding the risks of bleeding and infection. See ultrasound report. Ultrasound Guided Left Breast Core Needle Biopsy and Clip Placement    PATIENT:  Nancy DOMÍNGUEZ SZUCS     DATE: 2021    :      1950     INDICATION:  Left Mammogram Abnormality, Ultrasound Abnormality, Breast Mass and Palpable area of Concern    LOCATION:   10 o'clock 2 cm from nipple    DESCRIPTION:    A timeout was performed immediately prior to the start of the Ultrasound Guided Left Breast Core Needle Biopsy and Clip Placement procedure and included the correct patient (two identifiers), correct procedure and correct site(s). Procedure consent and allergies were also verified. The patient understood the risks and benefits of the procedure and consented on the day of her visit. The ultrasound probe was placed over the suspicious lesion. The skin was prepped with chlorhexidine. 13 cc of lidocaine/bicarb mixture was used to anesthetize the skin and breast.  An 11 scalpel was used to make a small quarter inch incision. 3 14-gauge Achieve core needle biopsies were performed, laterally to medially. The samples were placed in formalin. A clip was placed under ultrasound guidance. Hemostasis was obtained. The wound was cleaned. Steri-strips applied. Five minutes of pressure was held. The patient tolerated the procedure well.        IMPRESSION:  Successful Ultrasound Guided Left Breast Core Needle Biopsy and Clip Placement    Category 5    Dictated by:  Lenard Milner MD, FACS 2021     Orders Placed This Encounter   Procedures    Surgical Pathology     Standing Status:   Future     Standing Expiration Date:   1/19/2022     Order Specific Question:   PREVIOUS BIOPSY     Answer:   Yes     Order Specific Question:   PREOP DIAGNOSIS     Answer:   abnormal Left Breast mammogram     Order Specific Question:   FROZEN SECTION - NO OR YES/SPECIMEN     Answer:   No      Orders Placed This Encounter   Medications    lidocaine-EPINEPHrine 1 percent-1:[id number] injection 10 mL    sodium bicarbonate 8.4 % injection 2 mEq          Total face to face time was 50 minutes with greater than 50% spent on counseling the patient or coordinating her care. Elizabeth Wilkins MD    CC: Dee Cortez MD, Marianne Hamilton MD    The chief complaint, extensive medical and family history, and review of systems were asked and reviewed with the patient by me. I also reviewed the note in detail. This note was partially generated using Dragon voice recognition system, and there may be some incorrect words, spellings, punctuation that were not noticed in checking the note before saving.

## 2021-01-20 ENCOUNTER — TELEPHONE (OUTPATIENT)
Dept: SURGERY | Age: 71
End: 2021-01-20

## 2021-01-20 NOTE — TELEPHONE ENCOUNTER
I called the patient post Left Breast Core Biopsy. The Patient denies any pain and stated there was no drainage from the biopsy site. The Patient has no questions/concerns at this time.

## 2021-01-26 ENCOUNTER — OFFICE VISIT (OUTPATIENT)
Dept: PULMONOLOGY | Age: 71
End: 2021-01-26
Payer: COMMERCIAL

## 2021-01-26 ENCOUNTER — TELEPHONE (OUTPATIENT)
Dept: SURGERY | Age: 71
End: 2021-01-26

## 2021-01-26 VITALS
HEART RATE: 71 BPM | TEMPERATURE: 97.1 F | SYSTOLIC BLOOD PRESSURE: 110 MMHG | DIASTOLIC BLOOD PRESSURE: 68 MMHG | OXYGEN SATURATION: 95 % | BODY MASS INDEX: 26.81 KG/M2 | WEIGHT: 142 LBS | RESPIRATION RATE: 16 BRPM | HEIGHT: 61 IN

## 2021-01-26 DIAGNOSIS — I50.9 CONGESTIVE HEART FAILURE, UNSPECIFIED HF CHRONICITY, UNSPECIFIED HEART FAILURE TYPE (HCC): ICD-10-CM

## 2021-01-26 DIAGNOSIS — I48.91 ATRIAL FIBRILLATION, UNSPECIFIED TYPE (HCC): ICD-10-CM

## 2021-01-26 DIAGNOSIS — R06.02 SHORTNESS OF BREATH: ICD-10-CM

## 2021-01-26 DIAGNOSIS — J44.9 CHRONIC OBSTRUCTIVE PULMONARY DISEASE, UNSPECIFIED COPD TYPE (HCC): Primary | ICD-10-CM

## 2021-01-26 PROCEDURE — 99214 OFFICE O/P EST MOD 30 MIN: CPT | Performed by: INTERNAL MEDICINE

## 2021-01-26 ASSESSMENT — ENCOUNTER SYMPTOMS
SORE THROAT: 0
VOMITING: 0
WHEEZING: 0
VOICE CHANGE: 0
CHEST TIGHTNESS: 0
EYE ITCHING: 0
EYE DISCHARGE: 0
NAUSEA: 0
COUGH: 0
RHINORRHEA: 0
DIARRHEA: 0
SHORTNESS OF BREATH: 1
TROUBLE SWALLOWING: 0
ABDOMINAL PAIN: 0
SINUS PRESSURE: 0

## 2021-01-26 NOTE — PROGRESS NOTES
Subjective:     Nimesh Brown is a 65 y.o. female who complains today of:     Chief Complaint   Patient presents with    Follow-up     four month f/u for COPD. HPI  She is using bronchodilator with Anoro ellipta 1 puff daily. C/o shortness of breath with exertion. No  Wheezing   No Cough with Sputum  No Hemoptysis  No Chest tightness   No Chest pain with radiation  or pleuritic pain  No Fever or chills. No Rhinorrhea and postnasal drip. She is on amiodarone and she is taking 200 mg 2 times a week , following with dr. Socrates Cardenas  She said she will see Dr. Ricarda Phillips , surgeon regarding breast biopsy next Friday. Allergies:  Patient has no known allergies. Past Medical History:   Diagnosis Date    Abnormal echocardiogram 2020    possible PFO?  further eval suggested    COPD (chronic obstructive pulmonary disease) ([address] 2015    Dr Jordi White Current use of long term anticoagulation     Cyst of neck 2012    after plastic surgery    Emphysema of lung ([facility]     History of compression fracture of vertebral column 2020    T2, L1    History of humerus fracture     LEFT    History of left heart catheterization 03/2020    at [facility], normal LVEF with impaired relaxation, mild AV calcification and mild MV thickening, normal RV    History of sustained ventricular tachycardia 03/2020    had to wear a LifeVest, Dr Black Number Hyperlipidemia LDL goal < 130     Intermittent atrial fibrillation [facility]) 2015    Dr Nas Allen, Dr Juventino Tran, Dr Checo Vines Osteopenia 2014    S/P colonoscopic polypectomy 2008, 2015, 2018    Dr José Manuel Campos    Smoking history     Vitamin D deficiency      Past Surgical History:   Procedure Laterality Date    ATRIAL ABLATION SURGERY      BREAST BIOPSY Left 01/20/2021    BREAST SURGERY  1975    staph infection, postpartum    COLONOSCOPY  07/22/2015    w/polypectomy Dr Gopi Field  2012    Dr Kathi Martínez; HI RISK IND N/A 07/13/2018    COLONOSCOPY performed by Kanchan Marcus MD at [facility]     Family History   Problem Relation Age of Onset    Osteoarthritis Mother     Osteoporosis Mother     Cancer Mother         anorectal     Colon Cancer Mother      Social History     Socioeconomic History    Marital status:      Spouse name: Not on file    Number of children: 2    Years of education: Not on file    Highest education level: Not on file   Occupational History    Occupation: [employer], retired   Social Needs    Financial resource strain: Not on file    Food insecurity     Worry: Not on file     Inability: Not on file   [employer] needs     Medical: Not on file     Non-medical: Not on file   Tobacco Use    Smoking status: Former Smoker     Packs/day: 1.00     Years: 50.00     Pack years: 50.00     Types: Cigarettes     Start date:      Quit date: 2015     Years since quittin.1    Smokeless tobacco: Never Used    Tobacco comment: pt will try smoking cessation   Substance and Sexual Activity    Alcohol use: No    Drug use: No    Sexual activity: Not on file   Lifestyle    Physical activity     Days per week: Not on file     Minutes per session: Not on file    Stress: Not on file   Relationships    Social connections     Talks on phone: Not on file     Gets together: Not on file     Attends Nondenominational service: Not on file     Active member of club or organization: Not on file     Attends meetings of clubs or organizations: Not on file     Relationship status: Not on file    Intimate partner violence     Fear of current or ex partner: Not on file     Emotionally abused: Not on file     Physically abused: Not on file     Forced sexual activity: Not on file   Other Topics Concern    Not on file   Social History Narrative    Born in [facility], one brother in [facility], keeps in touch    , 2 children, one son in [place], one daughter in [place]    5 grandchildren     Lives in a house in [place] with spouse     Worked for [employer] and full time at [employer], [employer], fashion, 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 HENT: Negative for congestion, mouth sores, nosebleeds, postnasal drip, rhinorrhea, sinus pressure, sneezing, sore throat, trouble swallowing and voice change. Eyes: Negative for discharge, itching and visual disturbance. Respiratory: Positive for shortness of breath. Negative for cough, chest tightness and wheezing. Cardiovascular: Negative for chest pain, palpitations and leg swelling. Gastrointestinal: Negative for abdominal pain, diarrhea, nausea and vomiting. Genitourinary: Negative for difficulty urinating and hematuria. Musculoskeletal: Negative for arthralgias, joint swelling and myalgias. Skin: Negative for rash. Allergic/Immunologic: Negative for environmental allergies and food allergies. Neurological: Negative for dizziness, tremors, weakness and headaches. Psychiatric/Behavioral: Negative for behavioral problems and sleep disturbance.         :     Vitals:    01/26/21 0827   BP: 110/68   Pulse: 71   Resp: 16   Temp: 97.1 °F (36.2 °C)   TempSrc: Temporal   SpO2: 95%   Weight: 142 lb (64.4 kg)   Height: 5' 1\" (1.549 m)     Wt Readings from Last 3 Encounters:   01/26/21 142 lb (64.4 kg)   01/19/21 141 lb (64 kg)   01/07/21 139 lb 3.2 oz (63.1 kg)         Physical Exam  Constitutional:       General: She is not in acute distress. Appearance: She is well-developed. She is not diaphoretic. HENT:      Head: Normocephalic and atraumatic. Nose: Nose normal.   Eyes:      Pupils: Pupils are equal, round, and reactive to light. Neck:      Thyroid: No thyromegaly. Vascular: No JVD. Trachea: No tracheal deviation. Cardiovascular:      Rate and Rhythm: Normal rate and regular rhythm. Heart sounds: No murmur. No friction rub. No gallop. Pulmonary:      Effort: No respiratory distress. Breath sounds: No wheezing or rales. Comments: diminished Breath sound bilaterally. Chest:      Chest wall: No tenderness. Abdominal:      General: There is no distension. Tenderness: There is no abdominal tenderness. There is no rebound. Musculoskeletal: Normal range of motion. Lymphadenopathy:      Cervical: No cervical adenopathy. Skin:     General: Skin is warm and dry. Neurological:      Mental Status: She is alert and oriented to person, place, and time. Coordination: Coordination normal.         Current Outpatient Medications   Medication Sig Dispense Refill    rivaroxaban (XARELTO) 20 MG TABS tablet TAKE 1 TABLET BY MOUTH EVERY DAY WITH BREAKFAST 90 tablet 3    ANORO ELLIPTA 62.5-25 MCG/INH AEPB inhaler TAKE 1 PUFF BY MOUTH EVERY DAY 1 each 2    potassium chloride (KLOR-CON M) 20 MEQ extended release tablet Take 1 tablet by mouth daily 30 tablet 6    Psyllium (METAMUCIL FIBER PO) Take by mouth      zoster recombinant adjuvanted vaccine (SHINGRIX) 50 MCG/0.5ML SUSR injection Inject 0.5 mLs into the muscle See Admin Instructions 1 dose now and repeat in 2-6 months 0.5 mL 0    pravastatin (PRAVACHOL) 10 MG tablet Take 1 tablet by mouth daily 90 tablet 3    dilTIAZem (CARDIZEM CD) 240 MG extended release capsule TAKE 1 CAPSULE BY MOUTH EVERY DAY 90 capsule 3    furosemide (LASIX) 40 MG tablet TAKE 1 TABLET BY MOUTH EVERY DAY 90 tablet 3    Handicap Placard MISC by Does not apply route Good for 5 years. Dispense 1 1 each 0    Calcium Carb-Cholecalciferol (CALCIUM + D3 PO) Take by mouth      amiodarone (CORDARONE) 200 MG tablet Take 1 tablet by mouth Twice a Week 26 tablet 3    b complex vitamins tablet Take 1 tablet by mouth daily      magnesium oxide (MAG-OX) 400 MG tablet Take 400 mg by mouth daily      Vitamin D (CHOLECALCIFEROL) 1000 UNITS CAPS capsule Take 1,000 Units by mouth daily.          No current facility-administered medications for this visit. Results for orders placed during the hospital encounter of 05/11/20   XR CHEST STANDARD (2 VW)    Narrative EXAMINATION: XR CHEST (2 VW)    CLINICAL HISTORY: SHORTNESS OF BREATH    COMPARISONS: CT CHEST, MARCH 5, 2020, CHEST RADIOGRAPH, SEPTEMBER 25, 2019    FINDINGS: Osseous structures intact. Cardiopericardial silhouette normal. Aorta calcified. Pulmonary vasculature normal. Ill-defined area increased opacity left lung base posteriorly. Diaphragms flattened. Impression EMPHYSEMA. LEFT LOWER LUNG SUBSEGMENTAL ATELECTASIS/PNEUMONIA. Results for orders placed during the hospital encounter of 08/23/16   CT Chest W Contrast    Narrative CT CHEST     REASON FOR EXAM  HEMOPTYSIS     COMPARISONS  none     FINDINGS  Multiplanar images were obtained following the IV injection  of 75 cc Isovue-370. A focal area of consolidation involving the right  lower lobe posteriorly is seen. There is a suggestion of air  bronchogram present. Mild atelectasis at the left lung base is seen. Remainder lungs are clear. No pleural effusions are seen. No definite  pulmonary nodules are identified. Emphysematous changes are seen. No  pneumothorax is noted. Heart appears normal in size. No pericardial  effusion is seen. No mediastinal, hilar or axillary adenopathy is  noted. Bone windows demonstrate no gross osseous abnormalities. Surrounding soft tissues are unremarkable. Limited images of the upper  abdomen demonstrate no contributory findings. IMPRESSION  FOCAL AREA OF CONSOLIDATION INVOLVING THE RIGHT LOWER LOBE  IS SEEN. SHORT-TERM INTERVAL FOLLOWUP CT WITHOUT CONTRAST IN 3-4 MONTHS  IS SUGGESTED TO NOTE RESOLUTION. MILD ATELECTATIC CHANGES AT THE LEFT  LUNG BASE. NO OTHER SIGNIFICANT ABNORMALITIES ARE SEEN.         All CT scans at this facility use dose modulation, iterative  reconstruction, and/or weight based dosing when appropriate to reduce  radiation dose to as low as reasonably achievable. Maribel Ventura ByDiamond Salgado M.D. Released ByDiamond Salgado M.D. Released Date Time- 08/24/16 5155   This document has been electronically signed. ------------------------------------------------------------------------------   [address]                      [facility] [address] [place], [address] [facility]                      PULMONARY FUNCTION   Michelle Fuentes   68 y.o.   female   Height 62 in   Weight 129 lb       Referring provider   Nettie Rubin MD     Reading provider   Heidy Montes         Diagnosis: FOWLER Yes, Cough  No, wheezing No   Smoking quit 4 years ago     Spirometry   FVC            2.04 L   75%  Post bronchodilator 1.97 L  72%   Change -3 %   FEV1          0.87 L  42%   Post bronchodilator  0.96 L  46%     Change 9%   FEV1/FVC  43  %             Post bronchodilator  49 %   VDQ33-07% 0.27 L  15%  Post bronchodilator  0.34 L  19%   Change   26%     Lung volume   SVC           2.06 L  75%   RV              2.54 L 120%   TLC            4.60  L 96%   RV/TLC      55 %     DLCO           45 %         Test interpretation       Spirometry meets ATS criteria for severe obstructive airway   disease with no response to bronchodilator   Lung volume shows air-trapping   Diffusion capacity is moderately reduced         Clinical correlation is recommended     Shanta Joseph [facility], 3/4/2020 9:03 AM     Assessment/Plan:     1. Chronic obstructive pulmonary disease, unspecified COPD type ([address]  She is using bronchodilator with Anoro ellipta 1 puff daily. C/o shortness of breath with exertion. No  Wheezing. No Cough with Sputum. Continue bronchodilator as before. Sample given     - XR CHEST STANDARD (2 VW); Future  - Full PFT Study With Bronchodilator; Future    2.  Shortness of breath  She having  Chronic shortness of breath which is likely due to COPD, continue  Bronchodilator,as before. keep  Spo2 90% or above.      3. Atrial fibrillation, unspecified type [facility])  She  is following with cardiology, she is on Amiodarone, cardizem CD, and xarelto     4. Congestive heart failure, unspecified HF chronicity, unspecified heart failure type [facility])  She is doing better , no sign of CHF. On lasix,  Following with cardiology      Return in about 3 months (around 4/26/2021) for COPD.       Lio Sprague MD

## 2021-01-26 NOTE — TELEPHONE ENCOUNTER
PATIENT:  Ghazala Fu    DATE:     1/26/2021    TELEPHONE CONVERSATION:    [facility] was called regarding pathology results.     Dictated by:  Adolfo Olson MD  1/26/2021

## 2021-01-27 ENCOUNTER — OFFICE VISIT (OUTPATIENT)
Dept: SURGERY | Age: 71
End: 2021-01-27
Payer: COMMERCIAL

## 2021-01-27 ENCOUNTER — TELEPHONE (OUTPATIENT)
Dept: SURGERY | Age: 71
End: 2021-01-27

## 2021-01-27 ENCOUNTER — TELEPHONE (OUTPATIENT)
Dept: CARDIOLOGY CLINIC | Age: 71
End: 2021-01-27

## 2021-01-27 VITALS
RESPIRATION RATE: 16 BRPM | SYSTOLIC BLOOD PRESSURE: 148 MMHG | DIASTOLIC BLOOD PRESSURE: 76 MMHG | HEART RATE: 78 BPM | HEIGHT: 61 IN | BODY MASS INDEX: 26.62 KG/M2 | WEIGHT: 141 LBS

## 2021-01-27 DIAGNOSIS — Z17.1 MALIGNANT NEOPLASM OF UPPER-INNER QUADRANT OF LEFT BREAST IN FEMALE, ESTROGEN RECEPTOR NEGATIVE (HCC): Primary | ICD-10-CM

## 2021-01-27 DIAGNOSIS — C50.212 MALIGNANT NEOPLASM OF UPPER-INNER QUADRANT OF LEFT BREAST IN FEMALE, ESTROGEN RECEPTOR NEGATIVE (HCC): Primary | ICD-10-CM

## 2021-01-27 PROCEDURE — 99215 OFFICE O/P EST HI 40 MIN: CPT | Performed by: SURGERY

## 2021-01-27 RX ORDER — LIDOCAINE AND PRILOCAINE 25; 25 MG/G; MG/G
CREAM TOPICAL
Qty: 1 TUBE | Refills: 0 | Status: SHIPPED | OUTPATIENT
Start: 2021-01-27 | End: 2021-03-16

## 2021-01-27 NOTE — PROGRESS NOTES
Staging  Malignant neoplasm of upper-inner quadrant of left breast in female, estrogen receptor negative ([address] [address]  Staging form: Breast, AJCC 8th Edition  - Clinical stage from 1/26/2021: cT1, cN0, cM0, G3, ER-, WI-, HER2: Unknown - Signed by Ola Lesch, MD on 1/27/2021     Consultations to    Plastic Surgery Yes  Radiation/Oncology Yes   Medical/Oncology  Yes  Genetic Counseling Yes  Fertility issues  Not Applicable    Yes, But Patient declined  Psychology Yes, But Patient declined    Dictated by:  Joao Lopez MD 1/27/21      Cc: Stoney Mederos MD     This note was partially generated using Dragon voice recognition system, and there may be some incorrect words, spellings, punctuation that were not noticed in checking the note before saving.

## 2021-01-27 NOTE — TELEPHONE ENCOUNTER
I notified the patient that per Dr. Amor Guzman she was to hold the Xarelto 3 days prior to surgery and could restart the Xarelto the day after surgery. The patient verbalized understanding and has no further questions at this time.

## 2021-01-28 ENCOUNTER — TELEPHONE (OUTPATIENT)
Dept: SURGERY | Age: 71
End: 2021-01-28

## 2021-01-28 ENCOUNTER — HOSPITAL ENCOUNTER (OUTPATIENT)
Dept: GENERAL RADIOLOGY | Age: 71
Discharge: HOME | End: 2021-01-30
Payer: COMMERCIAL

## 2021-01-28 DIAGNOSIS — J44.9 CHRONIC OBSTRUCTIVE PULMONARY DISEASE, UNSPECIFIED COPD TYPE (HCC): ICD-10-CM

## 2021-01-28 PROCEDURE — 71046 X-RAY EXAM CHEST 2 VIEWS: CPT

## 2021-01-29 ENCOUNTER — NURSE ONLY (OUTPATIENT)
Dept: PRIMARY CARE CLINIC | Age: 71
End: 2021-01-29

## 2021-01-29 DIAGNOSIS — Z01.818 ENCOUNTER FOR PREADMISSION TESTING: ICD-10-CM

## 2021-01-29 DIAGNOSIS — Z01.818 ENCOUNTER FOR PREADMISSION TESTING: Primary | ICD-10-CM

## 2021-01-31 LAB
SARS-COV-2: NOT DETECTED
SOURCE: NORMAL

## 2021-02-01 ENCOUNTER — OFFICE VISIT (OUTPATIENT)
Dept: FAMILY MEDICINE CLINIC | Age: 71
End: 2021-02-01
Payer: COMMERCIAL

## 2021-02-01 VITALS
SYSTOLIC BLOOD PRESSURE: 128 MMHG | OXYGEN SATURATION: 98 % | HEART RATE: 84 BPM | DIASTOLIC BLOOD PRESSURE: 78 MMHG | WEIGHT: 141 LBS | BODY MASS INDEX: 26.62 KG/M2 | HEIGHT: 61 IN | TEMPERATURE: 97.6 F

## 2021-02-01 DIAGNOSIS — Z01.818 PRE-OP EXAMINATION: ICD-10-CM

## 2021-02-01 DIAGNOSIS — Z01.818 PRE-OP EXAMINATION: Primary | ICD-10-CM

## 2021-02-01 PROBLEM — R49.0 DYSPHONIA: Status: ACTIVE | Noted: 2021-02-01

## 2021-02-01 PROBLEM — R22.1 NECK NODULE: Status: ACTIVE | Noted: 2021-02-01

## 2021-02-01 PROBLEM — L30.9 ECZEMA: Status: ACTIVE | Noted: 2021-02-01

## 2021-02-01 PROBLEM — R04.2 HEMOPTYSIS: Status: ACTIVE | Noted: 2021-02-01

## 2021-02-01 LAB
ANION GAP SERPL CALCULATED.3IONS-SCNC: 13 MEQ/L (ref 9–15)
BUN BLDV-MCNC: 13 MG/DL (ref 8–23)
CALCIUM SERPL-MCNC: 11.1 MG/DL (ref 8.5–9.9)
CHLORIDE BLD-SCNC: 99 MEQ/L (ref 95–107)
CO2: 27 MEQ/L (ref 20–31)
CREAT SERPL-MCNC: 0.97 MG/DL (ref 0.5–0.9)
GFR AFRICAN AMERICAN: >60
GFR NON-AFRICAN AMERICAN: 56.4
GLUCOSE BLD-MCNC: 100 MG/DL (ref 70–99)
HCT VFR BLD CALC: 46.9 % (ref 37–47)
HEMOGLOBIN: 15.6 G/DL (ref 12–16)
MCH RBC QN AUTO: 31.3 PG (ref 27–31.3)
MCHC RBC AUTO-ENTMCNC: 33.3 % (ref 33–37)
MCV RBC AUTO: 94 FL (ref 82–100)
PDW BLD-RTO: 13.3 % (ref 11.5–14.5)
PLATELET # BLD: 365 K/UL (ref 130–400)
POTASSIUM SERPL-SCNC: 4.1 MEQ/L (ref 3.4–4.9)
RBC # BLD: 4.99 M/UL (ref 4.2–5.4)
SODIUM BLD-SCNC: 139 MEQ/L (ref 135–144)
WBC # BLD: 6.5 K/UL (ref 4.8–10.8)

## 2021-02-01 PROCEDURE — 93000 ELECTROCARDIOGRAM COMPLETE: CPT | Performed by: NURSE PRACTITIONER

## 2021-02-01 PROCEDURE — 99243 OFF/OP CNSLTJ NEW/EST LOW 30: CPT | Performed by: NURSE PRACTITIONER

## 2021-02-01 ASSESSMENT — PATIENT HEALTH QUESTIONNAIRE - PHQ9
2. FEELING DOWN, DEPRESSED OR HOPELESS: 0
SUM OF ALL RESPONSES TO PHQ QUESTIONS 1-9: 0
1. LITTLE INTEREST OR PLEASURE IN DOING THINGS: 0
1. LITTLE INTEREST OR PLEASURE IN DOING THINGS: 0

## 2021-02-01 NOTE — PROGRESS NOTES
Preoperative Consultation      Lexa Mcclain  YOB: 1950     Date of Service:  2/1/2021    Vitals:    02/01/21 0957   BP: 128/78   Site: Right Upper Arm   Position: Sitting   Cuff Size: Large Adult   Pulse: 84   Temp: 97.6 °F (36.4 °C)   TempSrc: Temporal   SpO2: 98%   Weight: 141 lb (64 kg)   Height: 5' 1\" (1.549 m)      Wt Readings from Last 2 Encounters:   02/01/21 141 lb (64 kg)   01/27/21 141 lb (64 kg)     BP Readings from Last 3 Encounters:   02/01/21 128/78   01/27/21 (!) 148/76   01/26/21 110/68        Chief Complaint   Patient presents with   [facility] Pre-op Exam     Patient here for pre-op DR. TAI LEFT BREAST LUMPECTOMY SURGERY ON 2-4-21     No Known Allergies  Outpatient Medications Marked as Taking for the 2/1/21 encounter (Office Visit) with LOREN Anderson - CNP   Medication Sig Dispense Refill    lidocaine-prilocaine (EMLA) 2.5-2.5 % cream Apply topically to Left [place]ola 1.5 hours prior to procedure 1 Tube 0    ANORO ELLIPTA 62.5-25 MCG/INH AEPB inhaler TAKE 1 PUFF BY MOUTH EVERY DAY 1 each 2    potassium chloride (KLOR-CON M) 20 MEQ extended release tablet Take 1 tablet by mouth daily 30 tablet 6    Psyllium (METAMUCIL FIBER PO) Take by mouth      pravastatin (PRAVACHOL) 10 MG tablet Take 1 tablet by mouth daily 90 tablet 3    dilTIAZem (CARDIZEM CD) 240 MG extended release capsule TAKE 1 CAPSULE BY MOUTH EVERY DAY 90 capsule 3    furosemide (LASIX) 40 MG tablet TAKE 1 TABLET BY MOUTH EVERY DAY 90 tablet 3    Handicap Placard MISC by Does not apply route Good for 5 years. Dispense 1 1 each 0    Calcium Carb-Cholecalciferol (CALCIUM + D3 PO) Take by mouth      amiodarone (CORDARONE) 200 MG tablet Take 1 tablet by mouth Twice a Week 26 tablet 3    b complex vitamins tablet Take 1 tablet by mouth daily      magnesium oxide (MAG-OX) 400 MG tablet Take 400 mg by mouth daily      Vitamin D (CHOLECALCIFEROL) 1000 UNITS CAPS capsule Take 1,000 Units by mouth daily. This patient presents to the office today for a preoperative consultation at the request of surgeon, Dr. Barrington Mccain, who plans on performing Left breast lumpectomy and sentinel lymph node biopsy on February 4 at [facility]. The current problem began 1 month ago, and symptoms have been stable with time. Conservative therapy: N/A. Planned anesthesia: General   Known anesthesia problems: None   Bleeding risk: Anticoagulant therapy- Xarelto-Last dose 1/31  Personal or FH of DVT/PE: No    Patient objection to receiving blood products: No    Patient Active Problem List   Diagnosis    Hyperlipidemia with target LDL less than 130    Vitamin D deficiency    Atrial fibrillation ([address]    Pulmonary emphysema ([address]    Bilateral edema of lower extremity    Other plastic surgery for unacceptable cosmetic appearance    Tobacco abuse    Fatigue    Chronic obstructive pulmonary disease (HCC)    Acute bronchitis    Hypoxemia    Shortness of breath    Fracture of humerus, proximal    Benign neoplasm of sigmoid colon    Other hemorrhoids    Hx of colonic polyps    Family history of colon cancer    Osteopenia of necks of both femurs    Congestive heart failure (HCC)    Bilateral pulmonary infiltrates on CXR    Pericardial calcification    Pericardial effusion    Pleural effusion    Atelectasis, left    Abnormal mammogram of left breast    Left breast mass    Overweight (BMI 25.0-29. 9)    Malignant neoplasm of upper-inner quadrant of left breast in female, estrogen receptor negative ([address]    Neck nodule    Hemoptysis    Eczema    Dysphonia       Past Medical History:   Diagnosis Date    Abnormal echocardiogram 2020    possible PFO?  further eval suggested    COPD (chronic obstructive pulmonary disease) ([address] 2015    Dr Geraldine Rangel Current use of long term anticoagulation     Cyst of neck 2012    after plastic surgery    Emphysema of lung ([address]     History of compression fracture of vertebral column     T2, L1    History of humerus fracture     LEFT    History of left heart catheterization 2020    at [facility], normal LVEF with impaired relaxation, mild AV calcification and mild MV thickening, normal RV    History of sustained ventricular tachycardia 2020    had to wear a LifeVest, Dr Marzena Proctor Hyperlipidemia LDL goal < 130     Intermittent atrial fibrillation [facility])     Dr Bandar Aguila, Dr Shery Lesch, Dr Kenn Briones Osteopenia 2014    S/P colonoscopic polypectomy , ,     Dr Saad Ybarra    Smoking history     Vitamin D deficiency      Past Surgical History:   Procedure Laterality Date    ATRIAL ABLATION SURGERY      BREAST BIOPSY Left 2021    BREAST SURGERY  1975    staph infection, postpartum    COLONOSCOPY  2015    w/polypectomy Dr Ele García      Dr April Ledezma; HI RISK IND N/A 2018    COLONOSCOPY [facility] by Felix Ha [facility] at [facility]     Family History   Problem Relation Age of Onset    Osteoarthritis Mother     Osteoporosis Mother     Cancer Mother         anorectal     Colon Cancer Mother      Social History     Socioeconomic History    Marital status:      Spouse name: Not on file    Number of children: 2    Years of education: Not on file    Highest education level: Not on file   Occupational History    Occupation: [employer], [employer], retired   Social Needs    Financial resource strain: Not on file    Food insecurity     Worry: Not on file     Inability: Not on file   [employer] needs     Medical: Not on file     Non-medical: Not on file   Tobacco Use    Smoking status: Former Smoker     Packs/day: 1.00     Years: 50.00     Pack years: 50.00     Types: Cigarettes     Start date:      Quit date: 2015     Years since quittin.1    Smokeless tobacco: Never Used    Tobacco comment: pt will try smoking cessation   Substance and Sexual Activity    Alcohol use: No    Drug use: No    Sexual activity: Not on file   Lifestyle    Physical activity     Days per week: Not on file     Minutes per session: Not on file    Stress: Not on file   Relationships    Social connections     Talks on phone: Not on file     Gets together: Not on file     Attends Scientologist service: Not on file     Active member of club or organization: Not on file     Attends meetings of clubs or organizations: Not on file     Relationship status: Not on file    Intimate partner violence     Fear of current or ex partner: Not on file     Emotionally abused: Not on file     Physically abused: Not on file     Forced sexual activity: Not on file   Other Topics Concern    Not on file   Social History Narrative    Born in [place], one brother in [facility], keeps in touch    , 2 children, one son in [place], one daughter in [place]    5 grandchildren     Lives in a house in [place] with spouse     Worked for [employer] and full time at [employer], [employer], fashion, family life     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        Review of Systems  A comprehensive review of systems was negative except for what was noted in the HPI. Physical Exam   Constitutional: She is oriented to person, place, and time. She appears well-developed and well-nourished. No distress. HENT:   Head: Normocephalic and atraumatic. Mouth/Throat: Uvula is midline, oropharynx is clear and moist and mucous membranes are normal.   Eyes: Conjunctivae and EOM are normal. Pupils are equal, round, and reactive to light. Neck: Trachea normal and normal range of motion. Neck supple. No JVD present. Carotid bruit is not present. No mass and no thyromegaly present. Cardiovascular: Normal rate, regular rhythm, normal heart sounds and intact distal pulses. Exam reveals no gallop and no friction rub. No murmur heard. Pulmonary/Chest: Effort normal and breath sounds normal. No respiratory distress.  She has no

## 2021-02-02 ENCOUNTER — HOSPITAL ENCOUNTER (OUTPATIENT)
Dept: OCCUPATIONAL THERAPY | Age: 71
Setting detail: THERAPIES SERIES
Discharge: HOME | End: 2021-02-02
Payer: COMMERCIAL

## 2021-02-02 ENCOUNTER — NURSE ONLY (OUTPATIENT)
Dept: SURGERY | Age: 71
End: 2021-02-02
Payer: MEDICARE

## 2021-02-02 VITALS
HEART RATE: 72 BPM | RESPIRATION RATE: 16 BRPM | SYSTOLIC BLOOD PRESSURE: 136 MMHG | DIASTOLIC BLOOD PRESSURE: 74 MMHG | WEIGHT: 138.2 LBS | OXYGEN SATURATION: 96 % | BODY MASS INDEX: 26.11 KG/M2

## 2021-02-02 DIAGNOSIS — Z17.1 MALIGNANT NEOPLASM OF UPPER-INNER QUADRANT OF LEFT BREAST IN FEMALE, ESTROGEN RECEPTOR NEGATIVE (HCC): Primary | ICD-10-CM

## 2021-02-02 DIAGNOSIS — C50.212 MALIGNANT NEOPLASM OF UPPER-INNER QUADRANT OF LEFT BREAST IN FEMALE, ESTROGEN RECEPTOR NEGATIVE (HCC): Primary | ICD-10-CM

## 2021-02-02 PROCEDURE — 97166 OT EVAL MOD COMPLEX 45 MIN: CPT

## 2021-02-02 RX ORDER — LIDOCAINE AND PRILOCAINE 25; 25 MG/G; MG/G
CREAM TOPICAL
Qty: 1 TUBE | Refills: 0 | Status: CANCELLED | OUTPATIENT
Start: 2021-02-02

## 2021-02-02 NOTE — PROGRESS NOTES
Patient teaching completed for their scheduled Left Breast Lumpectomy and SLNB on 2-4-2021. I reviewed Surgical instructions with the Patient, including the following information, you will receive a phone call from the surgery schedulers the day prior to the surgery, usually between 3-5 pm, to let you know what time to report to surgery. A map of [facility] was given to the Patient with instructions on where to park and go the day of surgery. The Patient was instructed to Not Eat or drink anything after midnight, before their surgery. Approved medications, which you have discussed with your Doctor, can be taken the morning of surgery, with sips of water. All blood thinners, oral anticoagulants, Aspirin, NSAIDS, should be stopped at least ten days prior to surgery. Please follow your Doctors instructions. Do not chew gum, take cough drops or eat hard candy the morning of your surgery. Do not wear any jewelry. Remove all body piercings prior to arriving for surgery. Leave all valuable items at home. Avoid hairspray, make-up, deodorant, lotions, nail polish or acrylic nails. Bring all personal care items, toiletries, and a loose fitting shirt that buttons or zips in the front to wear when discharged. Dont forget to bring your cell phone . Bring your cases for dentures and glasses, do not wear contact lenses. You will need an adult to drive you home after your surgery or when you are discharged from the hospital.No driving for one week after Lumpectomy. The Surgeon will call you with your pathology results in about 7-10 days. Do not lift greater than 10 pounds or use the affected arm to reach upwards after surgery, until the Doctor clears you post operatively. You may be asked to wear a surgical bra. If it is recommended that you wear one,it will be given to you after your surgery. Wear the surgical bra /ace wrap for the first 48 hours after surgery.  After surgery, you will need to take care of color. If it is thick, pus -like, and / or changes to red,to please call Dr. Montano Letters office. The ideal time to remove the drains is when the output is less than 30 cc per 24-hours for 2 days consecutively. It can take up to two weeks for that to happen. Dr. Edwin El will typically remove the drains if it has been more than two weeks. Patient is aware to bring the drainage log to the post operative appointment. If the drainage record indicates that there is still too much fluid draining to have the drain removed, Dr. Edwin El will determine when you should return to the office. Patient is aware to empty, measure and to record the drainage at least three times daily, using the cc side of the measuring cup. They were given a lanyard and it was explained that the drains should be pinned to the Bra, clothing or the lanyard so the drain will not be pulled out of the body. I reviewed the procedure for emptying the drain as follows, get all the supplies and work in a clean area: measuring cup(s), drain sheet, pen . You and/or your assistant must wash and dry your hands. Unpin the drain(s). Open the stopper out of the bulb and empty the fluid into the measuring cup. Educated the Patient on the importance of milking/ stripping the tubing three times a day and it was practiced on a SAMAN drain. Patient was then taught to squeeze the drainage bulb while firmly pushing the stopper back into place. Then to, re-pin the drain to their bra/clothing/lanyard. Then make sure to record the amount of drainage on the log. Empty the cup into the toilet or sink, then rinse and store it for future use. Begin recording the drainage the day you are discharged from the hospital.Pre-Operative Lymphedema arm measurements were completed by OT. I educated the Patient on Lymphedema and how to protect the affected limb to avoid injuries. I reviewed post operative exercises. The Patient is aware to not begin exercising until the surgeon has approved exercising post operatively. Your postop appointment is on 2- at 7:30 AM at the [facility]. The Patient verbalized understanding of all of the surgical instructions and has no further questions at this time.

## 2021-02-02 NOTE — PROGRESS NOTES
Occupational Therapy Lymphedema Prehab Screen    Date: 2021  Patient Name: Annamarie Barker        MRN: [id number]  Account: [de-identified]   : 1950  (65 y.o.)    Chart Review:  Diagnosis:  There were no encounter diagnoses. Past Medical History:   Diagnosis Date    Abnormal echocardiogram     possible PFO?  further eval suggested    COPD (chronic obstructive pulmonary disease) ([facility] [address]     Dr Karlie Julian Current use of long term anticoagulation     Cyst of neck     after plastic surgery    Emphysema of lung ([facility] [address]     History of compression fracture of vertebral column     T2, L1    History of humerus fracture     LEFT    History of left heart catheterization 2020    at [facility], normal LVEF with impaired relaxation, mild AV calcification and mild MV thickening, normal RV    History of sustained ventricular tachycardia 2020    had to wear a LifeVest, Dr Vipul Shoemaker Hyperlipidemia LDL goal < 130     Intermittent atrial fibrillation (HCC)     Dr Ysabel Escobar, Dr Pretty Graves, Dr Pratik Boston Osteopenia 2014    S/P colonoscopic polypectomy , ,     Dr Jai Sales    Smoking history     Vitamin D deficiency      Past Surgical History:   Procedure Laterality Date    ATRIAL ABLATION SURGERY      BREAST BIOPSY Left 2021    BREAST SURGERY  1975    staph infection, postpartum    COLONOSCOPY  2015    w/polypectomy Dr Adrianna Richards      Dr Yanna Yancey; HI RISK IND N/A 2018    COLONOSCOPY performed by Otoniel Mederos MD at [facility]       Strength:   WNL BUEs    ROM:    WNL BUEs    Observation:   Good skin integrity    Circumferential Measurements    Location Rt UE (cm)   Date:  Lt UE (cm)   Date:    3rd DIP/ PIP 5.1/6 4.7/5.5   10 cm from distal 3rd finger 22.2 21   15 cm 20.8 18   20 cm 14.6 14.7   30 cm 20.2 20.5   40 cm 24 23   50 cm 26.8 25.9   60 cm 33.1 32   **60 cm measured over lightweight shirt Instructed pt. in lymphedema signs and symptoms and provided written hand out. Yes    Instructed pt. in ROM HEP for prevention and reduction of lymphedema symptoms. Yes    Pt. demonstrates good understanding. Plan:  Follow up with physician per physician orders. Therapy Time:   OT Individual Minutes  Time In: 9486  Time Out: 1408  Minutes: 55    Electronically signed by:     TREE Everett  2/2/2021, 2:09 PM

## 2021-02-04 ENCOUNTER — ANESTHESIA EVENT (OUTPATIENT)
Dept: OPERATING ROOM | Age: 71
End: 2021-02-04
Payer: COMMERCIAL

## 2021-02-04 ENCOUNTER — HOSPITAL ENCOUNTER (OUTPATIENT)
Dept: NUCLEAR MEDICINE | Age: 71
Discharge: HOME | End: 2021-02-06
Payer: COMMERCIAL

## 2021-02-04 ENCOUNTER — ANESTHESIA (OUTPATIENT)
Dept: OPERATING ROOM | Age: 71
End: 2021-02-04
Payer: COMMERCIAL

## 2021-02-04 ENCOUNTER — HOSPITAL ENCOUNTER (OUTPATIENT)
Age: 71
Setting detail: OUTPATIENT SURGERY
Discharge: HOME | End: 2021-02-04
Attending: SURGERY | Admitting: SURGERY
Payer: COMMERCIAL

## 2021-02-04 VITALS — OXYGEN SATURATION: 99 % | SYSTOLIC BLOOD PRESSURE: 115 MMHG | TEMPERATURE: 59 F | DIASTOLIC BLOOD PRESSURE: 60 MMHG

## 2021-02-04 VITALS
RESPIRATION RATE: 16 BRPM | BODY MASS INDEX: 26.62 KG/M2 | WEIGHT: 141 LBS | HEART RATE: 73 BPM | DIASTOLIC BLOOD PRESSURE: 59 MMHG | OXYGEN SATURATION: 94 % | SYSTOLIC BLOOD PRESSURE: 130 MMHG | HEIGHT: 61 IN | TEMPERATURE: 97 F

## 2021-02-04 DIAGNOSIS — Z17.1 MALIGNANT NEOPLASM OF UPPER-INNER QUADRANT OF LEFT BREAST IN FEMALE, ESTROGEN RECEPTOR NEGATIVE (HCC): ICD-10-CM

## 2021-02-04 DIAGNOSIS — C50.212 MALIGNANT NEOPLASM OF UPPER-INNER QUADRANT OF LEFT BREAST IN FEMALE, ESTROGEN RECEPTOR NEGATIVE (HCC): ICD-10-CM

## 2021-02-04 PROCEDURE — 38792 RA TRACER ID OF SENTINL NODE: CPT

## 2021-02-04 PROCEDURE — 2580000003 HC RX 258: Performed by: SURGERY

## 2021-02-04 PROCEDURE — 2709999900 HC NON-CHARGEABLE SUPPLY: Performed by: SURGERY

## 2021-02-04 PROCEDURE — 88305 TISSUE EXAM BY PATHOLOGIST: CPT

## 2021-02-04 PROCEDURE — 7100000000 HC PACU RECOVERY - FIRST 15 MIN: Performed by: SURGERY

## 2021-02-04 PROCEDURE — 6360000002 HC RX W HCPCS: Performed by: SURGERY

## 2021-02-04 PROCEDURE — 3700000000 HC ANESTHESIA ATTENDED CARE: Performed by: SURGERY

## 2021-02-04 PROCEDURE — 76998 US GUIDE INTRAOP: CPT | Performed by: SURGERY

## 2021-02-04 PROCEDURE — 6360000002 HC RX W HCPCS: Performed by: ANESTHESIOLOGY

## 2021-02-04 PROCEDURE — 7100000010 HC PHASE II RECOVERY - FIRST 15 MIN: Performed by: SURGERY

## 2021-02-04 PROCEDURE — 19301 PARTIAL MASTECTOMY: CPT | Performed by: SURGERY

## 2021-02-04 PROCEDURE — 7100000011 HC PHASE II RECOVERY - ADDTL 15 MIN: Performed by: SURGERY

## 2021-02-04 PROCEDURE — 38525 BIOPSY/REMOVAL LYMPH NODES: CPT | Performed by: SURGERY

## 2021-02-04 PROCEDURE — 2580000003 HC RX 258: Performed by: ANESTHESIOLOGY

## 2021-02-04 PROCEDURE — 3600000014 HC SURGERY LEVEL 4 ADDTL 15MIN: Performed by: SURGERY

## 2021-02-04 PROCEDURE — 88307 TISSUE EXAM BY PATHOLOGIST: CPT

## 2021-02-04 PROCEDURE — 14000 TIS TRNFR TRUNK 10 SQ CM/<: CPT | Performed by: SURGERY

## 2021-02-04 PROCEDURE — A9541 TC99M SULFUR COLLOID: HCPCS | Performed by: SURGERY

## 2021-02-04 PROCEDURE — 76942 ECHO GUIDE FOR BIOPSY: CPT | Performed by: ANESTHESIOLOGY

## 2021-02-04 PROCEDURE — 6370000000 HC RX 637 (ALT 250 FOR IP): Performed by: SURGERY

## 2021-02-04 PROCEDURE — 2500000003 HC RX 250 WO HCPCS: Performed by: ANESTHESIOLOGY

## 2021-02-04 PROCEDURE — 3430000000 HC RX DIAGNOSTIC RADIOPHARMACEUTICAL: Performed by: SURGERY

## 2021-02-04 PROCEDURE — 3600000004 HC SURGERY LEVEL 4 BASE: Performed by: SURGERY

## 2021-02-04 PROCEDURE — 6360000002 HC RX W HCPCS

## 2021-02-04 PROCEDURE — 38900 IO MAP OF SENT LYMPH NODE: CPT | Performed by: SURGERY

## 2021-02-04 PROCEDURE — 3700000001 HC ADD 15 MINUTES (ANESTHESIA): Performed by: SURGERY

## 2021-02-04 PROCEDURE — 7100000001 HC PACU RECOVERY - ADDTL 15 MIN: Performed by: SURGERY

## 2021-02-04 PROCEDURE — 2720000010 HC SURG SUPPLY STERILE: Performed by: SURGERY

## 2021-02-04 RX ORDER — LIDOCAINE HYDROCHLORIDE 10 MG/ML
1 INJECTION, SOLUTION EPIDURAL; INFILTRATION; INTRACAUDAL; PERINEURAL
Status: COMPLETED | OUTPATIENT
Start: 2021-02-04 | End: 2021-02-04

## 2021-02-04 RX ORDER — ROPIVACAINE HYDROCHLORIDE 5 MG/ML
INJECTION, SOLUTION EPIDURAL; INFILTRATION; PERINEURAL PRN
Status: DISCONTINUED | OUTPATIENT
Start: 2021-02-04 | End: 2021-02-04 | Stop reason: SDUPTHER

## 2021-02-04 RX ORDER — SODIUM CHLORIDE, SODIUM LACTATE, POTASSIUM CHLORIDE, CALCIUM CHLORIDE 600; 310; 30; 20 MG/100ML; MG/100ML; MG/100ML; MG/100ML
INJECTION, SOLUTION INTRAVENOUS CONTINUOUS
Status: DISCONTINUED | OUTPATIENT
Start: 2021-02-04 | End: 2021-02-04 | Stop reason: HOSPADM

## 2021-02-04 RX ORDER — LIDOCAINE HYDROCHLORIDE 20 MG/ML
INJECTION, SOLUTION INTRAVENOUS PRN
Status: DISCONTINUED | OUTPATIENT
Start: 2021-02-04 | End: 2021-02-04 | Stop reason: SDUPTHER

## 2021-02-04 RX ORDER — DEXAMETHASONE SODIUM PHOSPHATE 10 MG/ML
INJECTION INTRAMUSCULAR; INTRAVENOUS PRN
Status: DISCONTINUED | OUTPATIENT
Start: 2021-02-04 | End: 2021-02-04 | Stop reason: SDUPTHER

## 2021-02-04 RX ORDER — FENTANYL CITRATE 50 UG/ML
INJECTION, SOLUTION INTRAMUSCULAR; INTRAVENOUS PRN
Status: DISCONTINUED | OUTPATIENT
Start: 2021-02-04 | End: 2021-02-04 | Stop reason: SDUPTHER

## 2021-02-04 RX ORDER — MIDAZOLAM HYDROCHLORIDE 1 MG/ML
INJECTION INTRAMUSCULAR; INTRAVENOUS PRN
Status: DISCONTINUED | OUTPATIENT
Start: 2021-02-04 | End: 2021-02-04 | Stop reason: SDUPTHER

## 2021-02-04 RX ORDER — ONDANSETRON 2 MG/ML
INJECTION INTRAMUSCULAR; INTRAVENOUS PRN
Status: DISCONTINUED | OUTPATIENT
Start: 2021-02-04 | End: 2021-02-04 | Stop reason: SDUPTHER

## 2021-02-04 RX ORDER — HYDROCODONE BITARTRATE AND ACETAMINOPHEN 5; 325 MG/1; MG/1
1 TABLET ORAL PRN
Status: DISCONTINUED | OUTPATIENT
Start: 2021-02-04 | End: 2021-02-04 | Stop reason: HOSPADM

## 2021-02-04 RX ORDER — MEPERIDINE HYDROCHLORIDE 25 MG/ML
12.5 INJECTION INTRAMUSCULAR; INTRAVENOUS; SUBCUTANEOUS EVERY 5 MIN PRN
Status: DISCONTINUED | OUTPATIENT
Start: 2021-02-04 | End: 2021-02-04 | Stop reason: HOSPADM

## 2021-02-04 RX ORDER — FENTANYL CITRATE 50 UG/ML
50 INJECTION, SOLUTION INTRAMUSCULAR; INTRAVENOUS EVERY 10 MIN PRN
Status: DISCONTINUED | OUTPATIENT
Start: 2021-02-04 | End: 2021-02-04 | Stop reason: HOSPADM

## 2021-02-04 RX ORDER — SODIUM CHLORIDE 9 MG/ML
INJECTION INTRAVENOUS PRN
Status: DISCONTINUED | OUTPATIENT
Start: 2021-02-04 | End: 2021-02-04 | Stop reason: ALTCHOICE

## 2021-02-04 RX ORDER — SODIUM CHLORIDE 0.9 % (FLUSH) 0.9 %
10 SYRINGE (ML) INJECTION EVERY 12 HOURS SCHEDULED
Status: DISCONTINUED | OUTPATIENT
Start: 2021-02-04 | End: 2021-02-04 | Stop reason: HOSPADM

## 2021-02-04 RX ORDER — DIPHENHYDRAMINE HYDROCHLORIDE 50 MG/ML
12.5 INJECTION INTRAMUSCULAR; INTRAVENOUS
Status: DISCONTINUED | OUTPATIENT
Start: 2021-02-04 | End: 2021-02-04 | Stop reason: HOSPADM

## 2021-02-04 RX ORDER — METHYLENE BLUE 10 MG/ML
INJECTION INTRAVENOUS PRN
Status: DISCONTINUED | OUTPATIENT
Start: 2021-02-04 | End: 2021-02-04 | Stop reason: ALTCHOICE

## 2021-02-04 RX ORDER — SODIUM CHLORIDE 0.9 % (FLUSH) 0.9 %
10 SYRINGE (ML) INJECTION PRN
Status: DISCONTINUED | OUTPATIENT
Start: 2021-02-04 | End: 2021-02-04 | Stop reason: HOSPADM

## 2021-02-04 RX ORDER — CEFAZOLIN SODIUM 2 G/50ML
2000 SOLUTION INTRAVENOUS
Status: COMPLETED | OUTPATIENT
Start: 2021-02-04 | End: 2021-02-04

## 2021-02-04 RX ORDER — METOCLOPRAMIDE HYDROCHLORIDE 5 MG/ML
10 INJECTION INTRAMUSCULAR; INTRAVENOUS
Status: DISCONTINUED | OUTPATIENT
Start: 2021-02-04 | End: 2021-02-04 | Stop reason: HOSPADM

## 2021-02-04 RX ORDER — HYDROCODONE BITARTRATE AND ACETAMINOPHEN 5; 325 MG/1; MG/1
2 TABLET ORAL PRN
Status: DISCONTINUED | OUTPATIENT
Start: 2021-02-04 | End: 2021-02-04 | Stop reason: HOSPADM

## 2021-02-04 RX ORDER — PROPOFOL 10 MG/ML
INJECTION, EMULSION INTRAVENOUS PRN
Status: DISCONTINUED | OUTPATIENT
Start: 2021-02-04 | End: 2021-02-04 | Stop reason: SDUPTHER

## 2021-02-04 RX ORDER — ONDANSETRON 2 MG/ML
4 INJECTION INTRAMUSCULAR; INTRAVENOUS
Status: DISCONTINUED | OUTPATIENT
Start: 2021-02-04 | End: 2021-02-04 | Stop reason: HOSPADM

## 2021-02-04 RX ORDER — MAGNESIUM HYDROXIDE 1200 MG/15ML
LIQUID ORAL PRN
Status: DISCONTINUED | OUTPATIENT
Start: 2021-02-04 | End: 2021-02-04 | Stop reason: ALTCHOICE

## 2021-02-04 RX ADMIN — FENTANYL CITRATE 50 MCG: 50 INJECTION, SOLUTION INTRAMUSCULAR; INTRAVENOUS at 13:08

## 2021-02-04 RX ADMIN — DEXAMETHASONE SODIUM PHOSPHATE 10 MG: 10 INJECTION INTRAMUSCULAR; INTRAVENOUS at 11:42

## 2021-02-04 RX ADMIN — LIDOCAINE HYDROCHLORIDE 45 MG: 20 INJECTION, SOLUTION INTRAVENOUS at 11:34

## 2021-02-04 RX ADMIN — CEFAZOLIN SODIUM 2 G: 2 SOLUTION INTRAVENOUS at 11:42

## 2021-02-04 RX ADMIN — FENTANYL CITRATE 50 MCG: 50 INJECTION, SOLUTION INTRAMUSCULAR; INTRAVENOUS at 12:58

## 2021-02-04 RX ADMIN — Medication 1.9 MILLICURIE: at 09:50

## 2021-02-04 RX ADMIN — SODIUM CHLORIDE, POTASSIUM CHLORIDE, SODIUM LACTATE AND CALCIUM CHLORIDE 1000 ML: 600; 310; 30; 20 INJECTION, SOLUTION INTRAVENOUS at 09:20

## 2021-02-04 RX ADMIN — PROPOFOL 30 MG: 10 INJECTION, EMULSION INTRAVENOUS at 11:58

## 2021-02-04 RX ADMIN — FENTANYL CITRATE 25 MCG: 50 INJECTION, SOLUTION INTRAMUSCULAR; INTRAVENOUS at 11:58

## 2021-02-04 RX ADMIN — LIDOCAINE HYDROCHLORIDE 0.1 ML: 10 INJECTION, SOLUTION EPIDURAL; INFILTRATION; INTRACAUDAL; PERINEURAL at 09:19

## 2021-02-04 RX ADMIN — DEXAMETHASONE SODIUM PHOSPHATE 10 MG: 10 INJECTION INTRAMUSCULAR; INTRAVENOUS at 10:26

## 2021-02-04 RX ADMIN — FENTANYL CITRATE 25 MCG: 50 INJECTION, SOLUTION INTRAMUSCULAR; INTRAVENOUS at 11:39

## 2021-02-04 RX ADMIN — ROPIVACAINE HYDROCHLORIDE 30 ML: 5 INJECTION, SOLUTION EPIDURAL; INFILTRATION; PERINEURAL at 10:26

## 2021-02-04 RX ADMIN — ONDANSETRON 4 MG: 2 INJECTION INTRAMUSCULAR; INTRAVENOUS at 12:19

## 2021-02-04 RX ADMIN — PROPOFOL 150 MG: 10 INJECTION, EMULSION INTRAVENOUS at 11:34

## 2021-02-04 RX ADMIN — MIDAZOLAM HYDROCHLORIDE 4 MG: 2 INJECTION, SOLUTION INTRAMUSCULAR; INTRAVENOUS at 10:21

## 2021-02-04 ASSESSMENT — PULMONARY FUNCTION TESTS
PIF_VALUE: 12
PIF_VALUE: 2
PIF_VALUE: 16
PIF_VALUE: 15
PIF_VALUE: 1
PIF_VALUE: 12
PIF_VALUE: 2
PIF_VALUE: 12
PIF_VALUE: 12
PIF_VALUE: 2
PIF_VALUE: 12
PIF_VALUE: 15
PIF_VALUE: 2
PIF_VALUE: 15
PIF_VALUE: 16
PIF_VALUE: 2
PIF_VALUE: 16
PIF_VALUE: 15
PIF_VALUE: 12
PIF_VALUE: 2
PIF_VALUE: 15
PIF_VALUE: 15
PIF_VALUE: 12
PIF_VALUE: 2
PIF_VALUE: 15
PIF_VALUE: 1
PIF_VALUE: 2
PIF_VALUE: 2
PIF_VALUE: 15

## 2021-02-04 ASSESSMENT — PAIN SCALES - GENERAL
PAINLEVEL_OUTOF10: 5
PAINLEVEL_OUTOF10: 4

## 2021-02-04 ASSESSMENT — ENCOUNTER SYMPTOMS: SHORTNESS OF BREATH: 1

## 2021-02-04 ASSESSMENT — LIFESTYLE VARIABLES: SMOKING_STATUS: 0

## 2021-02-04 NOTE — PROGRESS NOTES
Discharge instructions given by Radha Merritt. Reviewed Discharge instruction with patient prior to going home.

## 2021-02-04 NOTE — OP NOTE
OPERATIVE REPORT    LOG ID: [id number]  Surgery Date: 2/4/2021  Incision/Procedure Start Time: 11:56  Incision Close/Procedure End Time: 12:15    Procedure Performed: Procedure(s):  LEFT BREAST LUMPECTOMY AND  SENTINEL LYMPH NODE BIOPSY     Surgeon(s)/Proceduralist(s) and Assistant(s):  Surgeon(s) and Role:     * Matthew Aguila MD - Primary  First Assistant: Jazzmine Charles  Scrub Person First: Sangita Bowie       Anesthesia: General   Anesthesiologist: Kendal Leblanc MD  CRNA: LOREN Wild - JOSE       Pre-Operative Diagnosis:  LEFT BREAST CANCER   Post-Operative Diagnosis:  SAME    ESTIMATED BLOOD LOSS: Minimal, 5 cc. COMPLICATIONS: No complications. FINDINGS: mass in specimen with grossly negative margins     DRAINS: No drains were placed. PREOPERATIVE ANTIBIOTICS: ANCEF 2 GRAMS     SPECIMEN: left breast mass, left sln, Superior, medial, inferior, lateral, posterior margins were taken. She received Venodynes bilaterally. Cancer Staging  Malignant neoplasm of upper-inner quadrant of left breast in female, estrogen receptor negative ([facility] [address]  Staging form: Breast, AJCC 8th Edition  - Clinical stage from 1/26/2021: cT1, cN0, cM0, G3, ER-, UT-, HER2: Unknown - Signed by Matthew Aguila MD on 1/27/2021       INDICATIONS:  Kiesha Polo is a 65 y.o.  female  who presented with a left breast imaging abnormality. She underwent a minimally invasive biopsy that showed cancer. I recommended a left lumpectomy and sentinel lymph node biopsy. She understood the risks and benefits of the procedure and consented on the day of surgery. PROCEDURE: The patient was brought to Radiology for technetium sulfur colloid injection. She was then brought to the operating room and placed in supine position with ipsilateral arm abducted 90 degrees. The left breast, chest and axilla were prepped and draped in usual surgical fashion. Time out was performed and breast images were reviewed preoperatively.  2 cc of Szsauls   DATE: 2/4/21 MRN: [id number]   TIME: 12:19 PM EST PHONE: [phone]

## 2021-02-04 NOTE — ANESTHESIA PRE PROCEDURE
Department of Anesthesiology  Preprocedure Note       Name:  Mookie Castorena   Age:  65 y.o.  :  1950                                          MRN:  [id number]         Date:  2021      Surgeon: Lilia Alfaror):  Travis Combs MD    Procedure: Procedure(s):  LEFT BREAST LUMPECTOMY AND  SENTINEL LYMPH NODE BIOPSY, NUC MED  AM, PAT AT [place]    Medications prior to admission:   Prior to Admission medications    Medication Sig Start Date End Date Taking? Authorizing Provider   lidocaine-prilocaine (EMLA) 2.5-2.5 % cream Apply topically to Left Areola 1.5 hours prior to procedure 21   Travis oCmbs MD   rivaroxaban (XARELTO) 20 MG TABS tablet TAKE 1 TABLET BY MOUTH EVERY DAY WITH BREAKFAST  Patient not taking: Reported on 2021   Pablito Ureña MD   ANORO ELLIPTA 62.5-25 MCG/INH AEPB inhaler TAKE 1 PUFF BY MOUTH EVERY DAY 20   Megan Shipley MD   potassium chloride (KLOR-CON M) 20 MEQ extended release tablet Take 1 tablet by mouth daily 20   Pablito Ureña MD   Psyllium (METAMUCIL FIBER PO) Take by mouth    Historical Provider, MD   zoster recombinant adjuvanted vaccine [facility]) 50 MCG/0.5ML SUSR injection Inject 0.5 mLs into the muscle See Admin Instructions 1 dose now and repeat in 2-6 months  Patient not taking: Reported on 2021 11/11/20 5/10/21  Narendra Thompson MD   pravastatin (PRAVACHOL) 10 MG tablet Take 1 tablet by mouth daily 20   Narendra Thompson MD   dilTIAZem (CARDIZEM CD) 240 MG extended release capsule TAKE 1 CAPSULE BY MOUTH EVERY DAY 20   Pablito Ureña MD   furosemide (LASIX) 40 MG tablet TAKE 1 TABLET BY MOUTH EVERY DAY 20   Pablito Ureña MD   Handicap Placard MISC by Does not apply route Good for 5 years.  Dispense 1 20   Narendra Thompson MD   Calcium Carb-Cholecalciferol (CALCIUM + D3 PO) Take by mouth    Historical Provider, MD   amiodarone (CORDARONE) 200 MG tablet Take 1 tablet by mouth Twice a Week 20   Pablito Ureña MD b complex vitamins tablet Take 1 tablet by mouth daily    Historical Provider, MD   magnesium oxide (MAG-OX) 400 MG tablet Take 400 mg by mouth daily    Historical Provider, MD   Vitamin D (CHOLECALCIFEROL) 1000 UNITS CAPS capsule Take 1,000 Units by mouth daily. Historical Provider, MD       Current medications:    No current facility-administered medications for this encounter. Current Outpatient Medications   Medication Sig Dispense Refill    lidocaine-prilocaine (EMLA) 2.5-2.5 % cream Apply topically to Left Areola 1.5 hours prior to procedure 1 Tube 0    rivaroxaban (XARELTO) 20 MG TABS tablet TAKE 1 TABLET BY MOUTH EVERY DAY WITH BREAKFAST (Patient not taking: Reported on 2/1/2021) 90 tablet 3    ANORO ELLIPTA 62.5-25 MCG/INH AEPB inhaler TAKE 1 PUFF BY MOUTH EVERY DAY 1 each 2    potassium chloride (KLOR-CON M) 20 MEQ extended release tablet Take 1 tablet by mouth daily 30 tablet 6    Psyllium (METAMUCIL FIBER PO) Take by mouth      zoster recombinant adjuvanted vaccine [facility]) 50 MCG/0.5ML SUSR injection Inject 0.5 mLs into the muscle See Admin Instructions 1 dose now and repeat in 2-6 months (Patient not taking: Reported on 2/1/2021) 0.5 mL 0    pravastatin (PRAVACHOL) 10 MG tablet Take 1 tablet by mouth daily 90 tablet 3    dilTIAZem (CARDIZEM CD) 240 MG extended release capsule TAKE 1 CAPSULE BY MOUTH EVERY DAY 90 capsule 3    furosemide (LASIX) 40 MG tablet TAKE 1 TABLET BY MOUTH EVERY DAY 90 tablet 3    Handicap Placard MISC by Does not apply route Good for 5 years. Dispense 1 1 each 0    Calcium Carb-Cholecalciferol (CALCIUM + D3 PO) Take by mouth      amiodarone (CORDARONE) 200 MG tablet Take 1 tablet by mouth Twice a Week 26 tablet 3    b complex vitamins tablet Take 1 tablet by mouth daily      magnesium oxide (MAG-OX) 400 MG tablet Take 400 mg by mouth daily      Vitamin D (CHOLECALCIFEROL) 1000 UNITS CAPS capsule Take 1,000 Units by mouth daily.            Allergies: No Known Allergies    Problem List:    Patient Active Problem List   Diagnosis Code    Hyperlipidemia with target LDL less than 130 E78.5    Vitamin D deficiency E55.9    Atrial fibrillation (HCC) I48.91    Pulmonary emphysema ([facility] [address] J43.9    Bilateral edema of lower extremity R60.0    Other plastic surgery for unacceptable cosmetic appearance Z41.1    Tobacco abuse Z72.0    Fatigue R53.83    Chronic obstructive pulmonary disease (HCC) J44.9    Acute bronchitis J20.9    Hypoxemia R09.02    Shortness of breath R06.02    Fracture of humerus, proximal S42.209A    Benign neoplasm of sigmoid colon D12.5    Other hemorrhoids K64.8    Hx of colonic polyps Z86.010    Family history of colon cancer Z80.0    Osteopenia of necks of both femurs M85.851, M85.852    Congestive heart failure (HCC) I50.9    Bilateral pulmonary infiltrates on CXR R91.8    Pericardial calcification I31.8    Pericardial effusion I31.3    Pleural effusion J90    Atelectasis, left J98.11    Abnormal mammogram of left breast R92.8    Left breast mass N63.20    Overweight (BMI 25.0-29. 9) E66.3    Malignant neoplasm of upper-inner quadrant of left breast in female, estrogen receptor negative (HCC) C50.212, Z17.1    Neck nodule R22.1    Hemoptysis R04.2    Eczema L30.9    Dysphonia R49.0       Past Medical History:        Diagnosis Date    Abnormal echocardiogram 2020    possible PFO?  further eval suggested    COPD (chronic obstructive pulmonary disease) ([facility] [address] 2015    Dr Anastacio Delcid Current use of long term anticoagulation     Cyst of neck 2012    after plastic surgery    Emphysema of lung ([facility] [address]     History of compression fracture of vertebral column 2020    T2, L1    History of humerus fracture     LEFT    History of left heart catheterization 03/2020    at [facility], normal LVEF with impaired relaxation, mild AV calcification and mild MV thickening, normal RV    History of sustained ventricular tachycardia 03/2020    had to wear a LifeVest, Dr Skyler May Hyperlipidemia LDL goal < 130     Intermittent atrial fibrillation [facility])     Dr Amor Guzman, Dr Christian Yanez, Dr Wilma Swenson Osteopenia 2014    S/P colonoscopic polypectomy , ,     Dr Molina Stewart    Smoking history     Vitamin D deficiency        Past Surgical History:        Procedure Laterality Date    ATRIAL ABLATION SURGERY      BREAST BIOPSY Left 2021    BREAST SURGERY  1975    staph infection, postpartum    COLONOSCOPY  2015    w/polypectomy Dr Sabino Lizama      Dr Ariana Falcon; HI RISK IND N/A 2018    COLONOSCOPY performed by Serena Womack MD at [employer] History:    Social History     Tobacco Use    Smoking status: Former Smoker     Packs/day: 1.00     Years: 50.00     Pack years: 50.00     Types: Cigarettes     Start date:      Quit date: 2015     Years since quittin.1    Smokeless tobacco: Never Used    Tobacco comment: pt will try smoking cessation   Substance Use Topics    Alcohol use: No                                Counseling given: Not Answered  Comment: pt will try smoking cessation      Vital Signs (Current): There were no vitals filed for this visit.                                            BP Readings from Last 3 Encounters:   21 136/74   21 128/78   21 (!) 148/76       NPO Status:                                                                                 BMI:   Wt Readings from Last 3 Encounters:   21 138 lb 3.2 oz (62.7 kg)   21 141 lb (64 kg)   21 141 lb (64 kg)     There is no height or weight on file to calculate BMI.    CBC:   Lab Results   Component Value Date    WBC 6.5 2021    RBC 4.99 2021    HGB 15.6 2021    HCT 46.9 2021    MCV 94.0 2021    RDW 13.3 2021     2021       CMP:   Lab Results   Component Value Date     2021    K 4.1 2021 CL 99 02/01/2021    CO2 27 02/01/2021    BUN 13 02/01/2021    CREATININE 0.97 02/01/2021    GFRAA >60.0 02/01/2021    LABGLOM 56.4 02/01/2021    GLUCOSE 100 02/01/2021    GLUCOSE 98 06/12/2020    PROT 7.3 11/02/2020    CALCIUM 11.1 02/01/2021    BILITOT 0.3 11/02/2020    ALKPHOS 84 11/02/2020    AST 19 11/02/2020    ALT 17 11/02/2020       POC Tests: No results for input(s): POCGLU, POCNA, POCK, POCCL, POCBUN, POCHEMO, POCHCT in the last 72 hours. Coags:   Lab Results   Component Value Date    PROTIME 14.5 06/12/2020    INR 1.2 06/12/2020    APTT 34.0 12/22/2015       HCG (If Applicable): No results found for: PREGTESTUR, PREGSERUM, HCG, HCGQUANT     ABGs:   Lab Results   Component Value Date    PHART 7.33 03/13/2020    PO2ART 82 03/13/2020    HYU4SAR 41 03/13/2020    XAJ1CIO 23.3 12/22/2015    BEART -2 12/22/2015    N3GCDPJC 95 03/13/2020        Type & Screen (If Applicable):  No results found for: LABABO, LABRH    Drug/Infectious Status (If Applicable):  No results found for: HIV, HEPCAB    COVID-19 Screening (If Applicable):   Lab Results   Component Value Date    COVID19 Not Detected 01/29/2021         Anesthesia Evaluation  Patient summary reviewed and Nursing notes reviewed no history of anesthetic complications:   Airway: Mallampati: II  TM distance: >3 FB   Neck ROM: full  Mouth opening: > = 3 FB Dental: normal exam         Pulmonary:   (+) COPD:  shortness of breath:      (-) not a current smoker                          ROS comment: Reformed smoker 2015.  50 pk yr hx   Cardiovascular:    (+) dysrhythmias: atrial fibrillation, CHF:, hyperlipidemia                  Neuro/Psych:   Negative Neuro/Psych ROS              GI/Hepatic/Renal: Neg GI/Hepatic/Renal ROS            Endo/Other:    (+) blood dyscrasia: anticoagulation therapy:., .                 Abdominal:           Vascular: negative vascular ROS.                                        Anesthesia Plan      general     ASA 3     (PEC 1&2 block)  Induction: intravenous. MIPS: Postoperative opioids intended and Prophylactic antiemetics administered. Anesthetic plan and risks discussed with patient. Plan discussed with CRNA.     Attending anesthesiologist reviewed and agrees with Jeannine Rice MD   2/4/2021

## 2021-02-04 NOTE — H&P
UPDATED HISTORY AND PHYSICAL EXAMINATION    SERVICE DATE:  2/4/2021   SERVICE TIME:  11:27 AM    PHYSICAL EXAM MUST BE COMPLETED ON ADMISSION    The History and Physical (completed in the past 30 days) has been reviewed and the patient has been examined. The contents accurately reflect the patient's condition with the following additions or revisions since the H&P was completed. Examination indicates no changes. This H&P can be found in the Epic. The patient was counseled at length about the risks of sonia Covid-19 during their perioperative period and any recovery window from their procedure. The patient was made aware that sonia Covid-19  may worsen their prognosis for recovering from their procedure  and lend to a higher morbidity and/or mortality risk. All material risks, benefits, and reasonable alternatives including postponing the procedure were discussed. The patient does wish to proceed with the procedure at this time.     SIGNATURE: Linsey Douglas MD PATIENT NAME: Hemalatha Hernandez   DATE: 2/4/21 MRN: [id number]   TIME: 11:27 AM EST PHONE: [phone]

## 2021-02-05 ENCOUNTER — TELEPHONE (OUTPATIENT)
Dept: SURGERY | Age: 71
End: 2021-02-05

## 2021-02-05 NOTE — TELEPHONE ENCOUNTER
I called the patient post Left Lumpectomy and SLNB. The patient stated her pain is,\"very mild, a level 1\", and stated ,\" I haven't had to take anything for pain\". The patient verbalized the dressing is dry and intact. The Patient has no questions or concerns at this time.

## 2021-02-10 ENCOUNTER — TELEPHONE (OUTPATIENT)
Dept: SURGERY | Age: 71
End: 2021-02-10

## 2021-02-10 RX ORDER — POTASSIUM CHLORIDE 1500 MG/1
TABLET, FILM COATED, EXTENDED RELEASE ORAL
Qty: 90 TABLET | Refills: 2 | Status: SHIPPED | OUTPATIENT
Start: 2021-02-10 | End: 2021-05-10 | Stop reason: SDUPTHER

## 2021-02-10 NOTE — TELEPHONE ENCOUNTER
----- Message from Ramone Garces MD sent at 2/10/2021  5:16 AM EST -----  Regarding: FW:  Call with path, neg margins  ----- Message -----  From: Kirstie Castillo Incoming Lab Results From Soft  Sent: 2/9/2021   4:13 PM EST  To: Ramone Garces MD

## 2021-02-10 NOTE — TELEPHONE ENCOUNTER
I notified the patient of her Left Lumpectomy pathology results showing negative margins. The patient verbalized understanding of her pathology results and has no questions at this time.

## 2021-02-19 ENCOUNTER — OFFICE VISIT (OUTPATIENT)
Dept: SURGERY | Age: 71
End: 2021-02-19
Payer: COMMERCIAL

## 2021-02-19 VITALS
HEART RATE: 83 BPM | SYSTOLIC BLOOD PRESSURE: 136 MMHG | RESPIRATION RATE: 18 BRPM | OXYGEN SATURATION: 95 % | HEIGHT: 62 IN | BODY MASS INDEX: 26.5 KG/M2 | TEMPERATURE: 96.8 F | WEIGHT: 144 LBS | DIASTOLIC BLOOD PRESSURE: 74 MMHG

## 2021-02-19 DIAGNOSIS — C50.212 MALIGNANT NEOPLASM OF UPPER-INNER QUADRANT OF LEFT BREAST IN FEMALE, ESTROGEN RECEPTOR NEGATIVE (HCC): Primary | ICD-10-CM

## 2021-02-19 DIAGNOSIS — Z17.1 MALIGNANT NEOPLASM OF UPPER-INNER QUADRANT OF LEFT BREAST IN FEMALE, ESTROGEN RECEPTOR NEGATIVE (HCC): Primary | ICD-10-CM

## 2021-02-19 DIAGNOSIS — N64.9 BREAST DISORDER: ICD-10-CM

## 2021-02-19 PROCEDURE — 99024 POSTOP FOLLOW-UP VISIT: CPT | Performed by: SURGERY

## 2021-02-19 PROCEDURE — 99213 OFFICE O/P EST LOW 20 MIN: CPT | Performed by: SURGERY

## 2021-02-19 NOTE — PROGRESS NOTES
The patient was asked if she would like a chaperone present for her intimate exam. She  Declined the chaperone.  Madeleine Tate

## 2021-02-19 NOTE — PROGRESS NOTES
POSTOPERATIVE NOTE    PATIENT:  Madeline Campbell     DATE:     2/19/2021     TIME: 7:55 AM EST     HISTORY AND CHIEF COMPLAINT:    Madeline Campbell  is a 65y.o. year old  Female  status post  Left breast lumpectomy and sln    The pathology showed clear margins. PHYSICAL EXAMINATION:      Vitals:    02/19/21 0739   BP: 136/74   Pulse: 83   Resp: 18   Temp: 96.8 °F (36 °C)   SpO2: 95%       Madeline Campbell  is a 65y.o. year old  Female in no acute distress, alert and oriented x 3. Incision: clean, dry, intact    IMPRESSION:    Status post left lumpectomy and sln    PLAN:    Patient is doing well. Recommend clinical breast exams in the breast surgical suite in 6 month(s)  Mammography in 6 month(s)  Recommend an active lifestyle, healthy diet, limited alcohol intake, achieve and maintain a healthy BMI to optimize breast cancer outcomes / decrease risk of breast cancer.   Follow up with medical oncology  Follow up with radiation oncology  Follow up with PCP     --------------------------------------------------------------------------------------------------------------------------------------------------------------------------------------------------------------------

## 2021-02-19 NOTE — PROGRESS NOTES
Patient was also seen for her genetic counseling today:      GENETIC COUNSELING RISK ASSESSMENT, DISCUSSION, AND SUGGESTED FOLLOW UP:  We reviewed the natural history and genetic etiology of sporadic, familial and hereditary cancer syndromes. The patient's personal and family history is potentially suggestive of: Hereditary Breast and Ovarian Cancer Syndrome. The patient meets NCCN HBOC testing criteria based on her personal and family history. Her personal history of triple negative breast cancer and family history of breast cancer. We discussed that identification of a hereditary cancer syndrome may help her care providers tailor the patients medical management. If a mutation indicating Hereditary Breast and Ovarian Cancer Syndrome is detected in this case, the [address] [place] [address] recommendations would include increased cancer surveillance and prophylactic surgery options. If a mutation is detected, the patient will be referred back to the referring provider and to any additional appropriate care providers to discuss the relevant options. Inheritance of hereditary cancer syndromes was discussed with the patient. If a mutation is not found in the patient, this will decrease the likelihood of Hereditary Breast and Ovarian Cancer Syndrome as the explanation for her personal history. Cancer surveillance options would be discussed for the patient according to the appropriate standard [facility] guidelines, with consideration of their personal and family history risk factors. In this case, the patient will be referred back to their care providers for discussions of management. The patient was offered Integrated [facility] with [facility] through [employer].     After considering the risks, benefits, and limitations, the patient chose to pursue and provided informed consent for the following testing:  Breast cancer high risk panel through [employer]. Dictated by:  Francisco Wilson MD 2/19/2021 7:55 AM EST     Total face to face time was 20 minutes with greater than 50% spent on counseling the patient or coordinating her care.

## 2021-02-24 ENCOUNTER — HOSPITAL ENCOUNTER (OUTPATIENT)
Dept: PULMONOLOGY | Age: 71
Discharge: HOME | End: 2021-02-24
Payer: COMMERCIAL

## 2021-02-24 DIAGNOSIS — J44.9 CHRONIC OBSTRUCTIVE PULMONARY DISEASE, UNSPECIFIED COPD TYPE (HCC): ICD-10-CM

## 2021-02-24 PROCEDURE — 94729 DIFFUSING CAPACITY: CPT | Performed by: INTERNAL MEDICINE

## 2021-02-24 PROCEDURE — 94726 PLETHYSMOGRAPHY LUNG VOLUMES: CPT | Performed by: INTERNAL MEDICINE

## 2021-02-24 PROCEDURE — 94726 PLETHYSMOGRAPHY LUNG VOLUMES: CPT

## 2021-02-24 PROCEDURE — 94060 EVALUATION OF WHEEZING: CPT

## 2021-02-24 PROCEDURE — 94729 DIFFUSING CAPACITY: CPT

## 2021-02-24 PROCEDURE — 94060 EVALUATION OF WHEEZING: CPT | Performed by: INTERNAL MEDICINE

## 2021-02-24 PROCEDURE — 6360000002 HC RX W HCPCS: Performed by: INTERNAL MEDICINE

## 2021-02-24 RX ORDER — ALBUTEROL SULFATE 2.5 MG/3ML
2.5 SOLUTION RESPIRATORY (INHALATION) ONCE
Status: COMPLETED | OUTPATIENT
Start: 2021-02-24 | End: 2021-02-24

## 2021-02-24 RX ADMIN — ALBUTEROL SULFATE 2.5 MG: 2.5 SOLUTION RESPIRATORY (INHALATION) at 08:26

## 2021-02-25 ENCOUNTER — HOSPITAL ENCOUNTER (OUTPATIENT)
Dept: RADIATION ONCOLOGY | Age: 71
Discharge: HOME | End: 2021-02-25
Payer: COMMERCIAL

## 2021-02-25 ENCOUNTER — HOSPITAL ENCOUNTER (OUTPATIENT)
Dept: RADIATION ONCOLOGY | Age: 71
Discharge: HOME | End: 2021-02-25
Attending: RADIOLOGY
Payer: COMMERCIAL

## 2021-02-25 VITALS
HEIGHT: 61 IN | BODY MASS INDEX: 27 KG/M2 | WEIGHT: 143 LBS | TEMPERATURE: 97.3 F | DIASTOLIC BLOOD PRESSURE: 70 MMHG | OXYGEN SATURATION: 97 % | SYSTOLIC BLOOD PRESSURE: 152 MMHG | RESPIRATION RATE: 16 BRPM | HEART RATE: 74 BPM

## 2021-02-25 PROCEDURE — 99212 OFFICE O/P EST SF 10 MIN: CPT | Performed by: RADIOLOGY

## 2021-02-25 PROCEDURE — 77334 RADIATION TREATMENT AID(S): CPT | Performed by: RADIOLOGY

## 2021-02-25 PROCEDURE — 77290 THER RAD SIMULAJ FIELD CPLX: CPT | Performed by: RADIOLOGY

## 2021-02-25 NOTE — H&P
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 Maryjane Rubinstein to see this 65 y.o. female who had an abnormal screening mammogram and diagnostic imaging followed by core biopsy:  1/6/21:  FINDINGS: Routine two view mammography was performed and compared to previous study.    No additional images were obtained There are scattered fibroglandular densities.  No new suspicious microcalcifications are visualized to suggest malignancy. There    are stable areas of asymmetry in both breasts. No suspicious mass right breast. No evidence of skin thickening.       Left breast: There is an 8 mm retroareolar nodule which is located slightly medial and superior to the nipple. Recommend additional mammographic tomographic imaging and ultrasound.       CAD analysis was performed and used in the interpretation.           Impression       LEFT BREAST: BI-RADS 0: NEED ADDITIONAL IMAGING EVALUATION. 1/11/21:  FINDINGS:  3-D tomosynthesis performed. Images obtained in the CC, MLO, and 90 degree mediolateral projection. Targeted left breast sonogram in the upper inner quadrant performed.         Scattered breast parenchyma on the left side noted.             An ill-defined 8 x 7 mm nodular density has developed in the upper inner left breast. Nipple marker is in place on the left side. Ultrasound reveals a 0.8 cm daria shaped irregular hypoechoic mass in the left breast along the 10:00 axis II cm from the    nipple. There is posterior shadowing and small spicules associated with this hypoechoic mass. Neoplasm is suspected. Left breast biopsy with ultrasound guidance recommended.  Postbiopsy mammogram with clip in place would confirm this represents the new    lesion seen on the left breast mammogram.       I have discussed my findings with the patient who understands my concern for developing neoplasm. Follow-up with nurse navigator is in progress.           CAD analysis was performed and used in the interpretation.           Impression   BI-RADS 4: SUSPICIOUS FINDING--BIOPSY SHOULD BE CONSIDERED.         1/19/21:  PROCEDURE: NEEDLE BIOPSY   SPECIMEN LATERALITY: LEFT   TUMOR SIZE: GREATEST DIMENSION OF LARGEST INVASIVE FOCUS: 6 MM   HISTOLOGIC TYPE: INVASIVE DUCTAL CARCINOMA   HISTOLOGIC GRADE: GLANDULAR DIFFERENTIATION SCORE 3, NUCLEAR PLEOMORPHISM   SCORE 3, MITOTIC RATE SCORE 2.  OVERALL GRADE:  GRADE 3   DUCTAL CARCINOMA IN SITU: PRESENT.  ARCHITECTURAL PATTERN: CRIBRIFORM,   NUCLEAR GRADE: GRADE 3 (HIGH), NECROSIS: NOT IDENTIFIED   LYMPHOVASCULAR INVASION: NOT IDENTIFIED   MICROCALCIFICATIONS:  NOT IDENTIFIED     ESTROGEN/ PROGESTERONE RECEPTORS:     ANTIBODY:  ER     %TUMOR STAINING/INTENSITY: 0 %/NOT APPLICABLE      INTERPRETATION: NEGATIVE.  INTERNAL CONTROLS CELLS PRESENT AND STAIN   AS EXPECTED. ANTIBODY:  ND     %TUMOR STAINING/INTENSITY:  4%/WOW APPLICABLE      INTERPRETATION:   NEGATIVE.  INTERNAL CONTROLS CELLS ARE ABSENT.      HER-2 STUDIES:  HER2 NEGATIVE BY FISH (SCORE 0)     She underwent partial mastectomy and SLN evaluation on 2/4/21:  FINAL DIAGNOSIS:   A.  LEFT BREAST MASS:   INVASIVE DUCTAL CARCINOMA     B.  LEFT BREAST SUPERIOR MARGIN:   DUCTAL CARCINOMA IN SITU, NOT PRESENT AT RESECTION MARGIN     C.  LEFT BREAST MEDIAL MARGIN:   BREAST PARENCHYMA, NO EVIDENCE OF CARCINOMA     D.  LEFT BREAST INFERIOR MARGIN:   BREAST PARENCHYMA, NO EVIDENCE OF CARCINOMA     E.  LEFT BREAST LATERAL MARGIN:   BREAST PARENCHYMA, NO EVIDENCE OF CARCINOMA     F.  LEFT BREAST POSTERIOR MARGIN:   BREAST PARENCHYMA, NO EVIDENCE OF CARCINOMA     G.  LEFT SENTINEL LYMPH NODES:   TWO LYMPH NODES, NO EVIDENCE OF CARCINOMA         PROCEDURE:  EXCISION, LESS THAN TOTAL MASTECTOMY   SPECIMEN LATERALITY: LEFT   TUMOR SIZE: GREATEST DIMENSION OF LARGEST INVASIVE FOCUS:  9MM   HISTOLOGIC TYPE: INVASIVE DUCTAL CARCINOMA   HISTOLOGIC GRADE: GLANDULAR DIFFERENTIATION SCORE 3, NUCLEAR PLEOMORPHISM   SCORE 3, MITOTIC RATE SCORE 2.  OVERALL GRADE: GRADE 3   DUCTAL CARCINOMA IN SITU: PRESENT   INVASIVE CARCINOMA MARGINS: UNINVOLVED BY INVASIVE CARCINOMA.  DISTANCE   FROM CLOSEST MARGIN: AT LEAST 15 MM.  SPECIFY CLOSEST MARGIN: SUPERIOR   DCIS MARGINS: UNINVOLVED BY DCIS.  DISTANCE FROM CLOSEST MARGIN: 3 MM. SPECIFY CLOSEST MARGIN: SUPERIOR   REGIONAL LYMPH NODES: UNINVOLVED BY TUMOR CELLS.  NUMBER OF SENTINEL   NODES EXAMINED: 2   TREATMENT EFFECT: NO KNOWN PRESURGICAL THERAPY   LYMPHOVASCULAR INVASION: SUSPICIOUS FOR LYMPHATIC INVASION   PATHOLOGIC STAGE:  T1B, SN-N0     She is healed and is here today to discuss adjuvant radiation recommendations. She sees Dr Dawit Duff 3/3 in Medical oncology for consultation at 4 pm.     PAST MEDICAL HISTORY:   Past Medical History:   Diagnosis Date    Abnormal echocardiogram 2020    possible PFO?  further eval suggested    Cancer [facility])     left breast    COPD (chronic obstructive pulmonary disease) ([facility] [address] 2015    Dr Liz Owusu Current use of long term anticoagulation     Cyst of neck 2012    after plastic surgery    Emphysema of lung ([facility] [address]     History of compression fracture of vertebral column 2020    T2, L1    History of humerus fracture     LEFT    History of left heart catheterization 03/2020    at [facility], normal LVEF with impaired relaxation, mild AV calcification and mild MV thickening, normal RV    History of sustained ventricular tachycardia 03/2020    had to wear a LifeVest, Dr Josue Al Hyperlipidemia LDL goal < 130     Intermittent atrial fibrillation [facility]) 2015    Dr Hughes Lipoma, Dr Jackson Scot, Dr Ness Lauren Osteopenia 2014    S/P colonoscopic polypectomy 2008, 2015, 2018    Dr Adames Stable    Smoking history     Vitamin D deficiency        PAST SURGICAL HISTORY:  Past Surgical History: Procedure Laterality Date    ATRIAL ABLATION SURGERY      BREAST BIOPSY Left 01/20/2021    BREAST BIOPSY Left 2/4/2021    LEFT BREAST LUMPECTOMY AND  SENTINEL LYMPH NODE BIOPSY performed by Travis Combs MD at [facility]    stap infection, postpartum    COLONOSCOPY  07/22/2015    w/polypectomy Dr Ele García  2012    Dr April Ledezma; HI RISK IND N/A 07/13/2018    COLONOSCOPY performed by Felix Ha MD at [facility]:   No Known Allergies    CURRENT MEDICATIONS:   Prior to Admission medications    Medication Sig Start Date End Date Taking?  Authorizing Provider   rivaroxaban (XARELTO) 20 MG TABS tablet TAKE 1 TABLET BY MOUTH EVERY DAY WITH BREAKFAST 1/7/21  Yes Pablito Ureña MD   ANORO ELLIPTA 62.5-25 MCG/INH AEPB inhaler TAKE 1 PUFF BY MOUTH EVERY DAY 12/20/20  Yes Megan Shipley MD   potassium chloride (KLOR-CON M) 20 MEQ extended release tablet Take 1 tablet by mouth daily 12/2/20  Yes Pablito Ureña MD   Psyllium (METAMUCIL FIBER PO) Take by mouth   Yes Historical Provider, MD   zoster recombinant adjuvanted vaccine [facility]) 50 MCG/0.5ML SUSR injection Inject 0.5 mLs into the muscle See Admin Instructions 1 dose now and repeat in 2-6 months 11/11/20 5/10/21 Yes Narendra Thompson MD   dilTIAZem (CARDIZEM CD) 240 MG extended release capsule TAKE 1 CAPSULE BY MOUTH EVERY DAY 9/17/20  Yes Pablito Ureña MD   furosemide (LASIX) 40 MG tablet TAKE 1 TABLET BY MOUTH EVERY DAY 8/13/20  Yes Pablito Ureña MD   Calcium Carb-Cholecalciferol (CALCIUM + D3 PO) Take by mouth   Yes Historical Provider, MD   amiodarone (CORDARONE) 200 MG tablet Take 1 tablet by mouth Twice a Week 6/18/20  Yes Pablito Ureña MD   b complex vitamins tablet Take 1 tablet by mouth daily   Yes Historical Provider, MD   magnesium oxide (MAG-OX) 400 MG tablet Take 400 mg by mouth daily   Yes Historical Provider, MD   Vitamin D (CHOLECALCIFEROL) 1000 UNITS CAPS capsule Take 1,000 Units by mouth daily. Yes Historical Provider, MD   potassium chloride (KLOR-CON M) 20 MEQ TBCR extended release tablet TAKE 1 TABLET BY MOUTH EVERY DAY 2/10/21   Lenore Hwang MD   lidocaine-prilocaine (EMLA) 2.5-2.5 % cream Apply topically to Left Areola 1.5 hours prior to procedure 21   Ishaan Spence MD   pravastatin (PRAVACHOL) 10 MG tablet Take 1 tablet by mouth daily  Patient taking differently: Take 10 mg by mouth daily Pt states on hold for now 20   Hayden Luna MD   Handicap Placard MISC by Does not apply route Good for 5 years. Dispense 1 20   Hayden Luna MD       I have personally reviewed and reconciled the medications listed. FAMILY HISTORY:   Family History   Problem Relation Age of Onset    Osteoarthritis Mother     Osteoporosis Mother     Cancer Mother         anorectal     Colon Cancer Mother        SOCIAL HISTORY:   Social History     Tobacco Use   Smoking Status Former Smoker    Packs/day: 1.00    Years: 50.00    Pack years: 50.00    Types: Cigarettes    Start date:     Quit date: 2015    Years since quittin.2   Smokeless Tobacco Never Used   Tobacco Comment    pt will try smoking cessation     Social History     Substance and Sexual Activity   Alcohol Use No       Review Of Systems:     Pain Score:   Pain Score (1-10): none    General: alert, oriented, talkative  Patient has gained weight []? Yes   [x]? No  Patient has lost weight []? Yes   [x]? No  How much weight in pounds and over what length of time:      Eyes (Ophthalmic): No Problem, has cataracts                 Skin (Dermatological): No Problems                ENT: No Problems                Respiratory: FOWLER on occasion                Cardiovascular: No Problems                            Device   []? Yes   [x]? No              Copy of Card Obtained []? Yes   [x]?  No     Gastrointestinal: No Problems     Genito-Urinary: No Problems Breast: Changes left breast incisions healing                Musculoskeletal: No Problems     Neurological: No Problems                            Hematological and Lymphatic: Easy Bruising takes xarelto                Endocrine: No Problems      Gyn History:   /Para: 2/2  Age of Menarche: 4  Age of Menopause 37  Vaginal Bleeding:  no  First Pregnancy: 21  Breast Feeding:  no  Last Menstrual period:   Oral Contraceptives: yes     Number of years: 2 years  Hormone Replacement therapy no     Number of Years:   Last Pap Smear:   Last Mammogram 2021    A 10-point review of systems has been conducted and pertinent positives have been   recorded. All other review of systems are negative. ECOG PERFORMANCE STATUS: 1    VITAL SIGNS:   Vitals:    21 0823   BP: (!) 152/70   Pulse: 74   Resp: 16   Temp: 97.3 °F (36.3 °C)   SpO2: 97%   Weight: 143 lb (64.9 kg)   Height: 5' 1\" (1.549 m)       PHYSICAL EXAMINATION:  Constitutional: No acute distress.  awake, alert, cooperative    HEENT:  Normocephalic, without obvious abnormality, atraumatic, EOMI, external ears without lesions, oral pharynx with moist mucus membranes, orophayrnx clear, mucous membranes moist    NECK:  Supple, without supraclavicular or cervical adenopathy, thyroid symmetric, not enlarged    CARDIOVASCULAR:  Normal apical impulse, regular rate and rhythm, normal S1 and S2, no S3 or S4, and no murmur noted    CHEST/BREASTS:  Breasts  Relatively symmetrical with healed, seroma present, skin without lesion(s), no nipple retraction or dimpling, no nipple discharge, no masses palpated, no axillary or supraclavicular adenopathy    LUNGS:  No increased work of breathing, good air exchange, clear to auscultation bilaterally, no crackles or wheezing    ABDOMEN:  Nontender, nondistended, normal bowel sounds, soft, no masses, no hepatosplenomegally    EXTREMITIES: No cyanosis clubbing or lymphedema    MUSCULOSKELETAL: No bony tenderness along

## 2021-02-25 NOTE — PROCEDURES
[address]                      [facility], [address] [facility]                    PULMONARY FUNCTION  Michelle Fuentes   65 y.o.   female  Height 62 in  Weight 144 lb      Referring provider   Margarita Rice MD    Reading provider   Yanna Zhao    Test meets ATS criteria for acceptability and reproducibility Yes    Diagnosis: FOWLER: Yes  Cough   No, wheezing No  Smoking   quit 5 years ago    Spirometry   FVC            1.74 L   64%  Post bronchodilator 1.63 L  60% Change -6 %  FEV1          0.83 L  41%   Post bronchodilator  0.86 L  42%   Change 3%  FEV1/FVC  47  %             Post bronchodilator  52 %  IXH95-17% 0.30 L  17%  Post bronchodilator  0.29 L  16%   Change -3%    Lung volume   SVC           2.09 L  77%   RV              3.14 L 147%   TLC            5.23  L 109%   RV/TLC      60 %    DLCO           53 %     Test interpretation     Spirometry meets ATS criteria for severe obstructive airway disease with no significant response to bronchodilator  Lung volume shows air trapping  Diffusion capacity is moderately reduced       Clinical correlation is recommended     Shanta [facility], 2/25/2021 10:06 AM

## 2021-02-25 NOTE — CONSULTS
NURSING ASSESSMENT     Date: 2021        Patient Name: Nuno Bennett     YOB: 1950      Age:  65 y.o. MRN: [id number]     Chaperone [] Yes   [x] No      Advance Directives:   Do you currently have completed advance directives (living will)? [] Yes   [x] No         *If yes, please bring us a copy for your records. *If no, would you like info or assistance in completing advance directives (living will)? [] Yes   [x] No    Pain Score:   Pain Score (1-10): none    General: alert, oriented, talkative  Patient has gained weight [] Yes   [x] No  Patient has lost weight [] Yes   [x] No  How much weight in pounds and over what length of time:     Eyes (Ophthalmic): No Problem, has cataracts      Skin (Dermatological): No Problems     ENT: No Problems     Respiratory: FOWLER on occasion     Cardiovascular: No Problems      Device   [] Yes   [x] No   Copy of Card Obtained [] Yes   [x] No    Gastrointestinal: No Problems    Genito-Urinary: No Problems     Breast: Changes left breast incisions healing     Musculoskeletal: No Problems    Neurological: No Problems      Hematological and Lymphatic: Easy Bruising takes xarelto     Endocrine: No Problems     Gyn History:   /Para: 2/2  Age of Menarche: 4  Age of Menopause 37  Vaginal Bleeding:  no  First Pregnancy: 21  Breast Feeding:  no  Last Menstrual period:   Oral Contraceptives: yes     Number of years: 2 years  Hormone Replacement therapy no     Number of Years:   Last Pap Smear:   Last Mammogram 2021    A 10-point review of systems has been conducted and pertinent positives have been   recorded.  All other review of systems are negative    Was the patient admitted during the course of treatment OR within 30 days of treatment? n/a    Additional Comments: will see Med Onc 3/3/21

## 2021-03-03 PROCEDURE — 77334 RADIATION TREATMENT AID(S): CPT | Performed by: RADIOLOGY

## 2021-03-03 PROCEDURE — 77295 3-D RADIOTHERAPY PLAN: CPT | Performed by: RADIOLOGY

## 2021-03-03 PROCEDURE — 77300 RADIATION THERAPY DOSE PLAN: CPT | Performed by: RADIOLOGY

## 2021-03-04 ENCOUNTER — HOSPITAL ENCOUNTER (OUTPATIENT)
Dept: RADIATION ONCOLOGY | Age: 71
Discharge: HOME | End: 2021-03-04
Attending: RADIOLOGY
Payer: COMMERCIAL

## 2021-03-04 PROCEDURE — 77280 THER RAD SIMULAJ FIELD SMPL: CPT | Performed by: RADIOLOGY

## 2021-03-08 ENCOUNTER — HOSPITAL ENCOUNTER (OUTPATIENT)
Dept: RADIATION ONCOLOGY | Age: 71
Discharge: HOME | End: 2021-03-08
Attending: RADIOLOGY
Payer: COMMERCIAL

## 2021-03-08 PROCEDURE — 77334 RADIATION TREATMENT AID(S): CPT | Performed by: RADIOLOGY

## 2021-03-08 PROCEDURE — 77300 RADIATION THERAPY DOSE PLAN: CPT | Performed by: RADIOLOGY

## 2021-03-08 PROCEDURE — 77387 GUIDANCE FOR RADJ TX DLVR: CPT | Performed by: RADIOLOGY

## 2021-03-08 PROCEDURE — 77412 RADIATION TX DELIVERY LVL 3: CPT | Performed by: RADIOLOGY

## 2021-03-09 ENCOUNTER — HOSPITAL ENCOUNTER (OUTPATIENT)
Dept: RADIATION ONCOLOGY | Age: 71
Discharge: HOME | End: 2021-03-09
Attending: RADIOLOGY
Payer: COMMERCIAL

## 2021-03-09 ENCOUNTER — TELEPHONE (OUTPATIENT)
Dept: SURGERY | Age: 71
End: 2021-03-09

## 2021-03-09 PROCEDURE — 77412 RADIATION TX DELIVERY LVL 3: CPT | Performed by: RADIOLOGY

## 2021-03-09 NOTE — TELEPHONE ENCOUNTER
Patient was informed that her genetic test results are negative for a deleterious mutation. A negative test result does not mean her risk for breast cancer is not elevated. Her risk may still be elevated and continue the current treatment / follow up plan as previously recommended by Dr. Lito Rocha. She will receive a copy of the test results in the mail. In the information packet, there is information to contact a genetic counselor. This is free of charge and Dr. Lito Rocha highly recommends all her patients to contact them. Please call the office at [phone] with any questions.

## 2021-03-10 ENCOUNTER — HOSPITAL ENCOUNTER (OUTPATIENT)
Dept: RADIATION ONCOLOGY | Age: 71
Discharge: HOME | End: 2021-03-10
Attending: RADIOLOGY
Payer: COMMERCIAL

## 2021-03-10 PROCEDURE — 77412 RADIATION TX DELIVERY LVL 3: CPT | Performed by: RADIOLOGY

## 2021-03-11 ENCOUNTER — HOSPITAL ENCOUNTER (OUTPATIENT)
Dept: RADIATION ONCOLOGY | Age: 71
Discharge: HOME | End: 2021-03-11
Attending: RADIOLOGY
Payer: COMMERCIAL

## 2021-03-11 PROCEDURE — 77412 RADIATION TX DELIVERY LVL 3: CPT | Performed by: RADIOLOGY

## 2021-03-12 ENCOUNTER — HOSPITAL ENCOUNTER (OUTPATIENT)
Dept: RADIATION ONCOLOGY | Age: 71
Discharge: HOME | End: 2021-03-12
Attending: RADIOLOGY
Payer: COMMERCIAL

## 2021-03-12 PROCEDURE — 77412 RADIATION TX DELIVERY LVL 3: CPT | Performed by: RADIOLOGY

## 2021-03-12 PROCEDURE — 77417 THER RADIOLOGY PORT IMAGE(S): CPT | Performed by: RADIOLOGY

## 2021-03-12 PROCEDURE — 77336 RADIATION PHYSICS CONSULT: CPT | Performed by: RADIOLOGY

## 2021-03-15 ENCOUNTER — HOSPITAL ENCOUNTER (OUTPATIENT)
Dept: RADIATION ONCOLOGY | Age: 71
Discharge: HOME | End: 2021-03-15
Attending: RADIOLOGY
Payer: COMMERCIAL

## 2021-03-15 PROCEDURE — 77412 RADIATION TX DELIVERY LVL 3: CPT | Performed by: RADIOLOGY

## 2021-03-16 ENCOUNTER — HOSPITAL ENCOUNTER (OUTPATIENT)
Dept: RADIATION ONCOLOGY | Age: 71
Discharge: HOME | End: 2021-03-16
Attending: RADIOLOGY
Payer: COMMERCIAL

## 2021-03-16 PROCEDURE — 77412 RADIATION TX DELIVERY LVL 3: CPT | Performed by: RADIOLOGY

## 2021-03-17 ENCOUNTER — HOSPITAL ENCOUNTER (OUTPATIENT)
Dept: RADIATION ONCOLOGY | Age: 71
Discharge: HOME | End: 2021-03-17
Attending: RADIOLOGY
Payer: COMMERCIAL

## 2021-03-17 PROCEDURE — 77412 RADIATION TX DELIVERY LVL 3: CPT | Performed by: RADIOLOGY

## 2021-03-18 ENCOUNTER — HOSPITAL ENCOUNTER (OUTPATIENT)
Dept: RADIATION ONCOLOGY | Age: 71
Discharge: HOME | End: 2021-03-18
Attending: RADIOLOGY
Payer: COMMERCIAL

## 2021-03-18 PROCEDURE — 77412 RADIATION TX DELIVERY LVL 3: CPT | Performed by: RADIOLOGY

## 2021-03-19 ENCOUNTER — HOSPITAL ENCOUNTER (OUTPATIENT)
Dept: RADIATION ONCOLOGY | Age: 71
Discharge: HOME | End: 2021-03-19
Attending: RADIOLOGY
Payer: COMMERCIAL

## 2021-03-19 PROCEDURE — 77412 RADIATION TX DELIVERY LVL 3: CPT | Performed by: RADIOLOGY

## 2021-03-19 PROCEDURE — 77417 THER RADIOLOGY PORT IMAGE(S): CPT | Performed by: RADIOLOGY

## 2021-03-19 PROCEDURE — 77336 RADIATION PHYSICS CONSULT: CPT | Performed by: RADIOLOGY

## 2021-03-22 ENCOUNTER — HOSPITAL ENCOUNTER (OUTPATIENT)
Dept: RADIATION ONCOLOGY | Age: 71
Discharge: HOME | End: 2021-03-22
Attending: RADIOLOGY
Payer: COMMERCIAL

## 2021-03-22 PROCEDURE — 77412 RADIATION TX DELIVERY LVL 3: CPT | Performed by: RADIOLOGY

## 2021-03-23 ENCOUNTER — HOSPITAL ENCOUNTER (OUTPATIENT)
Dept: RADIATION ONCOLOGY | Age: 71
Discharge: HOME | End: 2021-03-23
Attending: RADIOLOGY
Payer: COMMERCIAL

## 2021-03-23 PROCEDURE — 77412 RADIATION TX DELIVERY LVL 3: CPT | Performed by: RADIOLOGY

## 2021-03-24 ENCOUNTER — HOSPITAL ENCOUNTER (OUTPATIENT)
Dept: RADIATION ONCOLOGY | Age: 71
Discharge: HOME | End: 2021-03-24
Attending: RADIOLOGY
Payer: COMMERCIAL

## 2021-03-24 PROCEDURE — 77412 RADIATION TX DELIVERY LVL 3: CPT | Performed by: RADIOLOGY

## 2021-03-25 ENCOUNTER — HOSPITAL ENCOUNTER (OUTPATIENT)
Dept: RADIATION ONCOLOGY | Age: 71
Discharge: HOME | End: 2021-03-25
Attending: RADIOLOGY
Payer: COMMERCIAL

## 2021-03-25 PROCEDURE — 77412 RADIATION TX DELIVERY LVL 3: CPT | Performed by: RADIOLOGY

## 2021-03-26 ENCOUNTER — HOSPITAL ENCOUNTER (OUTPATIENT)
Dept: RADIATION ONCOLOGY | Age: 71
Discharge: HOME | End: 2021-03-26
Attending: RADIOLOGY
Payer: COMMERCIAL

## 2021-03-26 PROCEDURE — 77412 RADIATION TX DELIVERY LVL 3: CPT | Performed by: RADIOLOGY

## 2021-03-26 PROCEDURE — 77336 RADIATION PHYSICS CONSULT: CPT | Performed by: RADIOLOGY

## 2021-03-29 ENCOUNTER — APPOINTMENT (OUTPATIENT)
Dept: RADIATION ONCOLOGY | Age: 71
End: 2021-03-29
Attending: RADIOLOGY
Payer: COMMERCIAL

## 2021-03-29 ENCOUNTER — HOSPITAL ENCOUNTER (OUTPATIENT)
Dept: RADIATION ONCOLOGY | Age: 71
Discharge: HOME | End: 2021-03-29
Attending: RADIOLOGY
Payer: COMMERCIAL

## 2021-03-29 PROCEDURE — 77412 RADIATION TX DELIVERY LVL 3: CPT | Performed by: RADIOLOGY

## 2021-03-30 ENCOUNTER — HOSPITAL ENCOUNTER (OUTPATIENT)
Dept: RADIATION ONCOLOGY | Age: 71
Discharge: HOME | End: 2021-03-30
Attending: RADIOLOGY
Payer: COMMERCIAL

## 2021-03-30 PROCEDURE — 77412 RADIATION TX DELIVERY LVL 3: CPT | Performed by: RADIOLOGY

## 2021-03-30 PROCEDURE — 77280 THER RAD SIMULAJ FIELD SMPL: CPT | Performed by: RADIOLOGY

## 2021-03-30 PROCEDURE — 77387 GUIDANCE FOR RADJ TX DLVR: CPT | Performed by: RADIOLOGY

## 2021-03-31 ENCOUNTER — HOSPITAL ENCOUNTER (OUTPATIENT)
Dept: RADIATION ONCOLOGY | Age: 71
Discharge: HOME | End: 2021-03-31
Attending: RADIOLOGY
Payer: COMMERCIAL

## 2021-03-31 PROCEDURE — 77412 RADIATION TX DELIVERY LVL 3: CPT | Performed by: RADIOLOGY

## 2021-03-31 PROCEDURE — 77387 GUIDANCE FOR RADJ TX DLVR: CPT | Performed by: RADIOLOGY

## 2021-04-01 ENCOUNTER — HOSPITAL ENCOUNTER (OUTPATIENT)
Dept: RADIATION ONCOLOGY | Age: 71
Discharge: HOME | End: 2021-04-01
Attending: RADIOLOGY
Payer: COMMERCIAL

## 2021-04-01 PROCEDURE — 77412 RADIATION TX DELIVERY LVL 3: CPT | Performed by: RADIOLOGY

## 2021-04-01 PROCEDURE — 77387 GUIDANCE FOR RADJ TX DLVR: CPT | Performed by: RADIOLOGY

## 2021-04-02 ENCOUNTER — HOSPITAL ENCOUNTER (OUTPATIENT)
Dept: RADIATION ONCOLOGY | Age: 71
Discharge: HOME | End: 2021-04-02
Attending: RADIOLOGY
Payer: COMMERCIAL

## 2021-04-02 PROCEDURE — 77412 RADIATION TX DELIVERY LVL 3: CPT | Performed by: RADIOLOGY

## 2021-04-02 PROCEDURE — 77336 RADIATION PHYSICS CONSULT: CPT | Performed by: RADIOLOGY

## 2021-04-02 PROCEDURE — 77387 GUIDANCE FOR RADJ TX DLVR: CPT | Performed by: RADIOLOGY

## 2021-04-05 ENCOUNTER — APPOINTMENT (OUTPATIENT)
Dept: RADIATION ONCOLOGY | Age: 71
End: 2021-04-05
Attending: RADIOLOGY
Payer: COMMERCIAL

## 2021-04-12 ENCOUNTER — HOSPITAL ENCOUNTER (OUTPATIENT)
Dept: RADIATION ONCOLOGY | Age: 71
Discharge: HOME | End: 2021-04-12
Attending: RADIOLOGY
Payer: COMMERCIAL

## 2021-04-12 PROCEDURE — 99213 OFFICE O/P EST LOW 20 MIN: CPT | Performed by: RADIOLOGY

## 2021-04-12 NOTE — PROGRESS NOTES
NURSING ASSESSMENT     Date: 4/12/2021        Patient Name: Abraham Arana     YOB: 1950      Age:  65 y.o. MRN: [id number]     Chaperone [] Yes   [x] No      Pain Score:   Pain Score (1-10): Tenderness, not pain   Pain Location:left breast incisional area   Pain Duration:  With touching  Pain Management/Control: nothing needed      Is pain affecting your ability to take care of yourself or move throughout your home? [] Yes   [x] No    General: No Problems  Patient has gained weight [] Yes   [x] No  Patient has lost weight [] Yes   [x] No  How much weight in pounds and over what length of time:     Eyes (Ophthalmic): No Problem     Skin (Dermatological): No Problems     ENT: No Problems     Respiratory: No Problems     Cardiovascular: No Problems      Device   [] Yes   [x] No   Copy of Card Obtained [] Yes   [x] No    Gastrointestinal: No Problems    Genito-Urinary: No Problems     Breast: Changes  Left breast skin intact, tanned in appearance     Musculoskeletal: No Problems    Neurological: No Problems      Hematological and Lymphatic: No Problems     Endocrine: No Problems     Gyn History:   No problems    A 10-point review of systems has been conducted and pertinent positives have been   recorded. All other review of systems are negative    Was the patient admitted during the course of treatment OR within 30 days of treatment?  no    Additional Comments: will start chemo tomorrow

## 2021-04-12 NOTE — H&P
SURGERY  1975    staph infection, postpartum    COLONOSCOPY  07/22/2015    w/polypectomy Dr Pham Halifax  2012    Dr Senait Little; HI RISK IND N/A 07/13/2018    COLONOSCOPY performed by Venkat Donahue MD at [address]:   No Known Allergies     I have personally reviewed and reconciled the allergies listed. CURRENT MEDICATIONS:   Prior to Admission medications    Medication Sig Start Date End Date Taking? Authorizing Provider   potassium chloride (KLOR-CON M) 20 MEQ TBCR extended release tablet TAKE 1 TABLET BY MOUTH EVERY DAY 2/10/21  Yes Luna Goodwin MD   rivaroxaban (XARELTO) 20 MG TABS tablet TAKE 1 TABLET BY MOUTH EVERY DAY WITH BREAKFAST 1/7/21  Yes Luna Goodwin MD   ANORO ELLIPTA 62.5-25 MCG/INH AEPB inhaler TAKE 1 PUFF BY MOUTH EVERY DAY 12/20/20  Yes Karen Hanson MD   Psyllium (METAMUCIL FIBER PO) Take by mouth   Yes Historical Provider, MD   dilTIAZem (CARDIZEM CD) 240 MG extended release capsule TAKE 1 CAPSULE BY MOUTH EVERY DAY 9/17/20  Yes Luna Goodwin MD   furosemide (LASIX) 40 MG tablet TAKE 1 TABLET BY MOUTH EVERY DAY 8/13/20  Yes Luna Goodwin MD   amiodarone (CORDARONE) 200 MG tablet Take 1 tablet by mouth Twice a Week 6/18/20  Yes Luna Goodwin MD   b complex vitamins tablet Take 1 tablet by mouth daily   Yes Historical Provider, MD   magnesium oxide (MAG-OX) 400 MG tablet Take 400 mg by mouth daily   Yes Historical Provider, MD   Vitamin D (CHOLECALCIFEROL) 1000 UNITS CAPS capsule Take 1,000 Units by mouth daily. Yes Historical Provider, MD   zoster recombinant adjuvanted vaccine [facility]) 50 MCG/0.5ML SUSR injection Inject 0.5 mLs into the muscle See Admin Instructions 1 dose now and repeat in 2-6 months 11/11/20 5/10/21  Parul Moran MD   Handicap Placard MISC by Does not apply route Good for 5 years.  Dispense 1 7/27/20   Parul Moran MD   Calcium Carb-Cholecalciferol (CALCIUM + D3 PO) Take by other review of systems are negative. ECOG PERFORMANCE STATUS: 0    VITAL SIGNS:   T 97.5  P 73  RR 16  /82 Wt 137lbs    PHYSICAL EXAMINATION:  Constitutional: No acute distress. awake, alert, cooperative    CHEST/BREASTS:  Breasts  Relatively symmetrical, skin minimal residual hyperpigmentation and erythema, no breakdown,   no nipple retraction or dimpling, no nipple discharge, no masses palpated, no axillary or supraclavicular adenopathy    EXTREMITIES: NO cyanosis clubbing or lymphedema      IMPRESSION/PLAN:    I spent 15 minutes face to face with the patient. Resolving dermatitis. Long term skin care, follow up self breast exam, survivorship mammograms discussed. She will follow with medical oncology and surgery who will order mammography. Written material dispensed. Thank you for allowing us to participate in the care of this patient.   Sincerely,    Electronically signed by Jose L Patel MD on 4/12/21 at 1:05 PM EDT      cc:   MD Margot Oscar MD Lani Hasting, MD  [address] [place] [address]

## 2021-04-19 DIAGNOSIS — J44.1 COPD EXACERBATION (HCC): ICD-10-CM

## 2021-04-20 RX ORDER — UMECLIDINIUM BROMIDE AND VILANTEROL TRIFENATATE 62.5; 25 UG/1; UG/1
1 POWDER RESPIRATORY (INHALATION) DAILY
Qty: 60 PUFF | Refills: 2 | Status: SHIPPED | OUTPATIENT
Start: 2021-04-20 | End: 2021-06-28 | Stop reason: SDUPTHER

## 2021-04-22 ENCOUNTER — TELEPHONE (OUTPATIENT)
Dept: CASE MANAGEMENT | Age: 71
End: 2021-04-22

## 2021-04-22 NOTE — TELEPHONE ENCOUNTER
Physician documentation on smoking history and CT Lung Screening reviewed. All required documentation complete. Patient is a former smoker with a 50 pack year history ( 1 ppd x 50 years) per physician documentation. I spoke to the patient to review details of CT lung Screening exam and informed patient of entrance to facility as well as requirement to wear a mask due to COVID precautions at this time. All questions answered a this time.

## 2021-04-26 ENCOUNTER — OFFICE VISIT (OUTPATIENT)
Dept: PULMONOLOGY | Age: 71
End: 2021-04-26
Payer: COMMERCIAL

## 2021-04-26 ENCOUNTER — HOSPITAL ENCOUNTER (OUTPATIENT)
Dept: CT IMAGING | Age: 71
Discharge: HOME | End: 2021-04-28
Payer: COMMERCIAL

## 2021-04-26 ENCOUNTER — TELEPHONE (OUTPATIENT)
Dept: CARDIOLOGY CLINIC | Age: 71
End: 2021-04-26

## 2021-04-26 VITALS
HEART RATE: 66 BPM | TEMPERATURE: 97.5 F | WEIGHT: 140 LBS | BODY MASS INDEX: 26.43 KG/M2 | HEIGHT: 61 IN | OXYGEN SATURATION: 96 % | DIASTOLIC BLOOD PRESSURE: 71 MMHG | SYSTOLIC BLOOD PRESSURE: 130 MMHG

## 2021-04-26 DIAGNOSIS — J44.9 CHRONIC OBSTRUCTIVE PULMONARY DISEASE, UNSPECIFIED COPD TYPE (HCC): Primary | ICD-10-CM

## 2021-04-26 DIAGNOSIS — R06.02 SHORTNESS OF BREATH: ICD-10-CM

## 2021-04-26 DIAGNOSIS — I50.9 CONGESTIVE HEART FAILURE, UNSPECIFIED HF CHRONICITY, UNSPECIFIED HEART FAILURE TYPE (HCC): ICD-10-CM

## 2021-04-26 DIAGNOSIS — I48.91 ATRIAL FIBRILLATION, UNSPECIFIED TYPE (HCC): ICD-10-CM

## 2021-04-26 DIAGNOSIS — Z72.0 TOBACCO ABUSE: ICD-10-CM

## 2021-04-26 PROCEDURE — 71271 CT THORAX LUNG CANCER SCR C-: CPT

## 2021-04-26 PROCEDURE — 99214 OFFICE O/P EST MOD 30 MIN: CPT | Performed by: INTERNAL MEDICINE

## 2021-04-26 ASSESSMENT — ENCOUNTER SYMPTOMS
VOICE CHANGE: 0
VOMITING: 0
EYE ITCHING: 0
WHEEZING: 0
RHINORRHEA: 0
ABDOMINAL PAIN: 0
COUGH: 0
EYE DISCHARGE: 0
SINUS PRESSURE: 0
SHORTNESS OF BREATH: 1
CHEST TIGHTNESS: 0
NAUSEA: 0
SORE THROAT: 0
TROUBLE SWALLOWING: 0
DIARRHEA: 0

## 2021-04-26 NOTE — PROGRESS NOTES
 BREAST SURGERY  1975    staph infection, postpartum    COLONOSCOPY  2015    w/polypectomy Dr Jose Thomason      Dr Tad Shone; HI RISK IND N/A 2018    COLONOSCOPY performed by Lethea Opitz, MD at [facility]     Family History   Problem Relation Age of Onset    Osteoarthritis Mother     Osteoporosis Mother     Cancer Mother         anorectal     Colon Cancer Mother      Social History     Socioeconomic History    Marital status:      Spouse name: Not on file    Number of children: 2    Years of education: Not on file    Highest education level: Not on file   Occupational History    Occupation: [employer], retired   Social Needs    Financial resource strain: Not on file    Food insecurity     Worry: Not on file     Inability: Not on file   [employer] needs     Medical: Not on file     Non-medical: Not on file   Tobacco Use    Smoking status: Former Smoker     Packs/day: 1.00     Years: 50.00     Pack years: 50.00     Types: Cigarettes     Start date:      Quit date: 2015     Years since quittin.3    Smokeless tobacco: Never Used    Tobacco comment: pt will try smoking cessation   Substance and Sexual Activity    Alcohol use: No    Drug use: No    Sexual activity: Not on file   Lifestyle    Physical activity     Days per week: Not on file     Minutes per session: Not on file    Stress: Not on file   Relationships    Social connections     Talks on phone: Not on file     Gets together: Not on file     Attends Church service: Not on file     Active member of club or organization: Not on file     Attends meetings of clubs or organizations: Not on file     Relationship status: Not on file    Intimate partner violence     Fear of current or ex partner: Not on file     Emotionally abused: Not on file     Physically abused: Not on file     Forced sexual activity: Not on file   Other Topics Concern    Not on file   Social History Narrative    Born in [facility] [place], one brother in [facility] [facility], keeps in touch    , 2 children, [place] son [place] [facility], one daughter in [place]    5 grandchildren     Lives in a house in [place] with spouse     Worked for [employer] and full time at [employer], fashion, 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 HENT: Negative for congestion, mouth sores, nosebleeds, postnasal drip, rhinorrhea, sinus pressure, sneezing, sore throat, trouble swallowing and voice change. Eyes: Negative for discharge, itching and visual disturbance. Respiratory: Positive for shortness of breath. Negative for cough, chest tightness and wheezing. Cardiovascular: Negative for chest pain, palpitations and leg swelling. Gastrointestinal: Negative for abdominal pain, diarrhea, nausea and vomiting. Genitourinary: Negative for difficulty urinating and hematuria. Musculoskeletal: Negative for arthralgias, joint swelling and myalgias. Skin: Negative for rash. Allergic/Immunologic: Negative for environmental allergies and food allergies. Neurological: Negative for dizziness, tremors, weakness and headaches. Psychiatric/Behavioral: Negative for behavioral problems and sleep disturbance.         :     Vitals:    04/26/21 0833   BP: 130/71   Pulse: 66   Temp: 97.5 °F (36.4 °C)   SpO2: 96%   Weight: 140 lb (63.5 kg)   Height: 5' 1\" (1.549 m)     Wt Readings from Last 3 Encounters:   04/26/21 140 lb (63.5 kg)   02/25/21 143 lb (64.9 kg)   02/19/21 144 lb (65.3 kg)         Physical Exam  Constitutional:       General: She is not in acute distress. Appearance: She is well-developed. She is not diaphoretic. HENT:      Head: Normocephalic and atraumatic. Nose: Nose normal.   Eyes:      Pupils: Pupils are equal, round, and reactive to light.       Comments: tablet Take 1 tablet by mouth daily      magnesium oxide (MAG-OX) 400 MG tablet Take 400 mg by mouth daily      Vitamin D (CHOLECALCIFEROL) 1000 UNITS CAPS capsule Take 1,000 Units by mouth daily. No current facility-administered medications for this visit. Results for orders placed during the hospital encounter of 01/28/21   XR CHEST (2 VW)    Narrative EXAMINATION: XR CHEST (2 VW)     CLINICAL HISTORY: J44.9 Chronic obstructive pulmonary disease, unspecified COPD type ([facility] ICD10    COMPARISONS: May 11, 2020     FINDINGS:    Two views of the chest are submitted. The cardiac silhouette is of normal size configuration. Pulmonary vascular attenuated. Widening of the AP diameter the chest.  Right sided trachea. No focal infiltrates. No effusions. No Pneumothoraces. Impression NO ACUTE ACTIVE CARDIOPULMONARY PROCESS. RADIOGRAPHIC FINDINGS OF COPD   ]  Results for orders placed during the hospital encounter of 08/23/16   CT Chest W Contrast    Narrative CT CHEST     REASON FOR EXAM  HEMOPTYSIS     COMPARISONS  none     FINDINGS  Multiplanar images were obtained following the IV injection  of 75 cc Isovue-370. A focal area of consolidation involving the right  lower lobe posteriorly is seen. There is a suggestion of air  bronchogram present. Mild atelectasis at the left lung base is seen. Remainder lungs are clear. No pleural effusions are seen. No definite  pulmonary nodules are identified. Emphysematous changes are seen. No  pneumothorax is noted. Heart appears normal in size. No pericardial  effusion is seen. No mediastinal, hilar or axillary adenopathy is  noted. Bone windows demonstrate no gross osseous abnormalities. Surrounding soft tissues are unremarkable. Limited images of the upper  abdomen demonstrate no contributory findings.      IMPRESSION  FOCAL AREA OF CONSOLIDATION INVOLVING THE RIGHT LOWER LOBE  IS SEEN. SHORT-TERM INTERVAL FOLLOWUP CT WITHOUT CONTRAST IN 3-4 MONTHS  IS SUGGESTED TO NOTE RESOLUTION. MILD ATELECTATIC CHANGES AT THE LEFT  LUNG BASE. NO OTHER SIGNIFICANT ABNORMALITIES ARE SEEN. All CT scans at this facility use dose modulation, iterative  reconstruction, and/or weight based dosing when appropriate to reduce  radiation dose to as low as reasonably achievable. Brianna Ruggiero Nadia Pace M.D. Released Nadia Pace M.D. Released Date Time- 08/24/16 9287   This document has been electronically signed. ------------------------------------------------------------------------------   ]    Assessment/Plan:     1. Chronic obstructive pulmonary disease, unspecified COPD type ([facility]  She is using bronchodilator with Anoro ellipta 1 puff daily. C/o shortness of breath with exertion. She is going for CT lung screening today  . No  Wheezing . No Cough with  Sputum. At this time continue bronchodilator as before. 2. Shortness of breath  She having  Chronic shortness of breath which is likely due to COPD, continue  Bronchodilator, as before. keep  Spo2 90% or above. 3. Atrial fibrillation, unspecified type [facility])  She  is following with cardiology, she is on Amiodarone, cardizem CD, and xarelto     4. Congestive heart failure, unspecified HF chronicity, unspecified heart failure type ([facility] [address]  She is on diuretics and potasium/     5. personal h/o smoking   She quit 5 and 1/2 yrs ago , CT lung screening f/u today. Return in about 3 months (around 7/26/2021).       Dulce Maria Rodriguez MD

## 2021-04-26 NOTE — TELEPHONE ENCOUNTER
PATIENT HAS STARTED CHEMO THERAPY (TAXOL) AND SHE IS ON Lesleigh Boga. HE EYE IS VERY BLOOD SHOT AND IS CONCERNING HER.  SHE HAS APPT. ON 5/10/21 WITH YOU. IS IT OK FOR HER TO WAIT UNTIL HER APPT. OR SHOULD SOMETHING BE DONE TO ADJUST MEDICATION?

## 2021-04-26 NOTE — TELEPHONE ENCOUNTER
Spoke to Dr Rosina Hinson, he recommended talking to primary or going to walk in.  Not familiar if there are issues with new meds to current meds

## 2021-05-05 DIAGNOSIS — Z23 ENCOUNTER FOR IMMUNIZATION: ICD-10-CM

## 2021-05-05 DIAGNOSIS — I10 ESSENTIAL HYPERTENSION: ICD-10-CM

## 2021-05-05 DIAGNOSIS — E78.5 HYPERLIPIDEMIA, UNSPECIFIED HYPERLIPIDEMIA TYPE: ICD-10-CM

## 2021-05-05 DIAGNOSIS — J44.9 CHRONIC OBSTRUCTIVE PULMONARY DISEASE, UNSPECIFIED COPD TYPE (HCC): ICD-10-CM

## 2021-05-05 LAB
ALBUMIN SERPL-MCNC: 4.1 G/DL (ref 3.5–4.6)
ALP BLD-CCNC: 66 U/L (ref 40–130)
ALT SERPL-CCNC: 14 U/L (ref 0–33)
ANION GAP SERPL CALCULATED.3IONS-SCNC: 13 MEQ/L (ref 9–15)
AST SERPL-CCNC: 21 U/L (ref 0–35)
BASOPHILS ABSOLUTE: 0 K/UL (ref 0–0.2)
BASOPHILS RELATIVE PERCENT: 0.3 %
BILIRUB SERPL-MCNC: <0.2 MG/DL (ref 0.2–0.7)
BUN BLDV-MCNC: 17 MG/DL (ref 8–23)
CALCIUM SERPL-MCNC: 10.1 MG/DL (ref 8.5–9.9)
CHLORIDE BLD-SCNC: 100 MEQ/L (ref 95–107)
CHOLESTEROL, TOTAL: 262 MG/DL (ref 0–199)
CO2: 24 MEQ/L (ref 20–31)
CREAT SERPL-MCNC: 0.82 MG/DL (ref 0.5–0.9)
EOSINOPHILS ABSOLUTE: 0 K/UL (ref 0–0.7)
EOSINOPHILS RELATIVE PERCENT: 0 %
GFR AFRICAN AMERICAN: >60
GFR NON-AFRICAN AMERICAN: >60
GLOBULIN: 3.1 G/DL (ref 2.3–3.5)
GLUCOSE BLD-MCNC: 100 MG/DL (ref 70–99)
HCT VFR BLD CALC: 38.4 % (ref 37–47)
HDLC SERPL-MCNC: 60 MG/DL (ref 40–59)
HEMOGLOBIN: 12.9 G/DL (ref 12–16)
LDL CHOLESTEROL CALCULATED: 189 MG/DL (ref 0–129)
LYMPHOCYTES ABSOLUTE: 0.4 K/UL (ref 1–4.8)
LYMPHOCYTES RELATIVE PERCENT: 6.1 %
MCH RBC QN AUTO: 31.6 PG (ref 27–31.3)
MCHC RBC AUTO-ENTMCNC: 33.5 % (ref 33–37)
MCV RBC AUTO: 94.3 FL (ref 82–100)
MONOCYTES ABSOLUTE: 0.3 K/UL (ref 0.2–0.8)
MONOCYTES RELATIVE PERCENT: 4.8 %
NEUTROPHILS ABSOLUTE: 5.1 K/UL (ref 1.4–6.5)
NEUTROPHILS RELATIVE PERCENT: 88.8 %
PDW BLD-RTO: 13.1 % (ref 11.5–14.5)
PLATELET # BLD: 508 K/UL (ref 130–400)
POTASSIUM SERPL-SCNC: 4.5 MEQ/L (ref 3.4–4.9)
RBC # BLD: 4.07 M/UL (ref 4.2–5.4)
SODIUM BLD-SCNC: 137 MEQ/L (ref 135–144)
TOTAL PROTEIN: 7.2 G/DL (ref 6.3–8)
TRIGL SERPL-MCNC: 64 MG/DL (ref 0–150)
TSH REFLEX: 0.43 UIU/ML (ref 0.44–3.86)
WBC # BLD: 5.8 K/UL (ref 4.8–10.8)

## 2021-05-10 ENCOUNTER — OFFICE VISIT (OUTPATIENT)
Dept: CARDIOLOGY CLINIC | Age: 71
End: 2021-05-10
Payer: COMMERCIAL

## 2021-05-10 VITALS
SYSTOLIC BLOOD PRESSURE: 130 MMHG | HEART RATE: 45 BPM | HEIGHT: 61 IN | OXYGEN SATURATION: 94 % | WEIGHT: 139 LBS | BODY MASS INDEX: 26.24 KG/M2 | DIASTOLIC BLOOD PRESSURE: 80 MMHG

## 2021-05-10 DIAGNOSIS — I48.91 ATRIAL FIBRILLATION, UNSPECIFIED TYPE (HCC): Primary | ICD-10-CM

## 2021-05-10 PROCEDURE — 99214 OFFICE O/P EST MOD 30 MIN: CPT | Performed by: INTERNAL MEDICINE

## 2021-05-10 RX ORDER — POTASSIUM CHLORIDE 1500 MG/1
TABLET, FILM COATED, EXTENDED RELEASE ORAL
Qty: 90 TABLET | Refills: 2 | Status: SHIPPED | OUTPATIENT
Start: 2021-05-10 | End: 2021-09-02

## 2021-05-10 RX ORDER — DILTIAZEM HYDROCHLORIDE 240 MG/1
CAPSULE, COATED, EXTENDED RELEASE ORAL
Qty: 90 CAPSULE | Refills: 3 | Status: SHIPPED | OUTPATIENT
Start: 2021-05-10 | End: 2021-06-28 | Stop reason: SDUPTHER

## 2021-05-10 RX ORDER — FUROSEMIDE 40 MG/1
TABLET ORAL
Qty: 90 TABLET | Refills: 3 | Status: SHIPPED | OUTPATIENT
Start: 2021-05-10 | End: 2021-09-02

## 2021-05-10 ASSESSMENT — ENCOUNTER SYMPTOMS
SHORTNESS OF BREATH: 1
CHEST TIGHTNESS: 0

## 2021-05-10 NOTE — PROGRESS NOTES
months        9/17/2020: Patient presents today for follow-up of atrial fibrillation hypertension.  Doing well.  Occasionally gets tired.  On amiodarone twice a week.  We will reduce the dose of Cardizem the evening dose will be stopped continue with the 240.  Continue with Xarelto.  See me in 2 months probably needs 48-hour Holter     8/13/2020: Patient presents today for follow-up of atrial fibrillation.  He had EP procedure done by Dr. Kole Westbrook. Gio Davidson was told she needs an ICD.  Last echo showed preserved LV ejection fraction. Gio Davidson is doing well.  He is on diltiazem 240 in the morning or 120 at night amiodarone 200 mg twice a week Monday and Friday and pravastatin 10 mg daily.  Lasix and potassium.  Renal function is borderline.  GFR is high 50s.  Had ultrasound of the neck which showed some thyroid cysts.  She now sees .  Used to see Dr Bridgette Cabral am going to see her in 2 months again. [facility] repeat an echocardiogram at that time.  No restrictions.        5/14/2020: Patient presents today for follow-up of atrial fibrillation.  Remains in sinus rhythm.  Last visit was with Daisy Ahn follow with Dr. Army Jennings cardiac rehab has not called her.  Otherwise feeling well blood pressure remains controlled.  No palpitation down the amiodarone to Monday and Friday only and see me in 3 months        4/28/2020 VIRTUAL: Michelle Fuentes is a 68 y. o. female evaluated via telephone on 4/28/2020.  For follow-up of atrial fibrillation.  Was admitted to [address] [facility] shows normal ejection fraction.  60%.   Mild concentric left ventricle hypertrophy.  He did start walking yesterday with her .  Got a little winded.  I think she is in sinus rhythm.  Will reduce amiodarone to Monday Wednesday and Friday.  Increase Lasix and potassium to every day.  See me in 2 weeks.  Continue Cardizem 240 in the morning and 120 at night.  Continue Xarelto           3/23/2020: Patient presents today for follow-up of recent hospitalization at [facility] for CHF atrial fibrillation.  Was seen by multiple people.  [place] cath which was negative.  Was evaluated by Dr. Alexander Gu was read by .  Showed preserved LV ejection fraction.  Had a cardiac MRI.  Bottom line is currently on amiodarone 200 mg a day he used to take that before and quit taking it 3 4 years ago. González Billy is somewhat noncompliant with medication.  Could not tolerate beta-blocker.  Currently she is on Cardizem 240 in the morning and 120 at night and was put on Lasix and potassium.  She is feeling better.  Supposed to see  for follow-up of event monitor. \"  Keep the medication the same.  I will see her in 2 weeks.     11/1/19: Patient presents today for follow-up of atrial fibrillation.  Remains in sinus rhythm.  Could not tolerate the Toprol dose.  Currently only on 12.5 daily.  Makes extremely tired.  Blood pressure remains well controlled. Perla Ley told her not to take it off no bleeding.  She has a history of being noncompliant in the past with medication.  I had long talk with her about the need to take anticoagulants or risk of stroke.  She will have a echo test and lexiscan stress test.  She will see me in February 9/30/19: Patient presents today for follow-up of recent hospitalization in [place] when she was admitted for rapid atrial fibrillation.  Reportedly had cardioversion.  Was discharged home on Toprol 50 mg 1 and half tablet a day.  And Xarelto.  Please her shortness of breath with Toprol.  Reportedly had a history of COPD which I doubt.  The rest of the trip to [place] was unremarkable she will continue with the same medication.  Take Toprol 50 mg at night and 25 mg in the morning.  She will see me in 1 month     8/29/19: Patient presents today for follow-up of atrial fibrillation.  She went to [facility]. to get stem cell injection.  She had to pay $6000 cash there is an outfit in [address] [place] [address] by a registered nurse practitioner. Saul Arauz stop [address] she works hard at think she does go into atrial fibrillation weakness at the mall.  Told her the risk of embolic event is high.  Will leave at this dose and see me in 6 months     5/20/19: Patient presents today for follow-up of atrial fibrillation. Remains in sinus rhythm. He wants to stop the Xarelto I told her not to do it at this point. Probably do a 30 day event monitor. And then decide at that point. He is retired from regular work. No chest pain angina congestive heart failure symptoms. See me in 3-6 months     11/19/18: Patient presents today for Follow-up of atrial fibrillation. Remains in sinus rhythm. Continues to work. On Cardizem and Xarelto only. No bleeding. She'll see me in 6 months. He wants to get off blood thinners. I told her not to do it right now. We will reconsider that option in 6 months.     5/14/18: Patient presents today for Follow-up of atrial fibrillation. Remains in sinus rhythm. On Xarelto. No bleeding. She is off amiodarone. Doing well. She fell down and hurt her left shoulder. Is healing up well. No syncope. See me in 6 months     11/13/17: Patient presents today for atrial fibrillation. In sinus rhythm. On Xarelto and Cardizem. No bleeding. Off amiodarone. See me in 6 months.     5/1/17: For follow-up of atrial fibrillation. Thyroid profile was normal. Amiodarone DC'd. Plan Cardizem and Xarelto. She is going to be going to [place] for road trip with her . Feels well. She is  status post cardioversion and remains in sinus rhythm. See me in 6 months. Past Medical History:   Diagnosis Date    Abnormal echocardiogram 2020    possible PFO?  further eval suggested    Cancer [facility])     left breast    COPD (chronic obstructive pulmonary disease) ([facility] 2015    Dr Owens Members Current use of long term anticoagulation     Cyst of neck 2012    after plastic surgery    Emphysema of lung ([facility]     History of compression fracture of vertebral column 2020    T2, L1    History of humerus fracture     LEFT    History of left heart catheterization 03/2020    at [facility], normal LVEF with impaired relaxation, mild AV calcification and mild MV thickening, normal RV    History of sustained ventricular tachycardia 03/2020    had to wear a LifeVest, Dr Emi Wan Hyperlipidemia LDL goal < 130     Intermittent atrial fibrillation [facility]) 2015    Dr Karely Manrique, Dr Ebony Estrada, Dr Kelvin Goodwin Osteopenia 2014    S/P colonoscopic polypectomy 2008, 2015, 2018    Dr Lisa Nails    Smoking history     Vitamin D deficiency        Past Surgical History:   Procedure Laterality Date    ATRIAL ABLATION SURGERY      BREAST BIOPSY Left 01/20/2021    BREAST BIOPSY Left 2/4/2021    LEFT BREAST LUMPECTOMY AND  SENTINEL LYMPH NODE BIOPSY performed by Jessica Kay MD at [address] [place] [address] infection, postpartum    COLONOSCOPY  07/22/2015    w/polypectomy Dr Karissa Bhatia  2012    Dr Cameron Mention; HI RISK IND N/A 07/13/2018    COLONOSCOPY performed by Leela Adams MD at [facility]       Family History   Problem Relation Age of Onset    Osteoarthritis Mother     Osteoporosis Mother     Cancer Mother         anorectal     Colon Cancer Mother        Social History     Socioeconomic History    Marital status:      Spouse name: Not on file    Number of children: 2    Years of education: Not on file    Highest education level: Not on file   Occupational History    Occupation: [employer], retired   Social Needs    Financial resource strain: Not on file    Food insecurity     Worry: Not on file     Inability: Not on file   [employer] needs     Medical: Not on file     Non-medical: Not on file   Tobacco Use    Smoking status: Former Smoker     Packs/day: 1.00     Years: 50.00     Pack years: 50.00     Types: Cigarettes     Start date: 1964     Quit date: 12/16/2015     Years since quittin.4    Smokeless tobacco: Never Used    Tobacco comment: pt will try smoking cessation   Substance and Sexual Activity    Alcohol use: No    Drug use: No    Sexual activity: Not on file   Lifestyle    Physical activity     Days per week: Not on file     Minutes per session: Not on file    Stress: Not on file   Relationships    Social connections     Talks on phone: Not on file     Gets together: Not on file     Attends Amish service: Not on file     Active member of club or organization: Not on file     Attends meetings of clubs or organizations: Not on file     Relationship status: Not on file    Intimate partner violence     Fear of current or ex partner: Not on file     Emotionally abused: Not on file     Physically abused: Not on file     Forced sexual activity: Not on file   Other Topics Concern    Not on file   Social History Narrative    Born in [place] [place], one brother in [place], keeps in touch    , 2 children, one son in [place], one daughter in [place]    5 grandchildren     Lives in a house in [place] with spouse     Worked for [employer] and full time at [employer], fashion, 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        No Known Allergies    Current Outpatient Medications   Medication Sig Dispense Refill    dilTIAZem (CARDIZEM CD) 240 MG extended release capsule TAKE 1 CAPSULE BY MOUTH EVERY DAY 90 capsule 3    potassium chloride (KLOR-CON M) 20 MEQ TBCR extended release tablet TAKE 1 TABLET BY MOUTH EVERY DAY 90 tablet 2    furosemide (LASIX) 40 MG tablet TAKE 1 TABLET BY MOUTH EVERY DAY 90 tablet 3    umeclidinium-vilanterol (ANORO ELLIPTA) 62.5-25 MCG/INH AEPB inhaler Inhale 1 puff into the lungs daily 60 puff 2    rivaroxaban (XARELTO) 20 MG TABS tablet TAKE 1 TABLET BY MOUTH EVERY DAY WITH BREAKFAST 90 tablet 3    Psyllium (METAMUCIL FIBER PO) Take by mouth      Handicap Placard MISC by Does not apply route Good for 5 years. Dispense 1 1 each 0    Calcium Carb-Cholecalciferol (CALCIUM + D3 PO) Take by mouth      amiodarone (CORDARONE) 200 MG tablet Take 1 tablet by mouth Twice a Week 26 tablet 3    b complex vitamins tablet Take 1 tablet by mouth daily      magnesium oxide (MAG-OX) 400 MG tablet Take 400 mg by mouth daily      Vitamin D (CHOLECALCIFEROL) 1000 UNITS CAPS capsule Take 1,000 Units by mouth daily. No current facility-administered medications for this visit. Review of Systems:   Review of Systems   Constitutional: Negative for diaphoresis. HENT: Negative for nosebleeds. Respiratory: Positive for shortness of breath. Negative for cough, chest tightness, wheezing and stridor. Cardiovascular: Negative for chest pain, palpitations and leg swelling. Gastrointestinal: Negative for blood in stool, diarrhea, nausea and vomiting. Musculoskeletal: Positive for myalgias. Neurological: Negative for dizziness, seizures, syncope, weakness, light-headedness, numbness and headaches. Hematological: Does not bruise/bleed easily. Psychiatric/Behavioral: Negative for suicidal ideas. The patient is not nervous/anxious. All other systems reviewed and are negative. Review of System is negative except for as mentioned above. Physical Examination:    /80 (Site: Right Upper Arm, Position: Sitting, Cuff Size: Medium Adult)   Pulse (!) 45   Ht 5' 1\" (1.549 m)   Wt 139 lb (63 kg)   LMP  (LMP Unknown)   SpO2 94%   BMI 26.26 kg/m²    Physical Exam   Constitutional: She appears healthy. No distress. HENT:   Nose: Nose normal.   Mouth/Throat: Dentition is normal. Oropharynx is clear. Eyes: Pupils are equal, round, and reactive to light. Conjunctivae are normal.   Neck: Normal range of motion and thyroid normal. Neck supple. Cardiovascular: Regular rhythm, S1 normal, S2 normal, normal heart sounds, intact distal pulses and normal pulses. PMI is not displaced.    No murmur heard.  Pulmonary/Chest: She has no wheezes. She has no rales. She exhibits no tenderness. Abdominal: Soft. Bowel sounds are normal. She exhibits no distension and no mass. There is no splenomegaly or hepatomegaly. There is no abdominal tenderness. No hernia. Neurological: She is alert and oriented to person, place, and time. She has normal motor skills. Gait normal.   Skin: Skin is warm and dry. No cyanosis. No jaundice. Nails show no clubbing. Patient Active Problem List   Diagnosis    Hyperlipidemia with target LDL less than 130    Vitamin D deficiency    Atrial fibrillation ([address]    Pulmonary emphysema (HCC)    Bilateral edema of lower extremity    Other plastic surgery for unacceptable cosmetic appearance    Tobacco abuse    Fatigue    Chronic obstructive pulmonary disease (HCC)    Acute bronchitis    Hypoxemia    Shortness of breath    Fracture of humerus, proximal    Benign neoplasm of sigmoid colon    Other hemorrhoids    Hx of colonic polyps    Family history of colon cancer    Osteopenia of necks of both femurs    Congestive heart failure (HCC)    Bilateral pulmonary infiltrates on CXR    Pericardial calcification    Pericardial effusion    Pleural effusion    Atelectasis, left    Abnormal mammogram of left breast    Left breast mass    Overweight (BMI 25.0-29. 9)    Malignant neoplasm of upper-inner quadrant of left breast in female, estrogen receptor negative (HCC)    Neck nodule    Hemoptysis    Eczema    Dysphonia           No orders of the defined types were placed in this encounter.         Orders Placed This Encounter   Medications    dilTIAZem (CARDIZEM CD) 240 MG extended release capsule     Sig: TAKE 1 CAPSULE BY MOUTH EVERY DAY     Dispense:  90 capsule     Refill:  3    potassium chloride (KLOR-CON M) 20 MEQ TBCR extended release tablet     Sig: TAKE 1 TABLET BY MOUTH EVERY DAY     Dispense:  90 tablet     Refill:  2    furosemide (LASIX) 40 MG tablet     Sig: TAKE 1 TABLET BY MOUTH EVERY DAY     Dispense:  90 tablet     Refill:  3             Assessment/Orders:       ICD-10-CM    1. Atrial fibrillation, unspecified type (HCC)  I48.91        Orders Placed This Encounter   Medications    dilTIAZem (CARDIZEM CD) 240 MG extended release capsule     Sig: TAKE 1 CAPSULE BY MOUTH EVERY DAY     Dispense:  90 capsule     Refill:  3    potassium chloride (KLOR-CON M) 20 MEQ TBCR extended release tablet     Sig: TAKE 1 TABLET BY MOUTH EVERY DAY     Dispense:  90 tablet     Refill:  2    furosemide (LASIX) 40 MG tablet     Sig: TAKE 1 TABLET BY MOUTH EVERY DAY     Dispense:  90 tablet     Refill:  3       Medications Discontinued During This Encounter   Medication Reason    zoster recombinant adjuvanted vaccine (SHINGRIX) 50 MCG/0.5ML SUSR injection Therapy completed    furosemide (LASIX) 40 MG tablet REORDER    dilTIAZem (CARDIZEM CD) 240 MG extended release capsule REORDER    potassium chloride (KLOR-CON M) 20 MEQ TBCR extended release tablet REORDER       No orders of the defined types were placed in this encounter. Plan    Stay on same medications. See me in June, before she leaves for Vacation!!         Electronically signed by: Andrea Rodriguez MD  5/11/2021 3:47 PM

## 2021-05-11 ASSESSMENT — ENCOUNTER SYMPTOMS
DIARRHEA: 0
COUGH: 0
NAUSEA: 0
STRIDOR: 0
BLOOD IN STOOL: 0
VOMITING: 0
WHEEZING: 0

## 2021-05-12 ENCOUNTER — OFFICE VISIT (OUTPATIENT)
Dept: FAMILY MEDICINE CLINIC | Age: 71
End: 2021-05-12
Payer: COMMERCIAL

## 2021-05-12 ENCOUNTER — TELEPHONE (OUTPATIENT)
Dept: GASTROENTEROLOGY | Age: 71
End: 2021-05-12

## 2021-05-12 VITALS
HEART RATE: 56 BPM | RESPIRATION RATE: 15 BRPM | WEIGHT: 141 LBS | OXYGEN SATURATION: 96 % | BODY MASS INDEX: 26.62 KG/M2 | SYSTOLIC BLOOD PRESSURE: 118 MMHG | DIASTOLIC BLOOD PRESSURE: 66 MMHG | HEIGHT: 61 IN | TEMPERATURE: 97.9 F

## 2021-05-12 DIAGNOSIS — C50.912 MALIGNANT NEOPLASM OF LEFT FEMALE BREAST, UNSPECIFIED ESTROGEN RECEPTOR STATUS, UNSPECIFIED SITE OF BREAST (HCC): ICD-10-CM

## 2021-05-12 DIAGNOSIS — N28.1 RENAL CYST: ICD-10-CM

## 2021-05-12 DIAGNOSIS — R79.89 ELEVATED PLATELET COUNT: ICD-10-CM

## 2021-05-12 DIAGNOSIS — E78.5 HYPERLIPIDEMIA, UNSPECIFIED HYPERLIPIDEMIA TYPE: Primary | ICD-10-CM

## 2021-05-12 DIAGNOSIS — E04.2 MULTIPLE THYROID NODULES: ICD-10-CM

## 2021-05-12 DIAGNOSIS — Z11.59 ENCOUNTER FOR HEPATITIS C SCREENING TEST FOR LOW RISK PATIENT: ICD-10-CM

## 2021-05-12 DIAGNOSIS — Z78.0 POST-MENOPAUSE: ICD-10-CM

## 2021-05-12 DIAGNOSIS — Z72.0 TOBACCO USE: ICD-10-CM

## 2021-05-12 DIAGNOSIS — Z87.891 PERSONAL HISTORY OF TOBACCO USE: ICD-10-CM

## 2021-05-12 LAB — HEPATITIS C ANTIBODY INTERPRETATION: NORMAL

## 2021-05-12 PROCEDURE — G0296 VISIT TO DETERM LDCT ELIG: HCPCS | Performed by: INTERNAL MEDICINE

## 2021-05-12 PROCEDURE — 99214 OFFICE O/P EST MOD 30 MIN: CPT | Performed by: INTERNAL MEDICINE

## 2021-05-12 RX ORDER — PACLITAXEL 6 MG/ML
INJECTION INTRAVENOUS ONCE
COMMUNITY
End: 2021-08-27

## 2021-05-12 ASSESSMENT — ENCOUNTER SYMPTOMS
BACK PAIN: 0
EYE PAIN: 0
ABDOMINAL PAIN: 0
SHORTNESS OF BREATH: 0

## 2021-05-12 ASSESSMENT — PATIENT HEALTH QUESTIONNAIRE - PHQ9
1. LITTLE INTEREST OR PLEASURE IN DOING THINGS: 0
2. FEELING DOWN, DEPRESSED OR HOPELESS: 0

## 2021-05-12 NOTE — PROGRESS NOTES
Subjective:      Patient ID: Elizabeth Frost is a 65 y.o. female who presents today with:  Chief Complaint   Patient presents with    6 Month Follow-Up       HPI     Thyroid nodules  hpl  Not on statin  Diagnosed with breast cancer  S/p lumpectomy and radiation and ehcmo  On tamoxifen  Simple renal cyst  Elevated platelet count      Past Medical History:   Diagnosis Date    Abnormal echocardiogram 2020    possible PFO?  further eval suggested    Cancer [facility])     left breast    COPD (chronic obstructive pulmonary disease) ([facility] [address] 2015    Dr Melina Bourne Current use of long term anticoagulation     Cyst of neck 2012    after plastic surgery    Emphysema of lung ([facility] [address]     History of compression fracture of vertebral column 2020    T2, L1    History of humerus fracture     LEFT    History of left heart catheterization 03/2020    at [facility], normal LVEF with impaired relaxation, mild AV calcification and mild MV thickening, normal RV    History of sustained ventricular tachycardia 03/2020    had to wear a LifeVest, Dr Jose Barillas Hyperlipidemia LDL goal < 130     Intermittent atrial fibrillation [facility]) 2015    Dr Jesus Valerio, Dr Italia Nelson, Dr Kat Irizarry Osteopenia 2014    S/P colonoscopic polypectomy 2008, 2015, 2018    Dr Farrukh Mendes    Smoking history     Vitamin D deficiency      Past Surgical History:   Procedure Laterality Date    ATRIAL ABLATION SURGERY      BREAST BIOPSY Left 01/20/2021    BREAST BIOPSY Left 2/4/2021    LEFT BREAST LUMPECTOMY AND  SENTINEL LYMPH NODE BIOPSY performed by Adore Manrique MD at [address] [place] [address]    stap infection, postpartum    COLONOSCOPY  07/22/2015    w/polypectomy Dr Reed Mitchell  2012    Dr Tristian Christopher; HI RISK IND N/A 07/13/2018    COLONOSCOPY performed by Hetal Felix MD at [address] [place] [address] Marital status:      Spouse name: Not on file    Number of children: 2    (CARDIZEM CD) 240 MG extended release capsule TAKE 1 CAPSULE BY MOUTH EVERY DAY 90 capsule 3    potassium chloride (KLOR-CON M) 20 MEQ TBCR extended release tablet TAKE 1 TABLET BY MOUTH EVERY DAY 90 tablet 2    furosemide (LASIX) 40 MG tablet TAKE 1 TABLET BY MOUTH EVERY DAY 90 tablet 3    umeclidinium-vilanterol (ANORO ELLIPTA) 62.5-25 MCG/INH AEPB inhaler Inhale 1 puff into the lungs daily 60 puff 2    rivaroxaban (XARELTO) 20 MG TABS tablet TAKE 1 TABLET BY MOUTH EVERY DAY WITH BREAKFAST 90 tablet 3    Psyllium (METAMUCIL FIBER PO) Take by mouth      Handicap Placard MISC by Does not apply route Good for 5 years. Dispense 1 1 each 0    Calcium Carb-Cholecalciferol (CALCIUM + D3 PO) Take by mouth      amiodarone (CORDARONE) 200 MG tablet Take 1 tablet by mouth Twice a Week 26 tablet 3    b complex vitamins tablet Take 1 tablet by mouth daily      magnesium oxide (MAG-OX) 400 MG tablet Take 400 mg by mouth daily      Vitamin D (CHOLECALCIFEROL) 1000 UNITS CAPS capsule Take 1,000 Units by mouth daily. No current facility-administered medications on file prior to visit. I have personally reviewed the ROS, PMH, PFH, and social history     Review of Systems   Constitutional: Negative for chills and fever. HENT: Negative for congestion. Eyes: Negative for pain. Respiratory: Negative for shortness of breath. Cardiovascular: Negative for chest pain. Gastrointestinal: Negative for abdominal pain. Genitourinary: Negative for hematuria. Musculoskeletal: Negative for back pain. Allergic/Immunologic: Negative for immunocompromised state. Neurological: Negative for headaches. Psychiatric/Behavioral: Negative for hallucinations.        Objective:   /66 (Site: Left Upper Arm, Position: Sitting, Cuff Size: Large Adult)   Pulse 56   Temp 97.9 °F (36.6 °C) (Oral)   Resp 15   Ht 5' 1\" (1.549 m)   Wt 141 lb (64 kg)   LMP  (LMP Unknown)   SpO2 96%   BMI 26.64 kg/m² Physical Exam  Constitutional:       General: She is not in acute distress. Appearance: Normal appearance. She is not ill-appearing, toxic-appearing or diaphoretic. HENT:      Head: Normocephalic. Neck:      Musculoskeletal: Neck supple. Vascular: No carotid bruit. Cardiovascular:      Rate and Rhythm: Normal rate and regular rhythm. Pulses: Normal pulses. Heart sounds: Normal heart sounds. No murmur. No friction rub. No gallop. Pulmonary:      Effort: Pulmonary effort is normal. No respiratory distress. Breath sounds: Normal breath sounds. No wheezing, rhonchi or rales. Abdominal:      General: Abdomen is flat. There is no distension. Palpations: Abdomen is soft. Tenderness: There is no abdominal tenderness. There is no right CVA tenderness, left CVA tenderness, guarding or rebound. Musculoskeletal:      Right lower leg: No edema. Left lower leg: No edema. Skin:     General: Skin is warm. Findings: No erythema or rash. Neurological:      Mental Status: She is alert. Psychiatric:         Mood and Affect: Mood normal.           Assessment:       Diagnosis Orders   1. Hyperlipidemia, unspecified hyperlipidemia type  TSH with Reflex    Vitamin D 25 Hydroxy    CBC Auto Differential    Comprehensive Metabolic Panel    Hemoglobin A1C    Lipid Panel   2. Elevated platelet count  TSH with Reflex    Vitamin D 25 Hydroxy    CBC Auto Differential    Comprehensive Metabolic Panel    Hemoglobin A1C    Lipid Panel   3. Malignant neoplasm of left female breast, unspecified estrogen receptor status, unspecified site of breast (HCC)  TSH with Reflex    Vitamin D 25 Hydroxy    CBC Auto Differential    Comprehensive Metabolic Panel    Hemoglobin A1C    Lipid Panel   4. Multiple thyroid nodules  TSH with Reflex    Vitamin D 25 Hydroxy    CBC Auto Differential    Comprehensive Metabolic Panel    Hemoglobin A1C    Lipid Panel   5.  Renal cyst  TSH with Reflex    Vitamin D 25 Hydroxy    CBC Auto Differential    Comprehensive Metabolic Panel    Hemoglobin A1C    Lipid Panel   6. Tobacco use  CT lung screen [Initial/Annual]    TSH with Reflex    Vitamin D 25 Hydroxy    CBC Auto Differential    Comprehensive Metabolic Panel    Hemoglobin A1C    Lipid Panel   7. Encounter for hepatitis C screening test for low risk patient  Hepatitis C Antibody    TSH with Reflex    Vitamin D 25 Hydroxy    CBC Auto Differential    Comprehensive Metabolic Panel    Hemoglobin A1C    Lipid Panel   8. Post-menopause  DEXA BONE DENSITY AXIAL SKELETON    TSH with Reflex    Vitamin D 25 Hydroxy    CBC Auto Differential    Comprehensive Metabolic Panel    Hemoglobin A1C    Lipid Panel   9. Personal history of tobacco use  [place] VISIT TO DISCUSS LUNG CA SCREEN W LDCT    CT Lung Screen (Annual)    TSH with Reflex    Vitamin D 25 Hydroxy    CBC Auto Differential    Comprehensive Metabolic Panel    Hemoglobin A1C    Lipid Panel         Plan:    vc  Continue chronic medications. Keep f/u with mumtaz allan/rad/onc  Keep cardiology f/u. Repeat thyroid US in one year, (2021)   Talk to heme. Breast   Talk to heme about high platelets and cholesterol see te   prvastatin sent.                Orders Placed This Encounter   Procedures    CT lung screen [Initial/Annual]     Age: 65 y.o. Smoking History:   Social History    Tobacco Use      Smoking status: Former Smoker        Packs/day: 1.00        Years: 50.00        Pack years: 48        Types: Cigarettes        Start date:         Quit date: 2015        Years since quittin.4      Smokeless tobacco: Never Used      Tobacco comment: pt will try smoking cessation    Alcohol use: No    Drug use: No    Pack years: 50  2021     Standing Status:   Future     Standing Expiration Date:   2022     Order Specific Question:   Is there documentation of shared decision making?      Answer:   Yes     Order Specific Question:   Does the patient show any signs or symptoms of lung cancer? Answer:   No     Order Specific Question:   Is this the first (baseline) CT or an annual exam?     Answer: Annual [2]     Order Specific Question:   Is this a low dose CT or a routine CT? Answer:   Low Dose CT [1]     Order Specific Question:   Smoking Status? Answer: Former Smoker [4]     Order Specific Question:   Date quit smoking? (must be within 15 years)     Answer:   2015     Order Specific Question:   Smoking packs per day? Answer:   1     Order Specific Question:   Years smoking? Answer:   48    DEXA BONE DENSITY AXIAL SKELETON     Standing Status:   Future     Standing Expiration Date:   2022    CT Lung Screen (Annual)     Age: Patient is 65 y.o. Smoking History: Social History    Tobacco Use      Smoking status: Former Smoker        Packs/day: 1.00        Years: 50.00        Pack years: 48        Types: Cigarettes        Start date:         Quit date: 2015        Years since quittin.4      Smokeless tobacco: Never Used      Tobacco comment: pt will try smoking cessation    Alcohol use: No    Drug use: No   Pack years: 48    Date of last lung cancer screenin2021     Standing Status:   Future     Standing Expiration Date:   2022     Order Specific Question:   Is there documentation of shared decision making? Answer:   Yes     Order Specific Question:   Is this a low dose CT or a routine CT? Answer:   Low Dose CT [1]     Order Specific Question:   Is this the first (baseline) CT or an annual exam?     Answer: Annual [2]     Order Specific Question:   Does the patient show any signs or symptoms of lung cancer? Answer:   No     Order Specific Question:   Smoking Status? Answer: Former Smoker [4]     Order Specific Question:   Date quit smoking? (must be within 15 years)     Answer:   2015     Order Specific Question:   Smoking packs per day?      Answer:   1     Order Specific Question:   Years smoking? Answer:   50    Hepatitis C Antibody     Standing Status:   Future     Number of Occurrences:   1     Standing Expiration Date:   5/12/2022    TSH with Reflex     Standing Status:   Future     Standing Expiration Date:   5/13/2022    Vitamin D 25 Hydroxy     Standing Status:   Future     Standing Expiration Date:   5/13/2022    CBC Auto Differential     Standing Status:   Future     Standing Expiration Date:   5/13/2022    Comprehensive Metabolic Panel     Standing Status:   Future     Standing Expiration Date:   5/13/2022    Hemoglobin A1C     Standing Status:   Future     Standing Expiration Date:   5/13/2022    Lipid Panel     Standing Status:   Future     Standing Expiration Date:   5/13/2022     Order Specific Question:   Is Patient Fasting?/# of Hours     Answer:   10    [place] VISIT TO DISCUSS LUNG CA SCREEN W LDCT     No orders of the defined types were placed in this encounter. Doesn't want to increase statin. (From last visit)   She is holding off on covid vaccine until after chemotherapy. Not dyspneic  If you stop seeing ob/gyn let me know 05/2021   If anything should change or worsen call ASAP, don't wait for next scheduled appointment. Return in 6 months (on 11/12/2021) for worsening symptoms, call ASAP for appointment, Chronic condition management/appointment. Deborah Munguia MD            Low Dose CT (LDCT) Lung Screening criteria met   Age 50-69   Pack year smoking >30   Still smoking or less than 15 year since quit   No sign or symptoms of lung cancer   > 11 months since last LDCT     Risks and benefits of lung cancer screening with LDCT scans discussed:    Significance of positive screen - False-positive LDCT results often occur. 95% of all positive results do not lead to a diagnosis of cancer. Usually further imaging can resolve most false-positive results; however, some patients may require invasive procedures.     Over diagnosis risk - 10% to 12% of screen-detected lung cancer cases are over diagnosed--that is, the cancer would not have been detected in the patient's lifetime without the screening. Need for follow up screens annually to continue lung cancer screening effectiveness     Risks associated with radiation from annual LDCT- Radiation exposure is about the same as for a mammogram, which is about 1/3 of the annual background radiation exposure from everyday life. Starting screening at age 54 is not likely to increase cancer risk from radiation exposure. Patients with comorbidities resulting in life expectancy of < 10 years, or that would preclude treatment of an abnormality identified on CT, should not be screened due to lack of benefit.     To obtain maximal benefit from this screening, smoking cessation and long-term abstinence from smoking is critical

## 2021-05-13 ENCOUNTER — TELEPHONE (OUTPATIENT)
Dept: FAMILY MEDICINE CLINIC | Age: 71
End: 2021-05-13

## 2021-05-13 NOTE — TELEPHONE ENCOUNTER
Her bad cholesterol has doubled and her platelets are high x 2 now at 478  [facility] dr Gonzales Mun[facility]    I would like to treat her cholesterol patient is concerned BOTH of these findings can be from her chemo  When does dr Sigrid Mcnally want cbc follow up? Does he want to see her?

## 2021-05-14 RX ORDER — ATORVASTATIN CALCIUM 20 MG/1
20 TABLET, FILM COATED ORAL DAILY
Qty: 30 TABLET | Refills: 0 | Status: SHIPPED | OUTPATIENT
Start: 2021-05-14 | End: 2021-05-14

## 2021-05-14 RX ORDER — PRAVASTATIN SODIUM 20 MG
20 TABLET ORAL DAILY
Qty: 30 TABLET | Refills: 0 | Status: SHIPPED | OUTPATIENT
Start: 2021-05-14 | End: 2021-06-02

## 2021-05-14 NOTE — TELEPHONE ENCOUNTER
I would feel more comfortable putting her on a cholesterol lowering medication (to lower risk of stroke and mi)   Is she agreeable?

## 2021-05-14 NOTE — TELEPHONE ENCOUNTER
Spoke deisi Vance and her , she is cincerned of s/e with chemo and cholesterol med but if it is not a concern please send rx to [facility] [address].

## 2021-05-14 NOTE — TELEPHONE ENCOUNTER
lipitor sent.    I dont think she will have issues with chemo  If any muscles aches or pains let me know

## 2021-05-14 NOTE — TELEPHONE ENCOUNTER
[id number] Harmony Landers thanks but she called back and would like pravastatin like she has had before, she wants nothing to do w lipitor. I will call pharmacy and cancel now, thanks.

## 2021-05-14 NOTE — TELEPHONE ENCOUNTER
Patient has follow up scheduled with [facility] 5/18/21 to discuss CBC and for treatment

## 2021-05-18 ENCOUNTER — TELEPHONE (OUTPATIENT)
Dept: PULMONOLOGY | Age: 71
End: 2021-05-18

## 2021-05-18 NOTE — TELEPHONE ENCOUNTER
Pt called stating she is getting very congested and experiencing clear color sputum coming up. She would like to have a prescription sent to [facility] in [facility].  Thanks

## 2021-05-19 ENCOUNTER — TELEPHONE (OUTPATIENT)
Dept: FAMILY MEDICINE CLINIC | Age: 71
End: 2021-05-19

## 2021-05-19 ENCOUNTER — APPOINTMENT (OUTPATIENT)
Dept: CT IMAGING | Age: 71
DRG: 205 | End: 2021-05-19
Payer: COMMERCIAL

## 2021-05-19 ENCOUNTER — HOSPITAL ENCOUNTER (INPATIENT)
Age: 71
LOS: 1 days | Discharge: HOME | DRG: 205 | End: 2021-05-21
Attending: INTERNAL MEDICINE | Admitting: INTERNAL MEDICINE
Payer: COMMERCIAL

## 2021-05-19 ENCOUNTER — APPOINTMENT (OUTPATIENT)
Dept: GENERAL RADIOLOGY | Age: 71
DRG: 205 | End: 2021-05-19
Payer: COMMERCIAL

## 2021-05-19 DIAGNOSIS — J44.1 COPD EXACERBATION (HCC): Primary | ICD-10-CM

## 2021-05-19 DIAGNOSIS — R09.02 HYPOXIA: ICD-10-CM

## 2021-05-19 LAB
ALBUMIN SERPL-MCNC: 3.5 G/DL (ref 3.5–4.6)
ALP BLD-CCNC: 67 U/L (ref 40–130)
ALT SERPL-CCNC: 17 U/L (ref 0–33)
ANION GAP SERPL CALCULATED.3IONS-SCNC: 12 MEQ/L (ref 9–15)
AST SERPL-CCNC: 18 U/L (ref 0–35)
BASOPHILS ABSOLUTE: 0.2 K/UL (ref 0–0.2)
BASOPHILS RELATIVE PERCENT: 2 %
BILIRUB SERPL-MCNC: 0.3 MG/DL (ref 0.2–0.7)
BILIRUBIN URINE: NEGATIVE
BLOOD, URINE: NEGATIVE
BUN BLDV-MCNC: 11 MG/DL (ref 8–23)
CALCIUM SERPL-MCNC: 9.4 MG/DL (ref 8.5–9.9)
CHLORIDE BLD-SCNC: 98 MEQ/L (ref 95–107)
CLARITY: CLEAR
CO2: 25 MEQ/L (ref 20–31)
COLOR: YELLOW
CREAT SERPL-MCNC: 0.97 MG/DL (ref 0.5–0.9)
EOSINOPHILS ABSOLUTE: 0.2 K/UL (ref 0–0.7)
EOSINOPHILS RELATIVE PERCENT: 2 %
GFR AFRICAN AMERICAN: >60
GFR NON-AFRICAN AMERICAN: 56.4
GLOBULIN: 3.1 G/DL (ref 2.3–3.5)
GLUCOSE BLD-MCNC: 137 MG/DL (ref 70–99)
GLUCOSE URINE: NEGATIVE MG/DL
HCT VFR BLD CALC: 33.5 % (ref 37–47)
HEMOGLOBIN: 11.5 G/DL (ref 12–16)
KETONES, URINE: NEGATIVE MG/DL
LACTIC ACID: 1.2 MMOL/L (ref 0.5–2.2)
LEUKOCYTE ESTERASE, URINE: NEGATIVE
LYMPHOCYTES ABSOLUTE: 1.3 K/UL (ref 1–4.8)
LYMPHOCYTES RELATIVE PERCENT: 13 %
MCH RBC QN AUTO: 31.3 PG (ref 27–31.3)
MCHC RBC AUTO-ENTMCNC: 34.3 % (ref 33–37)
MCV RBC AUTO: 91.4 FL (ref 82–100)
METAMYELOCYTES RELATIVE PERCENT: 1 %
MONOCYTES ABSOLUTE: 1.4 K/UL (ref 0.2–0.8)
MONOCYTES RELATIVE PERCENT: 14.2 %
NEUTROPHILS ABSOLUTE: 6.7 K/UL (ref 1.4–6.5)
NEUTROPHILS RELATIVE PERCENT: 68 %
NITRITE, URINE: NEGATIVE
NUCLEATED RED BLOOD CELLS: 2 /100 WBC
PDW BLD-RTO: 13.2 % (ref 11.5–14.5)
PH UA: 7 (ref 5–9)
PLATELET # BLD: 636 K/UL (ref 130–400)
PLATELET SLIDE REVIEW: ABNORMAL
POLYCHROMASIA: ABNORMAL
POTASSIUM SERPL-SCNC: 3.8 MEQ/L (ref 3.4–4.9)
PRO-BNP: 746 PG/ML
PROCALCITONIN: 0.15 NG/ML (ref 0–0.15)
PROTEIN UA: NEGATIVE MG/DL
RBC # BLD: 3.67 M/UL (ref 4.2–5.4)
SARS-COV-2, NAAT: NOT DETECTED
SODIUM BLD-SCNC: 135 MEQ/L (ref 135–144)
SPECIFIC GRAVITY UA: 1 (ref 1–1.03)
TOTAL CK: 39 U/L (ref 0–170)
TOTAL PROTEIN: 6.6 G/DL (ref 6.3–8)
TROPONIN: <0.01 NG/ML (ref 0–0.01)
URINE REFLEX TO CULTURE: NORMAL
UROBILINOGEN, URINE: 0.2 E.U./DL
WBC # BLD: 9.7 K/UL (ref 4.8–10.8)

## 2021-05-19 PROCEDURE — G0378 HOSPITAL OBSERVATION PER HR: HCPCS

## 2021-05-19 PROCEDURE — 83605 ASSAY OF LACTIC ACID: CPT

## 2021-05-19 PROCEDURE — 87040 BLOOD CULTURE FOR BACTERIA: CPT

## 2021-05-19 PROCEDURE — 6360000002 HC RX W HCPCS: Performed by: PHYSICIAN ASSISTANT

## 2021-05-19 PROCEDURE — G0378 HOSPITAL OBSERVATION PER HR: HCPCS | Performed by: INTERNAL MEDICINE

## 2021-05-19 PROCEDURE — 2580000003 HC RX 258: Performed by: INTERNAL MEDICINE

## 2021-05-19 PROCEDURE — 87635 SARS-COV-2 COVID-19 AMP PRB: CPT

## 2021-05-19 PROCEDURE — 71045 X-RAY EXAM CHEST 1 VIEW: CPT

## 2021-05-19 PROCEDURE — 99285 EMERGENCY DEPT VISIT HI MDM: CPT

## 2021-05-19 PROCEDURE — 83880 ASSAY OF NATRIURETIC PEPTIDE: CPT

## 2021-05-19 PROCEDURE — 81003 URINALYSIS AUTO W/O SCOPE: CPT

## 2021-05-19 PROCEDURE — 93005 ELECTROCARDIOGRAM TRACING: CPT | Performed by: PHYSICIAN ASSISTANT

## 2021-05-19 PROCEDURE — 96374 THER/PROPH/DIAG INJ IV PUSH: CPT

## 2021-05-19 PROCEDURE — 84145 PROCALCITONIN (PCT): CPT

## 2021-05-19 PROCEDURE — 85025 COMPLETE CBC W/AUTO DIFF WBC: CPT

## 2021-05-19 PROCEDURE — 36415 COLL VENOUS BLD VENIPUNCTURE: CPT

## 2021-05-19 PROCEDURE — 71250 CT THORAX DX C-: CPT

## 2021-05-19 PROCEDURE — 84484 ASSAY OF TROPONIN QUANT: CPT

## 2021-05-19 PROCEDURE — 2580000003 HC RX 258: Performed by: PHYSICIAN ASSISTANT

## 2021-05-19 PROCEDURE — 80053 COMPREHEN METABOLIC PANEL: CPT

## 2021-05-19 PROCEDURE — 82550 ASSAY OF CK (CPK): CPT

## 2021-05-19 RX ORDER — IPRATROPIUM BROMIDE AND ALBUTEROL SULFATE 2.5; .5 MG/3ML; MG/3ML
1 SOLUTION RESPIRATORY (INHALATION)
Status: DISCONTINUED | OUTPATIENT
Start: 2021-05-20 | End: 2021-05-20

## 2021-05-19 RX ORDER — SODIUM CHLORIDE 0.9 % (FLUSH) 0.9 %
5-40 SYRINGE (ML) INJECTION PRN
Status: DISCONTINUED | OUTPATIENT
Start: 2021-05-19 | End: 2021-05-21 | Stop reason: HOSPADM

## 2021-05-19 RX ORDER — 0.9 % SODIUM CHLORIDE 0.9 %
1000 INTRAVENOUS SOLUTION INTRAVENOUS ONCE
Status: COMPLETED | OUTPATIENT
Start: 2021-05-19 | End: 2021-05-19

## 2021-05-19 RX ORDER — ACETAMINOPHEN 325 MG/1
650 TABLET ORAL EVERY 6 HOURS PRN
Status: DISCONTINUED | OUTPATIENT
Start: 2021-05-19 | End: 2021-05-21 | Stop reason: HOSPADM

## 2021-05-19 RX ORDER — PROMETHAZINE HYDROCHLORIDE 12.5 MG/1
12.5 TABLET ORAL EVERY 6 HOURS PRN
Status: DISCONTINUED | OUTPATIENT
Start: 2021-05-19 | End: 2021-05-21 | Stop reason: HOSPADM

## 2021-05-19 RX ORDER — ONDANSETRON 2 MG/ML
4 INJECTION INTRAMUSCULAR; INTRAVENOUS EVERY 6 HOURS PRN
Status: DISCONTINUED | OUTPATIENT
Start: 2021-05-19 | End: 2021-05-21 | Stop reason: HOSPADM

## 2021-05-19 RX ORDER — METHYLPREDNISOLONE SODIUM SUCCINATE 125 MG/2ML
125 INJECTION, POWDER, LYOPHILIZED, FOR SOLUTION INTRAMUSCULAR; INTRAVENOUS ONCE
Status: COMPLETED | OUTPATIENT
Start: 2021-05-19 | End: 2021-05-19

## 2021-05-19 RX ORDER — POLYETHYLENE GLYCOL 3350 17 G/17G
17 POWDER, FOR SOLUTION ORAL DAILY PRN
Status: DISCONTINUED | OUTPATIENT
Start: 2021-05-19 | End: 2021-05-21 | Stop reason: HOSPADM

## 2021-05-19 RX ORDER — SODIUM CHLORIDE 0.9 % (FLUSH) 0.9 %
5-40 SYRINGE (ML) INJECTION EVERY 12 HOURS SCHEDULED
Status: DISCONTINUED | OUTPATIENT
Start: 2021-05-19 | End: 2021-05-21 | Stop reason: HOSPADM

## 2021-05-19 RX ORDER — ACETAMINOPHEN 650 MG/1
650 SUPPOSITORY RECTAL EVERY 6 HOURS PRN
Status: DISCONTINUED | OUTPATIENT
Start: 2021-05-19 | End: 2021-05-21 | Stop reason: HOSPADM

## 2021-05-19 RX ORDER — SODIUM CHLORIDE 9 MG/ML
25 INJECTION, SOLUTION INTRAVENOUS PRN
Status: DISCONTINUED | OUTPATIENT
Start: 2021-05-19 | End: 2021-05-21 | Stop reason: HOSPADM

## 2021-05-19 RX ADMIN — METHYLPREDNISOLONE SODIUM SUCCINATE 125 MG: 125 INJECTION, POWDER, FOR SOLUTION INTRAMUSCULAR; INTRAVENOUS at 15:37

## 2021-05-19 RX ADMIN — SODIUM CHLORIDE 1000 ML: 9 INJECTION, SOLUTION INTRAVENOUS at 15:37

## 2021-05-19 RX ADMIN — Medication 10 ML: at 22:43

## 2021-05-19 ASSESSMENT — PAIN SCALES - GENERAL: PAINLEVEL_OUTOF10: 0

## 2021-05-19 NOTE — ED TRIAGE NOTES
Pt arrived to ER for SOB, pt has breast cancer receiving treatments and COPD for 6 years. Pt denies any CP, headache or dizziness.

## 2021-05-19 NOTE — TELEPHONE ENCOUNTER
Pt reported to the Cancer center for Chemo this morning and she stated to the Nurse that she has mucus coming up, no cough, no other symptoms, but Pt is stating that she can feel the mucus in her chest and back. Pt also stating that just from talking mucus will come up from her lungs. Nurse took pulse ox and it read 85% and refused Chemo and advised Pt to get an appointment with  or at least have a prescription called in to take care of this. She has not been vaccinated and is worried about going to the ED but stated if Dr Carl Cobian advises this, she will definitely go. Please advise and Pt is awaiting a phone call.

## 2021-05-19 NOTE — H&P
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 She is scheduled for chemotherapy every Tuesday and this Tuesday (which would have been her 6th cycle out of 12) they told her she could not get treatment until she sees her PCP because of her symptoms-her PCP then told her to go to the emergency department who found she had hypoxia 85% on room air. She did state that her temperature got as high as 100.4. She otherwise denies significant weight change, nausea, vomiting. She has been eating and drinking well. She does not have any cough or new sputum production. She denies abdominal pain. She had diarrhea the most on Friday but it has since resolved. No urinary symptoms. No skin lesions or rash. She quit smoking 5 to 6 years ago. She denies alcohol or illicit drug use. Past Medical History:      Diagnosis Date    Abnormal echocardiogram 2020    possible PFO?  further eval suggested    Cancer [facility])     left breast    COPD (chronic obstructive pulmonary disease) ([facility] [address] 2015    Dr Elizabeth Romero Current use of long term anticoagulation     Cyst of neck 2012    after plastic surgery    Emphysema of lung ([facility] [address]     History of compression fracture of vertebral column 2020    T2, L1    History of humerus fracture     LEFT    History of left heart catheterization 03/2020    at [address] [place] [address], normal LVEF with impaired relaxation, mild AV calcification and mild MV thickening, normal RV    History of sustained ventricular tachycardia 03/2020    had to wear a LifeVest, Dr Alicia Paul Hyperlipidemia LDL goal < 130     Intermittent atrial fibrillation [facility]) 2015    Dr Boogie Parker, Dr Vilma Rodarte, Dr Brynn Membreno Osteopenia 2014    S/P colonoscopic polypectomy 2008, 2015, 2018    Dr Tiana Funes    Smoking history     Vitamin D deficiency        Past Surgical History:      Procedure Laterality Date    ATRIAL ABLATION SURGERY      BREAST BIOPSY Left 01/20/2021    BREAST BIOPSY Left 2/4/2021    LEFT BREAST LUMPECTOMY AND  SENTINEL LYMPH NODE BIOPSY performed by Phoenix Judd MD at [id number] [address]    [facility] infection, postpartum    COLONOSCOPY  07/22/2015    w/polypectomy Dr Bebo Lisa  2012    Dr Sunitha Torres; HI RISK IND N/A 07/13/2018    COLONOSCOPY performed by Madeleine Hopson MD at [facility]       Medications Prior to Admission:    Prior to Admission medications    Medication Sig Start Date End Date Taking?  Authorizing Provider   pravastatin (PRAVACHOL) 20 MG tablet Take 1 tablet by mouth daily 5/14/21   Shawanda Ross MD   PACLitaxel (TAXOL) 100 MG/16.7ML chemo injection Infuse intravenously once    Historical Provider, MD   dilTIAZem (CARDIZEM CD) 240 MG extended release capsule TAKE 1 CAPSULE BY MOUTH EVERY DAY 5/10/21   Dani Santos MD   potassium chloride (KLOR-CON M) 20 MEQ TBCR extended release tablet TAKE 1 TABLET BY MOUTH EVERY DAY 5/10/21   Dani Santos MD   furosemide (LASIX) 40 MG tablet TAKE 1 TABLET BY MOUTH EVERY DAY 5/10/21   Dani Santos MD   umeclidinium-vilanterol [facility] ELLIPTA) 62.5-25 MCG/INH AEPB inhaler Inhale 1 puff into the lungs daily 4/20/21   Brett Riley MD   rivaroxaban (XARELTO) 20 MG TABS tablet TAKE 1 TABLET BY MOUTH EVERY DAY WITH BREAKFAST 1/7/21   Dani Santos MD   Psyllium (METAMUCIL FIBER PO) Take by mouth    Historical Provider, MD   Handicap Placard MISC by Does not apply route Good for 5 years. Dispense 1 7/27/20   Jim Taylor MD   Calcium Carb-Cholecalciferol (CALCIUM + D3 PO) Take by mouth    Historical Provider, MD   amiodarone (CORDARONE) 200 MG tablet Take 1 tablet by mouth Twice a Week 6/18/20   Andrea Rodriguez MD   b complex vitamins tablet Take 1 tablet by mouth daily    Historical Provider, MD   magnesium oxide (MAG-OX) 400 MG tablet Take 400 mg by mouth daily    Historical Provider, MD   Vitamin D (CHOLECALCIFEROL) 1000 UNITS CAPS capsule Take 1,000 Units by mouth daily. Historical Provider, MD       Allergies:  Patient has no known allergies. Social History:   TOBACCO:   reports that she quit smoking about 5 years ago. Her smoking use included cigarettes. She started smoking about 57 years ago. She has a 50.00 pack-year smoking history. She has never used smokeless tobacco.  ETOH:   reports no history of alcohol use. Family History:       Problem Relation Age of Onset    Osteoarthritis Mother     Osteoporosis Mother     Cancer Mother         anorectal     Colon Cancer Mother        REVIEW OF SYSTEMS:  Ten systems reviewed and negative except for stated in HPI    Physical Exam:    Vitals: BP (!) 129/59   Pulse 87   Temp 98.5 °F (36.9 °C) (Temporal)   Resp 24   LMP  (LMP Unknown)   SpO2 93%   General appearance: alert, appears stated age and cooperative. NAD  Skin: Skin color, texture, turgor normal. No rashes or lesions  HEENT: eomi, perrla. MMM  Neck: No JVD or lymphadenopathy  Lungs: Diminished bilaterally  Heart: RRR, no murmur or gallp  Abdomen: soft, nontender. Bsx4. No masses or organomegaly  Extremities: no edema, redness, or tenderness in calves.  Cap refill <2s  Neurologic: No focal deficits     Recent Labs     05/19/21  1515   WBC 9.7   HGB 11.5*   *     Recent Labs     05/19/21  1515      K 3.8   CL 98   CO2 25   BUN 11   CREATININE 0.97*   GLUCOSE 137*   AST 18   ALT 17   BILITOT 0.3   ALKPHOS 67     Troponin T:   Recent Labs 05/19/21  1515   TROPONINI <0.010       ABGs:   Lab Results   Component Value Date    PHART 7.33 03/13/2020    PO2ART 82 03/13/2020    YKZ7TSW 41 03/13/2020     INR: No results for input(s): INR in the last 72 hours. URINALYSIS:  Recent Labs     05/19/21  1515   COLORU Yellow   PHUR 7.0   CLARITYU Clear   SPECGRAV 1.004   LEUKOCYTESUR Negative   UROBILINOGEN 0.2   BILIRUBINUR Negative   BLOODU Negative   GLUCOSEU Negative     -----------------------------------------------------------------   XR CHEST PORTABLE    Result Date: 5/19/2021  EXAMINATION: XR CHEST PORTABLE CLINICAL HISTORY: SHORTNESS OF BREATH COMPARISONS: CT CHEST, APRIL 26, 2021, CHEST RADIOGRAPH, JANUARY 28, 2021 FINDINGS: Remote right sixth rib fracture. Cardiopericardial silhouette normal. Aorta calcified. Ill-defined area of increased opacity found bilaterally, left mid and left lower lung. Increased opacity also demonstrated right lung base. BILATERAL ATELECTASIS/PNEUMONIA AS DISCUSSED. 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 1.  Acute respiratory failure with hypoxia (85% on room air). DDx-atelectasis, radiation pneumonitis, COPD, drug side effect (amiodarone)  -Further assess with CT chest.    -Consult pulmonology  -Continue nebulizer, supplemental O2, incentive spirometry, and supportive respiratory care    2. Fatigue, diarrhea: Suspect side effect of paclitaxel    Breast cancer on chemotherapy  COPD-does not appear to be in acute exacerbation-no cough or sputum production  Paroxysmal atrial fibrillation    Full code    45 minutes in total care time.      Chalino Lazo DO  Admitting Hospitalist

## 2021-05-19 NOTE — ED NOTES
Pt and spouse were given a turkey sandwich and Ginger Ale. Waiting for a bed assignment.      Mya Rutledge RN  05/19/21 1924

## 2021-05-19 NOTE — TELEPHONE ENCOUNTER
Spoke to patient, her 80%-84% on room air while on the phone with me. Advised that per PCP she needs to be evaluated at the ER today.  Patient agreed to go and will follow up as needed

## 2021-05-19 NOTE — ED PROVIDER NOTES
[address]  eMERGENCY dEPARTMENT eNCOUnter      Pt Name: Belinda Cruz  MRN: [id number]  [place] 1950  Date of evaluation: 5/19/2021  Provider: Juana Vyas PA-C    CHIEF COMPLAINT       Chief Complaint   Patient presents with    Shortness of Breath         HISTORY OF PRESENT ILLNESS   (Location/Symptom, Timing/Onset,Context/Setting, Quality, Duration, Modifying Factors, Severity)  Note limiting factors. Belinda Cruz is a 65 y.o. female who presents to the emergency department with complaint of shortness of breath which patient states been ongoing for last 3 to 4 days. Patient does have past history of breast cancer, she is currently receiving chemotherapy. She states her last appointment was this past Tuesday, she states at that time she was feeling short of breath, congested, she is spoke with her family physician but did not receive any medications at that time. She had spoke with her cancer doctor, who again referred her back to her family physician, who referred her to come to the emergency department today for a further evaluation. Past medical history significant for V. tach, compression fracture of vertebrae, COPD, atrial fibrillation, breast cancer, emphysema, hyperlipidemia. HPI    NursingNotes were reviewed. REVIEW OF SYSTEMS    (2-9 systems for level 4, 10 or more for level 5)     Review of Systems   Constitutional: Negative for activity change and appetite change. HENT: Negative for congestion, ear discharge, ear pain, nosebleeds, rhinorrhea and sore throat. Eyes: Negative for discharge. Respiratory: Positive for cough and shortness of breath. Negative for wheezing and stridor. Cardiovascular: Negative for chest pain, palpitations and leg swelling. Gastrointestinal: Negative for abdominal distention, abdominal pain, constipation, diarrhea, nausea and vomiting. Genitourinary: Negative for difficulty urinating and dysuria.    Musculoskeletal: Negative for arthralgias. Skin: Negative for color change, pallor, rash and wound. Neurological: Negative for dizziness, tremors, syncope, weakness, numbness and headaches. Psychiatric/Behavioral: Negative for agitation and confusion. Except as noted above the remainder of the review of systems was reviewed and negative. PAST MEDICAL HISTORY     Past Medical History:   Diagnosis Date    Abnormal echocardiogram 2020    possible PFO?  further eval suggested    Cancer [facility])     left breast    COPD [facility]chronic obstructive pulmonary disease) [facility] 2015    Dr Owens Members Current use of long term anticoagulation     Cyst of neck 2012    after plastic surgery    Emphysema of lung ([facility]     History of compression fracture of vertebral column 2020    T2, L1    History of humerus fracture     LEFT    History of left heart catheterization 03/2020    at [facility], normal LVEF with impaired relaxation, mild AV calcification and mild MV thickening, normal RV    History of sustained ventricular tachycardia 03/2020    had to wear a LifeVest, Dr Reagan Goodman Hyperlipidemia LDL goal < 130     Intermittent atrial fibrillation [facility]) 2015    Dr Andrew Kamara, Dr Chantell Wiggins, Dr Shakira Pan Osteopenia 2014    S/P colonoscopic polypectomy 2008, 2015, 2018    Dr Devendra South    Smoking history     Vitamin D deficiency          SURGICALHISTORY       Past Surgical History:   Procedure Laterality Date    ATRIAL ABLATION SURGERY      BREAST BIOPSY Left 01/20/2021    BREAST BIOPSY Left 2/4/2021    LEFT BREAST LUMPECTOMY AND  SENTINEL LYMPH NODE BIOPSY performed by Norma Rodrigues MD at [address]    stap infection, postpartum    COLONOSCOPY  07/22/2015    w/polypectomy Dr Rayo Yip  2012    Dr Chato Landrum; HI RISK IND N/A 07/13/2018    COLONOSCOPY performed by Siva Zuñiga MD at [address] [place] [address]       Discharge Medication List as of 5/21/2021 Occupational History    Occupation: [employer], retired   Tobacco Use    Smoking status: Former Smoker     Packs/day: 1.00     Years: 50.00     Pack years: 50.00     Types: Cigarettes     Start date:      Quit date: 2015     Years since quittin.4    Smokeless tobacco: Never Used    Tobacco comment: pt will try smoking cessation   Vaping Use    Vaping Use: Never used   Substance and Sexual Activity    Alcohol use: No    Drug use: No    Sexual activity: None   Other Topics Concern    None   Social History Narrative    Born in [place], one brother in [place], keeps in touch    , 2 children, one son in [place], one daughter in [place]    5 grandchildren     Lives in a house in [place] with spouse     Worked for [employer] and full time at [employer], [employer], fashion, family life     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      Social Determinants of Health     Financial Resource Strain: Low Risk     Difficulty of Paying Living Expenses: Not hard at all   Food Insecurity: No Food Insecurity    Worried About [address] in the Last Year: Never true   [address] in the Last Year: Never true   Transportation Needs:     Lack of Transportation (Medical):  Lack of Transportation (Non-Medical):    Physical Activity:     Days of Exercise per Week:     Minutes of Exercise per Session:    Stress:     Feeling of Stress :    Social Connections:     Frequency of Communication with Friends and Family:     Frequency of Social Gatherings with Friends and Family:     Attends Jain Services:     Active Member of Clubs or Organizations:     Attends Club or Organization Meetings:     Marital Status:    Intimate Partner Violence:     Fear of Current or Ex-Partner:     Emotionally Abused:     Physically Abused:     Sexually Abused:        SCREENINGS   NIH Stroke Scale  Interval: Baseline  Level of Consciousness (1a. ): Alert  LOC Questions (1b. ):  Answers per the Radiologist below, if available at the time ofthis note:    CT CHEST [address]   Final Result      Left upper lobe interstitial changes described. Given clinical history, changes may be secondary to recent radiation therapy. Other inflammatory and infectious etiologies secondary considerations. Recurrent malignancy less likely. All CT scans at this facility use dose modulation, iterative reconstruction, and/or weight based dosing when appropriate to reduce radiation dose to as low as reasonably achievable. XR CHEST PORTABLE   Final Result   BILATERAL ATELECTASIS/PNEUMONIA AS DISCUSSED.             ED BEDSIDE ULTRASOUND:   Performed by ED Physician - none    LABS:  Labs Reviewed   COMPREHENSIVE METABOLIC PANEL - Abnormal; Notable for the following components:       Result Value    Glucose 137 (*)     CREATININE 0.97 (*)     GFR Non- 56.4 (*)     All other components within normal limits   CBC WITH AUTO DIFFERENTIAL - Abnormal; Notable for the following components:    RBC 3.67 (*)     Hemoglobin 11.5 (*)     Hematocrit 33.5 (*)     Platelets 335 (*)     Neutrophils Absolute 6.7 (*)     Monocytes Absolute 1.4 (*)     All other components within normal limits   CBC WITH AUTO DIFFERENTIAL - Abnormal; Notable for the following components:    RBC 3.66 (*)     Hemoglobin 11.5 (*)     Hematocrit 33.5 (*)     MCH 31.4 (*)     Platelets 914 (*)     Neutrophils Absolute 6.9 (*)     Lymphocytes Absolute 0.6 (*)     All other components within normal limits   BASIC METABOLIC PANEL W/ REFLEX TO MG FOR LOW K - Abnormal; Notable for the following components:    Glucose 141 (*)     All other components within normal limits   CBC WITH AUTO DIFFERENTIAL - Abnormal; Notable for the following components:    WBC 15.6 (*)     RBC 3.55 (*)     Hemoglobin 11.2 (*)     Hematocrit 32.5 (*)     MCH 31.7 (*)     Platelets 711 (*)     Neutrophils Absolute 12.2 (*)     Monocytes Absolute 1.7 (*)     All other of atelectasis versus bibasilar infiltrates. White count is within normal range at 9.7 thousand, procalcitonin level 0.015. BNP is 746. At this time patient is not well optimized to be discharged home, she will be admitted to hospital for COPD exacerbation. With hypoxia      CRITICAL CARE TIME   Total Critical Care time was 0 minutes, excluding separately reportableprocedures. There was a high probability of clinicallysignificant/life threatening deterioration in the patient's condition which required my urgent intervention. CONSULTS:  IP CONSULT TO HOSPITALIST  IP CONSULT TO PULMONOLOGY    PROCEDURES:  Unless otherwise noted below, none     Procedures    FINAL IMPRESSION      1. COPD exacerbation ([address]    2. Hypoxia          DISPOSITION/PLAN   DISPOSITION Admitted 05/19/2021 07:11:55 PM      PATIENT REFERRED TO:  Sarthakcheryl Sruthi, [address] [place] [address]  [address]  [phone]    On 5/25/2021  @ 3:30PM [address] [facility]. Jeff Herman did not have any openings within the 7 day timeframe. Dulce Maria Rodriguez[address]  [address] [place], [place] [phone]    In 3 weeks        DISCHARGE MEDICATIONS:  Discharge Medication List as of 5/21/2021  4:53 PM      START taking these medications    Details   predniSONE (DELTASONE) 10 MG tablet 30mg x7d, then 20mg x7d, then 10mg x7d, then stop, Disp-42 tablet, R-0Normal                (Please note that portions of this note were completed with a voice recognition program.  Efforts were made to edit the dictations but occasionally words are mis-transcribed.)    Allyssa Bean PA-C (electronically signed)  Attending Emergency Physician         Allyssa Bean PA-C  05/20/21 [address] Henri Amaya PA-C  05/29/21 1014    Attending Supervisory Note/Shared Visit   I have personally performed a face to face diagnostic evaluation on this patient. I have reviewed the mid-levels findings and agree.   History and Exam by me shows COPD exacerbation      Alexsandra Centeno DO  Attending Emergency Physician         Alexsandra Centeno DO  05/31/21 1013

## 2021-05-20 PROBLEM — J96.01 ACUTE RESPIRATORY FAILURE WITH HYPOXIA (HCC): Status: ACTIVE | Noted: 2021-05-20

## 2021-05-20 LAB
ANION GAP SERPL CALCULATED.3IONS-SCNC: 11 MEQ/L (ref 9–15)
BASOPHILS ABSOLUTE: 0 K/UL (ref 0–0.2)
BASOPHILS RELATIVE PERCENT: 0.2 %
BUN BLDV-MCNC: 10 MG/DL (ref 8–23)
CALCIUM SERPL-MCNC: 9.4 MG/DL (ref 8.5–9.9)
CHLORIDE BLD-SCNC: 103 MEQ/L (ref 95–107)
CO2: 21 MEQ/L (ref 20–31)
CREAT SERPL-MCNC: 0.69 MG/DL (ref 0.5–0.9)
EKG ATRIAL RATE: 84 BPM
EKG P AXIS: 89 DEGREES
EKG P-R INTERVAL: 170 MS
EKG Q-T INTERVAL: 392 MS
EKG QRS DURATION: 98 MS
EKG QTC CALCULATION (BAZETT): 463 MS
EKG R AXIS: 6 DEGREES
EKG T AXIS: 58 DEGREES
EKG VENTRICULAR RATE: 84 BPM
EOSINOPHILS ABSOLUTE: 0 K/UL (ref 0–0.7)
EOSINOPHILS RELATIVE PERCENT: 0 %
GFR AFRICAN AMERICAN: >60
GFR NON-AFRICAN AMERICAN: >60
GLUCOSE BLD-MCNC: 141 MG/DL (ref 70–99)
HCT VFR BLD CALC: 33.5 % (ref 37–47)
HEMOGLOBIN: 11.5 G/DL (ref 12–16)
LYMPHOCYTES ABSOLUTE: 0.6 K/UL (ref 1–4.8)
LYMPHOCYTES RELATIVE PERCENT: 7.8 %
MCH RBC QN AUTO: 31.4 PG (ref 27–31.3)
MCHC RBC AUTO-ENTMCNC: 34.2 % (ref 33–37)
MCV RBC AUTO: 91.7 FL (ref 82–100)
MONOCYTES ABSOLUTE: 0.3 K/UL (ref 0.2–0.8)
MONOCYTES RELATIVE PERCENT: 3.2 %
NEUTROPHILS ABSOLUTE: 6.9 K/UL (ref 1.4–6.5)
NEUTROPHILS RELATIVE PERCENT: 88.8 %
PDW BLD-RTO: 13.4 % (ref 11.5–14.5)
PLATELET # BLD: 643 K/UL (ref 130–400)
POTASSIUM REFLEX MAGNESIUM: 4.2 MEQ/L (ref 3.4–4.9)
RBC # BLD: 3.66 M/UL (ref 4.2–5.4)
SODIUM BLD-SCNC: 135 MEQ/L (ref 135–144)
WBC # BLD: 7.7 K/UL (ref 4.8–10.8)

## 2021-05-20 PROCEDURE — 36415 COLL VENOUS BLD VENIPUNCTURE: CPT

## 2021-05-20 PROCEDURE — 94761 N-INVAS EAR/PLS OXIMETRY MLT: CPT

## 2021-05-20 PROCEDURE — 80048 BASIC METABOLIC PNL TOTAL CA: CPT

## 2021-05-20 PROCEDURE — 6370000000 HC RX 637 (ALT 250 FOR IP): Performed by: INTERNAL MEDICINE

## 2021-05-20 PROCEDURE — 1210000000 HC MED SURG R&B

## 2021-05-20 PROCEDURE — 93010 ELECTROCARDIOGRAM REPORT: CPT | Performed by: INTERNAL MEDICINE

## 2021-05-20 PROCEDURE — 2700000000 HC OXYGEN THERAPY PER DAY

## 2021-05-20 PROCEDURE — 94640 AIRWAY INHALATION TREATMENT: CPT

## 2021-05-20 PROCEDURE — 2580000003 HC RX 258: Performed by: INTERNAL MEDICINE

## 2021-05-20 PROCEDURE — 85025 COMPLETE CBC W/AUTO DIFF WBC: CPT

## 2021-05-20 PROCEDURE — 99223 1ST HOSP IP/OBS HIGH 75: CPT | Performed by: INTERNAL MEDICINE

## 2021-05-20 PROCEDURE — 94664 DEMO&/EVAL PT USE INHALER: CPT

## 2021-05-20 RX ORDER — PRAVASTATIN SODIUM 20 MG
20 TABLET ORAL NIGHTLY
Status: DISCONTINUED | OUTPATIENT
Start: 2021-05-20 | End: 2021-05-21 | Stop reason: HOSPADM

## 2021-05-20 RX ORDER — LANOLIN ALCOHOL/MO/W.PET/CERES
400 CREAM (GRAM) TOPICAL DAILY
Status: DISCONTINUED | OUTPATIENT
Start: 2021-05-20 | End: 2021-05-21 | Stop reason: HOSPADM

## 2021-05-20 RX ORDER — FUROSEMIDE 40 MG/1
40 TABLET ORAL DAILY
Status: DISCONTINUED | OUTPATIENT
Start: 2021-05-20 | End: 2021-05-21 | Stop reason: HOSPADM

## 2021-05-20 RX ORDER — POTASSIUM CHLORIDE 750 MG/1
20 TABLET, FILM COATED, EXTENDED RELEASE ORAL DAILY
Status: DISCONTINUED | OUTPATIENT
Start: 2021-05-21 | End: 2021-05-21 | Stop reason: HOSPADM

## 2021-05-20 RX ORDER — AMIODARONE HYDROCHLORIDE 200 MG/1
200 TABLET ORAL
Status: DISCONTINUED | OUTPATIENT
Start: 2021-05-21 | End: 2021-05-21 | Stop reason: HOSPADM

## 2021-05-20 RX ORDER — IPRATROPIUM BROMIDE AND ALBUTEROL SULFATE 2.5; .5 MG/3ML; MG/3ML
1 SOLUTION RESPIRATORY (INHALATION) 3 TIMES DAILY
Status: DISCONTINUED | OUTPATIENT
Start: 2021-05-20 | End: 2021-05-21 | Stop reason: HOSPADM

## 2021-05-20 RX ORDER — VITAMIN B COMPLEX
1000 TABLET ORAL DAILY
Status: DISCONTINUED | OUTPATIENT
Start: 2021-05-20 | End: 2021-05-21 | Stop reason: HOSPADM

## 2021-05-20 RX ORDER — ALBUTEROL SULFATE 2.5 MG/3ML
2.5 SOLUTION RESPIRATORY (INHALATION)
Status: DISCONTINUED | OUTPATIENT
Start: 2021-05-20 | End: 2021-05-21 | Stop reason: HOSPADM

## 2021-05-20 RX ORDER — POTASSIUM CHLORIDE 1500 MG/1
1 TABLET, FILM COATED, EXTENDED RELEASE ORAL DAILY
Status: DISCONTINUED | OUTPATIENT
Start: 2021-05-20 | End: 2021-05-20

## 2021-05-20 RX ORDER — DILTIAZEM HYDROCHLORIDE 240 MG/1
240 CAPSULE, COATED, EXTENDED RELEASE ORAL DAILY
Status: DISCONTINUED | OUTPATIENT
Start: 2021-05-20 | End: 2021-05-21 | Stop reason: HOSPADM

## 2021-05-20 RX ADMIN — Medication 400 MG: at 14:07

## 2021-05-20 RX ADMIN — PRAVASTATIN SODIUM 20 MG: 20 TABLET ORAL at 20:36

## 2021-05-20 RX ADMIN — IPRATROPIUM BROMIDE AND ALBUTEROL SULFATE 1 AMPULE: .5; 3 SOLUTION RESPIRATORY (INHALATION) at 19:40

## 2021-05-20 RX ADMIN — GLYCOPYRROLATE AND FORMOTEROL FUMARATE 2 PUFF: 9; 4.8 AEROSOL, METERED RESPIRATORY (INHALATION) at 07:13

## 2021-05-20 RX ADMIN — Medication 10 ML: at 08:08

## 2021-05-20 RX ADMIN — FUROSEMIDE 40 MG: 40 TABLET ORAL at 14:07

## 2021-05-20 RX ADMIN — GLYCOPYRROLATE AND FORMOTEROL FUMARATE 2 PUFF: 9; 4.8 AEROSOL, METERED RESPIRATORY (INHALATION) at 19:41

## 2021-05-20 RX ADMIN — Medication 10 ML: at 20:36

## 2021-05-20 RX ADMIN — Medication 1000 UNITS: at 14:07

## 2021-05-20 RX ADMIN — DILTIAZEM HYDROCHLORIDE 240 MG: 240 CAPSULE, COATED, EXTENDED RELEASE ORAL at 14:07

## 2021-05-20 RX ADMIN — RIVAROXABAN 20 MG: 20 TABLET, FILM COATED ORAL at 17:49

## 2021-05-20 RX ADMIN — IPRATROPIUM BROMIDE AND ALBUTEROL SULFATE 1 AMPULE: .5; 3 SOLUTION RESPIRATORY (INHALATION) at 13:09

## 2021-05-20 RX ADMIN — IPRATROPIUM BROMIDE AND ALBUTEROL SULFATE 1 AMPULE: .5; 3 SOLUTION RESPIRATORY (INHALATION) at 07:12

## 2021-05-20 NOTE — PROGRESS NOTES
Pt assessment and vitals complete and stable. Patient on 4L nc, sitting in the chair, denies needs at this time. C/o shortness of breath on exertion. Lung sounds diminished.

## 2021-05-20 NOTE — PROGRESS NOTES
Pt ambulated around unit x 1 with standby assist. Kavita well, stopped twice to check her spo2 with personal pulseoximeter. Approximately 30 minutes later, pt requesting to walk again. Pt ambulated around unit with very steady, fast gait, spo2 check with pt after ambulating 1/2 unit 86% on 4L. Immediately taken back to pt room, 88% on 4L, increased to 5L/min, pt up to 95%. Pt states 'I tried to push it too fast too hard with this, but I have to keep moving.' Education provided to pt regarding need to ambulate but also not cause shortness of breath or decreased oxygen. Voiced understanding, independent in room.

## 2021-05-20 NOTE — PLAN OF CARE
Problem: Breathing Pattern - Ineffective:  Goal: Ability to achieve and maintain a regular respiratory rate will improve  Description: Ability to achieve and maintain a regular respiratory rate will improve  5/20/2021 0812 by Nena Quintana RN  Outcome: Ongoing  5/19/2021 2312 by Katelyn Wilson RN  Outcome: Ongoing

## 2021-05-20 NOTE — PROGRESS NOTES
[facility] AT [place] Respiratory Therapy Evaluation   Current Order: DUONEB Q4 W/A     Home Regimen: PRN      Ordering Physician: DENISE  Re-evaluation Date:  5/23     Diagnosis: COPD EXAC      Patient Status: Stable / Unstable + Physician notified    The following MDI Criteria must be met in order to convert aerosol to MDI with spacer.  If unable to meet, MDI will be converted to aerosol:  []  Patient able to demonstrate the ability to use MDI effectively  []  Patient alert and cooperative  []  Patient able to take deep breath with 5-10 second hold  []  Medication(s) available in this delivery method   []  Peak flow greater than or equal to 200 ml/min            Current Order Substituted To  (same drug, same frequency)   Aerosol to MDI [] Albuterol Sulfate 0.083% unit dose by aerosol Albuterol Sulfate MDI 2 puffs by inhalation with spacer    [] Levalbuterol 1.25 mg unit dose by aerosol Levalbuterol MDI 2 puffs by inhalation with spacer    [] Levalbuterol 0.63 mg unit dose by aerosol Levalbuterol MDI 2 puffs by inhalation with spacer    [] Ipratropium Bromide 0.02% unit dose by aerosol Ipratropium Bromide MDI 2 puffs by inhalation with spacer    [] Duoneb (Ipratropium + Albuterol) unit dose by aerosol Ipratropium MDI + Albuterol MDI 2 puffs by inhalation w/spacer   MDI to Aerosol [] Albuterol Sulfate MDI Albuterol Sulfate 0.083% unit dose by aerosol    [] Levalbuterol MDI 2 puffs by inhalation Levalbuterol 1.25 mg unit dose by aerosol    [] Ipratropium Bromide MDI by inhalation Ipratropium Bromide 0.02% unit dose by aerosol    [] Combivent (Ipratropium + Albuterol) MDI by inhalation Duoneb (Ipratropium + Albuterol) unit dose by aerosol       Treatment Assessment [Frequency/Schedule]:  Change frequency to: ______________DUONEB TID AND ALBUTEROL Q2 PRN____________________________________per Protocol, P&T, [facility]      Points 0 1 2 3 4   Pulmonary Status  Non-Smoker  []   Smoking history   < 20 pack years  []   Smoking history  ?  20 pack years  []   Pulmonary Disorder  (acute or chronic)  []   Severe or Chronic w/ Exacerbation  [x]     Surgical Status No [x]   Surgeries     General []   Surgery Lower []   Abdominal Thoracic or []   Upper Abdominal Thoracic with  PulmonaryDisorder  []     Chest X-ray Clear/Not  Ordered     []  Chronic Changes  Results Pending  []  Infiltrates, atelectasis, pleural effusion, or edema  [x]  Infiltrates in more than one lobe []  Infiltrate + Atelectasis, &/or pleural effusion  []    Respiratory Pattern Regular,  RR = 12-20 [x]  Increased,  RR = 21-25 []  FOWLER, irregular,  or RR = 26-30 []  Decreased FEV1  or RR = 31-35 []  Severe SOB, use  of accessory muscles, or RR ? 35  []    Mental Status Alert, oriented,  Cooperative [x]  Confused but Follows commands []  Lethargic or unable to follow commands []  Obtunded  []  Comatose  []    Breath Sounds Clear to  auscultation  []  Decreased unilaterally or  in bases only []  Decreased  bilaterally  [x]  Crackles or intermittent wheezes []  Wheezes []    Cough Strong, Spontan., & nonproductive [x]  Strong,  spontaneous, &  productive []  Weak,  Nonproductive []  Weak, productive or  with wheezes []  No spontaneous  cough or may require suctioning []    Level of Activity Ambulatory []  Ambulatory w/ Assist  [x]  Non-ambulatory []  Paraplegic []  Quadriplegic []    Total    Score:___9____     Triage Score:_4_______      Tri       Triage:     1. (>20) Freq: Q3    2. (16-20) Freq: Q4   3. (11-15) Freq: QID & Albuterol Q2 PRN    4. (6-10) Freq: TID & Albuterol Q2 PRN    5. (0-5) Freq Q4prn

## 2021-05-20 NOTE — PROGRESS NOTES
2040: Assessment and admission complete. From home with . No O2 worn at home-- currently on 4L NC at 93%-- otherwise VSS. No complaints of dizziness, pain or nausea, and only SOB with exertion. Up with standby r/t O2 and dyspnea  with exertion. Resting comfortably, denying further needs. Safety maintained, Call light within reach.     Electronically signed by Kristin Mckeon RN on 5/19/2021 at 10:44 PM

## 2021-05-20 NOTE — PROGRESS NOTES
Pt ambulating in halls with  at this time.  Pt ambulating with steady gait and slower pace, tolerating well on 4L NC.

## 2021-05-20 NOTE — CONSULTS
2008, 2015, 2018    Dr Erum Ferraro Smoking history     Vitamin D deficiency        Past Surgical History:        Procedure Laterality Date    ATRIAL ABLATION SURGERY      BREAST BIOPSY Left 01/20/2021    BREAST BIOPSY Left 2/4/2021    LEFT BREAST LUMPECTOMY AND  SENTINEL LYMPH NODE BIOPSY performed by Yasemin Foy MD at [address]    stap infection, postpartum    COLONOSCOPY  07/22/2015    w/polypectomy Dr Elizabeth Sanchez  2012    Dr Martínez Banerjee; HI RISK IND N/A 07/13/2018    COLONOSCOPY performed by Zain Delgadillo MD at [address] [place] [address] History:     reports that she quit smoking about 5 years ago. Her smoking use included cigarettes. She started smoking about 57 years ago. She has a 50.00 pack-year smoking history. She has never used smokeless tobacco. She reports that she does not drink alcohol and does not use drugs. Family History:       Problem Relation Age of Onset    Osteoarthritis Mother     Osteoporosis Mother     Cancer Mother         anorectal     Colon Cancer Mother        Allergies:  Patient has no known allergies.         MEDICATIONS during current hospitalization:    Continuous Infusions:   sodium chloride         Scheduled Meds:   ipratropium-albuterol  1 ampule Inhalation TID    [START ON 5/21/2021] amiodarone  200 mg Oral Once per day on Mon Thu    dilTIAZem  240 mg Oral Daily    furosemide  40 mg Oral Daily    magnesium oxide  400 mg Oral Daily    pravastatin  20 mg Oral Nightly    Vitamin D  1,000 Units Oral Daily    [START ON 5/21/2021] potassium chloride  20 mEq Oral Daily    rivaroxaban  20 mg Oral Daily    glycopyrrolate-formoterol  2 puff Inhalation BID    sodium chloride flush  5-40 mL Intravenous 2 times per day       PRN Meds:albuterol, sodium chloride flush, sodium chloride, promethazine **OR** ondansetron, polyethylene glycol, acetaminophen **OR** acetaminophen    REVIEW OF SYSTEMS:  As obtained and formatted as 5 mm contiguous axial images from the thoracic inlet through the adrenal glands. Sagittal and coronal reconstructions obtained during postprocessing. Intravenous contrast medium:  None. Comparison:  Low dose CT chest, April 26, 2021, March 5, 2020, January 4, 2019. CT chest, August 23, 2016. FINDINGS: Right lung: No nodules, mass, pleural effusion, pneumothorax. Mild dependent subsegmental atelectatic change. Left lung: No nodules, masses, pleural effusion, pneumothorax. Mild dependent subsegmental atelectatic change. Focal interval development of reticular change, left upper lobe, at level of postsurgical change, left breast (series 2, images 21 through 37). Lymph nodes: No hilar, mediastinal, or axillary lymph node enlargement. Chest Wall: Increased attenuation, left retroareolar, and upper inner quadrant, with calcification, and surgical clips, unchanged (series 2, images 24 through 29). Thoracic aorta: Normal in course and caliber. Cardiac Size: Normal. Pericardial effusion: None. Upper abdomen:Limited imaging upper abdomen shows no gross anomaly. Musculoskeletal:No osteoblastic, and no osteolytic lesions. Anterior wedge compression, T12-L1, again demonstrated. Left upper lobe interstitial changes described. Given clinical history, changes may be secondary to recent radiation therapy. Other inflammatory and infectious etiologies secondary considerations. Recurrent malignancy less likely. All CT scans at this facility use dose modulation, iterative reconstruction, and/or weight based dosing when appropriate to reduce radiation dose to as low as reasonably achievable. XR CHEST PORTABLE    Result Date: 5/19/2021  EXAMINATION: XR CHEST PORTABLE CLINICAL HISTORY: SHORTNESS OF BREATH COMPARISONS: CT CHEST, APRIL 26, 2021, CHEST RADIOGRAPH, JANUARY 28, 2021 FINDINGS: Remote right sixth rib fracture. Cardiopericardial silhouette normal. Aorta calcified.  Ill-defined area of increased opacity found bilaterally, left mid and left lower lung. Increased opacity also demonstrated right lung base. BILATERAL ATELECTASIS/PNEUMONIA AS DISCUSSED. CT LUNG SCREENING    Result Date: 2021  EXAM: CT CHEST NON-CONTRAST NODULE PROTOCOL DATE: 2021 12:55 PM CLINICAL INDICATION:  Z72.0 Tobacco abuse ICD10 . 50 pack-year history smoking. COMPARISON: 3/5/2020 TECHNIQUE: Low-dose helical CT was acquired without intravenous contrast from the lung apices to bases at 5 mm slices with axial reconstruction at 2.5 mm and 2 mm reconstruction in the coronal and sagittal plane. 5 x 2 mm MIP axial were also generated. All CT scans at this facility use dose modulation, iterative reconstruction, and/or weight based dosing when appropriate to reduce radiation dose to as low as reasonably achievable. Findings:  LUNGS NODULES: Right lun. 3.5 mm nodule within the posterior lateral base of the right lower lobe, series 3 image 85. Left lun. There are no suspicious masses or nodules. OTHER : There is atelectasis or scarring within the lung bases, including the lower lobes posteriorly and the right middle lobe. Mild parenchymal scarring in apices, unchanged. No mediastinal lymphadenopathy. No aortic aneurysm. Aorta appears intact. There are atherosclerotic calcifications in the aorta and coronary arteries. No pleural effusions. No pneumothorax. There is depression superior endplate H69, unchanged. Visualized upper abdominal structures unremarkable. LUNG-RADS 2:  BENIGN APPEARANCE OR BEHAVIOR--CONTINUE ANNUAL SCREENING WITH LDCT IN 12 MONTHS. Assessment and  plan:     1. Left upper lung interstitial changes rule out radiation pneumonitis. 2. Acute hypoxic respiratory failure  3. COPD  4. Fatigue,  diarrhea could be secondary to chemo    She is currently on 4 L O2 via nasal cannula and her O2 saturation is 92%.   She is on nebulizer with DuoNeb 3 times daily albuterol every 2 hours as

## 2021-05-20 NOTE — CARE COORDINATION
Patient has been ambulating in the halls with the use of oxygen and dropping saturation into the upper 80s. Will monitor for possible home oxygen needs.   CM/LILIAM will continue to follow Electronically signed by Guilherme Patel RN on 5/20/2021 at 12:03 PM

## 2021-05-20 NOTE — PROGRESS NOTES
Physician Progress Note    5/20/2021   4:21 PM    Name:  Stefany Dorsey  MRN:    [id number]     IP Day: 0     Admit Date: 5/19/2021  2:47 PM  PCP: Mandy Meyers MD    Code Status:  Full Code    Subjective:     She feels well but she is very anxious about the possibility of needing oxygen for home-going.     Current Facility-Administered Medications   Medication Dose Route Frequency Provider Last Rate Last Admin    ipratropium-albuterol (DUONEB) nebulizer solution 1 ampule  1 ampule Inhalation TID Julio rPo MD   1 ampule at 05/20/21 1309    albuterol (PROVENTIL) nebulizer solution 2.5 mg  2.5 mg Nebulization Q2H PRN Julio Pro MD        [START ON 5/21/2021] amiodarone (CORDARONE) tablet 200 mg  200 mg Oral Once per day on Mon Thu Brendon Marshall, DO        dilTIAZem (CARDIZEM CD) extended release capsule 240 mg  240 mg Oral Daily Lindbergh Fleischer, DO   240 mg at 05/20/21 1407    furosemide (LASIX) tablet 40 mg  40 mg Oral Daily Lindbergh Fleischer, DO   40 mg at 05/20/21 1407    magnesium oxide (MAG-OX) tablet 400 mg  400 mg Oral Daily Lindbergh Fleischer, DO   400 mg at 05/20/21 1407    pravastatin (PRAVACHOL) tablet 20 mg  20 mg Oral Nightly Lindbergh Fleischer, DO        Vitamin D (CHOLECALCIFEROL) tablet 1,000 Units  1,000 Units Oral Daily Lindbergh Fleischer, DO   1,000 Units at 05/20/21 1407    [START ON 5/21/2021] potassium chloride (KLOR-CON) extended release tablet 20 mEq  20 mEq Oral Daily Lindbergh Fleischer, DO        rivaroxaban (XARELTO) tablet 20 mg  20 mg Oral Daily Lindbergh Fleischer, DO        glycopyrrolate-formoterol (BEVESPI) 9-4.8 MCG/ACT inhaler 2 puff  2 puff Inhalation BID Lindbergh Fleischer, DO   2 puff at 05/20/21 0713    sodium chloride flush 0.9 % injection 5-40 mL  5-40 mL Intravenous 2 times per day Lindbergh Fleischer, DO   10 mL at 05/20/21 5768    sodium chloride flush 0.9 % injection 5-40 mL  5-40 mL Intravenous PRN Lindbergh Fleischer, DO        0.9 % sodium chloride infusion  25 mL Intravenous PRN Nikolas Bimler, DO        promethazine (PHENERGAN) tablet 12.5 mg  12.5 mg Oral Q6H PRN Nikolas Bimler, DO        Or    ondansetron [facility]) injection 4 mg  4 mg Intravenous Q6H PRN Nikolas Bimler, DO        polyethylene glycol (GLYCOLAX) packet 17 g  17 g Oral Daily PRN Nikolas Bimler, DO        acetaminophen (TYLENOL) tablet 650 mg  650 mg Oral Q6H PRN Nikolas Bimler, DO        Or    acetaminophen (TYLENOL) suppository 650 mg  650 mg Rectal Q6H PRN Nikolas Bimler, DO           Physical Examination:      Vitals:  /67   Pulse 95   Temp 97.9 °F (36.6 °C) (Oral)   Resp 18   Ht 5' 1\" (1.549 m)   Wt 141 lb (64 kg)   LMP  (LMP Unknown)   SpO2 92%   BMI 26.64 kg/m²   Temp (24hrs), Av °F (36.7 °C), Min:97.9 °F (36.6 °C), Max:98.1 °F (36.7 °C)      General appearance: alert, cooperative and no distress  Mental Status: oriented to person, place and time and normal affect  Lungs: Diminished bilaterally. No wheezing, rhonchi, or crackles  Heart: regular rate and rhythm, no murmur  Abdomen: soft, nontender, nondistended, bowel sounds present, no masses  Extremities: no edema, redness, tenderness in the calves. 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     1.   Acute respiratory failure with hypoxia (85% on room air) suspect due to radiation induced lung injury in the setting of existing COPD   -Pulmonology following  -Blood cultures with no growth to date  -Continue nebulizer, supplemental O2, incentive spirometry, and supportive respiratory care  -Home O2 evaluation in the morning     2. Fatigue, diarrhea: Suspect side effect of paclitaxel: Resolved     Breast cancer on chemotherapy  COPD-does not appear to be in acute exacerbation-no cough or sputum production  Paroxysmal atrial fibrillation     Full code    Anticipate discharge home 5/21. May need home oxygen.     >35 minutes in total care time    Electronically signed by Mason Burgos DO on 5/20/2021 at 4:21 PM

## 2021-05-21 VITALS
TEMPERATURE: 97.5 F | DIASTOLIC BLOOD PRESSURE: 71 MMHG | OXYGEN SATURATION: 91 % | SYSTOLIC BLOOD PRESSURE: 148 MMHG | RESPIRATION RATE: 18 BRPM | HEART RATE: 92 BPM | WEIGHT: 141 LBS | HEIGHT: 61 IN | BODY MASS INDEX: 26.62 KG/M2

## 2021-05-21 PROBLEM — J70.0 RADIATION PNEUMONITIS (HCC): Status: ACTIVE | Noted: 2021-05-21

## 2021-05-21 LAB
ANION GAP SERPL CALCULATED.3IONS-SCNC: 13 MEQ/L (ref 9–15)
BASOPHILS ABSOLUTE: 0 K/UL (ref 0–0.2)
BASOPHILS RELATIVE PERCENT: 0 %
BUN BLDV-MCNC: 18 MG/DL (ref 8–23)
CALCIUM SERPL-MCNC: 9.8 MG/DL (ref 8.5–9.9)
CHLORIDE BLD-SCNC: 99 MEQ/L (ref 95–107)
CO2: 22 MEQ/L (ref 20–31)
CREAT SERPL-MCNC: 1 MG/DL (ref 0.5–0.9)
EOSINOPHILS ABSOLUTE: 0 K/UL (ref 0–0.7)
EOSINOPHILS RELATIVE PERCENT: 0 %
GFR AFRICAN AMERICAN: >60
GFR NON-AFRICAN AMERICAN: 54.4
GLUCOSE BLD-MCNC: 133 MG/DL (ref 70–99)
HCT VFR BLD CALC: 32.5 % (ref 37–47)
HEMOGLOBIN: 11.2 G/DL (ref 12–16)
LYMPHOCYTES ABSOLUTE: 1.7 K/UL (ref 1–4.8)
LYMPHOCYTES RELATIVE PERCENT: 11 %
MCH RBC QN AUTO: 31.7 PG (ref 27–31.3)
MCHC RBC AUTO-ENTMCNC: 34.5 % (ref 33–37)
MCV RBC AUTO: 91.7 FL (ref 82–100)
MONOCYTES ABSOLUTE: 1.7 K/UL (ref 0.2–0.8)
MONOCYTES RELATIVE PERCENT: 11 %
NEUTROPHILS ABSOLUTE: 12.2 K/UL (ref 1.4–6.5)
NEUTROPHILS RELATIVE PERCENT: 78 %
PDW BLD-RTO: 13.6 % (ref 11.5–14.5)
PLATELET # BLD: 792 K/UL (ref 130–400)
PLATELET SLIDE REVIEW: ABNORMAL
POTASSIUM REFLEX MAGNESIUM: 4 MEQ/L (ref 3.4–4.9)
RBC # BLD: 3.55 M/UL (ref 4.2–5.4)
RBC # BLD: NORMAL 10*6/UL
SODIUM BLD-SCNC: 134 MEQ/L (ref 135–144)
WBC # BLD: 15.6 K/UL (ref 4.8–10.8)

## 2021-05-21 PROCEDURE — 36415 COLL VENOUS BLD VENIPUNCTURE: CPT

## 2021-05-21 PROCEDURE — 2700000000 HC OXYGEN THERAPY PER DAY

## 2021-05-21 PROCEDURE — 85025 COMPLETE CBC W/AUTO DIFF WBC: CPT

## 2021-05-21 PROCEDURE — 94761 N-INVAS EAR/PLS OXIMETRY MLT: CPT

## 2021-05-21 PROCEDURE — 6370000000 HC RX 637 (ALT 250 FOR IP): Performed by: INTERNAL MEDICINE

## 2021-05-21 PROCEDURE — 99233 SBSQ HOSP IP/OBS HIGH 50: CPT | Performed by: INTERNAL MEDICINE

## 2021-05-21 PROCEDURE — 80048 BASIC METABOLIC PNL TOTAL CA: CPT

## 2021-05-21 PROCEDURE — 94640 AIRWAY INHALATION TREATMENT: CPT

## 2021-05-21 RX ORDER — PREDNISONE 10 MG/1
TABLET ORAL
Qty: 42 TABLET | Refills: 0 | Status: SHIPPED | OUTPATIENT
Start: 2021-05-21 | End: 2021-06-28 | Stop reason: ALTCHOICE

## 2021-05-21 RX ORDER — PREDNISONE 20 MG/1
40 TABLET ORAL DAILY
Status: DISCONTINUED | OUTPATIENT
Start: 2021-05-21 | End: 2021-05-21 | Stop reason: HOSPADM

## 2021-05-21 RX ADMIN — FUROSEMIDE 40 MG: 40 TABLET ORAL at 10:38

## 2021-05-21 RX ADMIN — DILTIAZEM HYDROCHLORIDE 240 MG: 240 CAPSULE, COATED, EXTENDED RELEASE ORAL at 10:38

## 2021-05-21 RX ADMIN — POTASSIUM CHLORIDE 20 MEQ: 750 TABLET, FILM COATED, EXTENDED RELEASE ORAL at 10:37

## 2021-05-21 RX ADMIN — PREDNISONE 40 MG: 20 TABLET ORAL at 10:38

## 2021-05-21 RX ADMIN — Medication 1000 UNITS: at 10:39

## 2021-05-21 RX ADMIN — GLYCOPYRROLATE AND FORMOTEROL FUMARATE 2 PUFF: 9; 4.8 AEROSOL, METERED RESPIRATORY (INHALATION) at 07:37

## 2021-05-21 RX ADMIN — Medication 400 MG: at 10:39

## 2021-05-21 RX ADMIN — IPRATROPIUM BROMIDE AND ALBUTEROL SULFATE 1 AMPULE: .5; 3 SOLUTION RESPIRATORY (INHALATION) at 13:55

## 2021-05-21 RX ADMIN — IPRATROPIUM BROMIDE AND ALBUTEROL SULFATE 1 AMPULE: .5; 3 SOLUTION RESPIRATORY (INHALATION) at 07:36

## 2021-05-21 NOTE — PROGRESS NOTES
Pt SPO2 on room air at rest dropped to 87%. Pt SPO2 while ambulating on 4L 90%. Pt SPO2 at rest on 4L 92%.

## 2021-05-21 NOTE — PROGRESS NOTES
Spiritual Care Services     Summary of Visit:  Patient said she was very happy because she is leaving the hospital today. Spiritual Assessment/Intervention/Outcomes:    Encounter Summary  Services provided to[de-identified] Patient  Referral/Consult From[de-identified] Rounding  Support System: Spouse, Children, Family members  Continue Visiting: No  Complexity of Encounter: Low  Length of Encounter: 15 minutes  Routine  Type: Initial  Assessment: Calm, Approachable  Intervention: [place]  Outcome: Expressed feelings of gauri, peace, and/or awe              Advance Directives (For Healthcare)  Pre-existing DNR Comfort Care/DNR Arrest/DNI Order: No  Healthcare Directive: No, patient does not have an advance directive for healthcare treatment  Information on Healthcare Directives Requested: No  Patient Requests Assistance: No  Advance Directives: Pt. not interested at this time           Values / Beliefs  Do you have any ethnic, cultural, sacramental, or spiritual Taoist needs you would like us to be aware of while you are in the hospital?: No    Care Plan:        [id number] [address]   Electronically signed by Roxy Leiva on 5/21/21 at 2:24 PM EDT     To reach a  for emotional and spiritual support, place an [facility] consult request.   If a  is needed immediately, dial 0 and ask to page the on-call .

## 2021-05-21 NOTE — DISCHARGE SUMMARY
[facility] Medicine Discharge Summary    Berta Aguilera  :  1950  MRN:  [id number]    Admit date:  2021  Discharge date:  2021    Admitting Physician:  Guillermina Cushing, DO  Primary Care Physician:  Kurt Garza MD    Discharge Diagnoses: Active Problems:    Hypoxia    Acute respiratory failure with hypoxia (HCC)    Radiation pneumonitis (HCC)  Resolved Problems:    * No resolved hospital problems. 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 She was found to have acute respiratory failure with hypoxia (85% on room air) due to radiation induced lung injury in the setting of existing COPD. She was started on steroids per recommendation of pulmonology and will follow up with pulmonology in approximately 2 weeks. She was asymptomatic at the time of discharge but did require oxygen-Home oxygen was arranged.       Exam on Discharge:   BP (!) 148/71   Pulse 92   Temp 97.5 °F (36.4 °C) (Oral)   Resp 18   Ht 5' 1\" (1.549 m)   Wt 141 lb (64 kg)   LMP  (LMP Unknown)   SpO2 91%   BMI 26.64 kg/m²   General appearance: alert, cooperative and no distress.  On O2  Mental Status: oriented to person, place and time and normal affect  Lungs: diminished  Heart: regular rate and rhythm, no murmur  Abdomen: soft, nontender, nondistended, bowel sounds present, no masses  Extremities: no edema, redness, tenderness in the calves  Skin: no gross lesions, rashes    Discharge Medication List:   Alfredo Duncan DOMÍNGUEZ   Home Medication Instructions LAD:[id number]    Printed on:05/21/21 8241   Medication Information                      amiodarone (CORDARONE) 200 MG tablet  Take 1 tablet by mouth Twice a Week             b complex vitamins tablet  Take 1 tablet by mouth daily             Calcium Carb-Cholecalciferol (CALCIUM + D3 PO)  Take by mouth             dilTIAZem (CARDIZEM CD) 240 MG extended release capsule  TAKE 1 CAPSULE BY MOUTH EVERY DAY             furosemide (LASIX) 40 MG tablet  TAKE 1 TABLET BY MOUTH EVERY DAY             Handicap Placard MISC  by Does not apply route Good for 5 years. Dispense 1             magnesium oxide (MAG-OX) 400 MG tablet  Take 400 mg by mouth daily             PACLitaxel (TAXOL) 100 MG/16.7ML chemo injection  Infuse intravenously once             potassium chloride (KLOR-CON M) 20 MEQ TBCR extended release tablet  TAKE 1 TABLET BY MOUTH EVERY DAY             pravastatin (PRAVACHOL) 20 MG tablet  Take 1 tablet by mouth daily             predniSONE (DELTASONE) 10 MG tablet  30mg x7d, then 20mg x7d, then 10mg x7d, then stop             Psyllium (METAMUCIL FIBER PO)  Take by mouth             rivaroxaban (XARELTO) 20 MG TABS tablet  TAKE 1 TABLET BY MOUTH EVERY DAY WITH BREAKFAST             umeclidinium-vilanterol (ANORO ELLIPTA) 62.5-25 MCG/INH AEPB inhaler  Inhale 1 puff into the lungs daily             Vitamin D (CHOLECALCIFEROL) 1000 UNITS CAPS capsule  Take 1,000 Units by mouth daily.                    DC time 37 minutes    Signed:  DO Reva  5/21/2021, 4:34 PM

## 2021-05-21 NOTE — PROGRESS NOTES
INPATIENT PROGRESS NOTES    PATIENT NAME: Zac Joseph  MRN: [id number]  SERVICE DATE:  May 21, 2021   SERVICE TIME:  3:14 PM      PRIMARY SERVICE: Pulmonary Disease    CHIEF COMPLAIN: Shortness of breath      INTERVAL HPI: Patient seen and examined at bedside, Interval Notes, orders reviewed. Nursing notes noted  Patient is still on 4 L O2 via nasal cannula and O2 saturation 91%. OBJECTIVE    Body mass index is 26.64 kg/m². PHYSICAL EXAM:  Vitals:  BP (!) 148/71   Pulse 92   Temp 97.5 °F (36.4 °C) (Oral)   Resp 18   Ht 5' 1\" (1.549 m)   Wt 141 lb (64 kg)   LMP  (LMP Unknown)   SpO2 91%   BMI 26.64 kg/m²   General: Alert, awake . comfortable in bed, No distress. Head: Atraumatic , Normocephalic   Eyes: PERRL. No sclera icterus. No conjunctival injection. No discharge   ENT: No nasal  discharge. Pharynx clear. Neck:  Trachea midline. No thyromegaly, no JVD, No cervical adenopathy. Chest : Bilaterally symmetrical ,Normal effort,  No accessory muscle use  Lung : Diminished breath sound bilaterally. No rales. No wheezing. No rhonchi. Heart[de-identified] Normal  rate. Regular rhythm. No mumur ,  Rub or gallop  ABD: Non-tender. Non-distended. No masses. No organmegaly. Normal bowel sounds. No hernia.   Ext : No Pitting both leg , No Cyanosis No clubbing  Neuro: no focal weakness          DATA:   Recent Labs     05/20/21  0654 05/21/21  0638   WBC 7.7 15.6*   HGB 11.5* 11.2*   HCT 33.5* 32.5*   MCV 91.7 91.7   * 792*     Recent Labs     05/19/21  1515 05/20/21  0654 05/21/21  0638    135 134*   K 3.8 4.2 4.0   CL 98 103 99   CO2 25 21 22   BUN 11 10 18   CREATININE 0.97* 0.69 1.00*   GLUCOSE 137* 141* 133*   CALCIUM 9.4 9.4 9.8   PROT 6.6  --   --    LABALBU 3.5  --   --    BILITOT 0.3  --   --    ALKPHOS 67  --   --    AST 18  --   --    ALT 17  --   --    LABGLOM 56.4* >60.0 54.4*   GFRAA >60.0 >60.0 >60.0   GLOB 3.1  --   --        MV Settings:          No results for input(s): PHART, TOO1TKA, PO2ART, PYC4MYD, BEART, I8FSREVL in the last 72 hours. O2 Device: Nasal cannula  O2 Flow Rate (L/min): 4 L/min    DIET GENERAL;     MEDICATIONS during current hospitalization:    Continuous Infusions:   sodium chloride         Scheduled Meds:   predniSONE  40 mg Oral Daily    ipratropium-albuterol  1 ampule Inhalation TID    amiodarone  200 mg Oral Once per day on Mon Thu    dilTIAZem  240 mg Oral Daily    furosemide  40 mg Oral Daily    magnesium oxide  400 mg Oral Daily    pravastatin  20 mg Oral Nightly    Vitamin D  1,000 Units Oral Daily    potassium chloride  20 mEq Oral Daily    rivaroxaban  20 mg Oral Daily    glycopyrrolate-formoterol  2 puff Inhalation BID    sodium chloride flush  5-40 mL Intravenous 2 times per day       PRN Meds:albuterol, sodium chloride flush, sodium chloride, promethazine **OR** ondansetron, polyethylene glycol, acetaminophen **OR** acetaminophen    Radiology  CT CHEST WO CONTRAST    Result Date: 5/20/2021  CT of the Chest without intravenous contrast medium. HISTORY:  New hypoxia. h/o radiation to L breast, completed, April 2, 2021. Receiving paclitaxel, and amiodarone. History of COPD. TECHNICAL FACTORS: CT imaging of the chest was obtained and formatted as 5 mm contiguous axial images from the thoracic inlet through the adrenal glands. Sagittal and coronal reconstructions obtained during postprocessing. Intravenous contrast medium:  None. Comparison:  Low dose CT chest, April 26, 2021, March 5, 2020, January 4, 2019. CT chest, August 23, 2016. FINDINGS: Right lung: No nodules, mass, pleural effusion, pneumothorax. Mild dependent subsegmental atelectatic change. Left lung: No nodules, masses, pleural effusion, pneumothorax. Mild dependent subsegmental atelectatic change. Focal interval development of reticular change, left upper lobe, at level of postsurgical change, left breast (series 2, images 21 through 37).  Lymph nodes: No hilar, mediastinal, or axillary lymph node enlargement. Chest Wall: Increased attenuation, left retroareolar, and upper inner quadrant, with calcification, and surgical clips, unchanged (series 2, images 24 through 29). Thoracic aorta: Normal in course and caliber. Cardiac Size: Normal. Pericardial effusion: None. Upper abdomen:Limited imaging upper abdomen shows no gross anomaly. Musculoskeletal:No osteoblastic, and no osteolytic lesions. Anterior wedge compression, T12-L1, again demonstrated. Left upper lobe interstitial changes described. Given clinical history, changes may be secondary to recent radiation therapy. Other inflammatory and infectious etiologies secondary considerations. Recurrent malignancy less likely. All CT scans at this facility use dose modulation, iterative reconstruction, and/or weight based dosing when appropriate to reduce radiation dose to as low as reasonably achievable. XR CHEST PORTABLE    Result Date: 5/19/2021  EXAMINATION: XR CHEST PORTABLE CLINICAL HISTORY: SHORTNESS OF BREATH COMPARISONS: CT CHEST, APRIL 26, 2021, CHEST RADIOGRAPH, JANUARY 28, 2021 FINDINGS: Remote right sixth rib fracture. Cardiopericardial silhouette normal. Aorta calcified. Ill-defined area of increased opacity found bilaterally, left mid and left lower lung. Increased opacity also demonstrated right lung base. BILATERAL ATELECTASIS/PNEUMONIA AS DISCUSSED. CT LUNG SCREENING    Result Date: 4/27/2021  EXAM: CT CHEST NON-CONTRAST NODULE PROTOCOL DATE: 4/26/2021 12:55 PM CLINICAL INDICATION:  Z72.0 Tobacco abuse ICD10 . 50 pack-year history smoking. COMPARISON: 3/5/2020 TECHNIQUE: Low-dose helical CT was acquired without intravenous contrast from the lung apices to bases at 5 mm slices with axial reconstruction at 2.5 mm and 2 mm reconstruction in the coronal and sagittal plane. 5 x 2 mm MIP axial were also generated.  All CT scans at this facility use dose modulation, iterative reconstruction, and/or weight based dosing when appropriate to reduce radiation dose to as low as reasonably achievable. Findings:  LUNGS NODULES: Right lun. 3.5 mm nodule within the posterior lateral base of the right lower lobe, series 3 image 85. Left lun. There are no suspicious masses or nodules. OTHER : There is atelectasis or scarring within the lung bases, including the lower lobes posteriorly and the right middle lobe. Mild parenchymal scarring in apices, unchanged. No mediastinal lymphadenopathy. No aortic aneurysm. Aorta appears intact. There are atherosclerotic calcifications in the aorta and coronary arteries. No pleural effusions. No pneumothorax. There is depression superior endplate P58, unchanged. Visualized upper abdominal structures unremarkable. LUNG-RADS 2:  BENIGN APPEARANCE OR BEHAVIOR--CONTINUE ANNUAL SCREENING WITH LDCT IN 12 MONTHS. IMPRESSION AND SUGGESTION:  1. Left upper lung interstitial changes most likely radiation pneumonitis. 2. Acute hypoxic respiratory failure on 4 L O2  3. Severe COPD, FEV1 0.83 L, 41%  4. Fatigue,  diarrhea could be secondary to chemo    Patient is on 4 L O2 via nasal cannula. Continue O2 to keep saturation 90% above. Discussed with Dr. Palmer Prudent, she wants to go home. She can be discharged home with prednisone for pneumonitis. Leukocytosis likely due to prednisone. Procalcitonin is not elevated. If patient develop fever add antibiotic to p.o..  I discussed with the  and patient at bedside reviewed CT chest film. Also reviewed PFT. Follow-up with me in office 2 to 3 weeks      NOTE: This report was transcribed using voice recognition software. Every effort was made to ensure accuracy; however, inadvertent computerized transcription errors may be present.       Electronically signed by Mike Aiken MD, [facility] on 2021 at 3:14 PM

## 2021-05-24 LAB
BLOOD CULTURE, ROUTINE: NORMAL
CULTURE, BLOOD 2: NORMAL

## 2021-05-25 ENCOUNTER — OFFICE VISIT (OUTPATIENT)
Dept: FAMILY MEDICINE CLINIC | Age: 71
End: 2021-05-25
Payer: COMMERCIAL

## 2021-05-25 VITALS
HEART RATE: 97 BPM | BODY MASS INDEX: 26.7 KG/M2 | OXYGEN SATURATION: 94 % | SYSTOLIC BLOOD PRESSURE: 138 MMHG | DIASTOLIC BLOOD PRESSURE: 68 MMHG | TEMPERATURE: 97.7 F | HEIGHT: 61 IN | WEIGHT: 141.4 LBS

## 2021-05-25 DIAGNOSIS — J70.0 RADIATION PNEUMONITIS (HCC): Primary | ICD-10-CM

## 2021-05-25 DIAGNOSIS — R09.02 HYPOXIA: ICD-10-CM

## 2021-05-25 DIAGNOSIS — J44.9 CHRONIC OBSTRUCTIVE PULMONARY DISEASE, UNSPECIFIED COPD TYPE (HCC): ICD-10-CM

## 2021-05-25 PROCEDURE — 99495 TRANSJ CARE MGMT MOD F2F 14D: CPT | Performed by: NURSE PRACTITIONER

## 2021-05-25 PROCEDURE — 1111F DSCHRG MED/CURRENT MED MERGE: CPT | Performed by: NURSE PRACTITIONER

## 2021-05-25 SDOH — ECONOMIC STABILITY: FOOD INSECURITY: WITHIN THE PAST 12 MONTHS, THE FOOD YOU BOUGHT JUST DIDN'T LAST AND YOU DIDN'T HAVE MONEY TO GET MORE.: NEVER TRUE

## 2021-05-25 SDOH — ECONOMIC STABILITY: FOOD INSECURITY: WITHIN THE PAST 12 MONTHS, YOU WORRIED THAT YOUR FOOD WOULD RUN OUT BEFORE YOU GOT MONEY TO BUY MORE.: NEVER TRUE

## 2021-05-25 ASSESSMENT — SOCIAL DETERMINANTS OF HEALTH (SDOH): HOW HARD IS IT FOR YOU TO PAY FOR THE VERY BASICS LIKE FOOD, HOUSING, MEDICAL CARE, AND HEATING?: NOT HARD AT ALL

## 2021-05-25 NOTE — PATIENT INSTRUCTIONS
Patient Education        Pneumonia: Care Instructions  Overview     Pneumonia is an infection of the lungs. Most cases are caused by infections from bacteria or viruses. Pneumonia may be mild or very severe. If it is caused by bacteria, you will be treated with antibiotics. It may take a few weeks to a few months to recover fully from pneumonia, depending on how sick you were and whether your overall health is good. Follow-up care is a key part of your treatment and safety. Be sure to make and go to all appointments, and call your doctor if you are having problems. It's also a good idea to know your test results and keep a list of the medicines you take. How can you care for yourself at home? · Take your antibiotics exactly as directed. Do not stop taking the medicine just because you are feeling better. You need to take the full course of antibiotics. · Take your medicines exactly as prescribed. Call your doctor if you think you are having a problem with your medicine. · Get plenty of rest and sleep. You may feel weak and tired for a while, but your energy level will improve with time. · To prevent dehydration, drink plenty of fluids, enough so that your urine is light yellow or clear like water. Choose water and other caffeine-free clear liquids until you feel better. If you have kidney, heart, or liver disease and have to limit fluids, talk with your doctor before you increase the amount of fluids you drink. · Take care of your cough so you can rest. A cough that brings up mucus from your lungs is common with pneumonia. It is one way your body gets rid of the infection. But if coughing keeps you from resting or causes severe fatigue and chest-wall pain, talk to your doctor. Your doctor may suggest that you take a medicine to reduce the cough. · Use a vaporizer or humidifier to add moisture to your bedroom. Follow the directions for cleaning the machine.   · Do not smoke or allow others to smoke around you. Smoke will make your cough last longer. If you need help quitting, talk to your doctor about stop-smoking programs and medicines. These can increase your chances of quitting for good. · Take an over-the-counter pain medicine, such as acetaminophen (Tylenol), ibuprofen (Advil, Motrin), or naproxen (Aleve). Read and follow all instructions on the label. · Do not take two or more pain medicines at the same time unless the doctor told you to. Many pain medicines have acetaminophen, which is Tylenol. Too much acetaminophen (Tylenol) can be harmful. · If you were given a spirometer to measure how well your lungs are working, use it as instructed. This can help your doctor tell how your recovery is going. · To prevent pneumonia in the future, talk to your doctor about getting a flu vaccine (once a year) and a pneumococcal vaccine (one time only for most people). When should you call for help? Call 911 anytime you think you may need emergency care. For example, call if:    · You have severe trouble breathing. Call your doctor now or seek immediate medical care if:    · You cough up dark brown or bloody mucus (sputum).     · You have new or worse trouble breathing.     · You are dizzy or lightheaded, or you feel like you may faint. Watch closely for changes in your health, and be sure to contact your doctor if:    · You have a new or higher fever.     · You are coughing more deeply or more often.     · You are not getting better after 2 days (48 hours).     · You do not get better as expected. Where can you learn more? Go to https://[address] [employer].brick&mobile. org and sign in to your [employer] account. Enter D336 in the [facility] box to learn more about \"Pneumonia: Care Instructions. \"     If you do not have an account, please click on the \"Sign Up Now\" link. Current as of: October 26, 2020               Content Version: 12.8  © 7246-5641 [employer], Inc[employer].    Care instructions adapted under license by [facility] ([facility]). If you have questions about a medical condition or this instruction, always ask your healthcare professional. [address] any warranty or liability for your use of this information.

## 2021-05-25 NOTE — PROGRESS NOTES
Post-Discharge Transitional Care Management Services or Hospital Follow Up      Bernardo Pichardo   YOB: 1950    Date of Office Visit:  5/25/2021  Date of Hospital Admission: 5/19/21  Date of Hospital Discharge: 5/21/21  Readmission Risk Score(high >=14%. Medium >=10%):Readmission Risk Score: 16      Care management risk score Rising risk (score 2-5) and Complex Care (Scores >=6): 3     Non face to face  following discharge, date last encounter closed (first attempt may have been earlier): *No documented post hospital discharge outreach found in the last 14 days *No documented post hospital discharge outreach found in the last 14 days    Call initiated 2 business days of discharge: *No response recorded in the last 14 days     Patient Active Problem List   Diagnosis    Hyperlipidemia with target LDL less than 130    Vitamin D deficiency    Atrial fibrillation ([address]    Pulmonary emphysema ([address]    Bilateral edema of lower extremity    Other plastic surgery for unacceptable cosmetic appearance    Tobacco abuse    Fatigue    Chronic obstructive pulmonary disease ([address]    Acute bronchitis    Hypoxemia    Shortness of breath    Fracture of humerus, proximal    Benign neoplasm of sigmoid colon    Other hemorrhoids    Hx of colonic polyps    Family history of colon cancer    Osteopenia of necks of both femurs    Congestive heart failure ([address]    Bilateral pulmonary infiltrates on CXR    Pericardial calcification    Pericardial effusion    Pleural effusion    Atelectasis, left    Abnormal mammogram of left breast    Left breast mass    Overweight (BMI 25.0-29. 9)    Malignant neoplasm of upper-inner quadrant of left breast in female, estrogen receptor negative (HCC)    Neck nodule    Hemoptysis    Eczema    Dysphonia    Hypoxia    Acute respiratory failure with hypoxia (HCC)    Radiation pneumonitis (HCC)       No Known Allergies    Medications listed as ordered at the time of discharge from [facility], [address] Medication Instructions EVELINA:    Printed on:05/26/21 0785   Medication Information                      amiodarone (CORDARONE) 200 MG tablet  Take 1 tablet by mouth Twice a Week             b complex vitamins tablet  Take 1 tablet by mouth daily             Calcium Carb-Cholecalciferol (CALCIUM + D3 PO)  Take by mouth             dilTIAZem (CARDIZEM CD) 240 MG extended release capsule  TAKE 1 CAPSULE BY MOUTH EVERY DAY             furosemide (LASIX) 40 MG tablet  TAKE 1 TABLET BY MOUTH EVERY DAY             Handicap Placard MISC  by Does not apply route Good for 5 years. Dispense 1             magnesium oxide (MAG-OX) 400 MG tablet  Take 400 mg by mouth daily             PACLitaxel (TAXOL) 100 MG/16.7ML chemo injection  Infuse intravenously once             potassium chloride (KLOR-CON M) 20 MEQ TBCR extended release tablet  TAKE 1 TABLET BY MOUTH EVERY DAY             pravastatin (PRAVACHOL) 20 MG tablet  Take 1 tablet by mouth daily             predniSONE (DELTASONE) 10 MG tablet  30mg x7d, then 20mg x7d, then 10mg x7d, then stop             Psyllium (METAMUCIL FIBER PO)  Take by mouth             rivaroxaban (XARELTO) 20 MG TABS tablet  TAKE 1 TABLET BY MOUTH EVERY DAY WITH BREAKFAST             umeclidinium-vilanterol (ANORO ELLIPTA) 62.5-25 MCG/INH AEPB inhaler  Inhale 1 puff into the lungs daily             Vitamin D (CHOLECALCIFEROL) 1000 UNITS CAPS capsule  Take 1,000 Units by mouth daily.                      Medications marked \"taking\" at this time  Outpatient Medications Marked as Taking for the 5/25/21 encounter (Office Visit) with LOREN White - CNP   Medication Sig Dispense Refill    predniSONE (DELTASONE) 10 MG tablet 30mg x7d, then 20mg x7d, then 10mg x7d, then stop 42 tablet 0    pravastatin (PRAVACHOL) 20 MG tablet Take 1 tablet by mouth daily 30 tablet 0    PACLitaxel (TAXOL) 100 MG/16.7ML chemo injection Infuse intravenously once  dilTIAZem (CARDIZEM CD) 240 MG extended release capsule TAKE 1 CAPSULE BY MOUTH EVERY DAY 90 capsule 3    potassium chloride (KLOR-CON M) 20 MEQ TBCR extended release tablet TAKE 1 TABLET BY MOUTH EVERY DAY 90 tablet 2    furosemide (LASIX) 40 MG tablet TAKE 1 TABLET BY MOUTH EVERY DAY 90 tablet 3    umeclidinium-vilanterol (ANORO ELLIPTA) 62.5-25 MCG/INH AEPB inhaler Inhale 1 puff into the lungs daily 60 puff 2    rivaroxaban (XARELTO) 20 MG TABS tablet TAKE 1 TABLET BY MOUTH EVERY DAY WITH BREAKFAST 90 tablet 3    Handicap Placard MISC by Does not apply route Good for 5 years. Dispense 1 1 each 0    Calcium Carb-Cholecalciferol (CALCIUM + D3 PO) Take by mouth      amiodarone (CORDARONE) 200 MG tablet Take 1 tablet by mouth Twice a Week 26 tablet 3    b complex vitamins tablet Take 1 tablet by mouth daily      magnesium oxide (MAG-OX) 400 MG tablet Take 400 mg by mouth daily      Vitamin D (CHOLECALCIFEROL) 1000 UNITS CAPS capsule Take 1,000 Units by mouth daily. Medications patient taking as of now reconciled against medications ordered at time of hospital discharge: Yes    Chief Complaint   Patient presents with    Follow-Up from Hospital     Patient presents today to follow up from hospital d/c 05/21/21 [facility] COPD and SOB. Pt in today for f/u on recent hospital stay. She was sent to ER due to some blood work. She reports that there was concern for radiation induced pneumonitis in upper lobe. She reports that she was supposed to be discharged on 3L of oxygen, but then she said it was put up to a 4 by respiratory. She reports that she did fine on 3L of oxygen and has been on 3L of oxygen at home. She would like a different machine that is smaller because the one she has not is too heavy for her to carry around, but due to being documented as needing 4L she cannot have the smaller machine. Inpatient course: Discharge summary reviewed- see chart.     Interval history/Current status: Stable    Review of Systems   Constitutional: Negative for chills, fatigue and fever. HENT: Negative for congestion, ear pain and sinus pressure. Respiratory: Positive for cough, shortness of breath and wheezing. Cardiovascular: Negative for chest pain, palpitations and leg swelling. Neurological: Negative for dizziness and headaches. Vitals:    05/25/21 1539 05/25/21 1542 05/25/21 1614   BP: (!) 153/75 (!) 151/80 138/68   Site: Right Upper Arm Right Upper Arm Right Upper Arm   Position: Sitting Sitting Sitting   Cuff Size: Medium Adult Medium Adult Medium Adult   Pulse: 97     Temp: 97.7 °F (36.5 °C)     TempSrc: Temporal     SpO2: 94%     Weight: 141 lb 6.4 oz (64.1 kg)     Height: 5' 1\" (1.549 m)       Body mass index is 26.72 kg/m². Wt Readings from Last 3 Encounters:   05/25/21 141 lb 6.4 oz (64.1 kg)   05/19/21 141 lb (64 kg)   05/12/21 141 lb (64 kg)     BP Readings from Last 3 Encounters:   05/25/21 138/68   05/21/21 (!) 148/71   05/12/21 118/66       Physical Exam  Constitutional:       Appearance: She is well-developed. HENT:      Head: Normocephalic. Right Ear: Tympanic membrane, ear canal and external ear normal.      Left Ear: Tympanic membrane, ear canal and external ear normal.      Nose: Nose normal.      Mouth/Throat:      Mouth: Mucous membranes are moist.      Pharynx: Oropharynx is clear. Uvula midline. Eyes:      General:         Right eye: No discharge. Left eye: No discharge. Conjunctiva/sclera: Conjunctivae normal.   Cardiovascular:      Rate and Rhythm: Normal rate and regular rhythm. Heart sounds: Normal heart sounds. Pulmonary:      Effort: Pulmonary effort is normal. No respiratory distress. Breath sounds: Wheezing (scattered throughout) present. Musculoskeletal:      Cervical back: Normal range of motion. Lymphadenopathy:      Cervical: No cervical adenopathy. Skin:     General: Skin is warm and dry. Neurological:      Mental Status: She is alert and oriented to person, place, and time. Psychiatric:         Mood and Affect: Mood is anxious. Affect is tearful. Speech: Speech normal.         Behavior: Behavior normal. Behavior is cooperative. Assessment/Plan:  1. Hypoxia    - AK DISCHARGE MEDS RECONCILED W/ CURRENT OUTPATIENT MED LIST    2. Chronic obstructive pulmonary disease, unspecified COPD type ([facility]    - AK DISCHARGE MEDS RECONCILED W/ CURRENT OUTPATIENT MED LIST    3. Radiation pneumonitis ([facility]    - AK DISCHARGE MEDS RECONCILED W/ CURRENT OUTPATIENT MED LIST      Pt on 3L in office and appeared stable with breathing. Discussed we would need to reevaluate with 3L and then can try to reorder. I will discuss with Tucker [place] tomorrow. She was agreeable. Medical Decision Making: moderate complexity      Return in about 2 weeks (around 6/8/2021) for evaluation with PCP. Reviewed with the patient: current clinicalstatus, medications, activities and diet. Side effects, adverse effects of the medication prescribedtoday, as well as treatment plan/ rationale and result expectations have been discussedwith the patient who expresses understanding and desires to proceed. Close follow upto evaluate treatment results and for coordination of care. I have reviewedthe patient's medical history in detail and updated the computerized patient record.     LOREN Wade - CNP

## 2021-05-26 ENCOUNTER — TELEPHONE (OUTPATIENT)
Dept: FAMILY MEDICINE CLINIC | Age: 71
End: 2021-05-26

## 2021-05-26 ENCOUNTER — NURSE ONLY (OUTPATIENT)
Dept: FAMILY MEDICINE CLINIC | Age: 71
End: 2021-05-26

## 2021-05-26 VITALS — OXYGEN SATURATION: 92 %

## 2021-05-26 DIAGNOSIS — R06.02 SHORTNESS OF BREATH: Primary | ICD-10-CM

## 2021-05-26 ASSESSMENT — ENCOUNTER SYMPTOMS
SINUS PRESSURE: 0
SHORTNESS OF BREATH: 1
COUGH: 1
WHEEZING: 1

## 2021-05-26 NOTE — PROGRESS NOTES
Patient presented today for a walking 02 test. Patient was walking without oxygen and was staying steady at 90-92. She was sent home from the hospital on 4 Liters and she doesn't want it, she has been using 3 Liters and wants it to stay at 3.

## 2021-05-29 ASSESSMENT — ENCOUNTER SYMPTOMS
CONSTIPATION: 0
SHORTNESS OF BREATH: 1
NAUSEA: 0
WHEEZING: 0
STRIDOR: 0
RHINORRHEA: 0
COUGH: 1
DIARRHEA: 0
VOMITING: 0
ABDOMINAL DISTENTION: 0
ABDOMINAL PAIN: 0
SORE THROAT: 0
COLOR CHANGE: 0
EYE DISCHARGE: 0

## 2021-06-01 NOTE — PROGRESS NOTES
1. O2 saturation at REST on ROOM AIR =      92 %    If oxygen saturation is 89% or above please proceed with steps 2 and 3:    2. O2 saturation with AMBULATION of __50__ feet on ROOM AIR =  82  %    3.  O2 saturation with AMBULATION on __3__ liter flow =     92%

## 2021-06-02 RX ORDER — PRAVASTATIN SODIUM 20 MG
TABLET ORAL
Qty: 90 TABLET | Refills: 3 | Status: SHIPPED | OUTPATIENT
Start: 2021-06-02 | End: 2021-06-09

## 2021-06-03 ENCOUNTER — OFFICE VISIT (OUTPATIENT)
Dept: PULMONOLOGY | Age: 71
End: 2021-06-03
Payer: COMMERCIAL

## 2021-06-03 VITALS
HEART RATE: 74 BPM | TEMPERATURE: 97.6 F | OXYGEN SATURATION: 98 % | BODY MASS INDEX: 24.92 KG/M2 | HEIGHT: 61 IN | DIASTOLIC BLOOD PRESSURE: 63 MMHG | WEIGHT: 132 LBS | SYSTOLIC BLOOD PRESSURE: 132 MMHG

## 2021-06-03 DIAGNOSIS — J70.0 RADIATION PNEUMONITIS (HCC): Primary | ICD-10-CM

## 2021-06-03 DIAGNOSIS — R09.02 HYPOXIA: ICD-10-CM

## 2021-06-03 DIAGNOSIS — J44.9 CHRONIC OBSTRUCTIVE PULMONARY DISEASE, UNSPECIFIED COPD TYPE (HCC): ICD-10-CM

## 2021-06-03 PROCEDURE — 99214 OFFICE O/P EST MOD 30 MIN: CPT | Performed by: INTERNAL MEDICINE

## 2021-06-03 RX ORDER — PREDNISONE 1 MG/1
TABLET ORAL
Qty: 7 TABLET | Refills: 0 | Status: SHIPPED | OUTPATIENT
Start: 2021-06-03 | End: 2021-06-03

## 2021-06-03 RX ORDER — PREDNISONE 1 MG/1
5 TABLET ORAL DAILY
Qty: 7 TABLET | Refills: 0 | Status: SHIPPED | OUTPATIENT
Start: 2021-06-03 | End: 2021-06-09 | Stop reason: CLARIF

## 2021-06-03 ASSESSMENT — ENCOUNTER SYMPTOMS
RHINORRHEA: 0
CHEST TIGHTNESS: 0
ABDOMINAL PAIN: 0
TROUBLE SWALLOWING: 0
WHEEZING: 0
DIARRHEA: 0
VOMITING: 0
COUGH: 0
EYE ITCHING: 0
NAUSEA: 0
SINUS PRESSURE: 0
VOICE CHANGE: 0
EYE DISCHARGE: 0
SHORTNESS OF BREATH: 1
SORE THROAT: 0

## 2021-06-03 NOTE — PROGRESS NOTES
Subjective:     Belinda Cruz is a 65 y.o. female who complains today of:     Chief Complaint   Patient presents with    COPD     follow up       HPI  She is feeling much better she stopped using oxygen. Currently on room air O2 saturation 98%. She is on prednisone 20 mg daily at this time and then she will be on 10 mg daily for 1 week. C/o shortness of breath  with exertion. No  Wheezing   No Cough with  Sputum  No Hemoptysis  No Chest tightness   No Chest pain with radiation  or pleuritic pain  No  leg edema   No orthopnea  No Fever or chills. No Rhinorrhea and postnasal drip. She is using bronchodilator with Anoro Ellipta 1 puff daily. Allergies:  Patient has no known allergies. Past Medical History:   Diagnosis Date    Abnormal echocardiogram 2020    possible PFO?  further eval suggested    Cancer [facility])     left breast    COPD (chronic obstructive pulmonary disease) ([facility] 2015    Dr Alirio Oconnor Current use of long term anticoagulation     Cyst of neck 2012    after plastic surgery    Emphysema of lung ([facility]     History of compression fracture of vertebral column 2020    T2, L1    History of humerus fracture     LEFT    History of left heart catheterization 03/2020    at [address] [place] [address], normal LVEF with impaired relaxation, mild AV calcification and mild MV thickening, normal RV    History of sustained ventricular tachycardia 03/2020    had to wear a LifeVest, Dr Jairo Chisholm Hyperlipidemia LDL goal < 130     Intermittent atrial fibrillation [facility]) 2015    Dr Jhonathan Lizarraga, Dr Lorenzo Walter, Dr Gary Carrier Osteopenia 2014    S/P colonoscopic polypectomy 2008, 2015, 2018    Dr Enrique Santos    Smoking history     Vitamin D deficiency      Past Surgical History:   Procedure Laterality Date    ATRIAL ABLATION SURGERY      BREAST BIOPSY Left 01/20/2021    BREAST BIOPSY Left 2/4/2021    LEFT BREAST LUMPECTOMY AND  SENTINEL LYMPH NODE BIOPSY performed by Berta Briones MD at [address] [facility]. infection, postpartum    COLONOSCOPY  2015    w/polypectomy Dr Nathan Cole      Dr Donna Oconnor; HI RISK IND N/A 2018    COLONOSCOPY performed by Katia Lozano MD at [facility]     Family History   Problem Relation Age of Onset    Osteoarthritis Mother     Osteoporosis Mother     Cancer Mother         anorectal     Colon Cancer Mother      Social History     Socioeconomic History    Marital status:      Spouse name: Not on file    Number of children: 2    Years of education: Not on file    Highest education level: Not on file   Occupational History    Occupation: [employer], retired   Tobacco Use    Smoking status: Former Smoker     Packs/day: 1.00     Years: 50.00     Pack years: 50.00     Types: Cigarettes     Start date:      Quit date: 2015     Years since quittin.4    Smokeless tobacco: Never Used    Tobacco comment: pt will try smoking cessation   Vaping Use    Vaping Use: Never used   Substance and Sexual Activity    Alcohol use: No    Drug use: No    Sexual activity: Not on file   Other Topics Concern    Not on file   Social History Narrative    Born in [place], one brother in [place], keeps in touch    , 2 children, one son in [place], one daughter in [place]    5 grandchildren     Lives in a house in [place] with spouse     Worked for [employer] and full time at [employer], [employer], fashion, family life     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      Social Determinants of Health     Financial Resource Strain: Low Risk     Difficulty of Paying Living Expenses: Not hard at all   Food Insecurity: No Food Insecurity    Worried About [address] in the Last Year: Never true    [facility] in the Last Year: Never true   Transportation Needs:     Lack of Transportation (Medical):      Lack of Transportation (Non-Medical):    Physical Activity:     Days of Exercise per Week:     Minutes of Exercise per Session:    Stress:     Feeling of Stress :    Social Connections:     Frequency of Communication with Friends and Family:     Frequency of Social Gatherings with Friends and Family:     Attends Mandaeism Services:     Active Member of Clubs or Organizations:     Attends Club or Organization Meetings:     Marital Status:    Intimate Partner Violence:     Fear of Current or Ex-Partner:     Emotionally Abused:     Physically Abused:     Sexually Abused:          Review of Systems   Constitutional: Negative for chills, diaphoresis, fatigue and fever. HENT: Negative for congestion, mouth sores, nosebleeds, postnasal drip, rhinorrhea, sinus pressure, sneezing, sore throat, trouble swallowing and voice change. Eyes: Negative for discharge, itching and visual disturbance. Respiratory: Positive for shortness of breath. Negative for cough, chest tightness and wheezing. Cardiovascular: Negative for chest pain, palpitations and leg swelling. Gastrointestinal: Negative for abdominal pain, diarrhea, nausea and vomiting. Genitourinary: Negative for difficulty urinating and hematuria. Musculoskeletal: Negative for arthralgias, joint swelling and myalgias. Skin: Negative for rash. Allergic/Immunologic: Negative for environmental allergies and food allergies. Neurological: Negative for dizziness, tremors, weakness and headaches. Psychiatric/Behavioral: Negative for behavioral problems and sleep disturbance.         :     Vitals:    06/03/21 1352   BP: 132/63   Pulse: 74   Temp: 97.6 °F (36.4 °C)   SpO2: 98%   Weight: 132 lb (59.9 kg)   Height: 5' 1\" (1.549 m)     Wt Readings from Last 3 Encounters:   06/03/21 132 lb (59.9 kg)   05/25/21 141 lb 6.4 oz (64.1 kg)   05/19/21 141 lb (64 kg)         Physical Exam  Constitutional:       General: She is not in acute distress. Appearance: She is well-developed. She is not diaphoretic.    HENT:      Head: Normocephalic and atraumatic. Nose: Nose normal.   Eyes:      Pupils: Pupils are equal, round, and reactive to light. Neck:      Thyroid: No thyromegaly. Vascular: No JVD. Trachea: No tracheal deviation. Cardiovascular:      Rate and Rhythm: Normal rate and regular rhythm. Heart sounds: No murmur heard. No friction rub. No gallop. Pulmonary:      Effort: No respiratory distress. Breath sounds: No wheezing or rales. Comments: diminished Breath sound bilaterally. Chest:      Chest wall: No tenderness. Abdominal:      General: There is no distension. Tenderness: There is no abdominal tenderness. There is no rebound. Musculoskeletal:         General: Normal range of motion. Lymphadenopathy:      Cervical: No cervical adenopathy. Skin:     General: Skin is warm and dry. Neurological:      Mental Status: She is alert and oriented to person, place, and time.       Coordination: Coordination normal.         Current Outpatient Medications   Medication Sig Dispense Refill    predniSONE (DELTASONE) 5 MG tablet Take 1 tablet by mouth daily for 7 days 7 tablet 0    pravastatin (PRAVACHOL) 20 MG tablet TAKE 1 TABLET BY MOUTH EVERY DAY 90 tablet 3    predniSONE (DELTASONE) 10 MG tablet 30mg x7d, then 20mg x7d, then 10mg x7d, then stop 42 tablet 0    PACLitaxel (TAXOL) 100 MG/16.7ML chemo injection Infuse intravenously once      dilTIAZem (CARDIZEM CD) 240 MG extended release capsule TAKE 1 CAPSULE BY MOUTH EVERY DAY 90 capsule 3    potassium chloride (KLOR-CON M) 20 MEQ TBCR extended release tablet TAKE 1 TABLET BY MOUTH EVERY DAY 90 tablet 2    furosemide (LASIX) 40 MG tablet TAKE 1 TABLET BY MOUTH EVERY DAY 90 tablet 3    umeclidinium-vilanterol (ANORO ELLIPTA) 62.5-25 MCG/INH AEPB inhaler Inhale 1 puff into the lungs daily 60 puff 2    rivaroxaban (XARELTO) 20 MG TABS tablet TAKE 1 TABLET BY MOUTH EVERY DAY WITH BREAKFAST 90 tablet 3    Handicap Placard MISC by Does not apply route Good for 5 years. Dispense 1 1 each 0    Calcium Carb-Cholecalciferol (CALCIUM + D3 PO) Take by mouth      amiodarone (CORDARONE) 200 MG tablet Take 1 tablet by mouth Twice a Week 26 tablet 3    b complex vitamins tablet Take 1 tablet by mouth daily      magnesium oxide (MAG-OX) 400 MG tablet Take 400 mg by mouth daily      Vitamin D (CHOLECALCIFEROL) 1000 UNITS CAPS capsule Take 1,000 Units by mouth daily.  Psyllium (METAMUCIL FIBER PO) Take by mouth (Patient not taking: Reported on 6/3/2021)       No current facility-administered medications for this visit. Results for orders placed during the hospital encounter of 21    XR CHEST (2 VW)    Narrative  EXAMINATION: XR CHEST (2 VW)    CLINICAL HISTORY: J44.9 Chronic obstructive pulmonary disease, unspecified COPD type ([facility] ICD10    COMPARISONS: May 11, 2020    FINDINGS:    Two views of the chest are submitted. The cardiac silhouette is of normal size configuration. Pulmonary vascular attenuated. Widening of the AP diameter the chest.  Right sided trachea. No focal infiltrates. No effusions. No Pneumothoraces. Impression  NO ACUTE ACTIVE CARDIOPULMONARY PROCESS. RADIOGRAPHIC FINDINGS OF COPD      Results for orders placed during the hospital encounter of 20    XR CHEST STANDARD (2 VW)    Narrative  EXAMINATION: XR CHEST (2 VW)    CLINICAL HISTORY: SHORTNESS OF BREATH    COMPARISONS: CT CHEST, 2020, CHEST RADIOGRAPH, 2019    FINDINGS: Osseous structures intact. Cardiopericardial silhouette normal. Aorta calcified. Pulmonary vasculature normal. Ill-defined area increased opacity left lung base posteriorly. Diaphragms flattened. Impression  EMPHYSEMA. LEFT LOWER LUNG SUBSEGMENTAL ATELECTASIS/PNEUMONIA.       Results for orders placed during the hospital encounter of 19    XR CHEST STANDARD (2 VW)    Narrative  Patient MRN: [id number]    : 1950    Age:  77 years    Gender: Female    Order nodules, masses, pleural effusion, pneumothorax. Mild dependent subsegmental atelectatic change. Focal interval development of reticular change, left upper lobe, at level of postsurgical change, left breast (series 2, images 21 through 37). Lymph nodes: No hilar, mediastinal, or axillary lymph node enlargement. Chest Wall: Increased attenuation, left retroareolar, and upper inner quadrant, with calcification, and surgical clips, unchanged (series 2, images 24 through 29). Thoracic aorta: Normal in course and caliber. Cardiac Size: Normal.    Pericardial effusion: None. Upper abdomen:Limited imaging upper abdomen shows no gross anomaly. Musculoskeletal:No osteoblastic, and no osteolytic lesions. Anterior wedge compression, T12-L1, again demonstrated. Impression  Left upper lobe interstitial changes described. Given clinical history, changes may be secondary to recent radiation therapy. Other inflammatory and infectious etiologies secondary considerations. Recurrent malignancy less likely. All CT scans at this facility use dose modulation, iterative reconstruction, and/or weight based dosing when appropriate to reduce radiation dose to as low as reasonably achievable.  ]  Results for orders placed during the hospital encounter of 08/23/16    CT Chest W Contrast    Narrative  CT CHEST    REASON FOR EXAM  HEMOPTYSIS    COMPARISONS  none    FINDINGS  Multiplanar images were obtained following the IV injection  of 75 cc Isovue-370. A focal area of consolidation involving the right  lower lobe posteriorly is seen. There is a suggestion of air  bronchogram present. Mild atelectasis at the left lung base is seen. Remainder lungs are clear. No pleural effusions are seen. No definite  pulmonary nodules are identified. Emphysematous changes are seen. No  pneumothorax is noted. Heart appears normal in size. No pericardial  effusion is seen. No mediastinal, hilar or axillary adenopathy is  noted.  Bone windows demonstrate no gross osseous abnormalities. Surrounding soft tissues are unremarkable. Limited images of the upper  abdomen demonstrate no contributory findings. IMPRESSION  FOCAL AREA OF CONSOLIDATION INVOLVING THE RIGHT LOWER LOBE  IS SEEN. SHORT-TERM INTERVAL FOLLOWUP CT WITHOUT CONTRAST IN 3-4 MONTHS  IS SUGGESTED TO NOTE RESOLUTION. MILD ATELECTATIC CHANGES AT THE LEFT  LUNG BASE. NO OTHER SIGNIFICANT ABNORMALITIES ARE SEEN. All CT scans at this facility use dose modulation, iterative  reconstruction, and/or weight based dosing when appropriate to reduce  radiation dose to as low as reasonably achievable. Juventino Pina M.D. Released Erika Pina M.D. Released Date Time- 08/24/16 1699  This document has been electronically signed. ------------------------------------------------------------------------------    Assessment/Plan:     1. Radiation pneumonitis ([facility]  She is on prednisone 20 mg daily at this time and then she will be on 10 mg daily for 1 week. C/o shortness of breath  with exertion. No  Wheezing. No Cough with Sputum. Continue prednisone therapy. Will request 1 more week of 5 mg for 7 days after she finished 10 mg for 1 week. And then she will have CT chest done. - CT CHEST WO CONTRAST; Future  - predniSONE (DELTASONE) 5 MG tablet; Take 1 tablet by mouth daily for 7 days  Dispense: 7 tablet; Refill: 0    2. Hypoxia  She is feeling much better she stopped using oxygen. Currently on room air O2 saturation 98%. 3. Chronic obstructive pulmonary disease, unspecified COPD type ([facility]  She is using bronchodilator with Anoro Ellipta 1 puff daily. Return in about 5 weeks (around 7/7/2021) for COPD, Ct chest f/u.       Rich Castillo MD

## 2021-06-09 ENCOUNTER — TELEPHONE (OUTPATIENT)
Dept: FAMILY MEDICINE CLINIC | Age: 71
End: 2021-06-09

## 2021-06-09 ENCOUNTER — OFFICE VISIT (OUTPATIENT)
Dept: FAMILY MEDICINE CLINIC | Age: 71
End: 2021-06-09
Payer: COMMERCIAL

## 2021-06-09 VITALS
OXYGEN SATURATION: 94 % | SYSTOLIC BLOOD PRESSURE: 133 MMHG | TEMPERATURE: 97.9 F | BODY MASS INDEX: 26.62 KG/M2 | RESPIRATION RATE: 15 BRPM | HEART RATE: 86 BPM | HEIGHT: 61 IN | DIASTOLIC BLOOD PRESSURE: 64 MMHG | WEIGHT: 141 LBS

## 2021-06-09 DIAGNOSIS — N28.1 RENAL CYST: ICD-10-CM

## 2021-06-09 DIAGNOSIS — E78.5 HYPERLIPIDEMIA, UNSPECIFIED HYPERLIPIDEMIA TYPE: Primary | ICD-10-CM

## 2021-06-09 DIAGNOSIS — R79.89 HIGH PLATELET COUNT: ICD-10-CM

## 2021-06-09 DIAGNOSIS — R93.89 ABNORMAL CXR: ICD-10-CM

## 2021-06-09 DIAGNOSIS — I49.9 IRREGULAR HEART BEAT: ICD-10-CM

## 2021-06-09 DIAGNOSIS — Z92.3 HX OF RADIATION THERAPY: ICD-10-CM

## 2021-06-09 PROCEDURE — 93000 ELECTROCARDIOGRAM COMPLETE: CPT | Performed by: INTERNAL MEDICINE

## 2021-06-09 PROCEDURE — 99214 OFFICE O/P EST MOD 30 MIN: CPT | Performed by: INTERNAL MEDICINE

## 2021-06-09 ASSESSMENT — ENCOUNTER SYMPTOMS
BACK PAIN: 0
ABDOMINAL PAIN: 0
EYE PAIN: 0
SHORTNESS OF BREATH: 0

## 2021-06-09 NOTE — PROGRESS NOTES
Subjective:      Patient ID: Lamont Harrell is a 65 y.o. female who presents today with:  Chief Complaint   Patient presents with    Follow-up    Discuss Labs       HPI    Here for follow up  Sees radiation oncology  hpl  Leery about taking higher doses of statins or new drugs  Uncontrolled  High platelets  Has heme follow up. Seeing pulmonary. Past Medical History:   Diagnosis Date    Abnormal echocardiogram 2020    possible PFO?  further eval suggested    Cancer [facility])     left breast    COPD (chronic obstructive pulmonary disease) ([facility] 2015    Dr Esequiel Peace Current use of long term anticoagulation     Cyst of neck 2012    after plastic surgery    Emphysema of lung ([facility]     History of compression fracture of vertebral column 2020    T2, L1    History of humerus fracture     LEFT    History of left heart catheterization 03/2020    at [facility], normal LVEF with impaired relaxation, mild AV calcification and mild MV thickening, normal RV    History of sustained ventricular tachycardia 03/2020    had to wear a LifeVest, Dr Colindres Lose Hyperlipidemia LDL goal < 130     Intermittent atrial fibrillation [facility]) 2015    Dr Chris Evans, Dr Paulette Jimenez, Dr Julita Reyes Osteopenia 2014    S/P colonoscopic polypectomy 2008, 2015, 2018    Dr Erum Douglas    Smoking history     Vitamin D deficiency      Past Surgical History:   Procedure Laterality Date    ATRIAL ABLATION SURGERY      BREAST BIOPSY Left 01/20/2021    BREAST BIOPSY Left 2/4/2021    LEFT BREAST LUMPECTOMY AND  SENTINEL LYMPH NODE BIOPSY performed by Marcela Tran MD at [address] [facility]. 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, retired   Tobacco Use    Smoking status: Former Smoker     Packs/day: 1.00     Years: 50.00     Pack years: 50.00     Types: Cigarettes     Start date:      Quit date: 2015     Years since quittin.4    Smokeless tobacco: Never Used    Tobacco comment: pt will try smoking cessation   Vaping Use    Vaping Use: Never used   Substance and Sexual Activity    Alcohol use: No    Drug use: No    Sexual activity: Not on file   Other Topics Concern    Not on file   Social History Narrative    Born in [address] [place] [address], one brother in [facility], keeps in touch    , 2 children, one son in [place], one daughter in [place]    5 grandchildren     Lives in a house in [address] [place] [address] with spouse     Worked for [employer] and full time at [employer], [employer], fashion, family life     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      Social Determinants of Health     Financial Resource Strain: Low Risk     Difficulty of Paying Living Expenses: Not hard at all   Food Insecurity: No Food Insecurity    Worried About [facility] in the Last Year: Never true   [address] in the Last Year: Never true   Transportation Needs:     Lack of Transportation (Medical):      Lack of Transportation (Non-Medical):    Physical Activity:     Days of Exercise per Week:     Minutes of Exercise per Session:    Stress:     Feeling of Stress :    Social Connections:     Frequency of Communication with Friends and Family:     Frequency of Social Gatherings with Friends and Family:     Attends Hinduism Services:     Active Member of Clubs or Organizations:     Attends Club or Organization Meetings:     Marital Status:    Intimate Partner Violence:     Fear of Current or Ex-Partner:     Emotionally Abused:     Physically Abused:     Sexually Abused:      No Known Allergies  Current Outpatient Medications on File Prior to Visit   Medication Sig Dispense Refill    predniSONE (DELTASONE) 10 MG tablet 30mg x7d, then 20mg x7d, then 10mg x7d, then stop 42 tablet 0    PACLitaxel (TAXOL) 100 MG/16.7ML chemo injection Infuse intravenously once      dilTIAZem (CARDIZEM CD) 240 MG extended release capsule TAKE 1 CAPSULE BY MOUTH EVERY DAY 90 capsule 3    potassium chloride (KLOR-CON M) 20 MEQ TBCR extended release tablet TAKE 1 TABLET BY MOUTH EVERY DAY 90 tablet 2    furosemide (LASIX) 40 MG tablet TAKE 1 TABLET BY MOUTH EVERY DAY 90 tablet 3    umeclidinium-vilanterol (ANORO ELLIPTA) 62.5-25 MCG/INH AEPB inhaler Inhale 1 puff into the lungs daily 60 puff 2    rivaroxaban (XARELTO) 20 MG TABS tablet TAKE 1 TABLET BY MOUTH EVERY DAY WITH BREAKFAST 90 tablet 3    Handicap Placard MISC by Does not apply route Good for 5 years. Dispense 1 1 each 0    Calcium Carb-Cholecalciferol (CALCIUM + D3 PO) Take by mouth      amiodarone (CORDARONE) 200 MG tablet Take 1 tablet by mouth Twice a Week 26 tablet 3    b complex vitamins tablet Take 1 tablet by mouth daily      magnesium oxide (MAG-OX) 400 MG tablet Take 400 mg by mouth daily      Vitamin D (CHOLECALCIFEROL) 1000 UNITS CAPS capsule Take 1,000 Units by mouth daily. No current facility-administered medications on file prior to visit. I have personally reviewed the ROS, PMH, PFH, and social history     Review of Systems   Constitutional: Negative for chills and fever. HENT: Negative for congestion. Eyes: Negative for pain. Respiratory: Negative for shortness of breath. Cardiovascular: Negative for chest pain. Gastrointestinal: Negative for abdominal pain. Genitourinary: Negative for hematuria. Musculoskeletal: Negative for back pain. Allergic/Immunologic: Negative for immunocompromised state. Neurological: Negative for headaches. Psychiatric/Behavioral: Negative for hallucinations. Objective:   /64 (Site: Right Upper Arm, Position: Sitting, Cuff Size: Large Adult)   Pulse 86   Temp 97.9 °F (36.6 °C) (Tympanic)   Resp 15   Ht 5' 1\" (1.549 m)   Wt 141 lb (64 kg)   LMP  (LMP Unknown)   SpO2 94%   BMI 26.64 kg/m²     Physical Exam  Constitutional:       General: She is not in acute distress. Appearance: Normal appearance. She is not ill-appearing, toxic-appearing or diaphoretic. HENT:      Head: Normocephalic. Neck:      Vascular: No carotid bruit. Cardiovascular:      Rate and Rhythm: Normal rate and regular rhythm. Pulses: Normal pulses. Heart sounds: Normal heart sounds. No murmur heard. No friction rub. No gallop. Pulmonary:      Effort: Pulmonary effort is normal. No respiratory distress. Breath sounds: Normal breath sounds. No wheezing, rhonchi or rales. Abdominal:      General: Abdomen is flat. There is no distension. Palpations: Abdomen is soft. Tenderness: There is no abdominal tenderness. There is no right CVA tenderness, left CVA tenderness, guarding or rebound. Musculoskeletal:      Cervical back: Neck supple. Right lower leg: No edema. Left lower leg: No edema. Skin:     General: Skin is warm. Findings: No erythema or rash. Neurological:      Mental Status: She is alert. Psychiatric:         Mood and Affect: Mood normal.           Assessment:       Diagnosis Orders   1. Hyperlipidemia, unspecified hyperlipidemia type     2. Renal cyst  US RETROPERITONEAL COMPLETE   3. Irregular heart beat  EKG 12 lead   4. Hx of radiation therapy  XR CHEST (2 VW)         Plan:     vc  Keep cardiology f/u.   Repeat thyroid US in one year, (08/2021)   Keep f/u with mumtaz allan/rad/onc (from before)   Continue chronic medications.    Renal us 08/2021   Labs due in November 2021   Talk to heme about high platelets  Await dexa                 Orders Placed This Encounter   Procedures    US RETROPERITONEAL COMPLETE Standing Status:   Future     Standing Expiration Date:   6/9/2022    XR CHEST (2 VW)     Standing Status:   Future     Standing Expiration Date:   6/9/2022    EKG 12 lead     Standing Status:   Future     Standing Expiration Date:   8/8/2021     Order Specific Question:   Reason for Exam?     Answer:   Irregular heart rate     Orders Placed This Encounter   Medications    Evolocumab 140 MG/ML SOSY     Sig: Inject 140 mg under the skin every 14 days. Dispense:  6 Syringe     Refill:  1   she is concerned about se from statins as a whole  Memory loss, etc.   We will send repatha. (patient refused after visit, will cancel)   No cough, no fevers, no sputum. On claudication. Patient not having cp or dyspnea  I did discuss ekg with dr Travis Palumbo  He mentions nonspecific ekg changes  She will follow up with him as well. If anything should change or worsen call ASAP, don't wait for next scheduled appointment. No follow-ups on file.       Isela Dan MD

## 2021-06-09 NOTE — PATIENT INSTRUCTIONS
medical help if you have signs of an allergic reaction: hives, severe itching; difficult breathing; swelling of your face, lips, tongue, or throat. Common side effects may include:  · redness, itching, soreness, or swelling where an injection was given;  · flu symptoms; or  · cold symptoms such as stuffy nose, sneezing, sore throat. This is not a complete list of side effects and others may occur. Call your doctor for medical advice about side effects. You may report side effects to FDA at 6-957-MKD-1711. What other drugs will affect alirocumab? Other drugs may affect alirocumab, including prescription and over-the-counter medicines, vitamins, and herbal products. Tell your doctor about all your current medicines and any medicine you start or stop using. Where can I get more information? Your pharmacist can provide more information about alirocumab. Remember, keep this and all other medicines out of the reach of children, never share your medicines with others, and use this medication only for the indication prescribed. Every effort has been made to ensure that the information provided by [address] is accurate, up-to-date, and complete, but no guarantee is made to that effect. Drug information contained herein may be time sensitive. [employer] information has been compiled for use by healthcare practitioners and consumers in the [place] and therefore [employer] does not warrant that uses outside of the [place] are appropriate, unless specifically indicated otherwise. [facility] drug information does not endorse drugs, diagnose patients or recommend therapy. [employer] drug information is an informational resource designed to assist licensed healthcare practitioners in caring for their patients and/or to serve consumers viewing this service as a supplement to, and not a substitute for, the expertise, skill, knowledge and judgment of healthcare practitioners.  The absence of a warning for a given drug or drug combination in no way should be construed to indicate that the drug or drug combination is safe, effective or appropriate for any given patient. [facility] does not assume any responsibility for any aspect of healthcare administered with the aid of information [facility] provides. The information contained herein is not intended to cover all possible uses, directions, precautions, warnings, drug interactions, allergic reactions, or adverse effects. If you have questions about the drugs you are taking, check with your doctor, nurse or pharmacist.  Copyright 2966-8430 [address]: 4.01. Revision date: 5/29/2019. Care instructions adapted under license by [facility] ([facility]). If you have questions about a medical condition or this instruction, always ask your healthcare professional. [place] [address] any warranty or liability for your use of this information. Patient Education        evolocumab  Pronunciation:  GINNY GREENFIELD ue mab  Brand:  Marny Parcel Pushtronex, Repatha SureClick  What is the most important information I should know about evolocumab? Follow all directions on your medicine label and package. Tell each of your healthcare providers about all your medical conditions, allergies, and all medicines you use. What is evolocumab? Evolocumab works by helping the liver reduce levels of \"bad\" cholesterol (low-density lipoprotein, or LDL) circulating in your blood. Evolocumab is used together with a low-fat diet and other cholesterol-lowering medications in people with homozygous or heterozygous familial hypercholesterolemia (inherited types of high cholesterol). These conditions can cause high blood levels of LDL cholesterol, and can also cause plaque to build up inside your arteries.   Evolocumab is also used to help lower the risk of stroke, heart attack, or other heart complications in people with heart or blood vessel problems caused by plaque build-up or hardening in the arteries (also called atherosclerosis, or arteriosclerosis). Evolocumab may also be used for purposes not listed in this medication guide. What should I discuss with my healthcare provider before using evolocumab? You should not use evolocumab if you are allergic to it. Tell your doctor if you have ever had:  · liver or kidney disease; or  · a latex allergy. If you are pregnant, your name may be listed on a pregnancy registry to track the effects of evolocumab on the baby. It may not be safe to breast-feed while using this medicine. Ask your doctor about any risk. Do not give this medicine to a child without medical advice. Evolocumab is not approved for use by anyone younger than 15years old. For certain conditions, evolocumab should not be given to a child of any age. How is evolocumab given? Follow all directions on your prescription label and read all medication guides or instruction sheets. Use the medicine exactly as directed. Evolocumab is injected under the skin. A healthcare provider may teach you how to properly use the medication by yourself. Evolocumab is available in a prefilled syringe, a SureClick prefilled autoinjector, or a Pushtronex on-body infusor with prefilled cartridge. Read and carefully follow any Instructions for Use provided with your medicine. Ask your doctor or pharmacist if you have questions, or call the  at [phone]. The Pushtronex on-body infusor is a special device placed on the skin that delivers your evolocumab dose slowly. You will need to wear the device for about 9 minutes to get the full dose. While wearing the on-body infusor, you may perform moderate activities such as walking, bending, or reaching. Your care provider will show you the best places on your body to inject evolocumab or place the on-body infusor. Use a different place each time you give an injection. Do not inject into the same place two times in a row.   Each single-use prefilled syringe, cartridge, or injection device is for one use only. Throw away after one use, even if there is still some medicine left inside. Follow any state or local laws about throwing away used needles and syringes. Use a puncture-proof \"sharps\" container. Follow state or local laws about how to dispose of this container. Keep it out of the reach of children and pets. Store evolocumab in the refrigerator in its original carton and protect from light and heat. Do not freeze. Throw away any evolocumab that has been frozen. Take the medicine out of the refrigerator and let it reach room temperature for 30 to 45 minutes before injecting your dose. Do not heat a syringe or injection device. You may also store evolocumab in the original carton at cool room temperature, away from light and heat. Use the medicine within 30 days if it is kept at room temperature. Handle this medicine carefully. Dropping an injection device can cause it to break. Do not use an injection device that has been dropped onto a hard surface, even if you cannot see a break in it. Call your pharmacist for new medicine. Do not shake this medicine. Do not use if the medicine has changed colors or has particles in it. Call your pharmacist for new medicine. You should not stop using evolocumab without your doctor's advice, or your LDL cholesterol levels may increase. Evolocumab is only part of a complete treatment program that also includes diet, statin medication, and regular blood testing. Follow your doctor's instructions very closely. What happens if I miss a dose? Use the missed dose within 7 days after that injection was due, but skip the missed dose if you are more than 7 days late. After a missed dose, go back to your original schedule and use the medicine again when your next scheduled dose is due. Do not use two doses at one time. What happens if I overdose?   Seek emergency medical attention or call the Poison Help line at [phone]. What should I avoid while using evolocumab? Do not inject evolocumab into skin that is bruised, sore, scarred, or hardened. What are the possible side effects of evolocumab? Get emergency medical help if you have signs of an allergic reaction: hives, severe itching; difficult breathing; swelling of your face, lips, tongue, or throat. Call your doctor at once if you have:  · high blood sugar --increased thirst, increased urination, dry mouth, fruity breath odor. Common side effects may include:  · redness, pain, or bruising where an injection was given;  · back pain;  · flu symptoms; or  · cold symptoms such as stuffy nose, sneezing, sore throat. This is not a complete list of side effects and others may occur. Call your doctor for medical advice about side effects. You may report side effects to FDA at 2-183-BJK-8139. What other drugs will affect evolocumab? Other drugs may affect evolocumab, including prescription and over-the-counter medicines, vitamins, and herbal products. Tell your doctor about all your current medicines and any medicine you start or stop using. Where can I get more information? Your pharmacist can provide more information about evolocumab. Remember, keep this and all other medicines out of the reach of children, never share your medicines with others, and use this medication only for the indication prescribed. Every effort has been made to ensure that the information provided by [address] is accurate, up-to-date, and complete, but no guarantee is made to that effect. Drug information contained herein may be time sensitive. [facility] information has been compiled for use by healthcare practitioners and consumers in the [place] and therefore [facility] does not warrant that uses outside of the [place] are appropriate, unless specifically indicated otherwise.  [facility] drug information does not endorse drugs, diagnose patients or recommend therapy. [facility] drug information is an informational resource designed to assist licensed healthcare practitioners in caring for their patients and/or to serve consumers viewing this service as a supplement to, and not a substitute for, the expertise, skill, knowledge and judgment of healthcare practitioners. The absence of a warning for a given drug or drug combination in no way should be construed to indicate that the drug or drug combination is safe, effective or appropriate for any given patient. [facility] does not assume any responsibility for any aspect of healthcare administered with the aid of information [facility] provides. The information contained herein is not intended to cover all possible uses, directions, precautions, warnings, drug interactions, allergic reactions, or adverse effects. If you have questions about the drugs you are taking, check with your doctor, nurse or pharmacist.  Copyright 8077-9152 [address]: 2.02. Revision date: 11/7/2018. Care instructions adapted under license by [facility] ([facility]). If you have questions about a medical condition or this instruction, always ask your healthcare professional. [place] [address] any warranty or liability for your use of this information.

## 2021-06-10 ENCOUNTER — TELEPHONE (OUTPATIENT)
Dept: FAMILY MEDICINE CLINIC | Age: 71
End: 2021-06-10

## 2021-06-10 RX ORDER — PRAVASTATIN SODIUM 20 MG
20 TABLET ORAL DAILY
COMMUNITY
End: 2021-11-08 | Stop reason: SDUPTHER

## 2021-06-10 NOTE — TELEPHONE ENCOUNTER
Please order  of O2 tanks and portable and concentrator  from home toMedical service [facility] phone [phone].

## 2021-06-10 NOTE — TELEPHONE ENCOUNTER
Please order  of O2 tanks and portable and concentrator  from home toMedical service [place] phone [phone].

## 2021-06-10 NOTE — TELEPHONE ENCOUNTER
Office called back, dr Komal Calvillo is seeing her next Tuesday and she has chemo, if he needs any more drawn he will do so then.

## 2021-06-10 NOTE — TELEPHONE ENCOUNTER
LM with physicians line for Dr Gayathri Burciaga to opine on patients labwork impaired balance/impaired postural control/decreased strength

## 2021-06-11 ENCOUNTER — TELEPHONE (OUTPATIENT)
Dept: FAMILY MEDICINE CLINIC | Age: 71
End: 2021-06-11

## 2021-06-11 NOTE — TELEPHONE ENCOUNTER
Her pulse ox is 94 percent on RA.    I would wait until ct is done and she follows up with pulmonary

## 2021-06-11 NOTE — TELEPHONE ENCOUNTER
Patient called asking for an order be sent to medical services to  her oxygen tanks, patient states she no longer needs them. Pt states medical services needs an order be faxed before tanks can be picked up.     Fax: [phone]

## 2021-06-15 DIAGNOSIS — I48.91 ATRIAL FIBRILLATION, UNSPECIFIED TYPE (HCC): Primary | ICD-10-CM

## 2021-06-16 RX ORDER — AMIODARONE HYDROCHLORIDE 200 MG/1
TABLET ORAL
Qty: 26 TABLET | Refills: 3 | Status: SHIPPED | OUTPATIENT
Start: 2021-06-16 | End: 2021-06-28 | Stop reason: SDUPTHER

## 2021-06-24 ENCOUNTER — HOSPITAL ENCOUNTER (OUTPATIENT)
Dept: CT IMAGING | Age: 71
Discharge: HOME | End: 2021-06-26
Payer: COMMERCIAL

## 2021-06-24 DIAGNOSIS — J70.0 RADIATION PNEUMONITIS (HCC): ICD-10-CM

## 2021-06-24 PROCEDURE — 71250 CT THORAX DX C-: CPT

## 2021-06-28 ENCOUNTER — OFFICE VISIT (OUTPATIENT)
Dept: CARDIOLOGY CLINIC | Age: 71
End: 2021-06-28
Payer: COMMERCIAL

## 2021-06-28 VITALS
DIASTOLIC BLOOD PRESSURE: 80 MMHG | OXYGEN SATURATION: 91 % | SYSTOLIC BLOOD PRESSURE: 144 MMHG | HEART RATE: 101 BPM | BODY MASS INDEX: 26.26 KG/M2 | WEIGHT: 139 LBS

## 2021-06-28 DIAGNOSIS — J44.1 COPD EXACERBATION (HCC): ICD-10-CM

## 2021-06-28 DIAGNOSIS — I48.91 ATRIAL FIBRILLATION, UNSPECIFIED TYPE (HCC): ICD-10-CM

## 2021-06-28 PROCEDURE — 99214 OFFICE O/P EST MOD 30 MIN: CPT | Performed by: INTERNAL MEDICINE

## 2021-06-28 RX ORDER — SPIRONOLACTONE 25 MG/1
25 TABLET ORAL DAILY
Qty: 30 TABLET | Refills: 5 | Status: SHIPPED | OUTPATIENT
Start: 2021-06-28 | End: 2021-07-28 | Stop reason: SINTOL

## 2021-06-28 RX ORDER — UMECLIDINIUM BROMIDE AND VILANTEROL TRIFENATATE 62.5; 25 UG/1; UG/1
1 POWDER RESPIRATORY (INHALATION) DAILY
Qty: 60 PUFF | Refills: 2 | Status: SHIPPED | OUTPATIENT
Start: 2021-06-28 | End: 2021-07-08 | Stop reason: SDUPTHER

## 2021-06-28 RX ORDER — AMIODARONE HYDROCHLORIDE 200 MG/1
TABLET ORAL
Qty: 180 TABLET | Refills: 3 | Status: SHIPPED | OUTPATIENT
Start: 2021-06-28 | End: 2022-07-07 | Stop reason: SDUPTHER

## 2021-06-28 RX ORDER — DILTIAZEM HYDROCHLORIDE 240 MG/1
CAPSULE, COATED, EXTENDED RELEASE ORAL
Qty: 90 CAPSULE | Refills: 3 | Status: SHIPPED | OUTPATIENT
Start: 2021-06-28 | End: 2022-07-29 | Stop reason: SDUPTHER

## 2021-06-28 ASSESSMENT — ENCOUNTER SYMPTOMS
VOICE CHANGE: 0
CHEST TIGHTNESS: 0
VOMITING: 0
NAUSEA: 0
BLOOD IN STOOL: 0
ABDOMINAL DISTENTION: 0
APNEA: 0
FACIAL SWELLING: 0
WHEEZING: 0
SHORTNESS OF BREATH: 1
TROUBLE SWALLOWING: 0
COLOR CHANGE: 0
ANAL BLEEDING: 0

## 2021-06-28 NOTE — PROGRESS NOTES
[facility] CARDIOLOGY OFFICE FOLLOW-UP      Patient: Hubert Melgoza  YOB: 1950  MRN: [id number]    Chief Complaint:  Chief Complaint   Patient presents with    Atrial Fibrillation     ekg 6/9/21         Subjective/HPI:  6/28/21: Patient presents today for follow-up of atrial fibrillation. She had a visit with Dr. Carl Cobian. EKG was attached and reviewed. Has sinus rhythm. She is undergoing radiation therapy for breast cancer. And was started on Taxol. Doing well. Some ankle edema. Renal function is normal.  Add Aldactone 25 mg a day she is pain go to [place] when she comes back I will see her in the towards the end of July check a BMP at that time       5/10/21: Patient presents today for follow-up of atrial fibrillation. She is currently being treated for breast cancer. Had lumpectomy and is getting radiation and chemo. Fairly happy with the results. She is on amiodarone twice a week anticoagulated and takes Lasix every day for ankle edema. 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 Estrogen receptor negative. She has quit smoking. Has not received the Covid vaccine yet. Rhythm remains stable. Continue the Lasix, potassium, and Cardizem.   And see me in later part of June before she goes on vacation.        1/7/21: Patient presents today for follow-up of atrial fibrillation.  The dose of Cardizem has been reduced to 240.  Also on amiodarone twice a week now. Jeff Harper wants to get off it.  I told her not to do so.  In the past we did that and she went back into atrial fibrillation significant issues.  She is anticoagulated.  No bleeding.  On Lasix every day 40 mg pravastatin for dyslipidemia.  He quit smoking 5 years ago. Jeffreydrew Matta with Xarelto, and amiodarone and Cardizem 240.  See me in 6 months last TSH was normal     10/8/2020: Patient presents today for follow-up of atrial fibrillation.  Remains in a sinus rhythm we did cut down the dose of Cardizem from with her . Germaine Juares a little winded.  I think she is in sinus rhythm.  Will reduce amiodarone to Monday Wednesday and Friday.  Increase Lasix and potassium to every day.  See me in 2 weeks.  Continue Cardizem 240 in the morning and 120 at night.  Continue Xarelto           3/23/2020: Patient presents today for follow-up of recent hospitalization at [facility] for CHF atrial fibrillation.  Was seen by multiple people.  [place] cath which was negative.  Was evaluated by Dr. Lisa Branchhire was read by .  Showed preserved LV ejection fraction.  Had a cardiac MRI.  Bottom line is currently on amiodarone 200 mg a day he used to take that before and quit taking it 3 4 years ago. Mammie Eisenmenger is somewhat noncompliant with medication.  Could not tolerate beta-blocker.  Currently she is on Cardizem 240 in the morning and 120 at night and was put on Lasix and potassium.  She is feeling better.  Supposed to see  for follow-up of event monitor. \"  Keep the medication the same.  I will see her in 2 weeks.     11/1/19: Patient presents today for follow-up of atrial fibrillation.  Remains in sinus rhythm.  Could not tolerate the Toprol dose.  Currently only on 12.5 daily.  Makes extremely tired.  Blood pressure remains well controlled. Anabela Schwarz told her not to take it off no bleeding.  She has a history of being noncompliant in the past with medication.  I had long talk with her about the need to take anticoagulants or risk of stroke.  She will have a echo test and lexiscan stress test.  She will see me in February 9/30/19: Patient presents today for follow-up of recent hospitalization in [place] when she was admitted for rapid atrial fibrillation.  Reportedly had cardioversion.  Was discharged home on Toprol 50 mg 1 and half tablet a day.  And Xarelto.  Please her shortness of breath with Toprol.  Reportedly had a history of COPD which I doubt.  The rest of the trip to [place] was unremarkable she will continue with the same medication.  Take Toprol 50 mg at night and 25 mg in the morning.  She will see me in 1 month     8/29/19: Patient presents today for follow-up of atrial fibrillation.  [facility] to get stem cell injection.  She had to pay $6000 cash there is an outfit in [address] [place] [address] by a registered nurse practitioner. Tania Viramontes stop [address] she works hard at think she does go into atrial fibrillation weakness at the mall.  Told her the risk of embolic event is high.  Will leave at this dose and see me in 6 months     5/20/19: Patient presents today for follow-up of atrial fibrillation. Remains in sinus rhythm. He wants to stop the Xarelto I told her not to do it at this point. Probably do a 30 day event monitor. And then decide at that point. He is retired from regular work. No chest pain angina congestive heart failure symptoms. See me in 3-6 months     11/19/18: Patient presents today for Follow-up of atrial fibrillation. Remains in sinus rhythm. Continues to work. On Cardizem and Xarelto only. No bleeding. She'll see me in 6 months. He wants to get off blood thinners. I told her not to do it right now. We will reconsider that option in 6 months.     5/14/18: Patient presents today for Follow-up of atrial fibrillation. Remains in sinus rhythm. On Xarelto. No bleeding. She is off amiodarone. Doing well. She fell down and hurt her left shoulder. Is healing up well. No syncope. See me in 6 months     11/13/17: Patient presents today for atrial fibrillation. In sinus rhythm. On Xarelto and Cardizem. No bleeding. Off amiodarone. See me in 6 months.     5/1/17: For follow-up of atrial fibrillation. Thyroid profile was normal. Amiodarone DC'd. Plan Cardizem and Xarelto. She is going to be going to [place] for road trip with her . Feels well. She is  status post cardioversion and remains in sinus rhythm. See me in 6 months.        Past Medical History: Diagnosis Date    Abnormal echocardiogram 2020    possible PFO?  further eval suggested    Cancer [facility])     left breast    COPD (chronic obstructive pulmonary disease) ([facility] 2015    Dr Elizabeth Romero Current use of long term anticoagulation     Cyst of neck 2012    after plastic surgery    Emphysema of lung ([facility]     History of compression fracture of vertebral column 2020    T2, L1    History of humerus fracture     LEFT    History of left heart catheterization 03/2020    at [facility], normal LVEF with impaired relaxation, mild AV calcification and mild MV thickening, normal RV    History of sustained ventricular tachycardia 03/2020    had to wear a LifeVest, Dr Magalis Mao Hyperlipidemia LDL goal < 130     Intermittent atrial fibrillation [facility]) 2015    Dr Kimberley Bailey, Dr Raina Mahmood, Dr Terry Verduzco Osteopenia 2014    S/P colonoscopic polypectomy 2008, 2015, 2018    Dr Radha Camacho    Smoking history     Vitamin D deficiency        Past Surgical History:   Procedure Laterality Date    ATRIAL ABLATION SURGERY      BREAST BIOPSY Left 01/20/2021    BREAST BIOPSY Left 2/4/2021    LEFT BREAST LUMPECTOMY AND  SENTINEL LYMPH NODE BIOPSY performed by Tammy Palomino MD at [address] [place] [address] infection, postpartum    COLONOSCOPY  07/22/2015    w/polypectomy Dr Katalina Alonso  2012    Dr Geo Gamboa; HI RISK IND N/A 07/13/2018    COLONOSCOPY performed by Peña Simeon MD at [facility]       Family History   Problem Relation Age of Onset    Osteoarthritis Mother     Osteoporosis Mother     Cancer Mother         anorectal     Colon Cancer Mother        Social History     Socioeconomic History    Marital status:      Spouse name: Not on file    Number of children: 2    Years of education: Not on file    Highest education level: Not on file   Occupational History    Occupation: [employer], retired   Tobacco Use    Smoking status: Former Smoker Packs/day: 1.00     Years: 50.00     Pack years: 50.00     Types: Cigarettes     Start date: 54     Quit date: 2015     Years since quittin.5    Smokeless tobacco: Never Used    Tobacco comment: pt will try smoking cessation   Vaping Use    Vaping Use: Never used   Substance and Sexual Activity    Alcohol use: No    Drug use: No    Sexual activity: Not on file   Other Topics Concern    Not on file   Social History Narrative    Born in [place], one brother in [facility] [place], keeps in touch    , 2 children, one son in [place], one daughter in [place]    5 grandchildren     Lives in a house in [place] with spouse     Worked for [employer] and full time at [employer], [employer], fashion, family life     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      Social Determinants of Health     Financial Resource Strain: Low Risk     Difficulty of Paying Living Expenses: Not hard at all   Food Insecurity: No Food Insecurity    Worried About [address] in the Last Year: Never true    [address] [employer] in the Last Year: Never true   Transportation Needs:     Lack of Transportation (Medical):      Lack of Transportation (Non-Medical):    Physical Activity:     Days of Exercise per Week:     Minutes of Exercise per Session:    Stress:     Feeling of Stress :    Social Connections:     Frequency of Communication with Friends and Family:     Frequency of Social Gatherings with Friends and Family:     Attends Jew Services:     Active Member of Clubs or Organizations:     Attends Club or Organization Meetings:     Marital Status:    Intimate Partner Violence:     Fear of Current or Ex-Partner:     Emotionally Abused:     Physically Abused:     Sexually Abused:        No Known Allergies    Current Outpatient Medications   Medication Sig Dispense Refill    amiodarone (CORDARONE) 200 MG tablet TAKE 1 TABLET BY MOUTH TWICE WEEKLY 180 tablet 3    dilTIAZem (CARDIZEM CD) 240 MG extended release capsule TAKE 1 CAPSULE BY MOUTH EVERY DAY 90 capsule 3    rivaroxaban (XARELTO) 20 MG TABS tablet TAKE 1 TABLET BY MOUTH EVERY DAY WITH BREAKFAST 90 tablet 3    pravastatin (PRAVACHOL) 20 MG tablet Take 20 mg by mouth daily      PACLitaxel (TAXOL) 100 MG/16.7ML chemo injection Infuse intravenously once      potassium chloride (KLOR-CON M) 20 MEQ TBCR extended release tablet TAKE 1 TABLET BY MOUTH EVERY DAY 90 tablet 2    furosemide (LASIX) 40 MG tablet TAKE 1 TABLET BY MOUTH EVERY DAY 90 tablet 3    Handicap Placard MISC by Does not apply route Good for 5 years. Dispense 1 1 each 0    Calcium Carb-Cholecalciferol (CALCIUM + D3 PO) Take by mouth      b complex vitamins tablet Take 1 tablet by mouth daily      magnesium oxide (MAG-OX) 400 MG tablet Take 400 mg by mouth daily      Vitamin D (CHOLECALCIFEROL) 1000 UNITS CAPS capsule Take 1,000 Units by mouth daily.  spironolactone (ALDACTONE) 25 MG tablet Take 1 tablet by mouth daily 30 tablet 5    umeclidinium-vilanterol (ANORO ELLIPTA) 62.5-25 MCG/INH AEPB inhaler Inhale 1 puff into the lungs daily 60 puff 2     No current facility-administered medications for this visit. Review of Systems:   Review of Systems   Constitutional: Negative for activity change, appetite change, diaphoresis, fatigue and unexpected weight change. HENT: Negative for facial swelling, nosebleeds, trouble swallowing and voice change. Respiratory: Positive for shortness of breath. Negative for apnea, chest tightness and wheezing. Cardiovascular: Positive for leg swelling. Negative for chest pain and palpitations. FEET SWELLING   Gastrointestinal: Negative for abdominal distention, anal bleeding, blood in stool, nausea and vomiting. Genitourinary: Negative for decreased urine volume and dysuria. Musculoskeletal: Negative for gait problem and myalgias. Skin: Negative. Negative for color change, pallor, rash and wound.    Neurological: Negative for dizziness, syncope, facial asymmetry, weakness, light-headedness, numbness and headaches. Hematological: Does not bruise/bleed easily. Psychiatric/Behavioral: Negative for agitation, behavioral problems, confusion, decreased concentration, hallucinations and suicidal ideas. The patient is not nervous/anxious and is not hyperactive. All other systems reviewed and are negative. Review of System is negative except for as mentioned above. Physical Examination:    BP (!) 144/80 (Site: Right Upper Arm, Position: Sitting, Cuff Size: Small Adult)   Pulse 101   Wt 139 lb (63 kg)   LMP  (LMP Unknown)   SpO2 91%   BMI 26.26 kg/m²    Physical Exam   Constitutional: She appears healthy. No distress. HENT:   Nose: Nose normal.   Mouth/Throat: Dentition is normal. Oropharynx is clear. Eyes: Pupils are equal, round, and reactive to light. Conjunctivae are normal.   Neck: Thyroid normal.   Cardiovascular: Regular rhythm, S1 normal, S2 normal, normal heart sounds, intact distal pulses and normal pulses. PMI is not displaced. No murmur heard. Pulmonary/Chest: She has no wheezes. She has no rales. She exhibits no tenderness. Abdominal: Soft. Bowel sounds are normal. She exhibits no distension and no mass. There is no splenomegaly or hepatomegaly. There is no abdominal tenderness. No hernia. Musculoskeletal:      Cervical back: Normal range of motion and neck supple. Neurological: She is alert and oriented to person, place, and time. She has normal motor skills. Gait normal.   Skin: Skin is warm and dry. No cyanosis. No jaundice. Nails show no clubbing.            Patient Active Problem List   Diagnosis    Hyperlipidemia with target LDL less than 130    Vitamin D deficiency    Atrial fibrillation ([address]    Pulmonary emphysema (HCC)    Bilateral edema of lower extremity    Other plastic surgery for unacceptable cosmetic appearance    Tobacco abuse    Fatigue    [facility] disease [facility]    Acute bronchitis    Hypoxemia    Shortness of breath    Fracture of humerus, proximal    Benign neoplasm of sigmoid colon    Other hemorrhoids    Hx of colonic polyps    Family history of colon cancer    Osteopenia of necks of both femurs    Congestive heart failure (HCC)    Bilateral pulmonary infiltrates on CXR    Pericardial calcification    Pericardial effusion    Pleural effusion    Atelectasis, left    Abnormal mammogram of left breast    Left breast mass    Overweight (BMI 25.0-29. 9)    Malignant neoplasm of upper-inner quadrant of left breast in female, estrogen receptor negative (HCC)    Neck nodule    Hemoptysis    Eczema    Dysphonia    Hypoxia    Acute respiratory failure with hypoxia (HCC)    Radiation pneumonitis (HCC)           No orders of the defined types were placed in this encounter. Orders Placed This Encounter   Medications    amiodarone (CORDARONE) 200 MG tablet     Sig: TAKE 1 TABLET BY MOUTH TWICE WEEKLY     Dispense:  180 tablet     Refill:  3    dilTIAZem (CARDIZEM CD) 240 MG extended release capsule     Sig: TAKE 1 CAPSULE BY MOUTH EVERY DAY     Dispense:  90 capsule     Refill:  3    rivaroxaban (XARELTO) 20 MG TABS tablet     Sig: TAKE 1 TABLET BY MOUTH EVERY DAY WITH BREAKFAST     Dispense:  90 tablet     Refill:  3             Assessment/Orders:       ICD-10-CM    1.  Atrial fibrillation, unspecified type ([facility])  I48.91 amiodarone (CORDARONE) 200 MG tablet       Orders Placed This Encounter   Medications    amiodarone (CORDARONE) 200 MG tablet     Sig: TAKE 1 TABLET BY MOUTH TWICE WEEKLY     Dispense:  180 tablet     Refill:  3    dilTIAZem (CARDIZEM CD) 240 MG extended release capsule     Sig: TAKE 1 CAPSULE BY MOUTH EVERY DAY     Dispense:  90 capsule     Refill:  3    rivaroxaban (XARELTO) 20 MG TABS tablet     Sig: TAKE 1 TABLET BY MOUTH EVERY DAY WITH BREAKFAST     Dispense:  90 tablet     Refill:  3       Medications Discontinued During This Encounter   Medication Reason    predniSONE (DELTASONE) 10 MG tablet Therapy completed    rivaroxaban (XARELTO) 20 MG TABS tablet REORDER    dilTIAZem (CARDIZEM CD) 240 MG extended release capsule REORDER    amiodarone (CORDARONE) 200 MG tablet REORDER       No orders of the defined types were placed in this encounter. Plan:    Stay on same medications.     See me in 1 month         Electronically signed by: Irven Claude, MD  6/29/2021 3:48 PM

## 2021-07-07 ENCOUNTER — OFFICE VISIT (OUTPATIENT)
Dept: FAMILY MEDICINE CLINIC | Age: 71
End: 2021-07-07
Payer: COMMERCIAL

## 2021-07-07 VITALS
BODY MASS INDEX: 26.24 KG/M2 | SYSTOLIC BLOOD PRESSURE: 122 MMHG | OXYGEN SATURATION: 98 % | WEIGHT: 139 LBS | TEMPERATURE: 97.7 F | DIASTOLIC BLOOD PRESSURE: 78 MMHG | HEIGHT: 61 IN | HEART RATE: 84 BPM

## 2021-07-07 DIAGNOSIS — L03.115 CELLULITIS OF RIGHT ANKLE: Primary | ICD-10-CM

## 2021-07-07 PROCEDURE — 99213 OFFICE O/P EST LOW 20 MIN: CPT | Performed by: NURSE PRACTITIONER

## 2021-07-07 RX ORDER — CEPHALEXIN 500 MG/1
500 CAPSULE ORAL 3 TIMES DAILY
Qty: 21 CAPSULE | Refills: 0 | Status: SHIPPED | OUTPATIENT
Start: 2021-07-07 | End: 2021-07-14

## 2021-07-07 SDOH — ECONOMIC STABILITY: TRANSPORTATION INSECURITY
IN THE PAST 12 MONTHS, HAS LACK OF TRANSPORTATION KEPT YOU FROM MEETINGS, WORK, OR FROM GETTING THINGS NEEDED FOR DAILY LIVING?: NO

## 2021-07-07 SDOH — ECONOMIC STABILITY: TRANSPORTATION INSECURITY
IN THE PAST 12 MONTHS, HAS THE LACK OF TRANSPORTATION KEPT YOU FROM MEDICAL APPOINTMENTS OR FROM GETTING MEDICATIONS?: NO

## 2021-07-07 ASSESSMENT — ENCOUNTER SYMPTOMS
SINUS PAIN: 0
COUGH: 0
CHEST TIGHTNESS: 0
EYE DISCHARGE: 0
TROUBLE SWALLOWING: 0
EYE ITCHING: 0
SHORTNESS OF BREATH: 0
SINUS PRESSURE: 0
WHEEZING: 0
ABDOMINAL DISTENTION: 0
COLOR CHANGE: 1
EYE PAIN: 0
NAUSEA: 0
SORE THROAT: 0
VOICE CHANGE: 0
EYE REDNESS: 0
VOMITING: 0
ABDOMINAL PAIN: 0
RHINORRHEA: 0
DIARRHEA: 0

## 2021-07-08 ENCOUNTER — OFFICE VISIT (OUTPATIENT)
Dept: PULMONOLOGY | Age: 71
End: 2021-07-08
Payer: COMMERCIAL

## 2021-07-08 VITALS
HEART RATE: 78 BPM | OXYGEN SATURATION: 95 % | TEMPERATURE: 98 F | WEIGHT: 138.8 LBS | SYSTOLIC BLOOD PRESSURE: 138 MMHG | RESPIRATION RATE: 20 BRPM | HEIGHT: 61 IN | DIASTOLIC BLOOD PRESSURE: 70 MMHG | BODY MASS INDEX: 26.21 KG/M2

## 2021-07-08 DIAGNOSIS — R09.02 HYPOXIA: ICD-10-CM

## 2021-07-08 DIAGNOSIS — J70.0 RADIATION PNEUMONITIS (HCC): ICD-10-CM

## 2021-07-08 DIAGNOSIS — I50.9 CONGESTIVE HEART FAILURE, UNSPECIFIED HF CHRONICITY, UNSPECIFIED HEART FAILURE TYPE (HCC): ICD-10-CM

## 2021-07-08 DIAGNOSIS — J98.11 ATELECTASIS OF RIGHT LUNG: ICD-10-CM

## 2021-07-08 DIAGNOSIS — J44.9 CHRONIC OBSTRUCTIVE PULMONARY DISEASE, UNSPECIFIED COPD TYPE (HCC): Primary | ICD-10-CM

## 2021-07-08 DIAGNOSIS — J90 PLEURAL EFFUSION: ICD-10-CM

## 2021-07-08 DIAGNOSIS — I48.91 ATRIAL FIBRILLATION, UNSPECIFIED TYPE (HCC): ICD-10-CM

## 2021-07-08 PROCEDURE — 99214 OFFICE O/P EST MOD 30 MIN: CPT | Performed by: INTERNAL MEDICINE

## 2021-07-08 RX ORDER — UMECLIDINIUM BROMIDE AND VILANTEROL TRIFENATATE 62.5; 25 UG/1; UG/1
1 POWDER RESPIRATORY (INHALATION) DAILY
Qty: 60 PUFF | Refills: 2 | Status: SHIPPED | OUTPATIENT
Start: 2021-07-08 | End: 2021-09-14 | Stop reason: SDUPTHER

## 2021-07-08 ASSESSMENT — ENCOUNTER SYMPTOMS
EYE ITCHING: 0
TROUBLE SWALLOWING: 0
CHEST TIGHTNESS: 0
RHINORRHEA: 0
VOMITING: 0
SHORTNESS OF BREATH: 1
EYE DISCHARGE: 0
NAUSEA: 0
WHEEZING: 0
SINUS PRESSURE: 0
DIARRHEA: 0
ABDOMINAL PAIN: 0
SORE THROAT: 0
COUGH: 0
VOICE CHANGE: 0

## 2021-07-08 NOTE — PROGRESS NOTES
Subjective:     Larissa Allen is a 65 y.o. female who complains today of:     Chief Complaint   Patient presents with    COPD     5 week f/u ct scan results       HPI  She is not using O2 , bronchodilator with Anoro Ellipta 1 puff daily. C/o shortness of breath with exertion but it is getting better. No  Wheezing. No Cough with  Sputum. No Hemoptysis. No Chest tightness. No Chest pain with radiation  or pleuritic pain. No  leg edema. No orthopnea. No Fever or chills. No Rhinorrhea and postnasal drip. She had CT chest done on 6/24/2021 shows radiation pneumonia within the left upper lobe is decreasing in size with some residual infiltration. There is minimal air adjacent pleural thickening. Small infiltration atelectasis in posterior basal right lower lobe. No adenopathy. Small bilateral pleural effusion. 3 mm nodule right lower lobe. T12 fracture unchanged. Allergies:  Patient has no known allergies. Past Medical History:   Diagnosis Date    Abnormal echocardiogram 2020    possible PFO?  further eval suggested    Cancer [facility])     left breast    COPD (chronic obstructive pulmonary disease) ([address] 2015    Dr Tom Delgadillo Current use of long term anticoagulation     Cyst of neck 2012    after plastic surgery    Emphysema of lung ([address]     History of compression fracture of vertebral column 2020    T2, L1    History of humerus fracture     LEFT    History of left heart catheterization 03/2020    at [facility], normal LVEF with impaired relaxation, mild AV calcification and mild MV thickening, normal RV    History of sustained ventricular tachycardia 03/2020    had to wear a LifeVest, Dr Kathy Hernandez Hyperlipidemia LDL goal < 130     Intermittent atrial fibrillation [facility]) 2015    Dr Lurdes Nuñez, Dr Romain Rogers, Dr Katy Carrasco Osteopenia 2014    S/P colonoscopic polypectomy 2008, 2015, 2018    Dr Ander Arboleda    Smoking history     Vitamin D deficiency      Past Surgical History:   Procedure Laterality Date    ATRIAL ABLATION SURGERY      BREAST BIOPSY Left 2021    BREAST BIOPSY Left 2021    LEFT BREAST LUMPECTOMY AND  SENTINEL LYMPH NODE BIOPSY performed by Gerhardt Hose, MD at [address]    stap infection, postpartum    COLONOSCOPY  2015    w/polypectomy Dr Berto Vera      Dr Jack Harvey; HI RISK IND N/A 2018    COLONOSCOPY performed by Antonio Hairston MD at [facility]     Family History   Problem Relation Age of Onset    Osteoarthritis Mother     Osteoporosis Mother     Cancer Mother         anorectal     Colon Cancer Mother      Social History     Socioeconomic History    Marital status:      Spouse name: Not on file    Number of children: 2    Years of education: Not on file    Highest education level: Not on file   Occupational History    Occupation: [employer], [employer], retired   Tobacco Use    Smoking status: Former Smoker     Packs/day: 1.00     Years: 50.00     Pack years: 50.00     Types: Cigarettes     Start date:      Quit date: 2015     Years since quittin.5    Smokeless tobacco: Never Used    Tobacco comment: pt will try smoking cessation   Vaping Use    Vaping Use: Never used   Substance and Sexual Activity    Alcohol use: No    Drug use: No    Sexual activity: Not on file   Other Topics Concern    Not on file   Social History Narrative    Born in [place], one brother in [facility], keeps in touch    , 2 children, one son in [place], one daughter in [place]    5 grandchildren     Lives in a house in [place] with spouse     Worked for [employer] and full time at [employer], [employer], fashion, family life     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      Social Determinants of Health     Financial Resource Strain: Low Risk     Difficulty of Paying Living Expenses: Not hard at all   Food Insecurity: No Food Insecurity    Worried About [facility] of [facility] in the Last Year: Never true    [address] in the Last Year: Never true   Transportation Needs: No Transportation Needs    Lack of Transportation (Medical): No    Lack of Transportation (Non-Medical): No   Physical Activity:     Days of Exercise per Week:     Minutes of Exercise per Session:    Stress:     Feeling of Stress :    Social Connections:     Frequency of Communication with Friends and Family:     Frequency of Social Gatherings with Friends and Family:     Attends [employer] Services:     Active Member of Clubs or Organizations:     Attends Club or Organization Meetings:     Marital Status:    Intimate Partner Violence:     Fear of Current or Ex-Partner:     Emotionally Abused:     Physically Abused:     Sexually Abused:          Review of Systems   Constitutional: Negative for chills, diaphoresis, fatigue and fever. HENT: Negative for congestion, mouth sores, nosebleeds, postnasal drip, rhinorrhea, sinus pressure, sneezing, sore throat, trouble swallowing and voice change. Eyes: Negative for discharge, itching and visual disturbance. Respiratory: Positive for shortness of breath. Negative for cough, chest tightness and wheezing. Cardiovascular: Negative for chest pain, palpitations and leg swelling. Gastrointestinal: Negative for abdominal pain, diarrhea, nausea and vomiting. Genitourinary: Negative for difficulty urinating and hematuria. Musculoskeletal: Negative for arthralgias, joint swelling and myalgias. Skin: Negative for rash. Allergic/Immunologic: Negative for environmental allergies and food allergies. Neurological: Negative for dizziness, tremors, weakness and headaches.    Psychiatric/Behavioral: Negative for behavioral problems and sleep disturbance.         :     Vitals:    07/08/21 1138   BP: 138/70   Pulse: 78   Temp: 98 °F (36.7 °C)   Weight: 138 lb 12.8 oz (63 kg)   Height: 5' 1\" (1.549 m)     Wt Readings from Last 3 Encounters:   07/08/21 138 lb 12.8 oz (63 kg)   07/07/21 139 lb (63 kg)   06/28/21 139 lb (63 kg)         Physical Exam  Constitutional:       General: She is not in acute distress. Appearance: She is well-developed. She is not diaphoretic. HENT:      Head: Normocephalic and atraumatic. Nose: Nose normal.   Eyes:      Pupils: Pupils are equal, round, and reactive to light. Neck:      Thyroid: No thyromegaly. Vascular: No JVD. Trachea: No tracheal deviation. Cardiovascular:      Rate and Rhythm: Normal rate and regular rhythm. Heart sounds: No murmur heard. No friction rub. No gallop. Pulmonary:      Effort: No respiratory distress. Breath sounds: No wheezing or rales. Chest:      Chest wall: No tenderness. Abdominal:      General: There is no distension. Tenderness: There is no abdominal tenderness. There is no rebound. Musculoskeletal:         General: Normal range of motion. Right lower leg: Edema (redness in rt. ankle lateral side ) present. Left lower leg: Edema present. Lymphadenopathy:      Cervical: No cervical adenopathy. Skin:     General: Skin is warm and dry. Neurological:      Mental Status: She is alert and oriented to person, place, and time.       Coordination: Coordination normal.         Current Outpatient Medications   Medication Sig Dispense Refill    umeclidinium-vilanterol (ANORO ELLIPTA) 62.5-25 MCG/INH AEPB inhaler Inhale 1 puff into the lungs daily 60 puff 2    cephALEXin (KEFLEX) 500 MG capsule Take 1 capsule by mouth 3 times daily for 7 days 21 capsule 0    amiodarone (CORDARONE) 200 MG tablet TAKE 1 TABLET BY MOUTH TWICE WEEKLY 180 tablet 3    dilTIAZem (CARDIZEM CD) 240 MG extended release capsule TAKE 1 CAPSULE BY MOUTH EVERY DAY 90 capsule 3    rivaroxaban (XARELTO) 20 MG TABS tablet TAKE 1 TABLET BY MOUTH EVERY DAY WITH BREAKFAST 90 tablet 3    spironolactone (ALDACTONE) 25 MG tablet Take 1 tablet by mouth daily 30 tablet 5    pravastatin (PRAVACHOL) 20 MG tablet Take 20 mg by mouth daily      PACLitaxel (TAXOL) 100 MG/16.7ML chemo injection Infuse intravenously once      potassium chloride (KLOR-CON M) 20 MEQ TBCR extended release tablet TAKE 1 TABLET BY MOUTH EVERY DAY 90 tablet 2    furosemide (LASIX) 40 MG tablet TAKE 1 TABLET BY MOUTH EVERY DAY 90 tablet 3    Handicap Placard MISC by Does not apply route Good for 5 years. Dispense 1 1 each 0    Calcium Carb-Cholecalciferol (CALCIUM + D3 PO) Take by mouth      b complex vitamins tablet Take 1 tablet by mouth daily      magnesium oxide (MAG-OX) 400 MG tablet Take 400 mg by mouth daily      Vitamin D (CHOLECALCIFEROL) 1000 UNITS CAPS capsule Take 1,000 Units by mouth daily. No current facility-administered medications for this visit. Results for orders placed in visit on 06/09/21    XR CHEST (2 VW)    Narrative  DIAGNOSIS:Z92.3 Hx of radiation therapy ICD10    COMPARISON: May 19, 2021 and January 28, 2021    TECHNIQUE: PA and lateral chest    FINDINGS: Ill-defined opacity is again seen in the left mid lung field in the perihilar region. The lungs are also hyperinflated. Flattening of the diaphragm and increased AP diameter is seen. The lungs contain no pleural effusion or pneumothorax. The  cardiac silhouette is not enlarged. There is loss of anterior superior endplate height of X45-M7 which is unchanged. Impression  The opacity seen in the left mid lung field may be related to patient's history of radiation therapy. Also findings are suggestive of COPD. Results for orders placed during the hospital encounter of 01/28/21    XR CHEST (2 VW)    Narrative  EXAMINATION: XR CHEST (2 VW)    CLINICAL HISTORY: J44.9 Chronic obstructive pulmonary disease, unspecified COPD type ([facility]) ICD10    COMPARISONS: May 11, 2020    FINDINGS:    Two views of the chest are submitted. The cardiac silhouette is of normal size configuration. Pulmonary vascular attenuated.  Widening of the AP diameter the chest.  Right sided trachea. No focal infiltrates. No effusions. No Pneumothoraces. Impression  NO ACUTE ACTIVE CARDIOPULMONARY PROCESS. RADIOGRAPHIC FINDINGS OF COPD      Results for orders placed during the hospital encounter of 05/11/20    XR CHEST STANDARD (2 VW)    Narrative  EXAMINATION: XR CHEST (2 VW)    CLINICAL HISTORY: SHORTNESS OF BREATH    COMPARISONS: CT CHEST, MARCH 5, 2020, CHEST RADIOGRAPH, SEPTEMBER 25, 2019    FINDINGS: Osseous structures intact. Cardiopericardial silhouette normal. Aorta calcified. Pulmonary vasculature normal. Ill-defined area increased opacity left lung base posteriorly. Diaphragms flattened. Impression  EMPHYSEMA. LEFT LOWER LUNG SUBSEGMENTAL ATELECTASIS/PNEUMONIA.  ]  Results for orders placed during the hospital encounter of 05/19/21    XR CHEST PORTABLE    Narrative  EXAMINATION: XR CHEST PORTABLE    CLINICAL HISTORY: SHORTNESS OF BREATH    COMPARISONS: CT CHEST, APRIL 26, 2021, CHEST RADIOGRAPH, JANUARY 28, 2021    FINDINGS: Remote right sixth rib fracture. Cardiopericardial silhouette normal. Aorta calcified. Ill-defined area of increased opacity found bilaterally, left mid and left lower lung. Increased opacity also demonstrated right lung base. Impression  BILATERAL ATELECTASIS/PNEUMONIA AS DISCUSSED.  ]  No results found for this or any previous visit.  ]  Results for orders placed during the hospital encounter of 06/24/21    CT CHEST WO CONTRAST    Narrative  EXAMINATION: CT CHEST WITHOUT CONTRAST    CLINICAL HISTORY:J70.0 Radiation pneumonitis ([facility] ICD10, history left breast cancer diagnosed 2/21. History radiation treatment to left breast completed 4/2/2021. COMPARISONS: 5/19/2021    TECHNIQUE: TECHNIQUE: Contiguous axial images were obtained through the chest without contrast. Coronal and sagittal reformations were made. FINDINGS:  No evidence of aortic aneurysm.  There are atherosclerotic calcifications in the aorta and coronary arteries. No significant mediastinal or hilar or axillary lymphadenopathy. No pericardial effusion. There are small bilateral pleural effusions  in the posterior costophrenic angles both lungs. On the prior exam there is reticular infiltrates have developed within the left upper lobe at level of left breast surgery. Today's exam is reticular infiltrate has decreased in size with some residual changes in the peripheral lung and an area of the  atelectasis and mild pleural thickening. On axial image the peripheral infiltrate measures approximately 2.6 x 3.1 cm. Previously infiltrate measured approximately 6 x 10 cm on axial images. Mild reticular infiltrate anterior to the left major fissure. There are centrilobular emphysematous changes in the lungs. There is a new 3 mm nodule in the right lower lobe base, series 3 image 40. There is minimal infiltrate anteriorly in the right middle lobe, series 3 image 37. There is infiltrate/atelectasis  posteriorly in the base of the right lower lobe. No pneumothorax. Upper abdomen is unremarkable. There is depression of the superior endplate of Y35, unchanged. There is volume loss in this vertebra, unchanged. Impression  RADIATION PNEUMONIA WITHIN THE LEFT UPPER LOBE IS DECREASING IN SIZE WITH SOME RESIDUAL INFILTRATE. THERE IS MINIMAL ADJACENT PLEURAL THICKENING. SMALL INFILTRATE/ATELECTASIS POSTERIOR BASE RIGHT LOWER LOBE. OTHER DETAILS ABOVE. NO ADENOPATHY. SMALL BILATERAL PLEURAL EFFUSIONS.    3 MM NODULE RIGHT LOWER LOBE. T12 FRACTURE, UNCHANGED. All CT scans at this facility use dose modulation, iterative reconstruction, and/or weight based dosing when appropriate to reduce radiation dose to as low as reasonably achievable. Results for orders placed during the hospital encounter of 05/19/21    CT CHEST WO CONTRAST    Narrative  CT of the Chest without intravenous contrast medium. HISTORY:  New hypoxia.  h/o radiation to L CHEST    REASON FOR EXAM  HEMOPTYSIS    COMPARISONS  none    FINDINGS  Multiplanar images were obtained following the IV injection  of 75 cc [id number]-[id number]. A focal area of consolidation involving the right  lower lobe posteriorly is seen. There is a suggestion of air  bronchogram present. Mild atelectasis at the left lung base is seen. Remainder lungs are clear. No pleural effusions are seen. No definite  pulmonary nodules are identified. Emphysematous changes are seen. No  pneumothorax is noted. Heart appears normal in size. No pericardial  effusion is seen. No mediastinal, hilar or axillary adenopathy is  noted. Bone windows demonstrate no gross osseous abnormalities. Surrounding soft tissues are unremarkable. Limited images of the upper  abdomen demonstrate no contributory findings. IMPRESSION  FOCAL AREA OF CONSOLIDATION INVOLVING THE RIGHT LOWER LOBE  IS SEEN. SHORT-TERM INTERVAL FOLLOWUP CT WITHOUT CONTRAST IN 3-4 MONTHS  IS SUGGESTED TO NOTE RESOLUTION. MILD ATELECTATIC CHANGES AT THE LEFT  LUNG BASE. NO OTHER SIGNIFICANT ABNORMALITIES ARE SEEN. All CT scans at this facility use dose modulation, iterative  reconstruction, and/or weight based dosing when appropriate to reduce  radiation dose to as low as reasonably achievable. Arleth George M.D. Released Kirstin George M.D. Released Date Time- 08/24/16 1628  This document has been electronically signed. ------------------------------------------------------------------------------  ]    Assessment/Plan:     1. Chronic obstructive pulmonary disease, unspecified COPD type ([address]  She is not using O2 , bronchodilator with Anoro Ellipta 1 puff daily. C/o shortness of breath with exertion but it is getting better. No  Wheezing. No Cough with  Sputum. Continue bronchodilator therapy as before      - umeclidinium-vilanterol (ANORO ELLIPTA) 62.5-25 MCG/INH AEPB inhaler;  Inhale 1 puff into the lungs daily Dispense: 60 puff; Refill: 2    2. Pleural effusion  Small pleural effusion bilateral likely due to underlying history of CHF. Diuretic therapy as per cardiology    3. Radiation pneumonitis [facility])  She is feeling better. CT chest reported radiation pneumonia within the left upper lobe is decreasing in size with some residual infiltration. There is minimal air adjacent pleural thickening. Small infiltration atelectasis in posterior basal right lower lobe. No adenopathy. Small bilateral pleural effusion. 3 mm nodule right lower lobe. T12 fracture unchanged. 4. Atelectasis of right lung  Recommend deep breathing exercise    5. Hypoxia  She is not using oxygen she said her oxygen improved. Patient said her O2 saturation stays around 94 to 97% even without oxygen    6. Atrial fibrillation, unspecified type [facility])  She is following with cardiology    7. Congestive heart failure, unspecified HF chronicity, unspecified heart failure type [facility])  She is following with cardiology. Return in about 3 months (around 10/8/2021) for COPD.       Chente Brewster MD

## 2021-07-28 ENCOUNTER — OFFICE VISIT (OUTPATIENT)
Dept: CARDIOLOGY CLINIC | Age: 71
End: 2021-07-28
Payer: COMMERCIAL

## 2021-07-28 VITALS
BODY MASS INDEX: 27 KG/M2 | WEIGHT: 143 LBS | HEIGHT: 61 IN | OXYGEN SATURATION: 96 % | SYSTOLIC BLOOD PRESSURE: 130 MMHG | DIASTOLIC BLOOD PRESSURE: 78 MMHG | HEART RATE: 92 BPM

## 2021-07-28 DIAGNOSIS — I48.91 ATRIAL FIBRILLATION, UNSPECIFIED TYPE (HCC): Primary | ICD-10-CM

## 2021-07-28 DIAGNOSIS — I10 ESSENTIAL HYPERTENSION: ICD-10-CM

## 2021-07-28 PROCEDURE — 99214 OFFICE O/P EST MOD 30 MIN: CPT | Performed by: INTERNAL MEDICINE

## 2021-07-28 RX ORDER — SPIRONOLACTONE 25 MG/1
25 TABLET ORAL
COMMUNITY
End: 2021-09-02

## 2021-07-28 ASSESSMENT — ENCOUNTER SYMPTOMS
ANAL BLEEDING: 0
WHEEZING: 0
COLOR CHANGE: 0
VOMITING: 0
VOICE CHANGE: 0
ABDOMINAL DISTENTION: 0
SHORTNESS OF BREATH: 1
FACIAL SWELLING: 0
TROUBLE SWALLOWING: 0
NAUSEA: 0
CHEST TIGHTNESS: 0
BLOOD IN STOOL: 0
APNEA: 0

## 2021-07-28 NOTE — PROGRESS NOTES
[facility] CARDIOLOGY OFFICE FOLLOW-UP      Patient: Anders Barclay  YOB: 1950  MRN: [id number]    Chief Complaint:  Chief Complaint   Patient presents with    1 Month Follow-Up    Atrial Fibrillation    Leg Swelling     dx with cellulitis.  Shortness of Breath     was told radiation pnuemonia previously. Subjective/HPI:  7/28/21: Patient presents today for follow-up of atrial fibrillation. She went to [place]. She had an episode of dizziness. Is the Aldactone that is doing it. She had gone there with her  to vacation. [place]. He is in a regular rhythm. On amiodarone twice a week Lasix 40 and potassium 24 leg edema. And Cardizem. Going to cut down the Aldactone to half a tablet every other day see me in few weeks      CHIEF COMPLAINT    Chief Complaint   Patient presents with    1 Month Follow-Up    Atrial Fibrillation    Leg Swelling     dx with cellulitis.  Shortness of Breath     was told radiation pnuemonia previously. HPI    Anders Barclay is a 65 y.o. female who presents on Lasix 40 mg every day and potassium for leg edema. Also compliant with Cordarone which she takes twice a week diltiazem Xarelto dyslipidemia she takes pravastatin. No definite syncope. Does not smoke anymore. I am going to cut down the dose of Aldactone to every other day half a tablet. See me in a month    REVIEW OF SYSTEMS    See HPI for further details. Review of systems otherwise negative. PAST MEDICAL HISTORY    Past Medical History:   Diagnosis Date    Abnormal echocardiogram 2020    possible PFO?  further eval suggested    Cancer [facility])     left breast    COPD (chronic obstructive pulmonary disease) [facility] [address] 2015    Dr Adriel Nails Current use of long term anticoagulation     Cyst of neck 2012    after plastic surgery    Emphysema of lung ([facility] [address]     History of compression fracture of vertebral column 2020    T2, L1    History of humerus tablet 3    Handicap Placard MISC by Does not apply route Good for 5 years. Dispense 1 1 each 0    Calcium Carb-Cholecalciferol (CALCIUM + D3 PO) Take by mouth      b complex vitamins tablet Take 1 tablet by mouth daily      magnesium oxide (MAG-OX) 400 MG tablet Take 400 mg by mouth daily       Vitamin D (CHOLECALCIFEROL) 1000 UNITS CAPS capsule Take 1,000 Units by mouth daily.         PACLitaxel (TAXOL) 100 MG/16.7ML chemo injection Infuse intravenously once (Patient not taking: Reported on 2021)         ALLERGIES    Allergies   Allergen Reactions    Aldactone [Spironolactone]        FAMILY HISTORY    Family History   Problem Relation Age of Onset    Osteoarthritis Mother     Osteoporosis Mother     Cancer Mother         anorectal     Colon Cancer Mother        SOCIAL HISTORY    Social History     Socioeconomic History    Marital status:      Spouse name: Not on file    Number of children: 2    Years of education: Not on file    Highest education level: Not on file   Occupational History    Occupation: [email or web address], retired   Tobacco Use    Smoking status: Former Smoker     Packs/day: 1.00     Years: 50.00     Pack years: 50.00     Types: Cigarettes     Start date:      Quit date: 2015     Years since quittin.6    Smokeless tobacco: Never Used    Tobacco comment: pt will try smoking cessation   Vaping Use    Vaping Use: Never used   Substance and Sexual Activity    Alcohol use: No    Drug use: No    Sexual activity: Not on file   Other Topics Concern    Not on file   Social History Narrative    Born in [place], one brother in [place], keeps in touch    , 2 children, one son in [place], one daughter in [place]    5 grandchildren     Lives in a house in [place] with spouse     Worked for [employer] and full time at [employer], [employer], fashion, family life     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      Social Determinants of Health     Financial Resource Strain: Low Risk     Difficulty of Paying Living Expenses: Not hard at all   Food Insecurity: No Food Insecurity    Worried About Running Out of Food in the Last Year: Never true    [place] of Food in the Last Year: Never true   Transportation Needs: No Transportation Needs    Lack of Transportation (Medical): No    Lack of Transportation (Non-Medical): No   Physical Activity:     Days of Exercise per Week:     Minutes of Exercise per Session:    Stress:     Feeling of Stress :    Social Connections:     Frequency of Communication with Friends and Family:     Frequency of Social Gatherings with Friends and Family:     Attends Spiritism Services:     Active Member of Clubs or Organizations:     Attends Club or Organization Meetings:     Marital Status:    Intimate Partner Violence:     Fear of Current or Ex-Partner:     Emotionally Abused:     Physically Abused:     Sexually Abused:        PHYSICAL EXAM    VITAL SIGNS: /78 (Site: Left Upper Arm, Position: Sitting, Cuff Size: Medium Adult)   Pulse 92   Ht 5' 1\" (1.549 m)   Wt 143 lb (64.9 kg)   LMP  (LMP Unknown)   SpO2 96%   BMI 27.02 kg/m²   Constitutional:  Well developed, well nourished, no acute distress, non-toxic appearance   Eyes:     HENT:  Atraumatic, external ears normal, nose normal, oropharynx moist, no pharyngeal exudates. Neck- supple   Respiratory:  No respiratory distress, normal breath sounds   Cardiovascular:  Normal rate, normal rhythm, no murmurs   Integument:  Well hydrated   Neurologic:  No focal deficits noted       RADIOLOGY/PROCEDURES        ED COURSE & MEDICAL DECISION MAKING    Pertinent Labs & Imaging studies reviewed. (See chart for details)      FINAL IMPRESSION    1.    2.          6/28/21: Patient presents today for follow-up of atrial fibrillation. She had a visit with Dr. Vaibhav Galvan. EKG was attached and reviewed. Has sinus rhythm. She is undergoing radiation therapy for breast cancer.   And was started on Taxol. Doing well. Some ankle edema. Renal function is normal.  Add Aldactone 25 mg a day she is pain go to [place] when she comes back I will see her in the towards the end of July check a BMP at that time        5/10/21: Patient presents today for follow-up of atrial fibrillation.  She is currently being treated for breast cancer.  Had lumpectomy and is getting radiation and chemo.  Fairly happy with the results.  She is on amiodarone twice a week anticoagulated and takes Lasix every day for ankle edema.  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 Estrogen receptor negative.  She has quit smoking.  Has not received the Covid vaccine yet.  Rhythm remains stable.  Continue the Lasix, potassium, and Cardizem.  And see me in later part of June before she goes on vacation.        1/7/21: Patient presents today for follow-up of atrial fibrillation.  The dose of Cardizem has been reduced to 240.  Also on amiodarone twice a week now. Lacey Parish wants to get off it.  I told her not to do so.  In the past we did that and she went back into atrial fibrillation significant issues.  She is anticoagulated.  No bleeding.  On Lasix every day 40 mg pravastatin for dyslipidemia.  He quit smoking 5 years ago. [place] with Xarelto, and amiodarone and Cardizem 240.  See me in 6 months last TSH was normal     10/8/2020: Patient presents today for follow-up of atrial fibrillation.  Remains in a sinus rhythm we did cut down the dose of Cardizem from 240+122 just to 240 a day.  Doing well.  Continue with the Lasix and the potassium.  She wants KCl rather than a K. Dur.  She wants to cut down the amiodarone I told her that what happened to her last time and she got into recurrent atrial fibrillation. Lacey Parish is only on Monday and Friday.  We will get a flu shot today and see me in 3 months        9/17/2020: Patient presents today for follow-up of atrial fibrillation hypertension.  Doing well. Barrett Hayes gets tired.  On amiodarone twice a week.  We will reduce the dose of Cardizem the evening dose will be stopped continue with the 240.  Continue with Xarelto.  See me in 2 months probably needs 48-hour Holter     8/13/2020: Patient presents today for follow-up of atrial fibrillation.  He had EP procedure done by Dr. Cassi Briseno. Breanna Renee was told she needs an ICD.  Last echo showed preserved LV ejection fraction. Breanna Renee is doing well.  He is on diltiazem 240 in the morning or 120 at night amiodarone 200 mg twice a week Monday and Friday and pravastatin 10 mg daily.  Lasix and potassium.  Renal function is borderline.  GFR is high 50s.  Had ultrasound of the neck which showed some thyroid cysts.  She now sees .  Used to see Dr Yao Gilliam am going to see her in 2 months again. [facility] repeat an echocardiogram at that time.  No restrictions.        5/14/2020: Patient presents today for follow-up of atrial fibrillation.  Remains in sinus rhythm.  Last visit was with Stpehenie Lyons follow with Dr. Cristina Sole cardiac rehab has not called her.  Otherwise feeling well blood pressure remains controlled.  No palpitation down the amiodarone to Monday and Friday only and see me in 3 months        4/28/2020 VIRTUAL: Michelle Fuentes is a 68 y. o. female evaluated via telephone on 4/28/2020.  For follow-up of atrial fibrillation.  Was admitted to [address] [facility] shows normal ejection fraction.  60%.  Mild concentric left ventricle hypertrophy.  He did start walking yesterday with her .  Got a little winded.  I think she is in sinus rhythm.  Will reduce amiodarone to Monday Wednesday and Friday.  Increase Lasix and potassium to every day.  See me in 2 weeks.  Continue Cardizem 240 in the morning and 120 at night.  Continue Xarelto           3/23/2020: Patient presents today for follow-up of recent hospitalization at [facility] for CHF atrial fibrillation.  Was seen by multiple people.  Merna bowen which was [place].  Told her the risk of embolic event is high.  Will leave at this dose and see me in 6 months     5/20/19: Patient presents today for follow-up of atrial fibrillation. Remains in sinus rhythm. He wants to stop the Xarelto I told her not to do it at this point. Probably do a 30 day event monitor. And then decide at that point. He is retired from regular work. No chest pain angina congestive heart failure symptoms. See me in 3-6 months     11/19/18: Patient presents today for Follow-up of atrial fibrillation. Remains in sinus rhythm. Continues to work. On Cardizem and Xarelto only. No bleeding. She'll see me in 6 months. He wants to get off blood thinners. I told her not to do it right now. We will reconsider that option in 6 months.     5/14/18: Patient presents today for Follow-up of atrial fibrillation. Remains in sinus rhythm. On Xarelto. No bleeding. She is off amiodarone. Doing well. She fell down and hurt her left shoulder. Is healing up well. No syncope. See me in 6 months     11/13/17: Patient presents today for atrial fibrillation. In sinus rhythm. On Xarelto and Cardizem. No bleeding. Off amiodarone. See me in 6 months.     5/1/17: For follow-up of atrial fibrillation. Thyroid profile was normal. Amiodarone DC'd. Plan Cardizem and Xarelto. She is going to be going to [place] for road trip with her . Feels well. She is  status post cardioversion and remains in sinus rhythm. See me in 6 months.              Past Medical History:   Diagnosis Date    Abnormal echocardiogram 2020    possible PFO?  further eval suggested    Cancer [facility])     left breast    COPD (chronic obstructive pulmonary disease) ([facility] [address] 2015    Dr Amanda Dhaliwal Current use of long term anticoagulation     Cyst of neck 2012    after plastic surgery    Emphysema of lung ([facility] [address]     History of compression fracture of vertebral column 2020    T2, L1    History of humerus fracture     LEFT    History of left heart catheterization 2020    at [facility], normal LVEF with impaired relaxation, mild AV calcification and mild MV thickening, normal RV    History of sustained ventricular tachycardia 2020    had to wear a LifeVest, Dr Maryse Corral Hyperlipidemia LDL goal < 130     Intermittent atrial fibrillation [facility])     Dr Orlin Law, Dr Polo Romeo, Dr Bjorn Collins Osteopenia 2014    S/P colonoscopic polypectomy , ,     Dr Noah Weber    Smoking history     Vitamin D deficiency        Past Surgical History:   Procedure Laterality Date    ATRIAL ABLATION SURGERY      BREAST BIOPSY Left 2021    BREAST BIOPSY Left 2021    LEFT BREAST LUMPECTOMY AND  SENTINEL LYMPH NODE BIOPSY performed by Rae Red MD at [address] [facility]. infection, postpartum    COLONOSCOPY  2015    w/polypectomy Dr Kendell Mosqueda      Dr Roxy Cabot; HI RISK IND N/A 2018    COLONOSCOPY performed by Kacey Mccollum MD at [facility]       Family History   Problem Relation Age of Onset    Osteoarthritis Mother     Osteoporosis Mother     Cancer Mother         anorectal     Colon Cancer Mother        Social History     Socioeconomic History    Marital status:      Spouse name: Not on file    Number of children: 2    Years of education: Not on file    Highest education level: Not on file   Occupational History    Occupation: [employer], retired   Tobacco Use    Smoking status: Former Smoker     Packs/day: 1.00     Years: 50.00     Pack years: 50.00     Types: Cigarettes     Start date:      Quit date: 2015     Years since quittin.6    Smokeless tobacco: Never Used    Tobacco comment: pt will try smoking cessation   Vaping Use    Vaping Use: Never used   Substance and Sexual Activity    Alcohol use: No    Drug use: No    Sexual activity: Not on file   Other Topics Concern    Not on file   Social History Narrative Born in [place], one brother in [place], keeps in touch    , 2 children, one son in [place], one daughter in [place]    5 grandchildren     Lives in a house in [place] with spouse     Worked for [employer] and full time at [employer], fashion, 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      Social Determinants of Health     Financial Resource Strain: Low Risk     Difficulty of Paying Living Expenses: Not hard at all   Food Insecurity: No Food Insecurity    Worried About [address] "2,10E+07" in the Last Year: Never true    [employer] in the Last Year: Never true   Transportation Needs: No Transportation Needs    Lack of Transportation (Medical): No    Lack of Transportation (Non-Medical): No   Physical Activity:     Days of Exercise per Week:     Minutes of Exercise per Session:    Stress:     Feeling of Stress :    Social Connections:     Frequency of Communication with Friends and Family:     Frequency of Social Gatherings with Friends and Family:     Attends Rastafarian Services:     Active Member of Clubs or Organizations:     Attends Club or Organization Meetings:     Marital Status:    Intimate Partner Violence:     Fear of Current or Ex-Partner:     Emotionally Abused:     Physically Abused:     Sexually Abused:         Allergies   Allergen Reactions    Aldactone [Spironolactone]        Current Outpatient Medications   Medication Sig Dispense Refill    spironolactone (ALDACTONE) 25 MG tablet Take 25 mg by mouth Twice a Week TAKE HALF A TABLET ON TUESDAY & THURSDAY ONLY      umeclidinium-vilanterol (ANORO ELLIPTA) 62.5-25 MCG/INH AEPB inhaler Inhale 1 puff into the lungs daily 60 puff 2    amiodarone (CORDARONE) 200 MG tablet TAKE 1 TABLET BY MOUTH TWICE WEEKLY 180 tablet 3    dilTIAZem (CARDIZEM CD) 240 MG extended release capsule TAKE 1 CAPSULE BY MOUTH EVERY DAY 90 capsule 3    rivaroxaban (XARELTO) 20 MG TABS tablet TAKE 1 TABLET BY MOUTH EVERY DAY WITH BREAKFAST 90 tablet 3    pravastatin (PRAVACHOL) 20 MG tablet Take 20 mg by mouth daily      potassium chloride (KLOR-CON M) 20 MEQ TBCR extended release tablet TAKE 1 TABLET BY MOUTH EVERY DAY 90 tablet 2    furosemide (LASIX) 40 MG tablet TAKE 1 TABLET BY MOUTH EVERY DAY 90 tablet 3    Handicap Placard MISC by Does not apply route Good for 5 years. Dispense 1 1 each 0    Calcium Carb-Cholecalciferol (CALCIUM + D3 PO) Take by mouth      b complex vitamins tablet Take 1 tablet by mouth daily      magnesium oxide (MAG-OX) 400 MG tablet Take 400 mg by mouth daily       Vitamin D (CHOLECALCIFEROL) 1000 UNITS CAPS capsule Take 1,000 Units by mouth daily.  PACLitaxel (TAXOL) 100 MG/16.7ML chemo injection Infuse intravenously once (Patient not taking: Reported on 7/28/2021)       No current facility-administered medications for this visit. Review of Systems:   Review of Systems   Constitutional: Negative for activity change, appetite change, diaphoresis, fatigue and unexpected weight change. HENT: Negative for facial swelling, nosebleeds, trouble swallowing and voice change. Respiratory: Positive for shortness of breath. Negative for apnea, chest tightness and wheezing. Cardiovascular: Positive for leg swelling. Negative for chest pain and palpitations. Gastrointestinal: Negative for abdominal distention, anal bleeding, blood in stool, nausea and vomiting. Genitourinary: Negative for decreased urine volume and dysuria. Musculoskeletal: Negative for gait problem and myalgias. Skin: Negative for color change, pallor, rash and wound. Neurological: Positive for dizziness and syncope. Negative for seizures, facial asymmetry, weakness, light-headedness, numbness and headaches. Hematological: Does not bruise/bleed easily. Psychiatric/Behavioral: Negative for agitation, behavioral problems, confusion, hallucinations and suicidal ideas. The patient is not nervous/anxious.     All of sigmoid colon    Other hemorrhoids    Hx of colonic polyps    Family history of colon cancer    Osteopenia of necks of both femurs    Congestive heart failure (HCC)    Bilateral pulmonary infiltrates on CXR    Pericardial calcification    Pericardial effusion    Pleural effusion    Atelectasis, left    Abnormal mammogram of left breast    Left breast mass    Overweight (BMI 25.0-29. 9)    Malignant neoplasm of upper-inner quadrant of left breast in female, estrogen receptor negative (HCC)    Neck nodule    Hemoptysis    Eczema    Dysphonia    Hypoxia    Acute respiratory failure with hypoxia (HCC)    Radiation pneumonitis (HCC)           No orders of the defined types were placed in this encounter. No orders of the defined types were placed in this encounter. Assessment/Orders:       ICD-10-CM    1. Atrial fibrillation, unspecified type ([facility] [address]  I48.91    2. Essential hypertension  I10        No orders of the defined types were placed in this encounter. Medications Discontinued During This Encounter   Medication Reason    spironolactone (ALDACTONE) 25 MG tablet Side effects       No orders of the defined types were placed in this encounter. Plan:  Reduce Aldactone to HALF A TABLET on Tuesday & Thursday    Stay on same medications.     See me in 1 month        Electronically signed by: Jermain Toure MD  7/28/2021 2:16 PM

## 2021-08-05 NOTE — PROGRESS NOTES
Chief Complaint(s) and History of Present Illness(es)     Follow Up     Laterality: both eyes    Course: stable    Associated symptoms: Negative for eye pain, headache, floaters and   flashes    Treatments tried: eye drops    Pain scale: 0/10    Comments: Chronic angle-closure glaucoma of left eye, mild stage Blind   right eye                   Comments     Pt states no change in [place] since last visit  Pt would like a RX printed for 1 year      Noelle Aguilar COT 12:34 PM August 5, 2021                 Review of systems for the eyes was negative other than the pertinent positives/negatives listed in the HPI.      Assessment & Plan      Elver Ruelas is a 68 year old male with the following diagnoses:   1. Chronic angle-closure glaucoma of left eye, mild stage    2. Mixed type age-related cataract, both eyes    3. Blind hypertensive eye, right       History obtained via interpretor   Lost to follow up since 2019  Patient is on Latanoprost at bedtime left eye, reports good compliance  Right eye is comfortable    Vision overall stable    End-stage narrow angle glaucoma (POAG) right eye.  No light perception.  Denies pain  High risk left eye with appositional closure > 270 degrees.    S/P laser peripheral iridotomy (LPI) left eye 2016, not patent today    Discussed RBA to repeat laser peripheral iridotomy (LPI) left eye today, consent obtained  Return precautions reviewed   Continue latanoprost at bedtime left eye     Patient disposition:   Return in about 3 weeks (around 8/26/2021) for Follow Up, [place].    Osman Angelo MD  Ophthalmology PGY-4    Attending Physician Attestation:  Complete documentation of historical and exam elements from today's encounter can be found in the full encounter summary report (not reduplicated in this progress note).  I personally obtained the chief complaint(s) and history of present illness.  I confirmed and edited as necessary the review of systems, past medical/surgical history, family history,  Subjective:      Patient ID: Frankey Flies is a 65 y.o. female who presents today for:  Chief Complaint   Patient presents with    Other     Patient here with bump and beny on right ankle, sweeling, hard bump, just finished 13 week of taxol (chemo)       HPI  Patient is here with bump on the right lateral ankle. Says it is swollen and hard  with surrounding redness. It has been there for 1 week but looking more red and swollen. Reports soreness but no itching. Says she has not had any fever, chills, or flu like sx. She just finished chemo for breast cancer. She has had oncologist look at this last week and this week but no treatment. She has no ankle joint pain and normal gait. Reports no drainage and she is not aware of any injury to the area. Past Medical History:   Diagnosis Date    Abnormal echocardiogram 2020    possible PFO?  further eval suggested    Cancer [facility])     left breast    COPD (chronic obstructive pulmonary disease) ([facility] [address] 2015    Dr Ashlyn Ho Current use of long term anticoagulation     Cyst of neck 2012    after plastic surgery    Emphysema of lung ([facility] [address]     History of compression fracture of vertebral column 2020    T2, L1    History of humerus fracture     LEFT    History of left heart catheterization 03/2020    at [facility], normal LVEF with impaired relaxation, mild AV calcification and mild MV thickening, normal RV    History of sustained ventricular tachycardia 03/2020    had to wear a LifeVest, Dr Tracy Loya Hyperlipidemia LDL goal < 130     Intermittent atrial fibrillation [facility]) 2015    Dr Sherly Garcia, Dr Ishmael Yung, Dr Omar Galvez Osteopenia 2014    S/P colonoscopic polypectomy 2008, 2015, 2018    Dr Darian Salvador    Smoking history     Vitamin D deficiency      Past Surgical History:   Procedure Laterality Date    ATRIAL ABLATION SURGERY      BREAST BIOPSY Left 01/20/2021    BREAST BIOPSY Left 2/4/2021    LEFT BREAST LUMPECTOMY AND  SENTINEL LYMPH NODE BIOPSY performed by Jennifer Al MD at [address]    staph infection, postpartum    COLONOSCOPY  2015    w/polypectomy Dr Matteo Garcia      Dr Arnaud Charlton; HI RISK IND N/A 2018    COLONOSCOPY performed by Araceli Simms MD at [address] [place] Marital status:      Spouse name: Not on file    Number of children: 2    Years of education: Not on file    Highest education level: Not on file   Occupational History    Occupation: [address] [employer], [employer], retired   Tobacco Use    Smoking status: Former Smoker     Packs/day: 1.00     Years: 50.00     Pack years: 50.00     Types: Cigarettes     Start date:      Quit date: 2015     Years since quittin.5    Smokeless tobacco: Never Used    Tobacco comment: pt will try smoking cessation   Vaping Use    Vaping Use: Never used   Substance and Sexual Activity    Alcohol use: No    Drug use: No    Sexual activity: Not on file   Other Topics Concern    Not on file   Social History Narrative    Born in [address] [place] [address], one brother in [facility], keeps in touch    , 2 children, one son in [place], one daughter in [place]    5 grandchildren     Lives in a house in [address] [place] [address] with spouse     Worked for [employer] and full time at [employer], [employer], fashion, family life     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      Social Determinants of Health     Financial Resource Strain: Low Risk     Difficulty of Paying Living Expenses: Not hard at all   Food Insecurity: No Food Insecurity    Worried About [address] in the Last Year: Never true    [facility] in the Last Year: Never true   Transportation Needs:     Lack of Transportation (Medical):      Lack of Transportation (Non-Medical):    Physical Activity:     Days of Exercise per Week:     Minutes of Exercise per Session:    Stress:     Feeling of Stress :    Social Connections:     Frequency of social history, and examination findings as documented by others; and I examined the patient myself.  I personally reviewed the relevant tests, images, and reports as documented above.  I formulated and edited as necessary the assessment and plan and discussed the findings and management plan with the patient and family. Attending Physician Image/Tesing Attestation: I personally reviewed the ophthalmic test(s) associated with this encounter, agree with the interpretation(s) as documented by the resident/fellow, and have edited the corresponding report(s) as necessary.   Attending Physician Procedure Attestation: I was present for the entire procedure     . - Rosalio Aguilar MD      Communication with Friends and Family:     Frequency of Social Gatherings with Friends and Family:     Attends Christianity Services:     Active Member of Clubs or Organizations:     Attends Club or Organization Meetings:     Marital Status:    Intimate Partner Violence:     Fear of Current or Ex-Partner:     Emotionally Abused:     Physically Abused:     Sexually Abused:      Family History   Problem Relation Age of Onset    Osteoarthritis Mother     Osteoporosis Mother     Cancer Mother         anorectal     Colon Cancer Mother      No Known Allergies  Current Outpatient Medications   Medication Sig Dispense Refill    cephALEXin (KEFLEX) 500 MG capsule Take 1 capsule by mouth 3 times daily for 7 days 21 capsule 0    amiodarone (CORDARONE) 200 MG tablet TAKE 1 TABLET BY MOUTH TWICE WEEKLY 180 tablet 3    dilTIAZem (CARDIZEM CD) 240 MG extended release capsule TAKE 1 CAPSULE BY MOUTH EVERY DAY 90 capsule 3    rivaroxaban (XARELTO) 20 MG TABS tablet TAKE 1 TABLET BY MOUTH EVERY DAY WITH BREAKFAST 90 tablet 3    spironolactone (ALDACTONE) 25 MG tablet Take 1 tablet by mouth daily 30 tablet 5    umeclidinium-vilanterol (ANORO ELLIPTA) 62.5-25 MCG/INH AEPB inhaler Inhale 1 puff into the lungs daily 60 puff 2    pravastatin (PRAVACHOL) 20 MG tablet Take 20 mg by mouth daily      PACLitaxel (TAXOL) 100 MG/16.7ML chemo injection Infuse intravenously once      potassium chloride (KLOR-CON M) 20 MEQ TBCR extended release tablet TAKE 1 TABLET BY MOUTH EVERY DAY 90 tablet 2    furosemide (LASIX) 40 MG tablet TAKE 1 TABLET BY MOUTH EVERY DAY 90 tablet 3    Handicap Placard MISC by Does not apply route Good for 5 years.  Dispense 1 1 each 0    Calcium Carb-Cholecalciferol (CALCIUM + D3 PO) Take by mouth      b complex vitamins tablet Take 1 tablet by mouth daily      magnesium oxide (MAG-OX) 400 MG tablet Take 400 mg by mouth daily      Vitamin D (CHOLECALCIFEROL) 1000 UNITS CAPS capsule Take 1,000 Units by mouth daily. No current facility-administered medications for this visit. Review of Systems   Constitutional: Negative for activity change, appetite change, chills, diaphoresis, fatigue, fever and unexpected weight change. HENT: Negative for congestion, ear pain, postnasal drip, rhinorrhea, sinus pressure, sinus pain, sneezing, sore throat, trouble swallowing and voice change. Eyes: Negative for pain, discharge, redness and itching. Respiratory: Negative for cough, chest tightness, shortness of breath and wheezing. Cardiovascular: Negative for chest pain, palpitations and leg swelling. Gastrointestinal: Negative for abdominal distention, abdominal pain, diarrhea, nausea and vomiting. Musculoskeletal: Negative for arthralgias, joint swelling and myalgias. Skin: Positive for color change and wound. Neurological: Negative for dizziness, weakness, light-headedness and headaches. Objective:   /78 (Site: Right Upper Arm, Position: Sitting, Cuff Size: Large Adult)   Pulse 84   Temp 97.7 °F (36.5 °C) (Temporal)   Ht 5' 1\" (1.549 m)   Wt 139 lb (63 kg)   LMP  (LMP Unknown)   SpO2 98%   BMI 26.26 kg/m²     Physical Exam  Vitals reviewed. Constitutional:       General: She is awake. Appearance: Normal appearance. She is well-developed, well-groomed and normal weight. HENT:      Head: Normocephalic and atraumatic. Right Ear: Hearing normal.      Left Ear: Hearing normal.      Mouth/Throat:      Lips: Pink. Eyes:      General: Lids are normal.      Extraocular Movements: Extraocular movements intact. Conjunctiva/sclera: Conjunctivae normal.   Cardiovascular:      Rate and Rhythm: Normal rate and regular rhythm. Pulses: Normal pulses. Heart sounds: Normal heart sounds, S1 normal and S2 normal.   Pulmonary:      Effort: Pulmonary effort is normal.      Breath sounds: Normal breath sounds and air entry.    Musculoskeletal:         General: Normal range of motion. Cervical back: Full passive range of motion without pain and neck supple. Skin:     General: Skin is warm and dry. Capillary Refill: Capillary refill takes less than 2 seconds. Findings: Erythema, lesion and rash present. Rash is crusting. Neurological:      General: No focal deficit present. Mental Status: She is alert and oriented to person, place, and time. Mental status is at baseline. Psychiatric:         Attention and Perception: Attention and perception normal.         Mood and Affect: Mood and affect normal.         Speech: Speech normal.         Behavior: Behavior normal. Behavior is cooperative. Thought Content: Thought content normal.         Cognition and Memory: Cognition and memory normal.         Judgment: Judgment normal.         Assessment:       Diagnosis Orders   1. Cellulitis of right ankle       No results found for this visit on 07/07/21. Plan:     [address] [place] [address] Rd was seen today for other. Diagnoses and all orders for this visit:    Cellulitis of right ankle  Discussed with patient will treat with oral ANTB and may try some warm compresses to the area. Advised to take with food. Discussed administration and SE of this mediation. Advised also to see derm after she gets back from her trip to r/o other issues. Discussed to call if sx worsen or do not improve. Advised f/u with any new onset fever/chills, ect. Other orders  -     cephALEXin (KEFLEX) 500 MG capsule; Take 1 capsule by mouth 3 times daily for 7 days      No orders of the defined types were placed in this encounter. Orders Placed This Encounter   Medications    cephALEXin (KEFLEX) 500 MG capsule     Sig: Take 1 capsule by mouth 3 times daily for 7 days     Dispense:  21 capsule     Refill:  0     There are no discontinued medications. Return for if symptoms do not improve in 3-5 days.         Reviewed with the patient/family: current clinical status & medications. Side effects of the medication prescribed today, as well as treatment plan/rationale and result expectations have been discussed with the patient/family who expresses understanding. Patient will be discharged home in stable condition. Follow up with PCP to evaluate treatment results or return if symptoms worsen or fail to improve. Discussed signs and symptoms which require immediate follow-up in ED/call to 911. Understanding verbalized. I have reviewed the patient's medical history in detail and updated the computerized patient record.     Shadi Valenzuela, APRN - CNP

## 2021-08-10 ENCOUNTER — HOSPITAL ENCOUNTER (OUTPATIENT)
Dept: ULTRASOUND IMAGING | Age: 71
Discharge: HOME | End: 2021-08-12
Payer: COMMERCIAL

## 2021-08-10 DIAGNOSIS — N28.1 RENAL CYST: ICD-10-CM

## 2021-08-10 PROCEDURE — 76775 US EXAM ABDO BACK WALL LIM: CPT

## 2021-08-16 ENCOUNTER — HOSPITAL ENCOUNTER (OUTPATIENT)
Dept: ULTRASOUND IMAGING | Age: 71
Discharge: HOME | End: 2021-08-18
Payer: COMMERCIAL

## 2021-08-16 ENCOUNTER — HOSPITAL ENCOUNTER (OUTPATIENT)
Dept: WOMENS IMAGING | Age: 71
Discharge: HOME | End: 2021-08-18
Payer: COMMERCIAL

## 2021-08-16 DIAGNOSIS — Z78.0 POST-MENOPAUSE: ICD-10-CM

## 2021-08-16 PROCEDURE — 77080 DXA BONE DENSITY AXIAL: CPT

## 2021-08-16 PROCEDURE — 76536 US EXAM OF HEAD AND NECK: CPT

## 2021-08-19 ENCOUNTER — HOSPITAL ENCOUNTER (OUTPATIENT)
Dept: WOMENS IMAGING | Age: 71
Discharge: HOME | End: 2021-08-21
Payer: COMMERCIAL

## 2021-08-19 VITALS — BODY MASS INDEX: 27.02 KG/M2 | HEIGHT: 61 IN

## 2021-08-19 DIAGNOSIS — N64.9 BREAST DISORDER: ICD-10-CM

## 2021-08-19 DIAGNOSIS — Z17.1 MALIGNANT NEOPLASM OF UPPER-INNER QUADRANT OF LEFT BREAST IN FEMALE, ESTROGEN RECEPTOR NEGATIVE (HCC): ICD-10-CM

## 2021-08-19 DIAGNOSIS — C50.212 MALIGNANT NEOPLASM OF UPPER-INNER QUADRANT OF LEFT BREAST IN FEMALE, ESTROGEN RECEPTOR NEGATIVE (HCC): ICD-10-CM

## 2021-08-19 PROCEDURE — G0279 TOMOSYNTHESIS, MAMMO: HCPCS

## 2021-08-27 ENCOUNTER — OFFICE VISIT (OUTPATIENT)
Dept: SURGERY | Age: 71
End: 2021-08-27
Payer: COMMERCIAL

## 2021-08-27 VITALS — WEIGHT: 144.6 LBS | BODY MASS INDEX: 27.3 KG/M2 | HEIGHT: 61 IN

## 2021-08-27 DIAGNOSIS — E66.3 OVERWEIGHT (BMI 25.0-29.9): ICD-10-CM

## 2021-08-27 DIAGNOSIS — N64.9 BREAST DISORDER: ICD-10-CM

## 2021-08-27 DIAGNOSIS — C50.212 MALIGNANT NEOPLASM OF UPPER-INNER QUADRANT OF LEFT BREAST IN FEMALE, ESTROGEN RECEPTOR NEGATIVE (HCC): Primary | ICD-10-CM

## 2021-08-27 DIAGNOSIS — Z17.1 MALIGNANT NEOPLASM OF UPPER-INNER QUADRANT OF LEFT BREAST IN FEMALE, ESTROGEN RECEPTOR NEGATIVE (HCC): Primary | ICD-10-CM

## 2021-08-27 PROCEDURE — 99214 OFFICE O/P EST MOD 30 MIN: CPT | Performed by: SURGERY

## 2021-08-27 NOTE — PROGRESS NOTES
General Information   Patient Name: Ld Jacob  Patient : 1950    Patient WTQAI:[phone]  Email: [email or web address]    Health Care Providers (Including Names, Institution)   Primary Care Provider: Eli Rodriguez MD    Surgeon: Milvia Morales MD [facility]   Radiation Oncologist: Dr. Brittany Peterson Oncologist: [facility]       Treatment Summary   Diagnosis   Cancer Staging  Malignant neoplasm of upper-inner quadrant of left breast in female, estrogen receptor negative ([facility] [address]  Staging form: Breast, AJCC 8th Edition  - Clinical stage from 2021: Stage IB (cT1, cN0, cM0, G3, ER-, ND-, HER2-) - Signed by Milvia Morales MD on 2021       Diagnosis Year:    Treatment Completed   Surgery: [] Yes   []No Surgery Date(s) (year): 2021   Surgical procedure/findings: Left Lumpectomy and SLNB    Lymph node removal: []Axillary Dissection [x] [place] Biopsy   Radiation: Yes Body area treated: Breast End Date (year):2021   Systemic Therapy (chemotherapy, hormonal therapy, other): Yes  [] Before surgery [x] After surgery   Names of Agents Used End Dates (year)   [x]Paclitaxel     Persistent symptoms or side effects at completion of treatment:  Fatigue: Yes                                                                Menopausal symptoms: No      Numbness:  No                                                          Pain: No      Psychosocial/Depression: No                                    Other (enter type(s)): Familial Cancer Risk Assessment   Breast and or ovarian cancer in 1st or 2nd degree relatives:  No        Received Genetic counseling:  No    Genetic testing: No  Genetic testing results: Follow-up Care Plan   Your follow-up care plan is design to inform you and primary care providers regarding the recommended and required follow-up, cancer screening and routine health maintenance that is needed to maintain optimal health.    Possible late- and long-term effects that someone with this type of cancer and treatment may experience:  Weakening of the heart presenting as shortness of breath and swelling of legs (rare < 5%); and bones become weak and at risk for fracture (osteoporosis). It is important to remember that these symptoms can be due to other causes like diabetes or with normal aging. If these or any other new symptoms occur bring these to attention of your health care provider. These symptoms should be brought to the attention of your provider:   1. Anything that represents a brand new symptom;  2. Anything that represents a persistent symptom;  3. Anything you are worried about that might be related to the cancer coming back. Please continue to see your primary care provider for all general health care recommended for a woman your age such as routine immunizations, and routine non-breast cancer screening like colonoscopy or bone density exams. Consult with your health care provider about prevention and screening for bone loss using bone density tests. Schedule for Clinical Visits   Coordinating Provider When/How often   Ambrosio Palafox MD FACS Every 6 months for two years / Yearly afterwards   Medical Oncology Every 3-6 months for 5 years, yearly after that   Radiation Oncology 1 week, weekly depending on skin reaction. 4-6 weeks after treatment and then Per radiation oncology note if patient is considered high risk. Cancer Surveillance Or Other Recommended Tests   Coordinating Provider TEST How often   Ambrosio Palafox MD  Mammogram Annually   Ambrosio Palafox MD   MRI breast As indicated by provider   GYN Pap/pelvic exam As indicated by provider   Lamar Roldan MD  Colonoscopy As indicated by provider   Medical Oncology Bone Density Every 2 years if on an aromatase inhibitor or as indicated by your provider   Breast cancer survivors may experience issues with the areas listed below.  If you have any concerns in these or other areas, please speak with your doctors or nurses to find out how you can get help with them. []Anxiety or depression   []Insurance     []Sexual Functioning  [x]Emotional and mental health []Memory or concentration loss         []Stopping Smoking   [x]Fatigue    []Parenting     []Weight changes   []Fertility    []Physical functioning   []Other   []Financial advice or assistance  []School/work       A number of lifestyle/behaviors can affect your ongoing health, including the risk for the cancer coming back or developing another cancer. Discuss these recommendations with your doctor or nurse:  []Alcohol use                [x] Physical activity                    [] Other  []Diet                 [] Sun screen use      []Management of my medications              [x] Tobacco use/cessation       []Management of my other illnesses  [] Weight management (loss/gain)                              Resources you may be interested in:      www.cancer. net   Other: [facility] Survivorship book given and reviewed extensively  [employer] for [place]  [place] Program     Other comments: none   Prepared by:   Ria Faustin MD FACS                                                           Delivered on: 8/27/2021

## 2021-09-01 ENCOUNTER — TELEPHONE (OUTPATIENT)
Dept: FAMILY MEDICINE CLINIC | Age: 71
End: 2021-09-01

## 2021-09-01 NOTE — TELEPHONE ENCOUNTER
----- Message from [facility] sent at 8/27/2021  3:47 PM EDT -----  Subject: Appointment Request    Reason for Call: Routine Physical Exam    QUESTIONS  Type of Appointment? Established Patient  Reason for appointment request? No appointments available during search  Additional Information for Provider? patient is requesting to have a PAP   test done with the CNP, before 9/8, when her appointment with Dr. Ian Duron   is  ---------------------------------------------------------------------------  --------------  CALL BACK INFO  What is the best way for the office to contact you? OK to leave message on   voicemail  Preferred Call Back Phone Number? [phone]  ---------------------------------------------------------------------------  --------------  SCRIPT ANSWERS  Relationship to Patient? Self  (If the patient has Medicare as their primary insurance coverage ask this   question) Are you requesting a Medicare Annual Wellness Visit? No  (Is the patient requesting a pap smear with their physical exam?)? No  (Is the patient requesting their annual physical and does not need PAP or   AWV per above?)? Yes   Have you been diagnosed with, awaiting test results for, or told that you   are suspected of having COVID-19 (Coronavirus)? (If patient has tested   negative or was tested as a requirement for work, school, or travel and   not based on symptoms, answer no)? No  Do you currently have flu-like symptoms including fever or chills, cough,   shortness of breath, difficulty breathing, or new loss of taste or smell? No  Have you had close contact with someone with COVID-19 in the last 14 days? No  (Service Expert  click yes below to proceed with [facility] As Usual   Scheduling)?  Yes

## 2021-09-02 ENCOUNTER — OFFICE VISIT (OUTPATIENT)
Dept: CARDIOLOGY CLINIC | Age: 71
End: 2021-09-02
Payer: COMMERCIAL

## 2021-09-02 VITALS
BODY MASS INDEX: 26.62 KG/M2 | OXYGEN SATURATION: 95 % | DIASTOLIC BLOOD PRESSURE: 80 MMHG | HEART RATE: 85 BPM | SYSTOLIC BLOOD PRESSURE: 130 MMHG | WEIGHT: 141 LBS | HEIGHT: 61 IN

## 2021-09-02 DIAGNOSIS — I48.91 ATRIAL FIBRILLATION, UNSPECIFIED TYPE (HCC): Primary | ICD-10-CM

## 2021-09-02 PROCEDURE — 99214 OFFICE O/P EST MOD 30 MIN: CPT | Performed by: INTERNAL MEDICINE

## 2021-09-02 ASSESSMENT — ENCOUNTER SYMPTOMS
WHEEZING: 0
NAUSEA: 0
BLOOD IN STOOL: 0
SHORTNESS OF BREATH: 1
COUGH: 0
STRIDOR: 0
DIARRHEA: 0
CHEST TIGHTNESS: 0
VOMITING: 0

## 2021-09-02 NOTE — PROGRESS NOTES
[facility] CARDIOLOGY OFFICE FOLLOW-UP      Patient: Ld Jacob  YOB: 1950  MRN: [id number]    Chief Complaint:  Chief Complaint   Patient presents with    1 Month Follow-Up    Medication Check     did stop aldactone, lasix and potassium    Atrial Fibrillation    Other     lasix was not making her urinate         Subjective/HPI:  9/2/21: Patient presents today for follow-up of atrial fibrillation. Remains in sinus rhythm. On amiodarone twice a week on Monday and Friday. And on diltiazem 240 a day In the Morning. She Used To Be on Lasix potassium and spironolactone twice a week. She quit taking them. She claims her leg edema is better and she is does not feel as bad. Patient is somewhat noncompliant with medical instruction. She is supposed to see Dr Dyllan Duke. She still has oxygen cylinder at home that she does not use. She was advised by Dr. Iggy Shelton and Dr. Kendell Gordon not to get Covid vaccine She has received radiation and currently on Taxol. I told her if the swelling to the leg gets worse, consider using Lasix 20 mg as needed. See me in 6 months      7/28/21: Patient presents today for follow-up of atrial fibrillation. She went to [place]. She had an episode of dizziness. Is the Aldactone that is doing it. She had gone there with her  to vacation. [place] the [place]. He is in a regular rhythm. On amiodarone twice a week Lasix 40 and potassium 24 leg edema. And Cardizem. Going to cut down the Aldactone to half a tablet every other day see me in few weeks        6/28/21: Patient presents today for follow-up of atrial fibrillation. She had a visit with Dr. Sera Rodríguez. EKG was attached and reviewed. Has sinus rhythm. She is undergoing radiation therapy for breast cancer. And was started on Taxol. Doing well. Some ankle edema.   Renal function is normal.  Add Aldactone 25 mg a day she is pain go to [place] when she comes back I will see her in the towards the end of July check a BMP at that time        5/10/21: Patient presents today for follow-up of atrial fibrillation.  She is currently being treated for breast cancer.  Had lumpectomy and is getting radiation and chemo.  Fairly happy with the results.  She is on amiodarone twice a week anticoagulated and takes Lasix every day for ankle edema.  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 Estrogen receptor negative.  She has quit smoking.  Has not received the Covid vaccine yet.  Rhythm remains stable.  Continue the Lasix, potassium, and Cardizem.  And see me in later part of June before she goes on vacation.        1/7/21: Patient presents today for follow-up of atrial fibrillation.  The dose of Cardizem has been reduced to 240.  Also on amiodarone twice a week now. Joselo Tian wants to get off it.  I told her not to do so.  In the past we did that and she went back into atrial fibrillation significant issues.  She is anticoagulated.  No bleeding.  On Lasix every day 40 mg pravastatin for dyslipidemia.  He quit smoking 5 years ago. Algis Mars with Xarelto, and amiodarone and Cardizem 240.  See me in 6 months last TSH was normal     10/8/2020: Patient presents today for follow-up of atrial fibrillation.  Remains in a sinus rhythm we did cut down the dose of Cardizem from 240+122 just to 240 a day.  Doing well.  Continue with the Lasix and the potassium.  She wants KCl rather than a K. Dur.  She wants to cut down the amiodarone I told her that what happened to her last time and she got into recurrent atrial fibrillation. Joselo Tian is only on Monday and Friday.  We will get a flu shot today and see me in 3 months        9/17/2020: Patient presents today for follow-up of atrial fibrillation hypertension.  Doing well.  Occasionally gets tired.  On amiodarone twice a week.  We will reduce the dose of Cardizem the evening dose will be stopped continue with the 240.  Continue with Xarelto.  See me in 2 months probably needs 48-hour Holter     8/13/2020: Patient presents today for follow-up of atrial fibrillation.  He had EP procedure done by Dr. Bridgett Ortiz. Amalia Guevara was told she needs an ICD.  Last echo showed preserved LV ejection fraction. Amalia Guevara is doing well.  He is on diltiazem 240 in the morning or 120 at night amiodarone 200 mg twice a week Monday and Friday and pravastatin 10 mg daily.  Lasix and potassium.  Renal function is borderline.  GFR is high 50s.  Had ultrasound of the neck which showed some thyroid cysts.  She now sees .  Used to see Dr Chris Ceron am going to see her in 2 months again. [facility] repeat an echocardiogram at that time.  No restrictions.        5/14/2020: Patient presents today for follow-up of atrial fibrillation.  Remains in sinus rhythm.  Last visit was with Keena Deal follow with Dr. April Almazan cardiac rehab has not called her.  Otherwise feeling well blood pressure remains controlled.  No palpitation down the amiodarone to Monday and Friday only and see me in 3 months        4/28/2020 VIRTUAL: Michelle Fuentes is a 68 y. o. female evaluated via telephone on 4/28/2020.  For follow-up of atrial fibrillation.  Was admitted to [address] [facility] shows normal ejection fraction.  60%.   Mild concentric left ventricle hypertrophy.  He did start walking yesterday with her .  Got a little winded.  I think she is in sinus rhythm.  Will reduce amiodarone to Monday Wednesday and Friday.  Increase Lasix and potassium to every day.  See me in 2 weeks.  Continue Cardizem 240 in the morning and 120 at night.  Continue Xarelto           3/23/2020: Patient presents today for follow-up of recent hospitalization at [facility] for CHF atrial fibrillation.  Was seen by multiple people.  Merna bowen which was negative.  Was evaluated by Dr. Junior Star was read by .  Showed preserved LV ejection fraction.  Had a cardiac MRI.  Bottom line is currently on amiodarone 200 mg a day he used to take that before and quit taking it 3 4 years ago. Aubrey Kenny is somewhat noncompliant with medication.  Could not tolerate beta-blocker.  Currently she is on Cardizem 240 in the morning and 120 at night and was put on Lasix and potassium.  She is feeling better.  Supposed to see  for follow-up of event monitor. \"  Keep the medication the same.  I will see her in 2 weeks.     11/1/19: Patient presents today for follow-up of atrial fibrillation.  Remains in sinus rhythm.  Could not tolerate the Toprol dose.  Currently only on 12.5 daily.  Makes extremely tired.  Blood pressure remains well controlled. Nohemy Dick told her not to take it off no bleeding.  She has a history of being noncompliant in the past with medication.  I had long talk with her about the need to take anticoagulants or risk of stroke. She will have a echo test and lexiscan stress test.  She will see me in February 9/30/19: Patient presents today for follow-up of recent hospitalization in [place] when she was admitted for rapid atrial fibrillation.  Reportedly had cardioversion.  Was discharged home on Toprol 50 mg 1 and half tablet a day.  And Xarelto.  Please her shortness of breath with Toprol.  Reportedly had a history of COPD which I doubt.  The rest of the trip to [place] was unremarkable she will continue with the same medication.  Take Toprol 50 mg at night and 25 mg in the morning.  She will see me in 1 month     8/29/19: Patient presents today for follow-up of atrial fibrillation.  She went to [facility]. to get stem cell injection.  She had to pay $6000 cash there is an outfit in [address] [place] [address] by a registered nurse practitioner. Mathieuarik Resendez stop [address] she works hard at think she does go into atrial fibrillation weakness at the mall.  Told her the risk of embolic event is high.  Will leave at this dose and see me in 6 months     5/20/19: Patient presents today for follow-up of atrial fibrillation.  Remains in sinus rhythm. He wants to stop the Xarelto I told her not to do it at this point. Probably do a 30 day event monitor. And then decide at that point. He is retired from regular work. No chest pain angina congestive heart failure symptoms. See me in 3-6 months     11/19/18: Patient presents today for Follow-up of atrial fibrillation. Remains in sinus rhythm. Continues to work. On Cardizem and Xarelto only. No bleeding. She'll see me in 6 months. He wants to get off blood thinners. I told her not to do it right now. We will reconsider that option in 6 months.     5/14/18: Patient presents today for Follow-up of atrial fibrillation. Remains in sinus rhythm. On Xarelto. No bleeding. She is off amiodarone. Doing well. She fell down and hurt her left shoulder. Is healing up well. No syncope. See me in 6 months     11/13/17: Patient presents today for atrial fibrillation. In sinus rhythm. On Xarelto and Cardizem. No bleeding. Off amiodarone. See me in 6 months.     5/1/17: For follow-up of atrial fibrillation. Thyroid profile was normal. Amiodarone DC'd. Plan Cardizem and Xarelto. She is going to be going to [place] for road trip with her . Feels well. She is  status post cardioversion and remains in sinus rhythm. See me in 6 months.             Past Medical History:   Diagnosis Date    Abnormal echocardiogram 2020    possible PFO?  further eval suggested    Breast cancer ([address] 02/04/2021    left    Cancer [facility])     left breast    COPD (chronic obstructive pulmonary disease) ([address] 2015    Dr Mary Ellen Smith Current use of long term anticoagulation     Cyst of neck 2012    after plastic surgery    Emphysema of lung ([address]     History of compression fracture of vertebral column 2020    T2, L1    History of humerus fracture     LEFT    History of left heart catheterization 03/2020    at [facility], normal LVEF with impaired relaxation, mild AV calcification and mild MV thickening, normal RV    History of sustained ventricular tachycardia 2020    had to wear a LifeVest, Dr Sofie Verduzco History of therapeutic radiation     4 weeks    Hx antineoplastic chemo     12 weeks    Hyperlipidemia LDL goal < 130     Intermittent atrial fibrillation [facility])     Dr Nikole Zhou, Dr Keven Mauro, Dr Estiven Ellsworth Osteopenia 2014    S/P colonoscopic polypectomy , ,     Dr Allyssa Thomas    Smoking history     Vitamin D deficiency        Past Surgical History:   Procedure Laterality Date    ATRIAL ABLATION SURGERY      BREAST BIOPSY Left 2021    BREAST BIOPSY Left 2021    LEFT BREAST LUMPECTOMY AND  SENTINEL LYMPH NODE BIOPSY performed by Evelyn Cohn MD at [facility]. infection, postpartum    COLONOSCOPY  2015    w/polypectomy Dr Marv Blair      Dr Carmelo Corbin; HI RISK IND N/A 2018    COLONOSCOPY performed by Demetria Anderson MD at [facility]       Family History   Problem Relation Age of Onset    Osteoarthritis Mother     Osteoporosis Mother     Cancer Mother         anorectal     Colon Cancer Mother        Social History     Socioeconomic History    Marital status:      Spouse name: None    Number of children: 2    Years of education: None    Highest education level: None   Occupational History    Occupation: [employer], retired   Tobacco Use    Smoking status: Former Smoker     Packs/day: 1.00     Years: 50.00     Pack years: 50.00     Types: Cigarettes     Start date:      Quit date: 2015     Years since quittin.7    Smokeless tobacco: Never Used    Tobacco comment: pt will try smoking cessation   Vaping Use    Vaping Use: Never used   Substance and Sexual Activity    Alcohol use: No    Drug use: No    Sexual activity: None   Other Topics Concern    None   Social History Narrative    Born in [place], one brother in [place], keeps in touch    , 2 children, one son in [place], one daughter in [place]    5 grandchildren     Lives in a house in [place] with spouse     Worked for [employer] and full time at [employer], [employer], fashion, family life     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      Social Determinants of Health     Financial Resource Strain: Low Risk     Difficulty of Paying Living Expenses: Not hard at all   Food Insecurity: No Food Insecurity    Worried About Running Out of Food in the Last Year: Never true    Los of Food in the Last Year: Never true   Transportation Needs: No Transportation Needs    Lack of Transportation (Medical): No    Lack of Transportation (Non-Medical): No   Physical Activity:     Days of Exercise per Week:     Minutes of Exercise per Session:    Stress:     Feeling of Stress :    Social Connections:     Frequency of Communication with Friends and Family:     Frequency of Social Gatherings with Friends and Family:     Attends [employer] Services:     Active Member of Clubs or Organizations:     Attends Club or Organization Meetings:     Marital Status:    Intimate Partner Violence:     Fear of Current or Ex-Partner:     Emotionally Abused:     Physically Abused:     Sexually Abused:         Allergies   Allergen Reactions    Aldactone [Spironolactone] Other (See Comments)     When taken daily,Leg weakness, was falling down       Current Outpatient Medications   Medication Sig Dispense Refill    umeclidinium-vilanterol (ANORO ELLIPTA) 62.5-25 MCG/INH AEPB inhaler Inhale 1 puff into the lungs daily 60 puff 2    amiodarone (CORDARONE) 200 MG tablet TAKE 1 TABLET BY MOUTH TWICE WEEKLY 180 tablet 3    dilTIAZem (CARDIZEM CD) 240 MG extended release capsule TAKE 1 CAPSULE BY MOUTH EVERY DAY 90 capsule 3    rivaroxaban (XARELTO) 20 MG TABS tablet TAKE 1 TABLET BY MOUTH EVERY DAY WITH BREAKFAST 90 tablet 3    pravastatin (PRAVACHOL) 20 MG tablet Take 20 mg by mouth daily      Calcium Carb-Cholecalciferol (CALCIUM + D3 PO) Take by mouth      b complex vitamins tablet Take 1 tablet by mouth daily      magnesium oxide (MAG-OX) 400 MG tablet Take 400 mg by mouth daily       Vitamin D (CHOLECALCIFEROL) 1000 UNITS CAPS capsule Take 1,000 Units by mouth daily.  spironolactone (ALDACTONE) 25 MG tablet Take 25 mg by mouth Twice a Week TAKE HALF A TABLET ON TUESDAY & THURSDAY ONLY (Patient not taking: Reported on 9/2/2021)      potassium chloride (KLOR-CON M) 20 MEQ TBCR extended release tablet TAKE 1 TABLET BY MOUTH EVERY DAY (Patient not taking: Reported on 9/2/2021) 90 tablet 2    furosemide (LASIX) 40 MG tablet TAKE 1 TABLET BY MOUTH EVERY DAY (Patient not taking: Reported on 9/2/2021) 90 tablet 3    Handicap Placard MISC by Does not apply route Good for 5 years. Dispense 1 1 each 0     No current facility-administered medications for this visit. Review of Systems:   Review of Systems   Constitutional: Negative for diaphoresis. HENT: Negative for nosebleeds. Respiratory: Positive for shortness of breath. Negative for cough, chest tightness, wheezing and stridor. Cardiovascular: Positive for palpitations and leg swelling. Negative for chest pain. Gastrointestinal: Negative for blood in stool, diarrhea, nausea and vomiting. Musculoskeletal: Negative for myalgias. Neurological: Negative for dizziness, seizures, syncope, weakness, light-headedness, numbness and headaches. Hematological: Does not bruise/bleed easily. Psychiatric/Behavioral: Negative for suicidal ideas. The patient is not nervous/anxious. All other systems reviewed and are negative. Review of System is negative except for as mentioned above. Physical Examination:    /80 (Site: Right Upper Arm, Position: Sitting, Cuff Size: Small Adult)   Pulse 85   Ht 5' 1\" (1.549 m)   Wt 141 lb (64 kg)   LMP  (LMP Unknown)   SpO2 95%   BMI 26.64 kg/m²    Physical Exam   Constitutional: She appears healthy.    HENT:   Nose: Nose normal.   Mouth/Throat: Dentition is normal. Oropharynx is clear. Eyes: Pupils are equal, round, and reactive to light. Cardiovascular: Normal rate, regular rhythm, S1 normal, S2 normal, normal heart sounds, intact distal pulses and normal pulses. No extrasystoles are present. Exam reveals no gallop. No murmur heard. Pulmonary/Chest: Effort normal and breath sounds normal. She has no wheezes. She has no rales. She exhibits no tenderness. Abdominal: Soft. Bowel sounds are normal. She exhibits no distension and no mass. There is no splenomegaly or hepatomegaly. There is no abdominal tenderness. Musculoskeletal:         General: No tenderness, deformity or edema. Normal range of motion. Cervical back: Normal range of motion. Neurological: She is alert and oriented to person, place, and time. She has normal motor skills and normal reflexes. Gait normal.   Skin: Skin is warm and dry. Patient Active Problem List   Diagnosis    Hyperlipidemia with target LDL less than 130    Vitamin D deficiency    Atrial fibrillation ([address]    Pulmonary emphysema (HCC)    Bilateral edema of lower extremity    Other plastic surgery for unacceptable cosmetic appearance    Tobacco abuse    Fatigue    Chronic obstructive pulmonary disease (HCC)    Acute bronchitis    Hypoxemia    Shortness of breath    Fracture of humerus, proximal    Benign neoplasm of sigmoid colon    Other hemorrhoids    Hx of colonic polyps    Family history of colon cancer    Osteopenia of necks of both femurs    Congestive heart failure (HCC)    Bilateral pulmonary infiltrates on CXR    Pericardial calcification    Pericardial effusion    Pleural effusion    Atelectasis, left    Abnormal mammogram of left breast    Left breast mass    Overweight (BMI 25.0-29. 9)    Malignant neoplasm of upper-inner quadrant of left breast in female, estrogen receptor negative (HCC)    Neck nodule    Hemoptysis    Eczema    Dysphonia    Hypoxia    Acute respiratory failure with hypoxia (HCC)    Radiation pneumonitis (HCC)           No orders of the defined types were placed in this encounter. No orders of the defined types were placed in this encounter. Assessment/Orders:       ICD-10-CM    1. Atrial fibrillation, unspecified type ([facility]  I48.91        No orders of the defined types were placed in this encounter. There are no discontinued medications. No orders of the defined types were placed in this encounter. Plan:    Stay on same medications.     See me in 6 months        Electronically signed by: Faisal Aggarwal MD  9/2/2021 12:02 PM

## 2021-09-07 ENCOUNTER — OFFICE VISIT (OUTPATIENT)
Dept: FAMILY MEDICINE CLINIC | Age: 71
End: 2021-09-07
Payer: COMMERCIAL

## 2021-09-07 VITALS
HEIGHT: 61 IN | BODY MASS INDEX: 26.62 KG/M2 | HEART RATE: 70 BPM | WEIGHT: 141 LBS | OXYGEN SATURATION: 91 % | SYSTOLIC BLOOD PRESSURE: 112 MMHG | TEMPERATURE: 97.8 F | DIASTOLIC BLOOD PRESSURE: 68 MMHG | RESPIRATION RATE: 16 BRPM

## 2021-09-07 DIAGNOSIS — Z01.419 ENCOUNTER FOR WELL WOMAN EXAM WITH ROUTINE GYNECOLOGICAL EXAM: Primary | ICD-10-CM

## 2021-09-07 PROCEDURE — 99397 PER PM REEVAL EST PAT 65+ YR: CPT | Performed by: NURSE PRACTITIONER

## 2021-09-07 ASSESSMENT — ENCOUNTER SYMPTOMS
SHORTNESS OF BREATH: 0
BACK PAIN: 0
NAUSEA: 0
VOMITING: 0
ABDOMINAL PAIN: 0
EYE PAIN: 0

## 2021-09-07 NOTE — PROGRESS NOTES
Subjective:     Patient ID: Ermelinda Last is a 65 y.o. female who presentstoday for:  Chief Complaint   Patient presents with    Gynecologic Exam     Pt. is here for her yearly pap test.       HPI   Patient here for pap denies any concerns    Past Medical History:   Diagnosis Date    Abnormal echocardiogram 2020    possible PFO?  further eval suggested    Breast cancer ([facility] [address] 02/04/2021    left    Cancer [facility])     left breast    COPD (chronic obstructive pulmonary disease) ([facility] [address] 2015    Dr Roscoe Mckinley Current use of long term anticoagulation     Cyst of neck 2012    after plastic surgery    Emphysema of lung ([facility] [address]     History of compression fracture of vertebral column 2020    T2, L1    History of humerus fracture     LEFT    History of left heart catheterization 03/2020    at [facility], normal LVEF with impaired relaxation, mild AV calcification and mild MV thickening, normal RV    History of sustained ventricular tachycardia 03/2020    had to wear a LifeVest, Dr Phylicia Thompson History of therapeutic radiation 2021    4 weeks    Hx antineoplastic chemo 2021    12 weeks    Hyperlipidemia LDL goal < 130     Intermittent atrial fibrillation (HCC) 2015    Dr Joel Jason, Dr Elvin Ochoa, Dr Cameron Arita Osteopenia 2014    S/P colonoscopic polypectomy 2008, 2015, 2018    Dr Teetee Hylton    Smoking history     Vitamin D deficiency      Current Outpatient Medications on File Prior to Visit   Medication Sig Dispense Refill    umeclidinium-vilanterol (ANORO ELLIPTA) 62.5-25 MCG/INH AEPB inhaler Inhale 1 puff into the lungs daily 60 puff 2    amiodarone (CORDARONE) 200 MG tablet TAKE 1 TABLET BY MOUTH TWICE WEEKLY 180 tablet 3    dilTIAZem (CARDIZEM CD) 240 MG extended release capsule TAKE 1 CAPSULE BY MOUTH EVERY DAY 90 capsule 3    rivaroxaban (XARELTO) 20 MG TABS tablet TAKE 1 TABLET BY MOUTH EVERY DAY WITH BREAKFAST 90 tablet 3    pravastatin (PRAVACHOL) 20 MG tablet Take 20 mg by mouth daily      Handicap Placard MISC by Does not apply route Good for 5 years. Dispense 1 1 each 0    Calcium Carb-Cholecalciferol (CALCIUM + D3 PO) Take by mouth      b complex vitamins tablet Take 1 tablet by mouth daily      magnesium oxide (MAG-OX) 400 MG tablet Take 400 mg by mouth daily       Vitamin D (CHOLECALCIFEROL) 1000 UNITS CAPS capsule Take 1,000 Units by mouth daily. No current facility-administered medications on file prior to visit.      Past Surgical History:   Procedure Laterality Date    ATRIAL ABLATION SURGERY      BREAST BIOPSY Left 2021    BREAST BIOPSY Left 2021    LEFT BREAST LUMPECTOMY AND  SENTINEL LYMPH NODE BIOPSY performed by Gerhardt Hose, MD at [address]    stap infection, postpartum    COLONOSCOPY  2015    w/polypectomy Dr Berto Vera      Dr Jack Harvey; HI RISK IND N/A 2018    COLONOSCOPY performed by Antonio Hairston MD at [address]        Family History   Problem Relation Age of Onset    Osteoarthritis Mother     Osteoporosis Mother     Cancer Mother         anorectal     Colon Cancer Mother      Social History     Socioeconomic History    Marital status:      Spouse name: Not on file    Number of children: 2    Years of education: Not on file    Highest education level: Not on file   Occupational History    Occupation: [employer], retired   Tobacco Use    Smoking status: Former Smoker     Packs/day: 1.00     Years: 50.00     Pack years: 50.00     Types: Cigarettes     Start date:      Quit date: 2015     Years since quittin.7    Smokeless tobacco: Never Used    Tobacco comment: pt will try smoking cessation   Vaping Use    Vaping Use: Never used   Substance and Sexual Activity    Alcohol use: No    Drug use: No    Sexual activity: Not on file   Other Topics Concern    Not on file   Social History Narrative    Born in [facility], one brother in [place], keeps in touch , 2 children, one son in [place], one daughter in [place]    5 grandchildren     Lives in a house in [place] with spouse     Worked for [employer] and full time at [employer], fashion, family life     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      Social Determinants of Health     Financial Resource Strain: Low Risk     Difficulty of Paying Living Expenses: Not hard at all   Food Insecurity: No Food Insecurity    Worried About [address] [employer] in the Last Year: Never true    [place] of Food in the Last Year: Never true   Transportation Needs: No Transportation Needs    Lack of Transportation (Medical): No    Lack of Transportation (Non-Medical): No   Physical Activity:     Days of Exercise per Week:     Minutes of Exercise per Session:    Stress:     Feeling of Stress :    Social Connections:     Frequency of Communication with Friends and Family:     Frequency of Social Gatherings with Friends and Family:     Attends Denominational Services:     Active Member of Clubs or Organizations:     Attends Club or Organization Meetings:     Marital Status:    Intimate Partner Violence:     Fear of Current or Ex-Partner:     Emotionally Abused:     Physically Abused:     Sexually Abused: Allergies:  Aldactone [spironolactone]    Review of Systems   Constitutional: Negative for chills, fever and unexpected weight change. HENT: Negative for congestion. Eyes: Negative for pain. Respiratory: Negative for shortness of breath. Cardiovascular: Negative for chest pain, palpitations and leg swelling. Gastrointestinal: Negative for abdominal pain, nausea and vomiting. Genitourinary: Negative. Negative for decreased urine volume, hematuria, pelvic pain, vaginal bleeding, vaginal discharge and vaginal pain. Musculoskeletal: Negative for back pain. Allergic/Immunologic: Negative for immunocompromised state. Neurological: Negative for headaches.    Psychiatric/Behavioral: Negative for hallucinations. Objective:    /68 (Site: Right Upper Arm, Position: Sitting, Cuff Size: Medium Adult)   Pulse 70   Temp 97.8 °F (36.6 °C) (Oral)   Resp 16   Ht 5' 1\" (1.549 m)   Wt 141 lb (64 kg)   LMP  (LMP Unknown)   SpO2 91%   Breastfeeding No   BMI 26.64 kg/m²     Physical Exam  Constitutional:       Appearance: Normal appearance. HENT:      Head: Atraumatic. Right Ear: External ear normal.      Left Ear: External ear normal.      Mouth/Throat:      Mouth: Mucous membranes are moist.   Eyes:      Conjunctiva/sclera: Conjunctivae normal.   Cardiovascular:      Rate and Rhythm: Normal rate and regular rhythm. Heart sounds: Normal heart sounds. Pulmonary:      Effort: Pulmonary effort is normal.      Breath sounds: Normal breath sounds. Abdominal:      General: Bowel sounds are normal. There is no distension. Palpations: Abdomen is soft. Tenderness: There is no abdominal tenderness. Hernia: There is no hernia in the left inguinal area or right inguinal area. Genitourinary:     Labia:         Right: No rash, tenderness, lesion or injury. Left: No rash, tenderness, lesion or injury. Urethra: No prolapse, urethral pain, urethral swelling or urethral lesion. Vagina: Normal.      Cervix: Normal.      Uterus: Normal.       Adnexa: Right adnexa normal and left adnexa normal.      Rectum: No external hemorrhoid. Musculoskeletal:         General: No swelling or tenderness. Normal range of motion. Cervical back: Neck supple. Lymphadenopathy:      Lower Body: No right inguinal adenopathy. No left inguinal adenopathy. Skin:     General: Skin is warm and dry. Neurological:      General: No focal deficit present. Mental Status: She is alert and oriented to person, place, and time. Psychiatric:         Mood and Affect: Mood normal.         Behavior: Behavior normal.         Assessment & Plan:       Diagnosis Orders   1. Encounter for well woman exam with routine gynecological exam  PAP SMEAR     Orders Placed This Encounter   Procedures    PAP SMEAR     Patient History:    No LMP recorded (lmp unknown). Patient is postmenopausal.  OBGYN Status: Postmenopausal  Past Surgical History:  No date: ATRIAL ABLATION SURGERY  2021: BREAST BIOPSY; Left  2021: BREAST BIOPSY; Left      Comment:  LEFT BREAST LUMPECTOMY AND  SENTINEL LYMPH NODE BIOPSY                performed by Milvia Morales MD at [facility]: BREAST SURGERY      Comment:  staph infection, postpartum  2015: COLONOSCOPY      Comment:  w/polypectomy Dr Trupti Colmenares  2012: COSMETIC SURGERY      Comment:  Dr Hiwot Liao  2018: Romeo Wesley; HI RISK IND; N/A      Comment:  COLONOSCOPY performed by Mao Townsend MD at [address]      Smoking status: Former Smoker        Packs/day: 1.00        Years: 50.00        Pack years: 48        Types: Cigarettes        Start date:         Quit date: 2015        Years since quittin.7      Smokeless tobacco: Never Used      Tobacco comment: pt will try smoking cessation       Standing Status:   Future     Standing Expiration Date:   2022     Order Specific Question:   Collection Type     Answer: Thin Prep     Order Specific Question:   Prior Abnormal Pap Test     Answer:   No     Order Specific Question:   Screening or Diagnostic     Answer:   Screening     Order Specific Question:   HPV Requested? Answer:   Yes     Order Specific Question:   High Risk Patient     Answer:   N/A     No orders of the defined types were placed in this encounter. There are no discontinued medications. Return in about 1 year (around 2022). Reviewed with the patient: currentclinical status, medications, activities and diet.      Side effects, adverse effects of the medicationprescribed today, as well as treatment plan/ rationale and result expectations havebeen discussed with the patient who expresses understanding and desires to proceed. Pt instructions reviewed and given to patient.     Close follow up to evaluate treatment resultsand for coordination of care. I have reviewed the patient's medical historyin detail and updated the computerized patient record.     Tarik St, LOREN - CNP

## 2021-09-09 ENCOUNTER — OFFICE VISIT (OUTPATIENT)
Dept: FAMILY MEDICINE CLINIC | Age: 71
End: 2021-09-09
Payer: COMMERCIAL

## 2021-09-09 VITALS
RESPIRATION RATE: 15 BRPM | HEART RATE: 74 BPM | DIASTOLIC BLOOD PRESSURE: 77 MMHG | TEMPERATURE: 98.1 F | WEIGHT: 140 LBS | OXYGEN SATURATION: 97 % | SYSTOLIC BLOOD PRESSURE: 123 MMHG | HEIGHT: 61 IN | BODY MASS INDEX: 26.43 KG/M2

## 2021-09-09 DIAGNOSIS — D75.839 THROMBOCYTOSIS: ICD-10-CM

## 2021-09-09 DIAGNOSIS — N28.1 RENAL CYST: ICD-10-CM

## 2021-09-09 DIAGNOSIS — D72.829 LEUKOCYTOSIS, UNSPECIFIED TYPE: ICD-10-CM

## 2021-09-09 DIAGNOSIS — M81.0 AGE RELATED OSTEOPOROSIS, UNSPECIFIED PATHOLOGICAL FRACTURE PRESENCE: ICD-10-CM

## 2021-09-09 DIAGNOSIS — R73.9 HYPERGLYCEMIA: ICD-10-CM

## 2021-09-09 DIAGNOSIS — L60.8 CHANGE IN NAIL APPEARANCE: ICD-10-CM

## 2021-09-09 DIAGNOSIS — E04.1 THYROID NODULE: Primary | ICD-10-CM

## 2021-09-09 PROCEDURE — 99214 OFFICE O/P EST MOD 30 MIN: CPT | Performed by: INTERNAL MEDICINE

## 2021-09-09 RX ORDER — ALBUTEROL SULFATE 90 UG/1
1 AEROSOL, METERED RESPIRATORY (INHALATION) EVERY 6 HOURS PRN
Qty: 18 G | Refills: 5 | Status: SHIPPED | OUTPATIENT
Start: 2021-09-09 | End: 2021-09-09

## 2021-09-09 ASSESSMENT — ENCOUNTER SYMPTOMS
BACK PAIN: 0
SHORTNESS OF BREATH: 0
EYE PAIN: 0
ABDOMINAL PAIN: 0

## 2021-09-09 NOTE — PATIENT INSTRUCTIONS
Patient Education        Thyroid Nodules: Care Instructions  Your Care Instructions  Thyroid nodules are growths or lumps in the thyroid gland. Your thyroid is in the front of your neck. It controls how your body uses energy. You may have tests to see if the nodule is caused by cancer. Most nodules aren't cancer and don't cause problems. Many don't even need treatment. If you do have cancer, it can usually be cured. Treatment will probably include surgery. You may also get radioactive iodine treatment. If your thyroid can't make thyroid hormone after treatment, you can take a pill every day to replace the hormone. Follow-up care is a key part of your treatment and safety. Be sure to make and go to all appointments, and call your doctor if you are having problems. It's also a good idea to know your test results and keep a list of the medicines you take. How can you care for yourself at home? · Be safe with medicines. If you take thyroid hormone medicine:  ? Take it exactly as prescribed. Call your doctor if you think you are having a problem with your medicine. If you take the right amount and don't skip doses, you probably won't have side effects. ? Tell your doctor about any medicines you take. This includes over-the-counter medicines. When should you call for help? Call 911 anytime you think you may need emergency care. For example, call if:    · You lose consciousness. Call your doctor now or seek immediate medical care if:    · You have shortness of breath. Watch closely for changes in your health, and be sure to contact your doctor if:    · You have pain in your neck, jaw, or ear.     · You have problems swallowing.     · You feel weak and tired.     · You have nervousness, a fast heartbeat, hand tremors, problems sleeping, increased sweating, and weight loss.     · You do not feel better even though you are taking your medicine. Where can you learn more?   Go to [email or web address] and sign in to your Zillabyte account. Enter F215 in the [facility] box to learn more about \"Thyroid Nodules: Care Instructions. \"     If you do not have an account, please click on the \"Sign Up Now\" link. Current as of: March 31, 2020               Content Version: 12.9  © 2006-2021 [employer]. Care instructions adapted under license by [facility] ([facility]). If you have questions about a medical condition or this instruction, always ask your healthcare professional. [place] [address] any warranty or liability for your use of this information. Patient Education        Osteoporosis: Care Instructions  Overview     Osteoporosis causes bones to become thin and weak. It is much more common in women than in men. Your chances of getting this disease depend on several things. These factors include the thickness of your bones (bone density), as well as health, diet, and physical activity. This disease may be very advanced before you know you have it. Sometimes the first sign is a broken bone in the hip, spine, or wrist. Or you may have sudden pain in your middle or lower back. Follow-up care is a key part of your treatment and safety. Be sure to make and go to all appointments, and call your doctor if you are having problems. It's also a good idea to know your test results and keep a list of the medicines you take. How can you care for yourself at home? · Your doctor may prescribe a bisphosphonate, such as risedronate (Actonel) or alendronate (Fosamax), for osteoporosis. If you are taking one of these medicines by mouth:  ? Take your medicine with a full glass of water when you first get up in the morning. ? Do not lie down, eat, drink a beverage, or take any other medicine for at least 30 minutes after taking the drug. This helps prevent stomach problems.   ? Do not take your medicine late in the day if you forgot to take it in the morning. Skip it, and take the usual dose the next morning. ? If you have side effects, tell your doctor. Your doctor may prescribe another medicine. · Get enough calcium and vitamin D. The recommended daily allowances (RDAs) for adults younger than age 46 are 1,000 mg of calcium and 600 IU of vitamin D each day. Women ages 46 to 79 need 1,200 mg of calcium and 600 IU of vitamin D each day. Men ages 46 to 79 need 1,000 mg of calcium and 600 IU of vitamin D each day. Adults 71 and older need 1,200 mg of calcium and 800 IU of vitamin D each day. It's not clear if people who already have osteoporosis need more calcium and vitamin D than this. Talk to your doctor about what's right for you.  ? Eat foods rich in calcium, like yogurt, cheese, milk, and dark green vegetables. This is a good way to get the calcium you need. You can get vitamin D from eggs, fatty fish, cereal, and milk. ? Ask your doctor if you need to take a calcium plus vitamin D supplement. You may be able to get enough calcium and vitamin D through your diet. Be careful with supplements. Adults ages 23 to 48 should not get more than 2,500 mg of calcium and 4,000 IU of vitamin D each day, whether it is from supplements and/or food. Adults ages 46 and older should not get more than 2,000 mg of calcium and 4,000 IU of vitamin D each day from supplements and/or food. · Limit alcohol to 2 drinks a day for men and 1 drink a day for women. Too much alcohol can cause health problems. · Do not smoke. Smoking puts you at a much higher risk for osteoporosis. If you need help quitting, talk to your doctor about stop-smoking programs and medicines. These can increase your chances of quitting for good. · Get regular bone-building exercise. Weight-bearing and resistance exercises keep bones healthy by working the muscles and bones against gravity. Start out at an exercise level that feels right for you. Add a little at a time until you can do the following:  ?  Do 30 minutes of weight-bearing exercise on most days of the week. Walking, jogging, stair climbing, and dancing are good choices. ? Do resistance exercises with weights or elastic bands 2 to 3 days a week. · Reduce your risk of falls:  ? Wear supportive shoes with low heels and nonslip soles. ? Use a cane or walker, if you need it. Use shower chairs and bath benches. Put in handrails on stairways, around your shower or tub area, and near the toilet. ? Keep stairs, porches, and walkways well lit. Use night-lights. ? Remove throw rugs and other objects that are in the way. ? Avoid icy, wet, or slippery surfaces. ? Keep a cordless phone and a flashlight with new batteries by your bed. When should you call for help? Watch closely for changes in your health, and be sure to contact your doctor if you have any problems. Where can you learn more? Go to https://Captimo.[email or web address] org and sign in to your Qello account. Enter K100 in the [employer]. box to learn more about \"Osteoporosis: Care Instructions. \"     If you do not have an account, please click on the \"Sign Up Now\" link. Current as of: December 7, 2020               Content Version: 12.9  © 2006-2021 [employer]. Care instructions adapted under license by [facility] ([facility]). If you have questions about a medical condition or this instruction, always ask your healthcare professional. [place] [address] any warranty or liability for your use of this information. Patient Education        alendronate  Pronunciation:  pina allen  Brand:  Binosto, Fosamax  What is the most important information I should know about alendronate? You should not take alendronate if you have problems with your esophagus, or low levels of calcium in your blood. Do not take alendronate if you cannot sit upright or stand for at least 30 minutes after taking the medicine.    Alendronate can cause serious problems in the not known whether this medicine will harm an unborn baby. Tell your doctor if you are pregnant or trying to become pregnant. Stop using the medicine and tell your doctor right away if you become pregnant. It may not be safe to breastfeed while using this medicine. Ask your doctor about any risk. How should I take alendronate? Follow all directions on your prescription label and read all medication guides or instruction sheets. Use the medicine exactly as directed. Alendronate is taken either once daily or once per week. Follow your doctor's dosing instructions very carefully. Take alendronate first thing in the morning, at least 30 minutes before you eat or drink anything or take any other medicine. If you take alendronate only once per week, take it on the same day each week and always first thing in the morning. Take with a full glass (6 to 8 ounces) of plain water. Do not use coffee, tea, soda, juice, or mineral water. Do not eat or drink anything other than plain water. Measure liquid medicine carefully. Use the dosing syringe provided, or use a medicine dose-measuring device (not a kitchen spoon). Do not crush, chew, or suck on an alendronate regular tablet. Swallow it whole. Dissolve the effervescent tablet in at least 4 ounces of water (at room temperature, not hot or cold). Let the tablet dissolve for 5 minutes. Stir this mixture for 10 seconds and drink all of it right away. Add a little more water to the glass, swirl gently and drink right away. For at least 30 minutes after taking alendronate:   · Do not lie down or recline. · Do not take any other medicine including vitamins, calcium, or antacids. Pay special attention to your dental hygiene while taking alendronate. Brush and floss your teeth regularly. If you need to have any dental work (especially surgery), tell the dentist ahead of time that you are using alendronate.   Alendronate is only part of a complete program of treatment that may also include diet changes, exercise, bone mineral density testing, and taking calcium and vitamin supplements. Follow your doctor's instructions very closely. Store at room temperature away from moisture and heat. Keep unused effervescent tablets in the foil blister pack. Your doctor will determine how long to treat you with this medicine. Alendronate is often given for only 3 to 5 years. What happens if I miss a dose? Once-daily dosing: If you forget to take alendronate first thing in the morning, do not take it later in the day. Wait until the following morning and skip the missed dose. Do not take two (2) doses in one day. Once-per-week dosing: If you forget to take alendronate on your scheduled day, take it first thing in the morning on the day after you remember the missed dose. Then return to your regular weekly schedule on your chosen dose day. Do not take 2 doses in one day. What happens if I overdose? Drink a full glass of milk and seek emergency medical attention or call the Poison Help line at 1-817.934.5471. Do not make yourself vomit and do not lie down. What should I avoid while taking alendronate? Avoid taking any other medicines for at least 30 minutes after taking alendronate. This includes vitamins, calcium, and antacids. Some medicines can make it harder for your body to absorb alendronate. Avoid smoking, or try to quit. Smoking can reduce your bone mineral density, making fractures more likely. Avoid drinking large amounts of alcohol. Heavy drinking can also cause bone loss. What are the possible side effects of alendronate? Get emergency medical help if you have signs of an allergic reaction: hives; wheezing, difficulty breathing; swelling of your face, lips, tongue, or throat.   Stop using alendronate and call your doctor at once if you have:  · chest pain, new or worsening heartburn;  · difficulty or pain when swallowing;  · pain or burning under the ribs or in the back;  · severe heartburn, burning pain in your upper stomach, or coughing up blood;  · new or unusual pain in your thigh or hip;  · jaw pain, numbness, or swelling;  · severe joint, bone, or muscle pain; or  · low calcium levels --muscle spasms or contractions, numbness or tingly feeling (around your mouth, or in your fingers and toes). Common side effects may include:  · heartburn, upset stomach;  · stomach pain, nausea;  · diarrhea, constipation; or  · bone pain, muscle or joint pain. This is not a complete list of side effects and others may occur. Call your doctor for medical advice about side effects. You may report side effects to FDA at 8-983-FDA-7340. What other drugs will affect alendronate? Tell your doctor about all your other medicines, especially:  · aspirin; or  · NSAIDs (nonsteroidal anti-inflammatory drugs) --ibuprofen (Advil, Motrin), naproxen (Aleve), celecoxib, diclofenac, indomethacin, meloxicam, and others. This list is not complete. Other drugs may affect alendronate, including prescription and over-the-counter medicines, vitamins, and herbal products. Not all possible drug interactions are listed here. Where can I get more information? Your pharmacist can provide more information about alendronate. Remember, keep this and all other medicines out of the reach of children, never share your medicines with others, and use this medication only for the indication prescribed. Every effort has been made to ensure that the information provided by [address] is accurate, up-to-date, and complete, but no guarantee is made to that effect. Drug information contained herein may be time sensitive. [facility] information has been compiled for use by healthcare practitioners and consumers in the [place] and therefore [facility] does not warrant that uses outside of the [place] are appropriate, unless specifically indicated otherwise.  [facility]'s drug information does not endorse drugs, diagnose patients or recommend therapy. [facility]'s drug information is an informational resource designed to assist licensed healthcare practitioners in caring for their patients and/or to serve consumers viewing this service as a supplement to, and not a substitute for, the expertise, skill, knowledge and judgment of healthcare practitioners. The absence of a warning for a given drug or drug combination in no way should be construed to indicate that the drug or drug combination is safe, effective or appropriate for any given patient. [facility] does not assume any responsibility for any aspect of healthcare administered with the aid of information [facility] provides. The information contained herein is not intended to cover all possible uses, directions, precautions, warnings, drug interactions, allergic reactions, or adverse effects. If you have questions about the drugs you are taking, check with your doctor, nurse or pharmacist.  Copyright 1748-6571 [address]: 15.01. Revision date: 10/19/2020. Care instructions adapted under license by [facility] ([facility]). If you have questions about a medical condition or this instruction, always ask your healthcare professional. [place] [address] any warranty or liability for your use of this information.

## 2021-09-09 NOTE — PROGRESS NOTES
name: Not on file    Number of children: 2    Years of education: Not on file    Highest education level: Not on file   Occupational History    Occupation: [address], cafeteria, retired   Tobacco Use    Smoking status: Former Smoker     Packs/day: 1.00     Years: 50.00     Pack years: 50.00     Types: Cigarettes     Start date:      Quit date: 2015     Years since quittin.7    Smokeless tobacco: Never Used    Tobacco comment: pt will try smoking cessation   Vaping Use    Vaping Use: Never used   Substance and Sexual Activity    Alcohol use: No    Drug use: No    Sexual activity: Not on file   Other Topics Concern    Not on file   Social History Narrative    Born in [place], one brother in [facility], keeps in touch    , 2 children, one son in [place], one daughter in [place]    5 grandchildren     Lives in a house in [place] with spouse     Worked for [employer] and full time at [employer], [employer], fashion, family life     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      Social Determinants of Health     Financial Resource Strain: Low Risk     Difficulty of Paying Living Expenses: Not hard at all   Food Insecurity: No Food Insecurity    Worried About [address] [facility] in the Last Year: Never true   [address] in the Last Year: Never true   Transportation Needs: No Transportation Needs    Lack of Transportation (Medical): No    Lack of Transportation (Non-Medical):  No   Physical Activity:     Days of Exercise per Week:     Minutes of Exercise per Session:    Stress:     Feeling of Stress :    Social Connections:     Frequency of Communication with Friends and Family:     Frequency of Social Gatherings with Friends and Family:     Attends Worship Services:     Active Member of Clubs or Organizations:     Attends Club or Organization Meetings:     Marital Status:    Intimate Partner Violence:     Fear of Current or Ex-Partner:     Emotionally Abused:     Physically Abused:     Sexually Abused: Allergies   Allergen Reactions    Aldactone [Spironolactone] Other (See Comments)     When taken daily,Leg weakness, was falling down     Current Outpatient Medications on File Prior to Visit   Medication Sig Dispense Refill    umeclidinium-vilanterol (ANORO ELLIPTA) 62.5-25 MCG/INH AEPB inhaler Inhale 1 puff into the lungs daily 60 puff 2    amiodarone (CORDARONE) 200 MG tablet TAKE 1 TABLET BY MOUTH TWICE WEEKLY 180 tablet 3    dilTIAZem (CARDIZEM CD) 240 MG extended release capsule TAKE 1 CAPSULE BY MOUTH EVERY DAY 90 capsule 3    rivaroxaban (XARELTO) 20 MG TABS tablet TAKE 1 TABLET BY MOUTH EVERY DAY WITH BREAKFAST 90 tablet 3    pravastatin (PRAVACHOL) 20 MG tablet Take 20 mg by mouth daily      Calcium Carb-Cholecalciferol (CALCIUM + D3 PO) Take by mouth      b complex vitamins tablet Take 1 tablet by mouth daily      magnesium oxide (MAG-OX) 400 MG tablet Take 400 mg by mouth daily       Vitamin D (CHOLECALCIFEROL) 1000 UNITS CAPS capsule Take 1,000 Units by mouth daily. No current facility-administered medications on file prior to visit. I have personally reviewed the ROS, PMH, PFH, and social history     Review of Systems   Constitutional: Negative for chills and fever. HENT: Negative for congestion. Eyes: Negative for pain. Respiratory: Negative for shortness of breath. Cardiovascular: Negative for chest pain. Gastrointestinal: Negative for abdominal pain. Genitourinary: Negative for hematuria. Musculoskeletal: Negative for back pain. Allergic/Immunologic: Negative for immunocompromised state. Neurological: Negative for headaches. Psychiatric/Behavioral: Negative for hallucinations.        Objective:   /77 (Site: Right Upper Arm, Position: Sitting, Cuff Size: Large Adult)   Pulse 74   Temp 98.1 °F (36.7 °C) (Tympanic)   Resp 15   Ht 5' 1\" (1.549 m)   Wt 140 lb (63.5 kg)   LMP  (LMP Unknown)   SpO2 97% BMI 26.45 kg/m²     Physical Exam  Constitutional:       General: She is not in acute distress. Appearance: Normal appearance. She is not ill-appearing, toxic-appearing or diaphoretic. HENT:      Head: Normocephalic. Neck:      Vascular: No carotid bruit. Cardiovascular:      Rate and Rhythm: Normal rate and regular rhythm. Pulses: Normal pulses. Heart sounds: Normal heart sounds. No murmur heard. No friction rub. No gallop. Pulmonary:      Effort: Pulmonary effort is normal. No respiratory distress. Breath sounds: Normal breath sounds. No wheezing, rhonchi or rales. Abdominal:      General: Abdomen is flat. There is no distension. Palpations: Abdomen is soft. Tenderness: There is no abdominal tenderness. There is no right CVA tenderness, left CVA tenderness, guarding or rebound. Musculoskeletal:      Cervical back: Neck supple. Right lower leg: No edema. Left lower leg: No edema. Skin:     General: Skin is warm. Findings: No erythema or rash. Neurological:      Mental Status: She is alert. Psychiatric:         Mood and Affect: Mood normal.           Assessment:       Diagnosis Orders   1. Thyroid nodule  US HEAD NECK SOFT TISSUE THYROID   2. Age related osteoporosis, unspecified pathological fracture presence     3. Change in nail appearance  MELECIO - Gordon Puentes MD, Dermatology, [facility]   4. Renal cyst     5. Thrombocytosis (HCC)  CBC Auto Differential   6. Leukocytosis, unspecified type  CBC Auto Differential   7. Hyperglycemia  Hemoglobin A1C         Plan:     vc  F/u with pulmonary   Keep cardiology f/u.   Labs due in November 2021   Continue chronic medications. Us Thryoid 08/2022  Rec fosamax, she will think about it.    Keep f/u with mumtaz allan/rad/onc (from before)  Talk to heme about high platelets (Did this)   Renal us (No F/u)   Seeing GI. (tomorrow)   Ct chest due 06/2022 (seeing pulm)             Orders Placed This Encounter   Procedures  US HEAD NECK SOFT TISSUE THYROID     Standing Status:   Future     Standing Expiration Date:   3/9/2023    CBC Auto Differential     Standing Status:   Future     Standing Expiration Date:   9/9/2022    Hemoglobin A1C     Standing Status:   Future     Standing Expiration Date:   9/9/2022    MELECIO - Melanie Shah MD, Dermatology, [facility]     Referral Priority:   Routine     Referral Type:   Eval and Treat     Referral Reason:   Specialty Services Required     Referred to Provider:   Danyel Falcon DO     Requested Specialty:   Dermatology     Number of Visits Requested:   1     Orders Placed This Encounter   Medications    albuterol sulfate HFA (VENTOLIN HFA) 108 (90 Base) MCG/ACT inhaler     Sig: Inhale 1 puff into the lungs every 6 hours as needed for Wheezing or Shortness of Breath     Dispense:  18 g     Refill:  5   rec flu shot later this month. Recommend covid vaccine x2, 2021   Aff, ON, pill esophagitis, etc explained with fosamax she will get back to me. REFUSED OTHER OPTIONS for osteoporosis. RISK of fractures explained. If anything should change or worsen call ASAP, don't wait for next scheduled appointment. Return in about 3 months (around 12/9/2021) for Chronic condition management/appointment, worsening symptoms, call ASAP for appointment.       Fina Jones MD

## 2021-09-10 ENCOUNTER — TELEPHONE (OUTPATIENT)
Dept: GASTROENTEROLOGY | Age: 71
End: 2021-09-10

## 2021-09-10 ENCOUNTER — OFFICE VISIT (OUTPATIENT)
Dept: GASTROENTEROLOGY | Age: 71
End: 2021-09-10

## 2021-09-10 VITALS
WEIGHT: 128 LBS | BODY MASS INDEX: 23.55 KG/M2 | TEMPERATURE: 98.1 F | HEIGHT: 62 IN | HEART RATE: 76 BPM | DIASTOLIC BLOOD PRESSURE: 80 MMHG | OXYGEN SATURATION: 96 % | RESPIRATION RATE: 12 BRPM | SYSTOLIC BLOOD PRESSURE: 126 MMHG

## 2021-09-10 DIAGNOSIS — Z86.010 HISTORY OF COLON POLYPS: Primary | ICD-10-CM

## 2021-09-10 PROCEDURE — 99999 PR OFFICE/OUTPT VISIT,PROCEDURE ONLY: CPT | Performed by: SPECIALIST

## 2021-09-10 RX ORDER — SODIUM, POTASSIUM,MAG SULFATES 17.5-3.13G
SOLUTION, RECONSTITUTED, ORAL ORAL
Qty: 354 ML | Refills: 0 | Status: SHIPPED | OUTPATIENT
Start: 2021-09-10 | End: 2021-11-08

## 2021-09-10 ASSESSMENT — ENCOUNTER SYMPTOMS
ABDOMINAL PAIN: 0
ABDOMINAL DISTENTION: 0
EYES NEGATIVE: 1
CONSTIPATION: 0
GASTROINTESTINAL NEGATIVE: 1
BLOOD IN STOOL: 0
VOMITING: 0
RESPIRATORY NEGATIVE: 1
NAUSEA: 0
RECTAL PAIN: 0
DIARRHEA: 0
ANAL BLEEDING: 0

## 2021-09-10 NOTE — TELEPHONE ENCOUNTER
Patient wants to know if she should use her inhaler (Anoro) the day of her procedure?   Please call and advise

## 2021-09-10 NOTE — PROGRESS NOTES
Gastroenterology Clinic Visit    Ermelinda Last  [id number]  Chief Complaint   Patient presents with   Avila Suarez Doctor     pt would like to establish care        HPI: 65 y.o. female presents to the clinic with history of: Polyps. Patient is here to schedule surveillance colonoscopy. Was seeing Darryn Stephens. no history of rectal bleeding. Experience diarrhea after chemo, has history of breast cancer. No abdominal pain nausea or vomiting. Surgical history includes breast lumpectomy. Family history mother had annal cancer  Review of Systems   Constitutional: Negative. HENT: Negative. Eyes: Negative. Respiratory: Negative. Cardiovascular: Negative. No history of angina or shortness of breath, cardiac status is stable   Gastrointestinal: Negative. Negative for abdominal distention, abdominal pain, anal bleeding, blood in stool, constipation, diarrhea, nausea, rectal pain and vomiting. Occasional diarrhea   Endocrine: Negative. Genitourinary: Negative. Musculoskeletal: Negative. Skin: Negative. Allergic/Immunologic: Negative for food allergies. Neurological: Negative. Hematological: Negative. Psychiatric/Behavioral: Negative. Past Medical History:   Diagnosis Date    Abnormal echocardiogram 2020    possible PFO?  further eval suggested    Breast cancer ([facility] [address] 02/04/2021    left    Cancer [facility])     left breast    COPD (chronic obstructive pulmonary disease) ([facility] [address] 2015    Dr Roscoe Mckinley Current use of long term anticoagulation     Cyst of neck 2012    after plastic surgery    Emphysema of lung ([facility] [address] [facility]     History of compression fracture of vertebral column 2020    T2, L1    History of humerus fracture     LEFT    History of left heart catheterization 03/2020    at [address] [place] [address], normal LVEF with impaired relaxation, mild AV calcification and mild MV thickening, normal RV    History of sustained ventricular tachycardia 03/2020    had to wear a LifeVest,  Julian Kessler History of therapeutic radiation 2021    4 weeks    Hx antineoplastic chemo 2021    12 weeks    Hyperlipidemia LDL goal < 130     Intermittent atrial fibrillation (HCC) 2015    Dr Abraham Cao, Dr Ricardo Baker, Dr Klaus Thorne Osteopenia 2014    S/P colonoscopic polypectomy 2008, 2015, 2018    Dr Amor Counts    Smoking history     Vitamin D deficiency       Past Surgical History:   Procedure Laterality Date    ATRIAL ABLATION SURGERY      BREAST BIOPSY Left 01/20/2021    BREAST BIOPSY Left 2/4/2021    LEFT BREAST LUMPECTOMY AND  SENTINEL LYMPH NODE BIOPSY performed by Tomasa Bruce MD at [facility]    [facility] infection, postpartum    COLONOSCOPY  07/22/2015    w/polypectomy Dr Ansari Dec  2012    Dr Anne Marie Chun; HI RISK IND N/A 07/13/2018    COLONOSCOPY performed by Gogo Mehta MD at [facility]     Current Outpatient Medications on File Prior to Visit   Medication Sig Dispense Refill    umeclidinium-vilanterol (ANORO ELLIPTA) 62.5-25 MCG/INH AEPB inhaler Inhale 1 puff into the lungs daily 60 puff 2    amiodarone (CORDARONE) 200 MG tablet TAKE 1 TABLET BY MOUTH TWICE WEEKLY 180 tablet 3    dilTIAZem (CARDIZEM CD) 240 MG extended release capsule TAKE 1 CAPSULE BY MOUTH EVERY DAY 90 capsule 3    rivaroxaban (XARELTO) 20 MG TABS tablet TAKE 1 TABLET BY MOUTH EVERY DAY WITH BREAKFAST 90 tablet 3    pravastatin (PRAVACHOL) 20 MG tablet Take 20 mg by mouth daily      Calcium Carb-Cholecalciferol (CALCIUM + D3 PO) Take by mouth      b complex vitamins tablet Take 1 tablet by mouth daily      magnesium oxide (MAG-OX) 400 MG tablet Take 400 mg by mouth daily       Vitamin D (CHOLECALCIFEROL) 1000 UNITS CAPS capsule Take 1,000 Units by mouth daily. No current facility-administered medications on file prior to visit.      Family History   Problem Relation Age of Onset    Osteoarthritis Mother     Osteoporosis Mother     Cancer Mother anorectal     Colon Cancer Mother       Social History     Socioeconomic History    Marital status:      Spouse name: Not on file    Number of children: 2    Years of education: Not on file    Highest education level: Not on file   Occupational History    Occupation: [employer], retired   Tobacco Use    Smoking status: Former Smoker     Packs/day: 1.00     Years: 50.00     Pack years: 50.00     Types: Cigarettes     Start date:      Quit date: 2015     Years since quittin.7    Smokeless tobacco: Never Used    Tobacco comment: pt will try smoking cessation   Vaping Use    Vaping Use: Never used   Substance and Sexual Activity    Alcohol use: No    Drug use: No    Sexual activity: Not on file   Other Topics Concern    Not on file   Social History Narrative    Born in [place], one brother in [place] [facility] [place], keeps in touch    , 2 children, one son in [place], one daughter in [place]    5 grandchildren     Lives in a house in [place] with spouse     Worked for [employer] and full time at [employer], [employer], fashion, family life     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      Social Determinants of Health     Financial Resource Strain: Low Risk     Difficulty of Paying Living Expenses: Not hard at all   Food Insecurity: No Food Insecurity    Worried About [address] in the Last Year: Never true    [address] in the Last Year: Never true   Transportation Needs: No Transportation Needs    Lack of Transportation (Medical): No    Lack of Transportation (Non-Medical):  No   Physical Activity:     Days of Exercise per Week:     Minutes of Exercise per Session:    Stress:     Feeling of Stress :    Social Connections:     Frequency of Communication with Friends and Family:     Frequency of Social Gatherings with Friends and Family:     Attends [employer] Services:     Active Member of Clubs or Organizations:     Attends Club or Organization Meetings:     Marital Status:    Intimate Partner Violence:     Fear of Current or Ex-Partner:     Emotionally Abused:     Physically Abused:     Sexually Abused:        Blood pressure 126/80, pulse 76, temperature 98.1 °F (36.7 °C), temperature source Temporal, resp. rate 12, height 5' 2\" (1.575 m), weight 128 lb (58.1 kg), SpO2 96 %, not currently breastfeeding. Physical Exam  Constitutional:       Appearance: She is well-developed. HENT:      Head: Normocephalic and atraumatic. Eyes:      Conjunctiva/sclera: Conjunctivae normal.      Pupils: Pupils are equal, round, and reactive to light. Cardiovascular:      Rate and Rhythm: Normal rate. Pulmonary:      Effort: Pulmonary effort is normal.   Abdominal:      General: Bowel sounds are normal.      Palpations: Abdomen is soft. Comments: Soft nontender no palpable mass   Musculoskeletal:         General: Normal range of motion. Cervical back: Normal range of motion. Comments: Trace bilateral leg edema   Skin:     General: Skin is warm. Neurological:      Mental Status: She is alert. Laboratory, Pathology, Radiology reviewed indetail with relevant important investigations summarized below:  Lab Results   Component Value Date    WBC 6.9 09/09/2021    HGB 14.4 09/09/2021    HCT 43.4 09/09/2021    MCV 92.7 09/09/2021     09/09/2021     Lab Results   Component Value Date    ALT 17 05/19/2021    AST 18 05/19/2021    ALKPHOS 67 05/19/2021    BILITOT 0.3 05/19/2021       US HEAD NECK SOFT TISSUE THYROID    Result Date: 8/16/2021  EXAMINATION: Thyroid sonogram. CLINICAL HISTORY: Thyroid nodule. Follow-up FINDINGS: In the upper pole of the right thyroid mixed echogenic mass with cystic and solid complements but predominantly solid noted. This is approximately 1.4 x 0.6 x 0.8 cm in diameter. Sonographic characteristics are compatible with TR-3 lesion.  Given these characteristics and stability over the past year I would recommend continued follow-up. Other small hypoechoic nodules in the right and left lobes of the thyroid remain unchanged measuring no more than 2 to 3 mm in size. Stable hypoechoic 2 mm lesion at the right side of the isthmus, small 3 mm hypoechoic well-circumscribed lesion in the superior pole of the right thyroid, stable 4 mm x 5 mm hypoechoic lesion in the posterior mid left thyroid. Right thyroid is 5.38 x 1.83 x 1.73 cm. Left thyroid is 3.21 x 1.80 x 1 point for 4 cm. STABLE THYROID NODULES. LARGEST NODULE 1.4 CM IN THE SUPERIOR POLE OF THE RIGHT THYROID LOBE CONSISTENT WITH TI-3 LESION. TR3: Mildly suspicious;  1.5 cm follow up,  2.5 cm FNA           follow up: 1, 3 and 5 years     DEXA BONE DENSITY AXIAL SKELETON    Result Date: 8/17/2021  FOR CHARGE PURPOSES ONLY REPORT TO FOLLOW     [facility] NAVARRO DIGITAL DIAGNOSTIC UNILATERAL LEFT    Result Date: 8/19/2021  EXAMINATION: [facility] DIGITAL DIAGNOSTIC W OR WO CAD LEFT CLINICAL HISTORY:N64.9 Breast disorder ICD10 COMPARISON: January 11, 2021 and January 6, 2021. FINDINGS:3-D tomosynthesis imaging of the left breast was performed. Interval posttreatment changes in the central left breast. Otherwise no suspicious masses or microcalcifications. CAD analysis was performed and used in the interpretation. BI-RADS 3: PROBABLY BENIGN--SHORT INTERVAL FOLLOW-UP SUGGESTED. Board Certified Radiologists. Accredited by the ACR and FDA. MAMMOGRAPHY IS VERY IMPORTANT TO YOUR HEALTH. THE AMERICAN CANCER SOCIETY GUIDELINES RECOMMEND THAT WOMEN 36YEARS OF AGE AND OLDER SHOULD HAVE A MAMMOGRAM EVERY YEAR.  A REMINDER LETTER WILL BE SENT AT THE APPROPRIATE TIME.  THIS FACILITY UTILIZES A REMINDER SYSTEM TO ENSURE ALL PATIENTS RECEIVE REMINDER NOTIFICATIONS AT THE APPROPRIATE TIME BASED ON THE RECOMMENDATIONS OF THIS EXAM. THIS INCLUDES REMINDERS FOR ROUTINE  SCREENING MAMMOGRAMS, DIAGNOSTIC MAMMOGRAMS IN WHICH THE PATIENT IS ASKED TO RETURN FOR ADDITIONAL VIEWS, OR OTHER BREAST IMAGING INTERVENTIONS WHEN APPROPRIATE. THE PATIENT WILL BE PLACED IN THE APPROPRIATE REMINDER SYSTEM INCLUDING A REMINDER AT THE APPROPRIATE TIME FOR ANY PENDING ADDITIONAL VIEWS. Endoscopic investigations:     Assessmentand Plan:  65 y.o. female with history of colon polyps. Will schedule colonoscopy. Patient to hold Xarelto 2 days prior   Diagnosis Orders   1. History of colon polyps  Endoscopy, colon, diagnostic     Return in about 4 weeks (around 10/8/2021). Arnaldo Damon MD   Staff Gastroenterologist  [facility]    Please note this report has been partially produced using speech recognition software and may cause contain errors related to thatsystem including grammar, punctuation and spelling as well as words and phrases that may seem inappropriate. If there are questions or concerns please feel free to contact me to clarify.

## 2021-09-14 ENCOUNTER — OFFICE VISIT (OUTPATIENT)
Dept: PULMONOLOGY | Age: 71
End: 2021-09-14
Payer: COMMERCIAL

## 2021-09-14 VITALS
OXYGEN SATURATION: 95 % | DIASTOLIC BLOOD PRESSURE: 75 MMHG | WEIGHT: 144 LBS | BODY MASS INDEX: 26.5 KG/M2 | TEMPERATURE: 98.2 F | HEART RATE: 77 BPM | HEIGHT: 62 IN | SYSTOLIC BLOOD PRESSURE: 139 MMHG

## 2021-09-14 DIAGNOSIS — I50.9 CONGESTIVE HEART FAILURE, UNSPECIFIED HF CHRONICITY, UNSPECIFIED HEART FAILURE TYPE (HCC): ICD-10-CM

## 2021-09-14 DIAGNOSIS — J44.9 CHRONIC OBSTRUCTIVE PULMONARY DISEASE, UNSPECIFIED COPD TYPE (HCC): Primary | ICD-10-CM

## 2021-09-14 DIAGNOSIS — J70.0 RADIATION PNEUMONITIS (HCC): ICD-10-CM

## 2021-09-14 DIAGNOSIS — I48.91 ATRIAL FIBRILLATION, UNSPECIFIED TYPE (HCC): ICD-10-CM

## 2021-09-14 PROCEDURE — 99214 OFFICE O/P EST MOD 30 MIN: CPT | Performed by: INTERNAL MEDICINE

## 2021-09-14 RX ORDER — UMECLIDINIUM BROMIDE AND VILANTEROL TRIFENATATE 62.5; 25 UG/1; UG/1
1 POWDER RESPIRATORY (INHALATION) DAILY
Qty: 1 EACH | Refills: 3 | Status: SHIPPED | OUTPATIENT
Start: 2021-09-14 | End: 2022-02-28 | Stop reason: SDUPTHER

## 2021-09-14 ASSESSMENT — ENCOUNTER SYMPTOMS
EYE ITCHING: 0
WHEEZING: 0
CHEST TIGHTNESS: 0
VOICE CHANGE: 0
NAUSEA: 0
SORE THROAT: 0
SHORTNESS OF BREATH: 1
EYE DISCHARGE: 0
ABDOMINAL PAIN: 0
TROUBLE SWALLOWING: 0
COUGH: 0
RHINORRHEA: 0
VOMITING: 0
SINUS PRESSURE: 0
DIARRHEA: 0

## 2021-09-14 NOTE — PROGRESS NOTES
SURGERY      BREAST BIOPSY Left 2021    BREAST BIOPSY Left 2021    LEFT BREAST LUMPECTOMY AND  SENTINEL LYMPH NODE BIOPSY performed by Rae Red MD at [address]    [facility] infection, postpartum    COLONOSCOPY  2015    w/polypectomy Dr Kendell Mosqueda      Dr Roxy Cabot; HI RISK IND N/A 2018    COLONOSCOPY performed by Kacey Mccollum MD at [facility]     Family History   Problem Relation Age of Onset    Osteoarthritis Mother     Osteoporosis Mother     Cancer Mother         anorectal     Colon Cancer Mother      Social History     Socioeconomic History    Marital status:      Spouse name: Not on file    Number of children: 2    Years of education: Not on file    Highest education level: Not on file   Occupational History    Occupation: [employer] retired   Tobacco Use    Smoking status: Former Smoker     Packs/day: 1.00     Years: 50.00     Pack years: 50.00     Types: Cigarettes     Start date:      Quit date: 2015     Years since quittin.7    Smokeless tobacco: Never Used    Tobacco comment: pt will try smoking cessation   Vaping Use    Vaping Use: Never used   Substance and Sexual Activity    Alcohol use: No    Drug use: No    Sexual activity: Not on file   Other Topics Concern    Not on file   Social History Narrative    Born in [place] [place], [place], one brother in [facility], keeps in touch    , 2 children, one son in [place], one daughter in [place]    5 grandchildren     Lives in a house in [place], [place] with spouse     Worked for [employer] and full time at [employer], [employer], fashion, family life     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      Social Determinants of Health     Financial Resource Strain: Low Risk     Difficulty of Paying Living Expenses: Not hard at all   Food Insecurity: No Food Insecurity    Worried About [address] [employer] in the Last Year: Never true    [place] of Food in the Last Year: Never true   Transportation Needs: No Transportation Needs    Lack of Transportation (Medical): No    Lack of Transportation (Non-Medical): No   Physical Activity:     Days of Exercise per Week:     Minutes of Exercise per Session:    Stress:     Feeling of Stress :    Social Connections:     Frequency of Communication with Friends and Family:     Frequency of Social Gatherings with Friends and Family:     Attends Islam Services:     Active Member of Clubs or Organizations:     Attends Club or Organization Meetings:     Marital Status:    Intimate Partner Violence:     Fear of Current or Ex-Partner:     Emotionally Abused:     Physically Abused:     Sexually Abused:          Review of Systems   Constitutional: Negative for chills, diaphoresis, fatigue and fever. HENT: Negative for congestion, mouth sores, nosebleeds, postnasal drip, rhinorrhea, sinus pressure, sneezing, sore throat, trouble swallowing and voice change. Eyes: Negative for discharge, itching and visual disturbance. Respiratory: Positive for shortness of breath. Negative for cough, chest tightness and wheezing. Cardiovascular: Positive for leg swelling. Negative for chest pain and palpitations. Gastrointestinal: Negative for abdominal pain, diarrhea, nausea and vomiting. Genitourinary: Negative for difficulty urinating and hematuria. Musculoskeletal: Negative for arthralgias, joint swelling and myalgias. Skin: Negative for rash. Allergic/Immunologic: Negative for environmental allergies and food allergies. Neurological: Negative for dizziness, tremors, weakness and headaches.    Psychiatric/Behavioral: Negative for behavioral problems and sleep disturbance.         :     Vitals:    09/14/21 0841   BP: 139/75   Pulse: 77   Temp: 98.2 °F (36.8 °C)   SpO2: 95%   Weight: 144 lb (65.3 kg)   Height: 5' 2\" (1.575 m)     Wt Readings from Last 3 Encounters:   09/14/21 144 lb (65.3 kg)   09/10/21 128 lb (58.1 kg)   09/09/21 140 lb (63.5 kg)         Physical Exam  Constitutional:       General: She is not in acute distress. Appearance: She is well-developed. She is not diaphoretic. HENT:      Head: Normocephalic and atraumatic. Nose: Nose normal.   Eyes:      Pupils: Pupils are equal, round, and reactive to light. Neck:      Thyroid: No thyromegaly. Vascular: No JVD. Trachea: No tracheal deviation. Cardiovascular:      Rate and Rhythm: Normal rate and regular rhythm. Heart sounds: No murmur heard. No friction rub. No gallop. Pulmonary:      Effort: No respiratory distress. Breath sounds: Rales present. No wheezing. Comments: diminished Breath sound bilaterally. Chest:      Chest wall: No tenderness. Abdominal:      General: There is no distension. Tenderness: There is no abdominal tenderness. There is no rebound. Musculoskeletal:         General: Normal range of motion. Right lower leg: Edema present. Left lower leg: Edema present. Lymphadenopathy:      Cervical: No cervical adenopathy. Skin:     General: Skin is warm and dry. Neurological:      Mental Status: She is alert and oriented to person, place, and time.       Coordination: Coordination normal.         Current Outpatient Medications   Medication Sig Dispense Refill    umeclidinium-vilanterol (ANORO ELLIPTA) 62.5-25 MCG/INH AEPB inhaler Inhale 1 puff into the lungs daily 1 each 3    Na Sulfate-K Sulfate-Mg Sulf 17.5-3.13-1.6 GM/177ML SOLN As directed 354 mL 0    amiodarone (CORDARONE) 200 MG tablet TAKE 1 TABLET BY MOUTH TWICE WEEKLY 180 tablet 3    dilTIAZem (CARDIZEM CD) 240 MG extended release capsule TAKE 1 CAPSULE BY MOUTH EVERY DAY 90 capsule 3    rivaroxaban (XARELTO) 20 MG TABS tablet TAKE 1 TABLET BY MOUTH EVERY DAY WITH BREAKFAST 90 tablet 3    pravastatin (PRAVACHOL) 20 MG tablet Take 20 mg by mouth daily      Calcium Carb-Cholecalciferol (CALCIUM + D3 PO) Take 1,200 ampules by mouth       b complex vitamins tablet Take 1 tablet by mouth daily      magnesium oxide (MAG-OX) 400 MG tablet Take 400 mg by mouth daily       Vitamin D (CHOLECALCIFEROL) 1000 UNITS CAPS capsule Take 1,000 Units by mouth daily. No current facility-administered medications for this visit. Results for orders placed in visit on 06/09/21    XR CHEST (2 VW)    Narrative  DIAGNOSIS:Z92.3 Hx of radiation therapy ICD10    COMPARISON: May 19, 2021 and January 28, 2021    TECHNIQUE: PA and lateral chest    FINDINGS: Ill-defined opacity is again seen in the left mid lung field in the perihilar region. The lungs are also hyperinflated. Flattening of the diaphragm and increased AP diameter is seen. The lungs contain no pleural effusion or pneumothorax. The  cardiac silhouette is not enlarged. There is loss of anterior superior endplate height of A84-Y1 which is unchanged. Impression  The opacity seen in the left mid lung field may be related to patient's history of radiation therapy. Also findings are suggestive of COPD. Results for orders placed during the hospital encounter of 01/28/21    XR CHEST (2 VW)    Narrative  EXAMINATION: XR CHEST (2 VW)    CLINICAL HISTORY: J44.9 Chronic obstructive pulmonary disease, unspecified COPD type ([facility] [address] ICD10    COMPARISONS: May 11, 2020    FINDINGS:    Two views of the chest are submitted. The cardiac silhouette is of normal size configuration. Pulmonary vascular attenuated. Widening of the AP diameter the chest.  Right sided trachea. No focal infiltrates. No effusions. No Pneumothoraces. Impression  NO ACUTE ACTIVE CARDIOPULMONARY PROCESS.   RADIOGRAPHIC FINDINGS OF COPD      Results for orders placed during the hospital encounter of 05/11/20    XR CHEST STANDARD (2 VW)    Narrative  EXAMINATION: XR CHEST (2 VW)    CLINICAL HISTORY: SHORTNESS OF BREATH    COMPARISONS: CT CHEST, MARCH 5, 2020, CHEST RADIOGRAPH, SEPTEMBER 25, 2019    FINDINGS: Osseous structures intact. Cardiopericardial silhouette normal. Aorta calcified. Pulmonary vasculature normal. Ill-defined area increased opacity left lung base posteriorly. Diaphragms flattened. Impression  EMPHYSEMA. LEFT LOWER LUNG SUBSEGMENTAL ATELECTASIS/PNEUMONIA.  ]  Results for orders placed during the hospital encounter of 05/19/21    XR CHEST PORTABLE    Narrative  EXAMINATION: XR CHEST PORTABLE    CLINICAL HISTORY: SHORTNESS OF BREATH    COMPARISONS: CT CHEST, APRIL 26, 2021, CHEST RADIOGRAPH, JANUARY 28, 2021    FINDINGS: Remote right sixth rib fracture. Cardiopericardial silhouette normal. Aorta calcified. Ill-defined area of increased opacity found bilaterally, left mid and left lower lung. Increased opacity also demonstrated right lung base. Impression  BILATERAL ATELECTASIS/PNEUMONIA AS DISCUSSED.  ]  No results found for this or any previous visit.  ]  Results for orders placed during the hospital encounter of 06/24/21    CT CHEST WO CONTRAST    Narrative  EXAMINATION: CT CHEST WITHOUT CONTRAST    CLINICAL HISTORY:J70.0 Radiation pneumonitis ([facility] [address] ICD10, history left breast cancer diagnosed 2/21. History radiation treatment to left breast completed 4/2/2021. COMPARISONS: 5/19/2021    TECHNIQUE: TECHNIQUE: Contiguous axial images were obtained through the chest without contrast. Coronal and sagittal reformations were made. FINDINGS:  No evidence of aortic aneurysm. There are atherosclerotic calcifications in the aorta and coronary arteries. No significant mediastinal or hilar or axillary lymphadenopathy. No pericardial effusion. There are small bilateral pleural effusions  in the posterior costophrenic angles both lungs. On the prior exam there is reticular infiltrates have developed within the left upper lobe at level of left breast surgery.  Today's exam is reticular infiltrate has decreased in size with some residual changes in the peripheral lung and an area of the  atelectasis and mild pleural thickening. On axial image the peripheral infiltrate measures approximately 2.6 x 3.1 cm. Previously infiltrate measured approximately 6 x 10 cm on axial images. Mild reticular infiltrate anterior to the left major fissure. There are centrilobular emphysematous changes in the lungs. There is a new 3 mm nodule in the right lower lobe base, series 3 image 40. There is minimal infiltrate anteriorly in the right middle lobe, series 3 image 37. There is infiltrate/atelectasis  posteriorly in the base of the right lower lobe. No pneumothorax. Upper abdomen is unremarkable. There is depression of the superior endplate of U25, unchanged. There is volume loss in this vertebra, unchanged. Impression  RADIATION PNEUMONIA WITHIN THE LEFT UPPER LOBE IS DECREASING IN SIZE WITH SOME RESIDUAL INFILTRATE. THERE IS MINIMAL ADJACENT PLEURAL THICKENING. SMALL INFILTRATE/ATELECTASIS POSTERIOR BASE RIGHT LOWER LOBE. OTHER DETAILS ABOVE. NO ADENOPATHY. SMALL BILATERAL PLEURAL EFFUSIONS.    3 MM NODULE RIGHT LOWER LOBE. T12 FRACTURE, UNCHANGED. All CT scans at this facility use dose modulation, iterative reconstruction, and/or weight based dosing when appropriate to reduce radiation dose to as low as reasonably achievable. Results for orders placed during the hospital encounter of 05/19/21    CT CHEST WO CONTRAST    Narrative  CT of the Chest without intravenous contrast medium. HISTORY:  New hypoxia. h/o radiation to L breast, completed, April 2, 2021. Receiving paclitaxel, and amiodarone. History of COPD. TECHNICAL FACTORS:    CT imaging of the chest was obtained and formatted as 5 mm contiguous axial images from the thoracic inlet through the adrenal glands. Sagittal and coronal reconstructions obtained during postprocessing. Intravenous contrast medium:  None. Comparison:  Low dose CT chest, April 26, 2021, March 5, 2020, January 4, 2019.  CT chest, August 23, 2016. FINDINGS:      Right lung: No nodules, mass, pleural effusion, pneumothorax. Mild dependent subsegmental atelectatic change. Left lung: No nodules, masses, pleural effusion, pneumothorax. Mild dependent subsegmental atelectatic change. Focal interval development of reticular change, left upper lobe, at level of postsurgical change, left breast (series 2, images 21 through 37). Lymph nodes: No hilar, mediastinal, or axillary lymph node enlargement. Chest Wall: Increased attenuation, left retroareolar, and upper inner quadrant, with calcification, and surgical clips, unchanged (series 2, images 24 through 29). Thoracic aorta: Normal in course and caliber. Cardiac Size: Normal.    Pericardial effusion: None. Upper abdomen:Limited imaging upper abdomen shows no gross anomaly. Musculoskeletal:No osteoblastic, and no osteolytic lesions. Anterior wedge compression, T12-L1, again demonstrated. Impression  Left upper lobe interstitial changes described. Given clinical history, changes may be secondary to recent radiation therapy. Other inflammatory and infectious etiologies secondary considerations. Recurrent malignancy less likely. All CT scans at this facility use dose modulation, iterative reconstruction, and/or weight based dosing when appropriate to reduce radiation dose to as low as reasonably achievable.  ]  Results for orders placed during the hospital encounter of 08/23/16    CT Chest W Contrast    Narrative  CT CHEST    REASON FOR EXAM  HEMOPTYSIS    COMPARISONS  none    FINDINGS  Multiplanar images were obtained following the IV injection  of 75 cc Isovue-370. A focal area of consolidation involving the right  lower lobe posteriorly is seen. There is a suggestion of air  bronchogram present. Mild atelectasis at the left lung base is seen. Remainder lungs are clear. No pleural effusions are seen. No definite  pulmonary nodules are identified.  Emphysematous changes are seen. No  pneumothorax is noted. Heart appears normal in size. No pericardial  effusion is seen. No mediastinal, hilar or axillary adenopathy is  noted. Bone windows demonstrate no gross osseous abnormalities. Surrounding soft tissues are unremarkable. Limited images of the upper  abdomen demonstrate no contributory findings. IMPRESSION  FOCAL AREA OF CONSOLIDATION INVOLVING THE RIGHT LOWER LOBE  IS SEEN. SHORT-TERM INTERVAL FOLLOWUP CT WITHOUT CONTRAST IN 3-4 MONTHS  IS SUGGESTED TO NOTE RESOLUTION. MILD ATELECTATIC CHANGES AT THE LEFT  LUNG BASE. NO OTHER SIGNIFICANT ABNORMALITIES ARE SEEN. All CT scans at this facility use dose modulation, iterative  reconstruction, and/or weight based dosing when appropriate to reduce  radiation dose to as low as reasonably achievable. Katrina Jules M.D. Released [place] Jules M.D. Released Date Time- 08/24/16 9957  This document has been electronically signed. ------------------------------------------------------------------------------  ]    Assessment/Plan:     1. Chronic obstructive pulmonary disease, unspecified COPD type ([facility]  She has c/o shortness of breath  with exertion. No Wheezing. No Cough with  Sputum. She has oxygen but she is not using it. - umeclidinium-vilanterol (ANORO ELLIPTA) 62.5-25 MCG/INH AEPB inhaler; Inhale 1 puff into the lungs daily  Dispense: 1 each; Refill: 3    2. Radiation pneumonitis ([facility] [address]  She is using not using O2 , she is on bronchodilator with Anoro Ellipta 1 puff daily. She said she finished radiation  in April 2nd , 2021  And develop radiation pneumonitis and treated with prednisone , she finished chemotherapy. 3. Congestive heart failure, unspecified HF chronicity, unspecified heart failure type ([address]  She said she stop taking diuretics lasix, spironolactone and KCL since she was getting dizzy and falling.   She she said she feels better after she stopped all of them. C/o  leg edema but she said it is less than before. I advised patient to discuss with Dr. Rosalba Barajas regarding diuretic therapy. 4. Atrial fibrillation, unspecified type ([address]  She is on Cardizem CD and amiodarone. She is also on Xarelto. Return in about 3 months (around 12/14/2021) for COPD.       Ammon Farah MD

## 2021-10-11 DIAGNOSIS — Z01.818 PREOP TESTING: Primary | ICD-10-CM

## 2021-10-18 ENCOUNTER — ANCILLARY PROCEDURE (OUTPATIENT)
Dept: ENDOSCOPY | Age: 71
End: 2021-10-18
Attending: SPECIALIST
Payer: COMMERCIAL

## 2021-10-18 ENCOUNTER — ANESTHESIA (OUTPATIENT)
Dept: ENDOSCOPY | Age: 71
End: 2021-10-18
Payer: COMMERCIAL

## 2021-10-18 ENCOUNTER — HOSPITAL ENCOUNTER (OUTPATIENT)
Age: 71
Setting detail: OUTPATIENT SURGERY
Discharge: HOME | End: 2021-10-18
Attending: SPECIALIST | Admitting: SPECIALIST
Payer: COMMERCIAL

## 2021-10-18 ENCOUNTER — ANESTHESIA EVENT (OUTPATIENT)
Dept: ENDOSCOPY | Age: 71
End: 2021-10-18
Payer: COMMERCIAL

## 2021-10-18 VITALS
HEIGHT: 62 IN | TEMPERATURE: 98.4 F | HEART RATE: 63 BPM | WEIGHT: 136.6 LBS | DIASTOLIC BLOOD PRESSURE: 58 MMHG | SYSTOLIC BLOOD PRESSURE: 117 MMHG | RESPIRATION RATE: 16 BRPM | OXYGEN SATURATION: 94 % | BODY MASS INDEX: 25.14 KG/M2

## 2021-10-18 VITALS
DIASTOLIC BLOOD PRESSURE: 56 MMHG | SYSTOLIC BLOOD PRESSURE: 119 MMHG | RESPIRATION RATE: 5 BRPM | OXYGEN SATURATION: 98 %

## 2021-10-18 PROCEDURE — 88305 TISSUE EXAM BY PATHOLOGIST: CPT

## 2021-10-18 PROCEDURE — 45380 COLONOSCOPY AND BIOPSY: CPT | Performed by: SPECIALIST

## 2021-10-18 PROCEDURE — 3700000000 HC ANESTHESIA ATTENDED CARE: Performed by: SPECIALIST

## 2021-10-18 PROCEDURE — 7100000011 HC PHASE II RECOVERY - ADDTL 15 MIN: Performed by: SPECIALIST

## 2021-10-18 PROCEDURE — 2580000003 HC RX 258: Performed by: NURSE ANESTHETIST, CERTIFIED REGISTERED

## 2021-10-18 PROCEDURE — 6360000002 HC RX W HCPCS: Performed by: NURSE ANESTHETIST, CERTIFIED REGISTERED

## 2021-10-18 PROCEDURE — 2580000003 HC RX 258

## 2021-10-18 PROCEDURE — 2709999900 HC NON-CHARGEABLE SUPPLY: Performed by: SPECIALIST

## 2021-10-18 PROCEDURE — 3700000001 HC ADD 15 MINUTES (ANESTHESIA): Performed by: SPECIALIST

## 2021-10-18 PROCEDURE — 2500000003 HC RX 250 WO HCPCS: Performed by: NURSE ANESTHETIST, CERTIFIED REGISTERED

## 2021-10-18 PROCEDURE — 3609027000 HC COLONOSCOPY: Performed by: SPECIALIST

## 2021-10-18 PROCEDURE — 2580000003 HC RX 258: Performed by: SPECIALIST

## 2021-10-18 PROCEDURE — 7100000010 HC PHASE II RECOVERY - FIRST 15 MIN: Performed by: SPECIALIST

## 2021-10-18 RX ORDER — PROPOFOL 10 MG/ML
INJECTION, EMULSION INTRAVENOUS PRN
Status: DISCONTINUED | OUTPATIENT
Start: 2021-10-18 | End: 2021-10-18 | Stop reason: SDUPTHER

## 2021-10-18 RX ORDER — POTASSIUM CHLORIDE 20 MEQ/1
20 TABLET, EXTENDED RELEASE ORAL DAILY PRN
COMMUNITY
End: 2021-12-27 | Stop reason: SDUPTHER

## 2021-10-18 RX ORDER — SODIUM CHLORIDE 9 MG/ML
INJECTION, SOLUTION INTRAVENOUS ONCE
Status: CANCELLED | OUTPATIENT
Start: 2021-10-18

## 2021-10-18 RX ORDER — SODIUM CHLORIDE 9 MG/ML
INJECTION, SOLUTION INTRAVENOUS CONTINUOUS PRN
Status: DISCONTINUED | OUTPATIENT
Start: 2021-10-18 | End: 2021-10-18 | Stop reason: SDUPTHER

## 2021-10-18 RX ORDER — SODIUM CHLORIDE 9 MG/ML
INJECTION, SOLUTION INTRAVENOUS
Status: COMPLETED
Start: 2021-10-18 | End: 2021-10-18

## 2021-10-18 RX ORDER — FUROSEMIDE 40 MG/1
40 TABLET ORAL PRN
COMMUNITY
End: 2022-01-25 | Stop reason: SDUPTHER

## 2021-10-18 RX ORDER — LIDOCAINE HYDROCHLORIDE 20 MG/ML
INJECTION, SOLUTION INFILTRATION; PERINEURAL PRN
Status: DISCONTINUED | OUTPATIENT
Start: 2021-10-18 | End: 2021-10-18 | Stop reason: SDUPTHER

## 2021-10-18 RX ORDER — POTASSIUM CHLORIDE 2 MEQ/ML
5 INJECTION, SOLUTION, CONCENTRATE INTRAVENOUS PRN
Status: ON HOLD | COMMUNITY
End: 2021-10-18 | Stop reason: CLARIF

## 2021-10-18 RX ORDER — MAGNESIUM HYDROXIDE 1200 MG/15ML
LIQUID ORAL PRN
Status: DISCONTINUED | OUTPATIENT
Start: 2021-10-18 | End: 2021-10-18 | Stop reason: ALTCHOICE

## 2021-10-18 RX ADMIN — PROPOFOL 200 MG: 10 INJECTION, EMULSION INTRAVENOUS at 10:10

## 2021-10-18 RX ADMIN — LIDOCAINE HYDROCHLORIDE 50 MG: 20 INJECTION, SOLUTION INFILTRATION; PERINEURAL at 10:10

## 2021-10-18 RX ADMIN — SODIUM CHLORIDE: 9 INJECTION, SOLUTION INTRAVENOUS at 10:02

## 2021-10-18 RX ADMIN — SODIUM CHLORIDE 500 ML: 9 INJECTION, SOLUTION INTRAVENOUS at 09:15

## 2021-10-18 ASSESSMENT — COPD QUESTIONNAIRES: CAT_SEVERITY: MODERATE

## 2021-10-18 ASSESSMENT — LIFESTYLE VARIABLES: SMOKING_STATUS: 0

## 2021-10-18 ASSESSMENT — ENCOUNTER SYMPTOMS: SHORTNESS OF BREATH: 1

## 2021-10-18 ASSESSMENT — PAIN - FUNCTIONAL ASSESSMENT: PAIN_FUNCTIONAL_ASSESSMENT: 0-10

## 2021-10-18 NOTE — ANESTHESIA POSTPROCEDURE EVALUATION
Department of Anesthesiology  Postprocedure Note    Patient: Rula Jerry  MRN: [id number]  YOB: 1950  Date of evaluation: 10/18/2021  Time:  10:27 AM     Procedure Summary     Date: 10/18/21 Room / Location: [facility] OR [address] [place] [address]    Anesthesia Start: 1007 Anesthesia Stop: 1026    Procedure: COLORECTAL CANCER SCREENING, HIGH RISK (N/A ) Diagnosis: (history of colon polyps Z86.010)    Surgeons: Kiersten Moreno MD Responsible Provider: LOREN Wade CRNA    Anesthesia Type: MAC ASA Status: 3          Anesthesia Type: MAC    Michael Phase I: Michael Score: 10    Michael Phase II:      Last vitals: Reviewed and per EMR flowsheets.        Anesthesia Post Evaluation    Patient location during evaluation: bedside  Patient participation: complete - patient participated  Level of consciousness: awake and awake and alert  Pain score: 0  Airway patency: patent  Nausea & Vomiting: no nausea and no vomiting  Complications: no  Cardiovascular status: blood pressure returned to baseline and hemodynamically stable  Respiratory status: acceptable  Hydration status: euvolemic

## 2021-10-18 NOTE — H&P
Patient Name: Vidal Wilson  : 1950  MRN: [id number]  DATE: 10/18/21      ENDOSCOPY  History and Physical    Procedure:    [x] Diagnostic Colonoscopy       [] Screening Colonoscopy  [] EGD      [] ERCP      [] EUS       [] Other    [x] Previous office notes/History and Physical reviewed from the patients chart. Please see EMR for further details of HPI. I have examined the patient's status immediately prior to the procedure and:      Indications/HPI:    []Abdominal Pain  []Cancer- GI/Lung  []Fhx of colon CA/polyps  []History of Polyps  [][place]s   []Melena  []Abnormal Imaging  []Dysphagia    []Persistent Pneumonia  []Anemia  []Food Impaction  [x]History of Polyps  []GI Bleed  []Pulmonary nodule/Mass  []Change in bowel habits []Heartburn/Reflux  []Rectal Bleed (BRBPR)  []Chest Pain - Non Cardiac []Heme (+) Stoo  l[]Ulcers  []Constipation  []Hemoptysis   []Varices  []Diarrhea  []Hypoxemia  []Nausea/Vomiting  []Screening   []Crohns/Colitis  []Other: altered bowel habits    Anesthesia:   [x] MAC [] Moderate Sedation   [] General   [] None     ROS: 12 pt Review of Symptoms was negative unless mentioned above    Medications:   Prior to Admission medications    Medication Sig Start Date End Date Taking?  Authorizing Provider   furosemide (LASIX) 40 MG tablet Take 40 mg by mouth as needed   Yes Historical Provider, MD   potassium chloride (KLOR-CON M) 20 MEQ extended release tablet Take 20 mEq by mouth daily as needed   Yes Historical Provider, MD   umeclidinium-vilanterol (ANORO ELLIPTA) 62.5-25 MCG/INH AEPB inhaler Inhale 1 puff into the lungs daily 21  Yes Lynn Thomas MD   amiodarone (CORDARONE) 200 MG tablet TAKE 1 TABLET BY MOUTH TWICE WEEKLY 21  Yes Ceci Calle MD   dilTIAZem (CARDIZEM CD) 240 MG extended release capsule TAKE 1 CAPSULE BY MOUTH EVERY DAY 21  Yes Ceci Calle MD   Calcium Carb-Cholecalciferol (CALCIUM + D3 PO) Take 1,200 ampules by mouth    Yes Historical Provider, MD   b complex vitamins tablet Take 1 tablet by mouth daily   Yes Historical Provider, MD   magnesium oxide (MAG-OX) 400 MG tablet Take 400 mg by mouth daily    Yes Historical Provider, MD   Vitamin D (CHOLECALCIFEROL) 1000 UNITS CAPS capsule Take 2,000 Units by mouth daily    Yes Historical Provider, MD   Na Sulfate-K Sulfate-Mg Sulf 17.5-3.13-1.6 GM/177ML SOLN As directed 9/10/21   Brooke Michel MD   rivaroxaban (XARELTO) 20 MG TABS tablet TAKE 1 TABLET BY MOUTH EVERY DAY WITH BREAKFAST 6/28/21   Estela Gimenez MD   pravastatin (PRAVACHOL) 20 MG tablet Take 20 mg by mouth daily    Historical Provider, MD       Allergies: Allergies   Allergen Reactions    Aldactone [Spironolactone] Other (See Comments)     When taken daily,Leg weakness, was falling down        History of allergic reaction to anesthesia:  No    Past Medical History:  Past Medical History:   Diagnosis Date    Abnormal echocardiogram 2020    possible PFO?  further eval suggested    Breast cancer ([facility] [facility] 02/04/2021    left    Cancer [facility])     left breast    COPD (chronic obstructive pulmonary disease) ([facility] [facility] 2015    Dr Lorrie Mckeon Current use of long term anticoagulation     Cyst of neck 2012    after plastic surgery    Emphysema of lung ([facility] [facility]     History of compression fracture of vertebral column 2020    T2, L1    History of humerus fracture     LEFT    History of left heart catheterization 03/2020    at [facility], normal LVEF with impaired relaxation, mild AV calcification and mild MV thickening, normal RV    History of sustained ventricular tachycardia 03/2020    had to wear a LifeVest, Dr Bertha Donis History of therapeutic radiation 2021    4 weeks    Hx antineoplastic chemo 2021    12 weeks    Hyperlipidemia LDL goal < 130     Intermittent atrial fibrillation [facility]) 2015    Dr Manuel Olszewski, Dr Helga Rashid, Dr Cathryn Perez Osteopenia 2014   Duncan Lopez history of skin cancer     had area excised from nose    S/P colonoscopic polypectomy 2008, 2015, 2018 Dr Reece Thayer Smoking history     Vitamin D deficiency        Past Surgical History:  Past Surgical History:   Procedure Laterality Date    ATRIAL ABLATION SURGERY      BREAST BIOPSY Left 2021    BREAST BIOPSY Left 2021    LEFT BREAST LUMPECTOMY AND  SENTINEL LYMPH NODE BIOPSY [facility]    stap infection, postpartum    COLONOSCOPY  2015    w/polypectomy Dr Jatin Evans      Dr Monica Francois; HI RISK IND N/A 2018    COLONOSCOPY performed by Ramin Shelton [facility] at [facility]       Social History:  Social History     Tobacco Use    Smoking status: Former Smoker     Packs/day: 1.00     Years: 50.00     Pack years: 50.00     Types: Cigarettes     Start date:      Quit date: 2015     Years since quittin.8    Smokeless tobacco: Never Used    Tobacco comment: pt will try smoking cessation   Vaping Use    Vaping Use: Never used   Substance Use Topics    Alcohol use: No    Drug use: No       Vital Signs:   Vitals:    10/18/21 0902   BP: (!) 162/75   Pulse: 77   Resp: 16   Temp: 98.4 °F (36.9 °C)   SpO2: 91%        Physical Exam:  Cardiac:  [x]WNL  []Comments:  Pulmonary:  [x]WNL   []Comments:   Neuro/Mental Status:  [x]WNL  []Comments:  Abdominal:  [x]WNL    []Comments:  Other:   []WNL  []Comments:    Informed Consent:  The risks and benefits of the procedure have been discussed with either the patient or if they cannot consent, their representative. Assessment:  Patient examined and appropriate for planned sedation and procedure. Plan:  Proceed with planned sedation and procedure as above.     Trudi Srivastava MD  10:07 AM

## 2021-10-18 NOTE — ANESTHESIA PRE PROCEDURE
Department of Anesthesiology  Preprocedure Note       Name:  Nati Velez   Age:  65 y.o.  :  1950                                          MRN:  [id number]         Date:  10/18/2021      Surgeon: Sabrina Guardado):  Trudi Srivastava MD    Procedure: Procedure(s):  COLORECTAL CANCER SCREENING, HIGH RISK    Medications prior to admission:   Prior to Admission medications    Medication Sig Start Date End Date Taking? Authorizing Provider   umeclidinium-vilanterol (ANORO ELLIPTA) 62.5-25 MCG/INH AEPB inhaler Inhale 1 puff into the lungs daily 21   Gabe Han MD   Na Sulfate-K Sulfate-Mg Sulf 17.5-3.13-1.6 GM/177ML SOLN As directed 9/10/21   Trudi Srivastava MD   amiodarone (CORDARONE) 200 MG tablet TAKE 1 TABLET BY MOUTH TWICE WEEKLY 21   Benny Glover MD   dilTIAZem (CARDIZEM CD) 240 MG extended release capsule TAKE 1 CAPSULE BY MOUTH EVERY DAY 21   Benny Glover MD   rivaroxaban (XARELTO) 20 MG TABS tablet TAKE 1 TABLET BY MOUTH EVERY DAY WITH BREAKFAST 21   Benny Glover MD   pravastatin (PRAVACHOL) 20 MG tablet Take 20 mg by mouth daily    Historical Provider, MD   Calcium Carb-Cholecalciferol (CALCIUM + D3 PO) Take 1,200 ampules by mouth     Historical Provider, MD   b complex vitamins tablet Take 1 tablet by mouth daily    Historical Provider, MD   magnesium oxide (MAG-OX) 400 MG tablet Take 400 mg by mouth daily     Historical Provider, MD   Vitamin D (CHOLECALCIFEROL) 1000 UNITS CAPS capsule Take 1,000 Units by mouth daily. Historical Provider, MD       Current medications:    No current facility-administered medications for this encounter. Allergies:     Allergies   Allergen Reactions    Aldactone [Spironolactone] Other (See Comments)     When taken daily,Leg weakness, was falling down       Problem List:    Patient Active Problem List   Diagnosis Code    Hyperlipidemia with target LDL less than 130 E78.5    Vitamin D deficiency E55.9    Atrial fibrillation ([facility] [address] I48.91    Pulmonary emphysema (HCC) J43.9    Bilateral edema of lower extremity R60.0    Other plastic surgery for unacceptable cosmetic appearance Z41.1    Tobacco abuse Z72.0    Fatigue R53.83    Chronic obstructive pulmonary disease (HCC) J44.9    Acute bronchitis J20.9    Hypoxemia R09.02    Shortness of breath R06.02    Fracture of humerus, proximal S42.209A    Benign neoplasm of sigmoid colon D12.5    Other hemorrhoids K64.8    Hx of colonic polyps Z86.010    Family history of colon cancer Z80.0    Osteopenia of necks of both femurs M85.851, M85.852    Congestive heart failure (HCC) I50.9    Bilateral pulmonary infiltrates on CXR R91.8    Pericardial calcification I31.8    Pericardial effusion I31.3    Pleural effusion J90    Atelectasis, left J98.11    Abnormal mammogram of left breast R92.8    Left breast mass N63.20    Overweight (BMI 25.0-29. 9) E66.3    Malignant neoplasm of upper-inner quadrant of left breast in female, estrogen receptor negative (HCC) C50.212, Z17.1    Neck nodule R22.1    Hemoptysis R04.2    Eczema L30.9    Dysphonia R49.0    Hypoxia R09.02    Acute respiratory failure with hypoxia (HCC) J96.01    Radiation pneumonitis (HCC) J70.0       Past Medical History:        Diagnosis Date    Abnormal echocardiogram 2020    possible PFO?  further eval suggested    Breast cancer ([facility] [address] 02/04/2021    left    Cancer [facility])     left breast    COPD (chronic obstructive pulmonary disease) ([facility] [address] 2015    Dr Duke Mejia Current use of long term anticoagulation     Cyst of neck 2012    after plastic surgery    Emphysema of lung ([facility] [address]     History of compression fracture of vertebral column 2020    T2, L1    History of humerus fracture     LEFT    History of left heart catheterization 03/2020    at [facility], normal LVEF with impaired relaxation, mild AV calcification and mild MV thickening, normal RV    History of sustained ventricular tachycardia 03/2020    had to wear a Estefani, Dr Edmonds Post History of therapeutic radiation     4 weeks    Hx antineoplastic chemo     12 weeks    Hyperlipidemia LDL goal < 130     Intermittent atrial fibrillation [facility])     Dr Jorge Alberto Guadarrama, Dr Wendy Villalpando, Dr Nataliya Ramon Osteopenia 2014    S/P colonoscopic polypectomy , ,     Dr Lalitha Ghosh    Smoking history     Vitamin D deficiency        Past Surgical History:        Procedure Laterality Date    ATRIAL ABLATION SURGERY      BREAST BIOPSY Left 2021    BREAST BIOPSY Left 2021    LEFT BREAST LUMPECTOMY AND  SENTINEL LYMPH NODE BIOPSY performed by Ramy Crews MD at [address] [facility]. infection, postpartum    COLONOSCOPY  2015    w/polypectomy Dr Kade Ngo      Dr Crista Cruz; HI RISK IND N/A 2018    COLONOSCOPY performed by Justus Mooney MD at [address] [place] [address] History:    Social History     Tobacco Use    Smoking status: Former Smoker     Packs/day: 1.00     Years: 50.00     Pack years: 50.00     Types: Cigarettes     Start date:      Quit date: 2015     Years since quittin.8    Smokeless tobacco: Never Used    Tobacco comment: pt will try smoking cessation   Substance Use Topics    Alcohol use: No                                Counseling given: Not Answered  Comment: pt will try smoking cessation      Vital Signs (Current): There were no vitals filed for this visit.                                            BP Readings from Last 3 Encounters:   21 139/75   09/10/21 126/80   21 123/77       NPO Status:                                                                                 BMI:   Wt Readings from Last 3 Encounters:   21 144 lb (65.3 kg)   09/10/21 128 lb (58.1 kg)   21 140 lb (63.5 kg)     There is no height or weight on file to calculate BMI.    CBC:   Lab Results   Component Value Date    WBC 6.9 2021    RBC 4.69 09/09/2021    HGB 14.4 09/09/2021    HCT 43.4 09/09/2021    MCV 92.7 09/09/2021    RDW 14.7 09/09/2021     09/09/2021       CMP:   Lab Results   Component Value Date     05/21/2021    K 4.0 05/21/2021    CL 99 05/21/2021    CO2 22 05/21/2021    BUN 18 05/21/2021    CREATININE 1.00 05/21/2021    GFRAA >60.0 05/21/2021    LABGLOM 54.4 05/21/2021    GLUCOSE 133 05/21/2021    GLUCOSE 98 06/12/2020    PROT 6.6 05/19/2021    CALCIUM 9.8 05/21/2021    BILITOT 0.3 05/19/2021    ALKPHOS 67 05/19/2021    AST 18 05/19/2021    ALT 17 05/19/2021       POC Tests: No results for input(s): POCGLU, POCNA, POCK, POCCL, POCBUN, POCHEMO, POCHCT in the last 72 hours.     Coags:   Lab Results   Component Value Date    PROTIME 14.5 06/12/2020    INR 1.2 06/12/2020    APTT 34.0 12/22/2015       HCG (If Applicable): No results found for: PREGTESTUR, PREGSERUM, HCG, HCGQUANT     ABGs:   Lab Results   Component Value Date    PHART 7.33 03/13/2020    PO2ART 82 03/13/2020    WTF5FFJ 41 03/13/2020    NXM8VSS 23.3 12/22/2015    BEART -2 12/22/2015    L0JELHAX 95 03/13/2020        Type & Screen (If Applicable):  No results found for: LABABO, LABRH    Drug/Infectious Status (If Applicable):  No results found for: HIV, HEPCAB    COVID-19 Screening (If Applicable):   Lab Results   Component Value Date    COVID19 Not Detected 10/11/2021           Anesthesia Evaluation  Patient summary reviewed and Nursing notes reviewed no history of anesthetic complications:   Airway: Mallampati: II  TM distance: >3 FB   Neck ROM: full  Mouth opening: > = 3 FB Dental: normal exam         Pulmonary:   (+) COPD: moderate,  shortness of breath: chronic,      (-) not a current smoker                           Cardiovascular:  Exercise tolerance: good (>4 METS),   (+) dysrhythmias: atrial fibrillation, CHF:,       ECG reviewed      Echocardiogram reviewed  Stress test reviewed             ROS comment: Sinus  Rhythm  - occasional PAC     # PACs = 1.  -Old anteroseptal infarct. -Diffuse ST depression   +   Nonspecific T-abnormality  -Nondiagnostic -possible digitalis effect, -consider subendocardial injury/ischemia. Normal left ventricular systolic function, no regional wall motion   abnormalities, estimated ejection fraction of 60%. Normal left ventricular size and function. Mild concentric left ventricular hypertrophy. Impaired relaxation compatible with diastolic dysfunction. ( reversed E/A   ratio)   Mild (1+) mitral regurgitation is present. No evidence of mitral valve stenosis. No evidence of aortic valve regurgitation . No evidence of aortic valve stenosis. The left atrium is Mildly dilated. Neuro/Psych:   Negative Neuro/Psych ROS              GI/Hepatic/Renal:   (+) bowel prep,           Endo/Other:    (+) blood dyscrasia: anticoagulation therapy:., .          Pt had no PAT visit       Abdominal:             Vascular: negative vascular ROS. Other Findings:             Anesthesia Plan      MAC     ASA 3       Induction: intravenous. Anesthetic plan and risks discussed with patient. Plan discussed with CRNA.                   LOREN Gray - JOSE   10/18/2021

## 2021-10-29 ENCOUNTER — OFFICE VISIT (OUTPATIENT)
Dept: GASTROENTEROLOGY | Age: 71
End: 2021-10-29
Payer: COMMERCIAL

## 2021-10-29 VITALS
HEART RATE: 74 BPM | HEIGHT: 62 IN | DIASTOLIC BLOOD PRESSURE: 62 MMHG | WEIGHT: 136 LBS | SYSTOLIC BLOOD PRESSURE: 122 MMHG | BODY MASS INDEX: 25.03 KG/M2 | OXYGEN SATURATION: 99 %

## 2021-10-29 DIAGNOSIS — Z86.010 HISTORY OF COLON POLYPS: Primary | ICD-10-CM

## 2021-10-29 PROCEDURE — 99213 OFFICE O/P EST LOW 20 MIN: CPT | Performed by: SPECIALIST

## 2021-10-29 ASSESSMENT — ENCOUNTER SYMPTOMS
ABDOMINAL DISTENTION: 0
NAUSEA: 0
RESPIRATORY NEGATIVE: 1
BLOOD IN STOOL: 0
GASTROINTESTINAL NEGATIVE: 1
EYES NEGATIVE: 1
ABDOMINAL PAIN: 0
RECTAL PAIN: 0
DIARRHEA: 0
CONSTIPATION: 0
ANAL BLEEDING: 0
VOMITING: 0

## 2021-10-29 NOTE — PROGRESS NOTES
Gastroenterology Clinic Follow up Visit    Eunice Dc  [id number]  Chief Complaint   Patient presents with    Follow-up     Patient doing well, daily BM       HPI and A/P at last visit summarized below:  Patient is here for follow-up.,  Colonoscopy showed diverticulosis and internal hemorrhoid. Random colonic biopsies were taken because of a history of diarrhea which showed no evidence of microscopic colitis. It seems diarrhea started with chemo and lately symptoms has improved, has history of breast cancer. No history of rectal bleeding    Review of Systems   Constitutional: Negative. HENT: Negative. Eyes: Negative. Respiratory: Negative. Cardiovascular: Negative. Gastrointestinal: Negative. Negative for abdominal distention, abdominal pain, anal bleeding, blood in stool, constipation, diarrhea, nausea, rectal pain and vomiting. Diarrhea is improving   Endocrine: Negative. Genitourinary: Negative. Musculoskeletal: Negative. Skin: Negative. Allergic/Immunologic: Negative for food allergies. Neurological: Negative. Hematological: Negative. Psychiatric/Behavioral: Negative. Past medical history, past surgical history, medication list, social and familyhistory reviewed    Blood pressure 122/62, pulse 74, height 5' 2\" (1.575 m), weight 136 lb (61.7 kg), SpO2 99 %, not currently breastfeeding. Physical Exam  Constitutional:       Appearance: She is well-developed. HENT:      Head: Normocephalic and atraumatic. Eyes:      Conjunctiva/sclera: Conjunctivae normal.      Pupils: Pupils are equal, round, and reactive to light. Cardiovascular:      Rate and Rhythm: Normal rate. Pulmonary:      Effort: Pulmonary effort is normal.   Abdominal:      General: Bowel sounds are normal.      Palpations: Abdomen is soft. Comments: Soft nontender no palpable mass   Musculoskeletal:         General: Normal range of motion.       Cervical back: Normal range of

## 2021-11-08 ENCOUNTER — OFFICE VISIT (OUTPATIENT)
Dept: FAMILY MEDICINE CLINIC | Age: 71
End: 2021-11-08
Payer: COMMERCIAL

## 2021-11-08 VITALS
BODY MASS INDEX: 26.5 KG/M2 | SYSTOLIC BLOOD PRESSURE: 108 MMHG | HEART RATE: 78 BPM | WEIGHT: 144 LBS | OXYGEN SATURATION: 96 % | TEMPERATURE: 98 F | HEIGHT: 62 IN | DIASTOLIC BLOOD PRESSURE: 68 MMHG

## 2021-11-08 DIAGNOSIS — E78.5 HYPERLIPIDEMIA, UNSPECIFIED HYPERLIPIDEMIA TYPE: ICD-10-CM

## 2021-11-08 DIAGNOSIS — E04.2 MULTIPLE THYROID NODULES: ICD-10-CM

## 2021-11-08 DIAGNOSIS — Z23 ENCOUNTER FOR IMMUNIZATION: ICD-10-CM

## 2021-11-08 DIAGNOSIS — R13.10 DYSPHAGIA, UNSPECIFIED TYPE: Primary | ICD-10-CM

## 2021-11-08 DIAGNOSIS — E83.52 SERUM CALCIUM ELEVATED: ICD-10-CM

## 2021-11-08 PROCEDURE — 90471 IMMUNIZATION ADMIN: CPT | Performed by: INTERNAL MEDICINE

## 2021-11-08 PROCEDURE — 90694 VACC AIIV4 NO PRSRV 0.5ML IM: CPT | Performed by: INTERNAL MEDICINE

## 2021-11-08 PROCEDURE — 99213 OFFICE O/P EST LOW 20 MIN: CPT | Performed by: INTERNAL MEDICINE

## 2021-11-08 RX ORDER — PRAVASTATIN SODIUM 20 MG
20 TABLET ORAL DAILY
Qty: 90 TABLET | Refills: 3 | Status: SHIPPED | OUTPATIENT
Start: 2021-11-08 | End: 2022-08-08 | Stop reason: CLARIF

## 2021-11-08 RX ORDER — ZOSTER VACCINE RECOMBINANT, ADJUVANTED 50 MCG/0.5
0.5 KIT INTRAMUSCULAR SEE ADMIN INSTRUCTIONS
Qty: 0.5 ML | Refills: 0 | Status: SHIPPED | OUTPATIENT
Start: 2021-11-08 | End: 2021-11-09

## 2021-11-08 ASSESSMENT — PATIENT HEALTH QUESTIONNAIRE - PHQ9
SUM OF ALL RESPONSES TO PHQ QUESTIONS 1-9: 0
2. FEELING DOWN, DEPRESSED OR HOPELESS: 0
1. LITTLE INTEREST OR PLEASURE IN DOING THINGS: 0
SUM OF ALL RESPONSES TO PHQ QUESTIONS 1-9: 0
SUM OF ALL RESPONSES TO PHQ9 QUESTIONS 1 & 2: 0
SUM OF ALL RESPONSES TO PHQ QUESTIONS 1-9: 0

## 2021-11-08 ASSESSMENT — ENCOUNTER SYMPTOMS
SHORTNESS OF BREATH: 0
ABDOMINAL PAIN: 0
EYE PAIN: 0
BACK PAIN: 0

## 2021-11-08 NOTE — PROGRESS NOTES
Vaccine Information Sheet, \"Influenza - Inactivated\"  given to [facility], or parent/legal guardian of  [facility] and verbalized understanding. Patient responses:    Have you ever had a reaction to a flu vaccine? No  Are you able to eat eggs without adverse effects? Yes  Do you have any current illness? No  Have you ever had [place] Syndrome? No    Flu vaccine given per order. Please see immunization tab.

## 2021-11-08 NOTE — PROGRESS NOTES
Subjective  Chief Complaint   Patient presents with    6 Month Follow-Up     pt states nodules in thyroid are bothersome, constantly has to clear throat. pt also states cyts on kidney is bothersome but feels better everyday       HPI   Here for fu     Patient Active Problem List    Diagnosis Date Noted    Colitis     Radiation pneumonitis ([address] 05/21/2021    Acute respiratory failure with hypoxia ([address] 05/20/2021    Hypoxia 05/19/2021    Neck nodule 02/01/2021    Hemoptysis 02/01/2021    Eczema 02/01/2021    Dysphonia 02/01/2021    Malignant neoplasm of upper-inner quadrant of left breast in female, estrogen receptor negative ([address] 01/27/2021    Abnormal mammogram of left breast 01/19/2021    Left breast mass 01/19/2021    Overweight (BMI 25.0-29.9) 01/19/2021    Atelectasis, left 07/24/2020    Bilateral pulmonary infiltrates on CXR 03/23/2020    Pericardial calcification 03/23/2020    Pericardial effusion 03/23/2020    Pleural effusion 03/23/2020    Congestive heart failure ([address] 03/19/2020    Osteopenia of necks of both femurs 03/25/2019    Benign neoplasm of sigmoid colon     Other hemorrhoids     Hx of colonic polyps     Family history of colon cancer     Fracture of humerus, proximal     Shortness of breath     Chronic obstructive pulmonary disease ([address] 09/28/2017    Acute bronchitis 09/28/2017    Hypoxemia 09/28/2017    Fatigue 04/03/2017    Tobacco abuse 09/19/2016    Bilateral edema of lower extremity 01/26/2016    Atrial fibrillation ([address] 01/05/2016    Pulmonary emphysema ([address] 01/05/2016    Hyperlipidemia with target LDL less than 130     Vitamin D deficiency     Other plastic surgery for unacceptable cosmetic appearance 09/29/2010     Past Medical History:   Diagnosis Date    Abnormal echocardiogram 2020    possible PFO?  further eval suggested    Breast cancer ([address] 02/04/2021    left    Cancer [facility])     left breast    COPD (chronic obstructive pulmonary disease) [facility]) 2015    Dr Nickie Meier Current use of long term anticoagulation     Cyst of neck 2012    after plastic surgery    Emphysema of lung ([address]     History of compression fracture of vertebral column 2020    T2, L1    History of humerus fracture     LEFT    History of left heart catheterization 03/2020    at [facility], normal LVEF with impaired relaxation, mild AV calcification and mild MV thickening, normal RV    History of sustained ventricular tachycardia 03/2020    had to wear a LifeVest, Dr Ruthann Lopez History of therapeutic radiation 2021    4 weeks    Hx antineoplastic chemo 2021    12 weeks    Hyperlipidemia LDL goal < 130     Intermittent atrial fibrillation [facility]) 2015    Dr Gauthier Lipoma, Dr Brittni Harvey, Dr Belia Campos Osteopenia 2014    Personal history of skin cancer     had area excised from nose    S/P colonoscopic polypectomy 2008, 2015, 2018    Dr Prachi Jara    Smoking history     Vitamin D deficiency      Past Surgical History:   Procedure Laterality Date    ATRIAL ABLATION SURGERY      BREAST BIOPSY Left 01/20/2021    BREAST BIOPSY Left 2/4/2021    LEFT BREAST LUMPECTOMY AND  SENTINEL LYMPH NODE BIOPSY performed by Yvonne Mckeon MD at [address] [facility]    stap infection, postpartum    COLONOSCOPY  07/22/2015    w/polypectomy Dr Prachi Jara    COLONOSCOPY N/A 10/18/2021    COLORECTAL CANCER SCREENING, HIGH RISK performed by Apurva Reese MD at [address]  2012    Dr Nupur Sow; HI RISK IND N/A 07/13/2018    COLONOSCOPY performed by Angie Valerio MD at [facility]     Family History   Problem Relation Age of Onset    Osteoarthritis Mother     Osteoporosis Mother     Cancer Mother         anorectal     Colon Cancer Mother      Social History     Socioeconomic History    Marital status:      Spouse name: None    Number of children: 2    Years of education: None    Highest education level: None   Occupational History  Occupation: [employer], retired   Tobacco Use    Smoking status: Former Smoker     Packs/day: 1.00     Years: 50.00     Pack years: 50.00     Types: Cigarettes     Start date:      Quit date: 2015     Years since quittin.9    Smokeless tobacco: Never Used    Tobacco comment: pt will try smoking cessation   Vaping Use    Vaping Use: Never used   Substance and Sexual Activity    Alcohol use: No    Drug use: No    Sexual activity: None   Other Topics Concern    None   Social History Narrative    Born in [place], one brother in [place], keeps in touch    , 2 children, one son in [place], one daughter in [place]    5 grandchildren     Lives in a house in [place] with spouse     Worked for [employer] and full time at [employer], [employer], fashion, family life     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      Social Determinants of Health     Financial Resource Strain: Low Risk     Difficulty of Paying Living Expenses: Not hard at all   Food Insecurity: No Food Insecurity    Worried About [facility] in the Last Year: Never true   [address] in the Last Year: Never true   Transportation Needs: No Transportation Needs    Lack of Transportation (Medical): No    Lack of Transportation (Non-Medical):  No   Physical Activity:     Days of Exercise per Week: Not on file    Minutes of Exercise per Session: Not on file   Stress:     Feeling of Stress : Not on file   Social Connections:     Frequency of Communication with Friends and Family: Not on file    Frequency of Social Gatherings with Friends and Family: Not on file    Attends Hindu Services: Not on file    Active Member of Clubs or Organizations: Not on file    Attends Club or Organization Meetings: Not on file    Marital Status: Not on file   Intimate Partner Violence:     Fear of Current or Ex-Partner: Not on file    Emotionally Abused: Not on file    Physically Abused: Not on file   Vega Sexually Abused: Not on file   Housing Stability:     Unable to Pay for Housing in the Last Year: Not on file    Number of Josefa in the Last Year: Not on file    Unstable Housing in the Last Year: Not on file     Current Outpatient Medications on File Prior to Visit   Medication Sig Dispense Refill    furosemide (LASIX) 40 MG tablet Take 40 mg by mouth as needed      potassium chloride (KLOR-CON M) 20 MEQ extended release tablet Take 20 mEq by mouth daily as needed      umeclidinium-vilanterol (ANORO ELLIPTA) 62.5-25 MCG/INH AEPB inhaler Inhale 1 puff into the lungs daily 1 each 3    amiodarone (CORDARONE) 200 MG tablet TAKE 1 TABLET BY MOUTH TWICE WEEKLY 180 tablet 3    dilTIAZem (CARDIZEM CD) 240 MG extended release capsule TAKE 1 CAPSULE BY MOUTH EVERY DAY 90 capsule 3    rivaroxaban (XARELTO) 20 MG TABS tablet TAKE 1 TABLET BY MOUTH EVERY DAY WITH BREAKFAST 90 tablet 3    Calcium Carb-Cholecalciferol (CALCIUM + D3 PO) Take 1,200 ampules by mouth       b complex vitamins tablet Take 1 tablet by mouth daily      magnesium oxide (MAG-OX) 400 MG tablet Take 400 mg by mouth daily       Vitamin D (CHOLECALCIFEROL) 1000 UNITS CAPS capsule Take 2,000 Units by mouth daily        No current facility-administered medications on file prior to visit. Allergies   Allergen Reactions    Aldactone [Spironolactone] Other (See Comments)     When taken daily,Leg weakness, was falling down       Review of Systems   Constitutional: Negative for chills and fever. HENT: Negative for congestion. Eyes: Negative for pain. Respiratory: Negative for shortness of breath. Cardiovascular: Negative for chest pain. Gastrointestinal: Negative for abdominal pain. Genitourinary: Negative for hematuria. Musculoskeletal: Negative for back pain. Allergic/Immunologic: Negative for immunocompromised state. Neurological: Negative for headaches. Psychiatric/Behavioral: Negative for hallucinations. Objective  Vitals:    11/08/21 0838   BP: 108/68   Pulse: 72   SpO2: 94%   Weight: 144 lb (65.3 kg)   Height: 5' 2\" (1.575 m)     Physical Exam  Constitutional:       General: She is not in acute distress. Appearance: Normal appearance. She is not ill-appearing, toxic-appearing or diaphoretic. HENT:      Head: Normocephalic. Neck:      Vascular: No carotid bruit. Cardiovascular:      Rate and Rhythm: Normal rate and regular rhythm. Pulses: Normal pulses. Heart sounds: Normal heart sounds. No murmur heard. No friction rub. No gallop. Pulmonary:      Effort: Pulmonary effort is normal. No respiratory distress. Breath sounds: Normal breath sounds. No wheezing, rhonchi or rales. Abdominal:      General: Abdomen is flat. There is no distension. Palpations: Abdomen is soft. Tenderness: There is no abdominal tenderness. There is no right CVA tenderness, left CVA tenderness, guarding or rebound. Musculoskeletal:      Cervical back: Neck supple. Right lower leg: No edema. Left lower leg: No edema. Skin:     General: Skin is warm. Findings: No erythema or rash. Neurological:      Mental Status: She is alert. Psychiatric:         Mood and Affect: Mood normal.       Assessment:       Diagnosis Orders   1. Dysphagia, unspecified type     2. Multiple thyroid nodules     3. Hyperlipidemia, unspecified hyperlipidemia type           Plan:     vc  Us thyroid 08/2022   Renal us (No F/u)   F/u with pulmonary AND GI  Keep cardiology f/u.   Offered her ent referral she declined. Keep f/u with mumtaz allan/rad/onc (from before)  Ct chest due 06/2022 (seeing pulm)   Rec fosamax,(Wants to hold off)   Recommend flu and covid vaccines. No orders of the defined types were placed in this encounter.     Orders Placed This Encounter   Medications    pravastatin (PRAVACHOL) 20 MG tablet     Sig: Take 1 tablet by mouth daily     Dispense:  90 tablet

## 2021-11-09 DIAGNOSIS — E04.2 MULTIPLE THYROID NODULES: ICD-10-CM

## 2021-11-09 DIAGNOSIS — E78.5 HYPERLIPIDEMIA, UNSPECIFIED HYPERLIPIDEMIA TYPE: ICD-10-CM

## 2021-11-09 DIAGNOSIS — R13.10 DYSPHAGIA, UNSPECIFIED TYPE: ICD-10-CM

## 2021-11-09 DIAGNOSIS — Z23 ENCOUNTER FOR IMMUNIZATION: ICD-10-CM

## 2021-11-09 LAB
ALBUMIN SERPL-MCNC: 4.4 G/DL (ref 3.5–4.6)
ALP BLD-CCNC: 96 U/L (ref 40–130)
ALT SERPL-CCNC: 14 U/L (ref 0–33)
ANION GAP SERPL CALCULATED.3IONS-SCNC: 14 MEQ/L (ref 9–15)
AST SERPL-CCNC: 23 U/L (ref 0–35)
BILIRUB SERPL-MCNC: 0.4 MG/DL (ref 0.2–0.7)
BUN BLDV-MCNC: 14 MG/DL (ref 8–23)
CALCIUM SERPL-MCNC: 10.4 MG/DL (ref 8.5–9.9)
CHLORIDE BLD-SCNC: 100 MEQ/L (ref 95–107)
CHOLESTEROL, FASTING: 263 MG/DL (ref 0–199)
CO2: 26 MEQ/L (ref 20–31)
CREAT SERPL-MCNC: 1.09 MG/DL (ref 0.5–0.9)
GFR AFRICAN AMERICAN: 59.5
GFR NON-AFRICAN AMERICAN: 49.2
GLOBULIN: 3.1 G/DL (ref 2.3–3.5)
GLUCOSE BLD-MCNC: 82 MG/DL (ref 70–99)
HDLC SERPL-MCNC: 73 MG/DL (ref 40–59)
LDL CHOLESTEROL CALCULATED: 162 MG/DL (ref 0–129)
POTASSIUM SERPL-SCNC: 4.6 MEQ/L (ref 3.4–4.9)
SODIUM BLD-SCNC: 140 MEQ/L (ref 135–144)
TOTAL PROTEIN: 7.5 G/DL (ref 6.3–8)
TRIGLYCERIDE, FASTING: 139 MG/DL (ref 0–150)
TSH REFLEX: 2.81 UIU/ML (ref 0.44–3.86)

## 2021-11-16 ENCOUNTER — IMMUNIZATION (OUTPATIENT)
Dept: FAMILY MEDICINE CLINIC | Age: 71
End: 2021-11-16
Payer: COMMERCIAL

## 2021-11-16 DIAGNOSIS — Z23 NEED FOR COVID-19 VACCINE: Primary | ICD-10-CM

## 2021-11-16 PROCEDURE — 0011A COVID-19, MODERNA PRIMARY SERIES VACCINE 100MCG/0.5ML DOSE: CPT | Performed by: FAMILY MEDICINE

## 2021-11-16 PROCEDURE — 91301 COVID-19, MODERNA PRIMARY SERIES VACCINE 100MCG/0.5ML DOSE: CPT | Performed by: FAMILY MEDICINE

## 2021-11-17 ENCOUNTER — TELEPHONE (OUTPATIENT)
Dept: OBGYN CLINIC | Age: 71
End: 2021-11-17

## 2021-11-17 ENCOUNTER — OFFICE VISIT (OUTPATIENT)
Dept: FAMILY MEDICINE CLINIC | Age: 71
End: 2021-11-17
Payer: COMMERCIAL

## 2021-11-17 VITALS
WEIGHT: 141.4 LBS | HEIGHT: 62 IN | TEMPERATURE: 97.3 F | HEART RATE: 64 BPM | BODY MASS INDEX: 26.02 KG/M2 | OXYGEN SATURATION: 94 % | SYSTOLIC BLOOD PRESSURE: 110 MMHG | DIASTOLIC BLOOD PRESSURE: 60 MMHG

## 2021-11-17 DIAGNOSIS — R22.31 LUMP OF SKIN OF RIGHT UPPER EXTREMITY: ICD-10-CM

## 2021-11-17 DIAGNOSIS — T50.Z95A VACCINATION SIDE EFFECTS, INITIAL ENCOUNTER: Primary | ICD-10-CM

## 2021-11-17 PROCEDURE — 99213 OFFICE O/P EST LOW 20 MIN: CPT | Performed by: PHYSICIAN ASSISTANT

## 2021-11-17 ASSESSMENT — ENCOUNTER SYMPTOMS
CHEST TIGHTNESS: 0
VOMITING: 0
SINUS PRESSURE: 0
SHORTNESS OF BREATH: 0
COUGH: 0
BACK PAIN: 0
DIARRHEA: 0
ABDOMINAL PAIN: 0
TROUBLE SWALLOWING: 0

## 2021-11-17 NOTE — PROGRESS NOTES
SENTINEL LYMPH NODE BIOPSY performed by Felix Machado MD at [address]    staph infection, postpartum    COLONOSCOPY  2015    w/polypectomy Dr Mckayla Farias    COLONOSCOPY N/A 10/18/2021    COLORECTAL CANCER SCREENING, HIGH RISK performed by Aaliyah Cole MD at [address] [facility]      Dr Sadie Carlson; HI RISK IND N/A 2018    COLONOSCOPY performed by Colton Naik MD at [address] [place] Marital status:      Spouse name: Not on file    Number of children: 2    Years of education: Not on file    Highest education level: Not on file   Occupational History    Occupation: [employer], retired   Tobacco Use    Smoking status: Former Smoker     Packs/day: 1.00     Years: 50.00     Pack years: 50.00     Types: Cigarettes     Start date:      Quit date: 2015     Years since quittin.9    Smokeless tobacco: Never Used    Tobacco comment: pt will try smoking cessation   Vaping Use    Vaping Use: Never used   Substance and Sexual Activity    Alcohol use: No    Drug use: No    Sexual activity: Not on file   Other Topics Concern    Not on file   Social History Narrative    Born in [place], one brother in [facility], keeps in touch    , 2 children, one son in [place], one daughter in [place]    5 grandchildren     Lives in a house in [place] with spouse     Worked for [employer] and full time at [employer], [employer], fashion, family life     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      Social Determinants of Health     Financial Resource Strain: Low Risk     Difficulty of Paying Living Expenses: Not hard at all   Food Insecurity: No Food Insecurity    Worried About [address] in the Last Year: Never true    [address] in the Last Year: Never true   Transportation Needs: No Transportation Needs    Lack of Transportation (Medical):  No    Lack of Transportation (Non-Medical):  No   Physical Activity:     Days of Exercise per Week: Not on file    Minutes of Exercise per Session: Not on file   Stress:     Feeling of Stress : Not on file   Social Connections:     Frequency of Communication with Friends and Family: Not on file    Frequency of Social Gatherings with Friends and Family: Not on file    Attends Quaker Services: Not on file    Active Member of [address] [place] [address] [employer] or Organizations: Not on file    Attends Club or Organization Meetings: Not on file    Marital Status: Not on file   Intimate Partner Violence:     Fear of Current or Ex-Partner: Not on file    Emotionally Abused: Not on file    Physically Abused: Not on file    Sexually Abused: Not on file   Housing Stability:     Unable to Pay for Housing in the Last Year: Not on file    Number of [place] in the Last Year: Not on file    Unstable Housing in the Last Year: Not on file     Family History   Problem Relation Age of Onset    Osteoarthritis Mother     Osteoporosis Mother     Cancer Mother         anorectal     Colon Cancer Mother      Allergies   Allergen Reactions    Aldactone [Spironolactone] Other (See Comments)     When taken daily,Leg weakness, was falling down     Current Outpatient Medications   Medication Sig Dispense Refill    pravastatin (PRAVACHOL) 20 MG tablet Take 1 tablet by mouth daily 90 tablet 3    furosemide (LASIX) 40 MG tablet Take 40 mg by mouth as needed      potassium chloride (KLOR-CON M) 20 MEQ extended release tablet Take 20 mEq by mouth daily as needed      umeclidinium-vilanterol (ANORO ELLIPTA) 62.5-25 MCG/INH AEPB inhaler Inhale 1 puff into the lungs daily 1 each 3    amiodarone (CORDARONE) 200 MG tablet TAKE 1 TABLET BY MOUTH TWICE WEEKLY 180 tablet 3    dilTIAZem (CARDIZEM CD) 240 MG extended release capsule TAKE 1 CAPSULE BY MOUTH EVERY DAY 90 capsule 3    rivaroxaban (XARELTO) 20 MG TABS tablet TAKE 1 TABLET BY MOUTH EVERY DAY WITH BREAKFAST 90 tablet 3    Calcium Carb-Cholecalciferol (CALCIUM + D3 PO) Take 1,200 ampules by mouth       b complex vitamins tablet Take 1 tablet by mouth daily      magnesium oxide (MAG-OX) 400 MG tablet Take 400 mg by mouth daily       Vitamin D (CHOLECALCIFEROL) 1000 UNITS CAPS capsule Take 2,000 Units by mouth daily        No current facility-administered medications for this visit. Review of Systems   Constitutional: Negative for activity change, appetite change, chills, fever and unexpected weight change. HENT: Negative for drooling, ear pain, nosebleeds, sinus pressure and trouble swallowing. Respiratory: Negative for cough, chest tightness and shortness of breath. Cardiovascular: Negative for chest pain and leg swelling. Gastrointestinal: Negative for abdominal pain, diarrhea and vomiting. Endocrine: Negative for polydipsia and polyphagia. Genitourinary: Negative for dysuria, flank pain and frequency. Musculoskeletal: Negative for back pain and myalgias. Skin: Negative for pallor and rash. Neurological: Negative for syncope, weakness and headaches. Hematological: Does not bruise/bleed easily. Psychiatric/Behavioral: Negative for agitation, behavioral problems and confusion. All other systems reviewed and are negative. Objective:   /60   Pulse 64   Temp 97.3 °F (36.3 °C)   Ht 5' 2\" (1.575 m)   Wt 141 lb 6.4 oz (64.1 kg)   LMP  (LMP Unknown)   SpO2 94%   BMI 25.86 kg/m²     Physical Exam  Vitals and nursing note reviewed. Constitutional:       General: She is awake. She is not in acute distress. Appearance: Normal appearance. She is well-developed and normal weight. She is not ill-appearing, toxic-appearing or diaphoretic. Comments: No photophobia. No phonophobia. HENT:      Head: Normocephalic and atraumatic. No Oliver's sign.       Right Ear: External ear normal.      Left Ear: External ear normal.      Nose: Nose normal. No congestion or rhinorrhea. Mouth/Throat:      Mouth: Mucous membranes are moist.      Pharynx: Oropharynx is clear. No oropharyngeal exudate or posterior oropharyngeal erythema. Eyes:      General: No scleral icterus. Right eye: No foreign body or discharge. Left eye: No discharge. Extraocular Movements: Extraocular movements intact. Conjunctiva/sclera: Conjunctivae normal.      Left eye: No exudate. Pupils: Pupils are equal, round, and reactive to light. Neck:      Vascular: No JVD. Trachea: No tracheal deviation. Comments: No meningismus. Cardiovascular:      Rate and Rhythm: Normal rate and regular rhythm. Heart sounds: Normal heart sounds. Heart sounds not distant. No murmur heard. No friction rub. No gallop. Pulmonary:      Effort: Pulmonary effort is normal. No respiratory distress. Breath sounds: Normal breath sounds. No stridor. No wheezing, rhonchi or rales. Chest:      Chest wall: No tenderness. Abdominal:      General: Abdomen is flat. Bowel sounds are normal. There is no distension. Palpations: Abdomen is soft. There is no mass. Tenderness: There is no abdominal tenderness. There is no right CVA tenderness, left CVA tenderness, guarding or rebound. Hernia: No hernia is present. Musculoskeletal:         General: No swelling, tenderness, deformity or signs of injury. Normal range of motion. Arms:       Cervical back: Normal range of motion and neck supple. No rigidity. Comments: Soft tissue lump   Lymphadenopathy:      Head:      Right side of head: No submental adenopathy. Left side of head: No submental adenopathy. Skin:     General: Skin is warm and dry. Capillary Refill: Capillary refill takes less than 2 seconds. Coloration: Skin is not jaundiced or pale. Findings: No bruising, erythema, lesion or rash. Neurological:      General: No focal deficit present.       Mental Status: She is alert and oriented to person, place, and time. Mental status is at baseline. Cranial Nerves: No cranial nerve deficit. Sensory: No sensory deficit. Motor: No weakness. Coordination: Coordination normal.      Deep Tendon Reflexes: Reflexes are normal and symmetric. Psychiatric:         Mood and Affect: Mood normal.         Behavior: Behavior normal. Behavior is cooperative. Thought Content: Thought content normal.         Judgment: Judgment normal.         Assessment:       Diagnosis Orders   1. Vaccination side effects, initial encounter  XR ELBOW RIGHT (MIN 3 VIEWS)   2. Lump of skin of right upper extremity  XR ELBOW RIGHT (MIN 3 VIEWS)     No results found for this visit on 11/17/21. Plan:     [address] was seen today for skin problem. Diagnoses and all orders for this visit:    Vaccination side effects, initial encounter  -     XR ELBOW RIGHT (MIN 3 VIEWS); Future    Lump of skin of right upper extremity  -     XR ELBOW RIGHT (MIN 3 VIEWS); Future      Orders Placed This Encounter   Procedures    XR ELBOW RIGHT (MIN 3 VIEWS)     Standing Status:   Future     Standing Expiration Date:   11/17/2022     Order Specific Question:   Reason for exam:     Answer:   r/o fx     No orders of the defined types were placed in this encounter. There are no discontinued medications. Return if symptoms worsen or fail to improve. Reviewed with the patient/family: current clinical status & medications. Side effects of the medication prescribed today, as well as treatment plan/rationale and result expectations have been discussed with the patient/family who expresses understanding. Patient will be discharged home in stable condition. Follow up with PCP to evaluate treatment results or return if symptoms worsen or fail to improve. Discussed signs and symptoms which require immediate follow-up in ED/call to 911. Understanding verbalized.      I have reviewed the patient's medical history in detail and updated the computerized patient record.     ALIYAH Rizo

## 2021-11-17 NOTE — TELEPHONE ENCOUNTER
The patient was concerned because she had her first COVID 19 vaccine 11/16/2021 and then woke up today with a swollen area on her right arm by the elbow. The patient stated the area is sore. The patient was evaluated at urgent care and an xray of the elbow was completed. The patient is awaiting the provider to call her with results. The patient was just wanting to make sure she should not be concerned because she had Left Breast cancer and a Left Lumpectomy with SLNB on 2/4/2021. She just wanted to know if she should be concerned. I informed the patient that I would update Dr. Jeffrey Arciniega on this occurrence, but that this area is on the opposite arm, was a sudden change with soreness, does not present like Lymphedema and that she did the right thing by being evaluated by a provider. The patient was encouraged to await the advise of the provider that she was evaluated by today and follow up with her PCP. The patient has no further questions at this time.

## 2021-11-20 DIAGNOSIS — M70.20 OLECRANON BURSITIS, UNSPECIFIED LATERALITY: Primary | ICD-10-CM

## 2021-11-26 ENCOUNTER — VIRTUAL VISIT (OUTPATIENT)
Dept: SURGERY | Age: 71
End: 2021-11-26
Payer: COMMERCIAL

## 2021-11-26 ENCOUNTER — TELEPHONE (OUTPATIENT)
Dept: SURGERY | Age: 71
End: 2021-11-26

## 2021-11-26 DIAGNOSIS — E66.3 OVERWEIGHT (BMI 25.0-29.9): ICD-10-CM

## 2021-11-26 DIAGNOSIS — Z17.1 MALIGNANT NEOPLASM OF UPPER-INNER QUADRANT OF LEFT BREAST IN FEMALE, ESTROGEN RECEPTOR NEGATIVE (HCC): Primary | ICD-10-CM

## 2021-11-26 DIAGNOSIS — C50.212 MALIGNANT NEOPLASM OF UPPER-INNER QUADRANT OF LEFT BREAST IN FEMALE, ESTROGEN RECEPTOR NEGATIVE (HCC): Primary | ICD-10-CM

## 2021-11-26 PROCEDURE — 99421 OL DIG E/M SVC 5-10 MIN: CPT | Performed by: SURGERY

## 2021-11-26 NOTE — TELEPHONE ENCOUNTER
Patient called to schedule an appointment to be evaluated for right nipple \"white\" fluid that she noticed after showering this am. Please advise.

## 2021-12-01 ENCOUNTER — OFFICE VISIT (OUTPATIENT)
Dept: ORTHOPEDIC SURGERY | Age: 71
End: 2021-12-01
Payer: COMMERCIAL

## 2021-12-01 VITALS
BODY MASS INDEX: 25.95 KG/M2 | HEIGHT: 62 IN | HEART RATE: 89 BPM | RESPIRATION RATE: 16 BRPM | WEIGHT: 141 LBS | TEMPERATURE: 97.1 F | OXYGEN SATURATION: 90 %

## 2021-12-01 DIAGNOSIS — M70.21 OLECRANON BURSITIS, RIGHT ELBOW: Primary | ICD-10-CM

## 2021-12-01 PROCEDURE — 20605 DRAIN/INJ JOINT/BURSA W/O US: CPT | Performed by: ORTHOPAEDIC SURGERY

## 2021-12-01 PROCEDURE — 99203 OFFICE O/P NEW LOW 30 MIN: CPT | Performed by: ORTHOPAEDIC SURGERY

## 2021-12-01 NOTE — PROGRESS NOTES
Subjective:      Patient ID: Davina Ambrose is a 66 y.o. female who presents today for:  Chief Complaint   Patient presents with    Elbow Pain     due to olecranon bursitis, right XR done 11/17/2021. Pt states she noticed this a few days after her first covid vaccine. HPI  The patient is a very pleasant 75-year-old female here for evaluation of a olecranon bursa on the posterior aspect of the right elbow. He says it started a few days after she got a Covid injection into her right arm. There is no marked or significant pain. She has noticed any redness in the area. It just has not gotten better. She is never had this before. Past Medical History:   Diagnosis Date    Abnormal echocardiogram 2020    possible PFO?  further eval suggested    Breast cancer ([facility] 02/04/2021    left    Cancer [facility])     left breast    COPD (chronic obstructive pulmonary disease) ([facility] 2015    Dr Leland Villalpando Current use of long term anticoagulation     Cyst of neck 2012    after plastic surgery    Emphysema of lung ([facility]     History of compression fracture of vertebral column 2020    T2, L1    History of humerus fracture     LEFT    History of left heart catheterization 03/2020    at [facility], normal LVEF with impaired relaxation, mild AV calcification and mild MV thickening, normal RV    History of sustained ventricular tachycardia 03/2020    had to wear a LifeVest, Dr Deshaun Yeboah History of therapeutic radiation 2021    4 weeks    Hx antineoplastic chemo 2021    12 weeks    Hyperlipidemia LDL goal < 130     Intermittent atrial fibrillation [facility]) 2015    Dr Felecia Chisholm, Dr Susie Chapin [facility], Dr Florencia Lindsay Osteopenia 2014    Personal history of skin cancer     had area excised from nose    S/P colonoscopic polypectomy 2008, 2015, 2018    Dr Florentin Youngblood    Smoking history     Vitamin D deficiency      Past Surgical History:   Procedure Laterality Date    ATRIAL ABLATION SURGERY      BREAST BIOPSY Left 01/20/2021    BREAST BIOPSY Left Lack of Transportation (Medical): No    Lack of Transportation (Non-Medical):  No   Physical Activity:     Days of Exercise per Week: Not on file    Minutes of Exercise per Session: Not on file   Stress:     Feeling of Stress : Not on file   Social Connections:     Frequency of Communication with Friends and Family: Not on file    Frequency of Social Gatherings with Friends and Family: Not on file    Attends Sabianism Services: Not on file    Active Member of [address] [place] [address] [employer] or Organizations: Not on file    Attends Club or Organization Meetings: Not on file    Marital Status: Not on file   Intimate Partner Violence:     Fear of Current or Ex-Partner: Not on file    Emotionally Abused: Not on file    Physically Abused: Not on file    Sexually Abused: Not on file   Housing Stability:     Unable to Pay for Housing in the Last Year: Not on file    Number of [place] in the Last Year: Not on file    Unstable Housing in the Last Year: Not on file     Family History   Problem Relation Age of Onset    Osteoarthritis Mother     Osteoporosis Mother     Cancer Mother         anorectal     Colon Cancer Mother      Allergies   Allergen Reactions    Aldactone [Spironolactone] Other (See Comments)     When taken daily,Leg weakness, was falling down     Current Outpatient Medications on File Prior to Visit   Medication Sig Dispense Refill    pravastatin (PRAVACHOL) 20 MG tablet Take 1 tablet by mouth daily 90 tablet 3    furosemide (LASIX) 40 MG tablet Take 40 mg by mouth as needed      potassium chloride (KLOR-CON M) 20 MEQ extended release tablet Take 20 mEq by mouth daily as needed      umeclidinium-vilanterol (ANORO ELLIPTA) 62.5-25 MCG/INH AEPB inhaler Inhale 1 puff into the lungs daily 1 each 3    amiodarone (CORDARONE) 200 MG tablet TAKE 1 TABLET BY MOUTH TWICE WEEKLY 180 tablet 3    dilTIAZem (CARDIZEM CD) 240 MG extended release capsule TAKE 1 CAPSULE BY MOUTH EVERY DAY 90 capsule 3    rivaroxaban the patient which include observation, aspiration, and operative removal.  In general we do not recommend immediate surgical intervention. I explained to her that it does not affect the elbow joint itself. There is rare cases of infection that can occur with these. After discussing the options available to her she wished to proceed with an aspiration of the right elbow olecranon bursa. The risk of infection and chronic draining sinus were discussed with this procedure and she agreed to proceed. Under sterile conditions through a direct posterior approach using a Betadine prep and sterile technique rated 5 cc of bloody fluid from the bursa without complication. She tolerated it well. She did note that she was on Eliquis which may explain the bursal bloody effusion. She will keep the Ace wrap on for 48 to 72 hours. She will not bathe or get it wet for 48 hours. Signs and symptoms of infection were discussed with the patient she will call immediately go straight to the emergency room if she has any increasing pain, fever, chills, redness, loss of motion of the elbow or any other symptoms. If she has recurrent bursal effusion she should give it 2 to 3 months to see if would go away and if it recurs operative removal may be the next choice in treatment. She will call me if it does not resolve. No orders of the defined types were placed in this encounter. No orders of the defined types were placed in this encounter. Return if symptoms worsen or fail to improve.       Deniz Irvin MD

## 2021-12-02 ENCOUNTER — OFFICE VISIT (OUTPATIENT)
Dept: GASTROENTEROLOGY | Age: 71
End: 2021-12-02
Payer: COMMERCIAL

## 2021-12-02 VITALS
WEIGHT: 137.8 LBS | BODY MASS INDEX: 25.36 KG/M2 | OXYGEN SATURATION: 95 % | HEART RATE: 83 BPM | SYSTOLIC BLOOD PRESSURE: 116 MMHG | DIASTOLIC BLOOD PRESSURE: 70 MMHG | HEIGHT: 62 IN

## 2021-12-02 DIAGNOSIS — R13.19 ESOPHAGEAL DYSPHAGIA: Primary | ICD-10-CM

## 2021-12-02 PROCEDURE — 99213 OFFICE O/P EST LOW 20 MIN: CPT | Performed by: SPECIALIST

## 2021-12-02 ASSESSMENT — ENCOUNTER SYMPTOMS
CONSTIPATION: 0
ANAL BLEEDING: 0
ABDOMINAL DISTENTION: 0
EYES NEGATIVE: 1
RESPIRATORY NEGATIVE: 1
RECTAL PAIN: 0
NAUSEA: 0
VOMITING: 0
DIARRHEA: 0
BLOOD IN STOOL: 0
ABDOMINAL PAIN: 0
GASTROINTESTINAL NEGATIVE: 1

## 2021-12-02 NOTE — PROGRESS NOTES
Gastroenterology Clinic Follow up Visit    Monica Johnson  [id number]  Chief Complaint   Patient presents with   [facility] Consultation     EGD; no complaints of dysphagia or GERD       HPI and A/P at last visit summarized below:  Patient is here to schedule EGD, she was recommended to have an EGD by her PCP since patient had breast cancer and also skin cancer. Reports no abdominal pain. No history of GERD. no, diarrhea has resolved now it is patient discontinued using the chemo pills. No family history of gastric CA, patient has history of thyroid nodule and has occasional tendency to swallow her throat and occasional dysphagia. Review of Systems   Constitutional: Negative. HENT: Negative. Eyes: Negative. Respiratory: Negative. Cardiovascular: Negative. Gastrointestinal: Negative. Negative for abdominal distention, abdominal pain, anal bleeding, blood in stool, constipation, diarrhea, nausea, rectal pain and vomiting. No GI issues   Endocrine: Negative. Genitourinary: Negative. Musculoskeletal: Negative. Skin: Negative. Allergic/Immunologic: Negative for food allergies. Neurological: Negative. Hematological: Negative. Psychiatric/Behavioral: Negative. Past medical history, past surgical history, medication list, social and familyhistory reviewed    Blood pressure 116/70, pulse 83, height 5' 2\" (1.575 m), weight 137 lb 12.8 oz (62.5 kg), SpO2 95 %, not currently breastfeeding. Physical Exam  Constitutional:       Appearance: She is well-developed. HENT:      Head: Normocephalic and atraumatic. Comments: No visible mass  Eyes:      Conjunctiva/sclera: Conjunctivae normal.      Pupils: Pupils are equal, round, and reactive to light. Cardiovascular:      Rate and Rhythm: Normal rate. Pulmonary:      Effort: Pulmonary effort is normal.   Abdominal:      General: Bowel sounds are normal.      Palpations: Abdomen is soft.       Comments: Soft nontender no palpable mass   Musculoskeletal:         General: Normal range of motion. Cervical back: Normal range of motion. Skin:     General: Skin is warm. Neurological:      Mental Status: She is alert. Laboratory, Pathology, Radiology reviewed in detail with relevantimportant investigations summarized below:    Recent Labs     11/05/21  0658   WBC 6.1   HGB 14.4   HCT 43.5   MCV 88.5        Lab Results   Component Value Date    ALT 14 11/09/2021    AST 23 11/09/2021    ALKPHOS 96 11/09/2021    BILITOT 0.4 11/09/2021     XR ELBOW RIGHT (MIN 3 VIEWS)    Result Date: 11/17/2021  EXAMINATION/TECHNIQUE:  XR ELBOW RIGHT (MIN 3 VIEWS) HISTORY:  Right elbow pain after recent vaccine. COMPARISON: None RESULT: Moderate soft tissue edema overlying the olecranon. No acute fracture or dislocation. No elbow joint effusion. Elbow joint spaces appear maintained with tiny osteophytes. Small lateral epicondyle enthesophytes. No other significant abnormality. No acute osseous findings. Soft tissue edema overlying the olecranon suggestive of olecranon bursitis. Endoscopic investigations:     Assessment and Plan:  [facility] 66 y.o. female for follow up. History of thyroid nodule and occasional dysphagia. No history of food allergies, will schedule EGD. Diagnosis Orders   1. Esophageal dysphagia  EGD       Return in about 2 months (around 2/2/2022). Mac Sarmiento MD   StaffGastroenterologist  [facility]    Please note this report has been partially produced using speech recognitionsoftware  and may cause contain errors related to that system including grammar, punctuation and spelling as well as words andphrases that may seem inappropriate. If there are questions or concerns please feel free to contact me to clarify.

## 2021-12-09 DIAGNOSIS — E83.52 SERUM CALCIUM ELEVATED: ICD-10-CM

## 2021-12-09 LAB — CALCIUM SERPL-MCNC: 10.7 MG/DL (ref 8.5–9.9)

## 2021-12-13 DIAGNOSIS — E83.52 SERUM CALCIUM ELEVATED: ICD-10-CM

## 2021-12-13 LAB
CALCIUM SERPL-MCNC: 10.1 MG/DL (ref 8.5–9.9)
PARATHYROID HORMONE INTACT: 36.6 PG/ML (ref 15–65)

## 2021-12-14 ENCOUNTER — IMMUNIZATION (OUTPATIENT)
Dept: FAMILY MEDICINE CLINIC | Age: 71
End: 2021-12-14
Payer: COMMERCIAL

## 2021-12-14 ENCOUNTER — OFFICE VISIT (OUTPATIENT)
Dept: PULMONOLOGY | Age: 71
End: 2021-12-14
Payer: COMMERCIAL

## 2021-12-14 VITALS
HEART RATE: 74 BPM | BODY MASS INDEX: 25.76 KG/M2 | OXYGEN SATURATION: 91 % | SYSTOLIC BLOOD PRESSURE: 130 MMHG | DIASTOLIC BLOOD PRESSURE: 66 MMHG | TEMPERATURE: 98.2 F | HEIGHT: 62 IN | WEIGHT: 140 LBS

## 2021-12-14 DIAGNOSIS — I50.9 CONGESTIVE HEART FAILURE, UNSPECIFIED HF CHRONICITY, UNSPECIFIED HEART FAILURE TYPE (HCC): ICD-10-CM

## 2021-12-14 DIAGNOSIS — J70.0 RADIATION PNEUMONITIS (HCC): ICD-10-CM

## 2021-12-14 DIAGNOSIS — R09.02 HYPOXIA: ICD-10-CM

## 2021-12-14 DIAGNOSIS — J44.9 CHRONIC OBSTRUCTIVE PULMONARY DISEASE, UNSPECIFIED COPD TYPE (HCC): Primary | ICD-10-CM

## 2021-12-14 PROCEDURE — 91301 COVID-19, MODERNA PRIMARY SERIES VACCINE 100MCG/0.5ML DOSE: CPT | Performed by: FAMILY MEDICINE

## 2021-12-14 PROCEDURE — 99214 OFFICE O/P EST MOD 30 MIN: CPT | Performed by: INTERNAL MEDICINE

## 2021-12-14 PROCEDURE — 0012A COVID-19, MODERNA PRIMARY SERIES VACCINE 100MCG/0.5ML DOSE: CPT | Performed by: FAMILY MEDICINE

## 2021-12-14 ASSESSMENT — ENCOUNTER SYMPTOMS
DIARRHEA: 0
CHEST TIGHTNESS: 0
RHINORRHEA: 0
EYE ITCHING: 0
VOICE CHANGE: 0
NAUSEA: 0
TROUBLE SWALLOWING: 0
ABDOMINAL PAIN: 0
VOMITING: 0
SINUS PRESSURE: 0
WHEEZING: 0
SORE THROAT: 0
COUGH: 0
SHORTNESS OF BREATH: 0
EYE DISCHARGE: 0

## 2021-12-14 NOTE — PROGRESS NOTES
Subjective:     Sanket Reese is a 66 y.o. female who complains today of:     Chief Complaint   Patient presents with    COPD     3 month f/u       HPI  She is off chemotherapy. She she is not using O2 . She want to d/c   She is on bronchodilator with Anoro Ellipta 1 puff daily. No C/o shortness of breath  with exertion. No Wheezing. No Cough with  Sputum. No Hemoptysis. No Chest tightness. No Chest pain with radiation  or pleuritic pain. No orthopnea. No Fever or chills. No Rhinorrhea and postnasal drip. Allergies:  Aldactone [spironolactone]  Past Medical History:   Diagnosis Date    Abnormal echocardiogram 2020    possible PFO?  further eval suggested    Breast cancer ([facility] 02/04/2021    left    Cancer [facility])     left breast    COPD (chronic obstructive pulmonary disease) ([facility] [facility] 2015    Dr Mercedes Patel Current use of long term anticoagulation     Cyst of neck 2012    after plastic surgery    Emphysema of lung ([facility] [facility]     History of compression fracture of vertebral column 2020    T2, L1    History of humerus fracture     LEFT    History of left heart catheterization 03/2020    at [address] [place] [address], normal LVEF with impaired relaxation, mild AV calcification and mild MV thickening, normal RV    History of sustained ventricular tachycardia 03/2020    had to wear a LifeVest, Dr Edmonds Post History of therapeutic radiation 2021    4 weeks    Hx antineoplastic chemo 2021    12 weeks    Hyperlipidemia LDL goal < 130     Intermittent atrial fibrillation [facility]) 2015    Dr Jorge Alberto Guadarrama, Dr Wendy Villalpando, Dr Nataliya Ramon Osteopenia 2014   Venejolene eRese history of skin cancer     had area excised from nose    S/P colonoscopic polypectomy 2008, 2015, 2018    Dr Lalitha Ghosh    Smoking history     Vitamin D deficiency      Past Surgical History:   Procedure Laterality Date    ATRIAL ABLATION SURGERY      BREAST BIOPSY Left 01/20/2021    BREAST BIOPSY Left 2/4/2021    LEFT BREAST LUMPECTOMY AND  SENTINEL LYMPH NODE BIOPSY performed by Crissy Polo MD at [address]    staph infection, postpartum    COLONOSCOPY  2015    w/polypectomy Dr Stacey Scott    COLONOSCOPY N/A 10/18/2021    COLORECTAL CANCER SCREENING, HIGH RISK performed by Mario Jimenez MD at [facility]      Dr Maksim Lopez; HI RISK IND N/A 2018    COLONOSCOPY performed by Nimco Marrero MD at [facility]     Family History   Problem Relation Age of Onset    Osteoarthritis Mother     Osteoporosis Mother     Cancer Mother         anorectal     Colon Cancer Mother      Social History     Socioeconomic History    Marital status:      Spouse name: Not on file    Number of children: 2    Years of education: Not on file    Highest education level: Not on file   Occupational History    Occupation: [address], retired   Tobacco Use    Smoking status: Former Smoker     Packs/day: 1.00     Years: 50.00     Pack years: 50.00     Types: Cigarettes     Start date:      Quit date: 2015     Years since quittin.0    Smokeless tobacco: Never Used    Tobacco comment: pt will try smoking cessation   Vaping Use    Vaping Use: Never used   Substance and Sexual Activity    Alcohol use: No    Drug use: No    Sexual activity: Not on file   Other Topics Concern    Not on file   Social History Narrative    Born in [place], one brother in [facility], keeps in touch    , 2 children, one son in [place], one daughter in [place]    5 grandchildren     Lives in a house in [place] with spouse     Worked for [employer] and full time at [employer], [employer], fashion, family life     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      Social Determinants of Health     Financial Resource Strain: Low Risk     Difficulty of Paying Living Expenses: Not hard at all   Food Insecurity: No Food Insecurity    Worried About [address] [facility] in the Last Year: Never true   Savita Olson Ran Out of Food in the Last Year: Never true   Transportation Needs: No Transportation Needs    Lack of Transportation (Medical): No    Lack of Transportation (Non-Medical): No   Physical Activity:     Days of Exercise per Week: Not on file    Minutes of Exercise per Session: Not on file   Stress:     Feeling of Stress : Not on file   Social Connections:     Frequency of Communication with Friends and Family: Not on file    Frequency of Social Gatherings with Friends and Family: Not on file    Attends Shinto Services: Not on file    Active Member of [address] [place] [address] or Organizations: Not on file    Attends Club or Organization Meetings: Not on file    Marital Status: Not on file   Intimate Partner Violence:     Fear of Current or Ex-Partner: Not on file    Emotionally Abused: Not on file    Physically Abused: Not on file    Sexually Abused: Not on file   Housing Stability:     Unable to Pay for Housing in the Last Year: Not on file    Number of [place] in the Last Year: Not on file    Unstable Housing in the Last Year: Not on file         Review of Systems   Constitutional: Negative for chills, diaphoresis, fatigue and fever. HENT: Negative for congestion, mouth sores, nosebleeds, postnasal drip, rhinorrhea, sinus pressure, sneezing, sore throat, trouble swallowing and voice change. Eyes: Negative for discharge, itching and visual disturbance. Respiratory: Negative for cough, chest tightness, shortness of breath and wheezing. Cardiovascular: Negative for chest pain, palpitations and leg swelling. Gastrointestinal: Negative for abdominal pain, diarrhea, nausea and vomiting. Genitourinary: Negative for difficulty urinating and hematuria. Musculoskeletal: Negative for arthralgias, joint swelling and myalgias. Skin: Negative for rash. Allergic/Immunologic: Negative for environmental allergies and food allergies. Neurological: Negative for dizziness, tremors, weakness and headaches. Psychiatric/Behavioral: Negative for behavioral problems and sleep disturbance.         :     Vitals:    12/14/21 0944   BP: 130/66   Pulse: 74   Temp: 98.2 °F (36.8 °C)   SpO2: 91%   Weight: 140 lb (63.5 kg)   Height: 5' 2\" (1.575 m)     Wt Readings from Last 3 Encounters:   12/14/21 140 lb (63.5 kg)   12/02/21 137 lb 12.8 oz (62.5 kg)   12/01/21 141 lb (64 kg)         Physical Exam  Constitutional:       General: She is not in acute distress. Appearance: She is well-developed. She is not diaphoretic. HENT:      Head: Normocephalic and atraumatic. Nose: Nose normal.   Eyes:      Pupils: Pupils are equal, round, and reactive to light. Neck:      Thyroid: No thyromegaly. Vascular: No JVD. Trachea: No tracheal deviation. Cardiovascular:      Rate and Rhythm: Normal rate and regular rhythm. Heart sounds: No murmur heard. No friction rub. No gallop. Pulmonary:      Effort: No respiratory distress. Breath sounds: No wheezing or rales. Chest:      Chest wall: No tenderness. Abdominal:      General: There is no distension. Tenderness: There is no abdominal tenderness. There is no rebound. Musculoskeletal:         General: Normal range of motion. Lymphadenopathy:      Cervical: No cervical adenopathy. Skin:     General: Skin is warm and dry. Neurological:      Mental Status: She is alert and oriented to person, place, and time. Coordination: Coordination normal.         Current Outpatient Medications   Medication Sig Dispense Refill    OXYGEN Discontinue Oxygen , she is not using it .  1 Units 0    pravastatin (PRAVACHOL) 20 MG tablet Take 1 tablet by mouth daily 90 tablet 3    furosemide (LASIX) 40 MG tablet Take 40 mg by mouth as needed      potassium chloride (KLOR-CON M) 20 MEQ extended release tablet Take 20 mEq by mouth daily as needed      umeclidinium-vilanterol (ANORO ELLIPTA) 62.5-25 MCG/INH AEPB inhaler Inhale 1 puff into the lungs daily 1 each 3  amiodarone (CORDARONE) 200 MG tablet TAKE 1 TABLET BY MOUTH TWICE WEEKLY 180 tablet 3    dilTIAZem (CARDIZEM CD) 240 MG extended release capsule TAKE 1 CAPSULE BY MOUTH EVERY DAY 90 capsule 3    rivaroxaban (XARELTO) 20 MG TABS tablet TAKE 1 TABLET BY MOUTH EVERY DAY WITH BREAKFAST 90 tablet 3    Calcium Carb-Cholecalciferol (CALCIUM + D3 PO) Take 1,200 ampules by mouth       b complex vitamins tablet Take 1 tablet by mouth daily      magnesium oxide (MAG-OX) 400 MG tablet Take 400 mg by mouth daily       Vitamin D (CHOLECALCIFEROL) 1000 UNITS CAPS capsule Take 2,000 Units by mouth daily        No current facility-administered medications for this visit. Results for orders placed in visit on 06/09/21    XR CHEST (2 VW)    Narrative  DIAGNOSIS:Z92.3 Hx of radiation therapy ICD10    COMPARISON: May 19, 2021 and January 28, 2021    TECHNIQUE: PA and lateral chest    FINDINGS: Ill-defined opacity is again seen in the left mid lung field in the perihilar region. The lungs are also hyperinflated. Flattening of the diaphragm and increased AP diameter is seen. The lungs contain no pleural effusion or pneumothorax. The  cardiac silhouette is not enlarged. There is loss of anterior superior endplate height of I95-Y0 which is unchanged. Impression  The opacity seen in the left mid lung field may be related to patient's history of radiation therapy. Also findings are suggestive of COPD. Results for orders placed during the hospital encounter of 01/28/21    XR CHEST (2 VW)    Narrative  EXAMINATION: XR CHEST (2 VW)    CLINICAL HISTORY: J44.9 Chronic obstructive pulmonary disease, unspecified COPD type ([facility] [address] ICD10    COMPARISONS: May 11, 2020    FINDINGS:    Two views of the chest are submitted. The cardiac silhouette is of normal size configuration. Pulmonary vascular attenuated. Widening of the AP diameter the chest.  Right sided trachea. No focal infiltrates. No effusions.   No Pneumothoraces. Impression  NO ACUTE ACTIVE CARDIOPULMONARY PROCESS. RADIOGRAPHIC FINDINGS OF COPD      Results for orders placed during the hospital encounter of 05/11/20    XR CHEST STANDARD (2 VW)    Narrative  EXAMINATION: XR CHEST (2 VW)    CLINICAL HISTORY: SHORTNESS OF BREATH    COMPARISONS: CT CHEST, MARCH 5, 2020, CHEST RADIOGRAPH, SEPTEMBER 25, 2019    FINDINGS: Osseous structures intact. Cardiopericardial silhouette normal. Aorta calcified. Pulmonary vasculature normal. Ill-defined area increased opacity left lung base posteriorly. Diaphragms flattened. Impression  EMPHYSEMA. LEFT LOWER LUNG SUBSEGMENTAL ATELECTASIS/PNEUMONIA.  ]  Results for orders placed during the hospital encounter of 05/19/21    XR CHEST PORTABLE    Narrative  EXAMINATION: XR CHEST PORTABLE    CLINICAL HISTORY: SHORTNESS OF BREATH    COMPARISONS: CT CHEST, APRIL 26, 2021, CHEST RADIOGRAPH, JANUARY 28, 2021    FINDINGS: Remote right sixth rib fracture. Cardiopericardial silhouette normal. Aorta calcified. Ill-defined area of increased opacity found bilaterally, left mid and left lower lung. Increased opacity also demonstrated right lung base. Impression  BILATERAL ATELECTASIS/PNEUMONIA AS DISCUSSED.  ]  No results found for this or any previous visit.  ]  Results for orders placed during the hospital encounter of 06/24/21    CT CHEST WO CONTRAST    Narrative  EXAMINATION: CT CHEST WITHOUT CONTRAST    CLINICAL HISTORY:J70.0 Radiation pneumonitis ([facility] [address] ICD10, history left breast cancer diagnosed 2/21. History radiation treatment to left breast completed 4/2/2021. COMPARISONS: 5/19/2021    TECHNIQUE: TECHNIQUE: Contiguous axial images were obtained through the chest without contrast. Coronal and sagittal reformations were made. FINDINGS:  No evidence of aortic aneurysm. There are atherosclerotic calcifications in the aorta and coronary arteries. No significant mediastinal or hilar or axillary lymphadenopathy.  No pericardial effusion. There are small bilateral pleural effusions  in the posterior costophrenic angles both lungs. On the prior exam there is reticular infiltrates have developed within the left upper lobe at level of left breast surgery. Today's exam is reticular infiltrate has decreased in size with some residual changes in the peripheral lung and an area of the  atelectasis and mild pleural thickening. On axial image the peripheral infiltrate measures approximately 2.6 x 3.1 cm. Previously infiltrate measured approximately 6 x 10 cm on axial images. Mild reticular infiltrate anterior to the left major fissure. There are centrilobular emphysematous changes in the lungs. There is a new 3 mm nodule in the right lower lobe base, series 3 image 40. There is minimal infiltrate anteriorly in the right middle lobe, series 3 image 37. There is infiltrate/atelectasis  posteriorly in the base of the right lower lobe. No pneumothorax. Upper abdomen is unremarkable. There is depression of the superior endplate of U85, unchanged. There is volume loss in this vertebra, unchanged. Impression  RADIATION PNEUMONIA WITHIN THE LEFT UPPER LOBE IS DECREASING IN SIZE WITH SOME RESIDUAL INFILTRATE. THERE IS MINIMAL ADJACENT PLEURAL THICKENING. SMALL INFILTRATE/ATELECTASIS POSTERIOR BASE RIGHT LOWER LOBE. OTHER DETAILS ABOVE. NO ADENOPATHY. SMALL BILATERAL PLEURAL EFFUSIONS.    3 MM NODULE RIGHT LOWER LOBE. T12 FRACTURE, UNCHANGED. All CT scans at this facility use dose modulation, iterative reconstruction, and/or weight based dosing when appropriate to reduce radiation dose to as low as reasonably achievable. Results for orders placed during the hospital encounter of 05/19/21    CT CHEST WO CONTRAST    Narrative  CT of the Chest without intravenous contrast medium. HISTORY:  New hypoxia. h/o radiation to L breast, completed, April 2, 2021. Receiving paclitaxel, and amiodarone.  History of COPD.      TECHNICAL FACTORS:    CT imaging of the chest was obtained and formatted as 5 mm contiguous axial images from the thoracic inlet through the adrenal glands. Sagittal and coronal reconstructions obtained during postprocessing. Intravenous contrast medium:  None. Comparison:  Low dose CT chest, April 26, 2021, March 5, 2020, January 4, 2019. CT chest, August 23, 2016. FINDINGS:      Right lung: No nodules, mass, pleural effusion, pneumothorax. Mild dependent subsegmental atelectatic change. Left lung: No nodules, masses, pleural effusion, pneumothorax. Mild dependent subsegmental atelectatic change. Focal interval development of reticular change, left upper lobe, at level of postsurgical change, left breast (series 2, images 21 through 37). Lymph nodes: No hilar, mediastinal, or axillary lymph node enlargement. Chest Wall: Increased attenuation, left retroareolar, and upper inner quadrant, with calcification, and surgical clips, unchanged (series 2, images 24 through 29). Thoracic aorta: Normal in course and caliber. Cardiac Size: Normal.    Pericardial effusion: None. Upper abdomen:Limited imaging upper abdomen shows no gross anomaly. Musculoskeletal:No osteoblastic, and no osteolytic lesions. Anterior wedge compression, T12-L1, again demonstrated. Impression  Left upper lobe interstitial changes described. Given clinical history, changes may be secondary to recent radiation therapy. Other inflammatory and infectious etiologies secondary considerations. Recurrent malignancy less likely.       All CT scans at this facility use dose modulation, iterative reconstruction, and/or weight based dosing when appropriate to reduce radiation dose to as low as reasonably achievable.  ]  Results for orders placed during the hospital encounter of 08/23/16    CT Chest W Contrast    Narrative  CT CHEST    REASON FOR EXAM  HEMOPTYSIS    COMPARISONS  none    FINDINGS  Multiplanar images Radiation pneumonitis (HCC)  She has no symptoms of radiation pneumonitis and pneumonia since she was treated with prednisone therapy. 4. Congestive heart failure, unspecified HF chronicity, unspecified heart failure type [facility])  She is on diuretic therapy. She is following with cardiology. Return in about 4 months (around 4/14/2022) for COPD.       Eamon Jones MD

## 2021-12-16 ENCOUNTER — NURSE ONLY (OUTPATIENT)
Dept: GASTROENTEROLOGY | Age: 71
End: 2021-12-16

## 2021-12-16 DIAGNOSIS — Z01.818 PRE-OP TESTING: ICD-10-CM

## 2021-12-16 DIAGNOSIS — Z01.818 PRE-OP TESTING: Primary | ICD-10-CM

## 2021-12-17 LAB
CALCIUM IONIZED, CALC AT PH 7.4: 1.28 MMOL/L (ref 1.11–1.3)
CALCIUM IONIZED: 1.29 MMOL/L (ref 1.11–1.3)
SARS-COV-2: NOT DETECTED
SOURCE: NORMAL

## 2021-12-22 ENCOUNTER — ANESTHESIA EVENT (OUTPATIENT)
Dept: ENDOSCOPY | Age: 71
End: 2021-12-22
Payer: COMMERCIAL

## 2021-12-23 ENCOUNTER — HOSPITAL ENCOUNTER (OUTPATIENT)
Age: 71
Setting detail: OUTPATIENT SURGERY
Discharge: HOME | End: 2021-12-23
Attending: SPECIALIST | Admitting: SPECIALIST
Payer: COMMERCIAL

## 2021-12-23 ENCOUNTER — ANCILLARY PROCEDURE (OUTPATIENT)
Dept: ENDOSCOPY | Age: 71
End: 2021-12-23
Attending: SPECIALIST
Payer: COMMERCIAL

## 2021-12-23 ENCOUNTER — ANESTHESIA (OUTPATIENT)
Dept: ENDOSCOPY | Age: 71
End: 2021-12-23
Payer: COMMERCIAL

## 2021-12-23 VITALS
SYSTOLIC BLOOD PRESSURE: 108 MMHG | OXYGEN SATURATION: 92 % | RESPIRATION RATE: 16 BRPM | DIASTOLIC BLOOD PRESSURE: 55 MMHG | HEIGHT: 62 IN | TEMPERATURE: 98.5 F | HEART RATE: 67 BPM | WEIGHT: 138 LBS | BODY MASS INDEX: 25.4 KG/M2

## 2021-12-23 VITALS
OXYGEN SATURATION: 94 % | RESPIRATION RATE: 13 BRPM | SYSTOLIC BLOOD PRESSURE: 105 MMHG | DIASTOLIC BLOOD PRESSURE: 52 MMHG

## 2021-12-23 PROCEDURE — 7100000011 HC PHASE II RECOVERY - ADDTL 15 MIN: Performed by: SPECIALIST

## 2021-12-23 PROCEDURE — 3700000001 HC ADD 15 MINUTES (ANESTHESIA): Performed by: SPECIALIST

## 2021-12-23 PROCEDURE — 88305 TISSUE EXAM BY PATHOLOGIST: CPT

## 2021-12-23 PROCEDURE — 2580000003 HC RX 258

## 2021-12-23 PROCEDURE — 43239 EGD BIOPSY SINGLE/MULTIPLE: CPT | Performed by: SPECIALIST

## 2021-12-23 PROCEDURE — 3700000000 HC ANESTHESIA ATTENDED CARE: Performed by: SPECIALIST

## 2021-12-23 PROCEDURE — 6360000002 HC RX W HCPCS: Performed by: NURSE ANESTHETIST, CERTIFIED REGISTERED

## 2021-12-23 PROCEDURE — 2709999900 HC NON-CHARGEABLE SUPPLY: Performed by: SPECIALIST

## 2021-12-23 PROCEDURE — 7100000010 HC PHASE II RECOVERY - FIRST 15 MIN: Performed by: SPECIALIST

## 2021-12-23 PROCEDURE — 88313 SPECIAL STAINS GROUP 2: CPT

## 2021-12-23 PROCEDURE — 2580000003 HC RX 258: Performed by: SPECIALIST

## 2021-12-23 PROCEDURE — 3609017100 HC EGD: Performed by: SPECIALIST

## 2021-12-23 PROCEDURE — 6370000000 HC RX 637 (ALT 250 FOR IP): Performed by: SPECIALIST

## 2021-12-23 PROCEDURE — 2500000003 HC RX 250 WO HCPCS: Performed by: NURSE ANESTHETIST, CERTIFIED REGISTERED

## 2021-12-23 RX ORDER — SODIUM CHLORIDE 9 MG/ML
INJECTION, SOLUTION INTRAVENOUS CONTINUOUS
Status: DISCONTINUED | OUTPATIENT
Start: 2021-12-23 | End: 2021-12-23 | Stop reason: HOSPADM

## 2021-12-23 RX ORDER — OMEPRAZOLE 20 MG/1
20 CAPSULE, DELAYED RELEASE ORAL DAILY
Qty: 60 CAPSULE | Refills: 1 | Status: SHIPPED | OUTPATIENT
Start: 2021-12-23 | End: 2022-02-11 | Stop reason: ALTCHOICE

## 2021-12-23 RX ORDER — PROPOFOL 10 MG/ML
INJECTION, EMULSION INTRAVENOUS PRN
Status: DISCONTINUED | OUTPATIENT
Start: 2021-12-23 | End: 2021-12-23 | Stop reason: SDUPTHER

## 2021-12-23 RX ORDER — SODIUM CHLORIDE 9 MG/ML
INJECTION, SOLUTION INTRAVENOUS
Status: COMPLETED
Start: 2021-12-23 | End: 2021-12-23

## 2021-12-23 RX ORDER — MAGNESIUM HYDROXIDE 1200 MG/15ML
LIQUID ORAL PRN
Status: DISCONTINUED | OUTPATIENT
Start: 2021-12-23 | End: 2021-12-23 | Stop reason: ALTCHOICE

## 2021-12-23 RX ORDER — LIDOCAINE HYDROCHLORIDE 20 MG/ML
INJECTION, SOLUTION EPIDURAL; INFILTRATION; INTRACAUDAL; PERINEURAL PRN
Status: DISCONTINUED | OUTPATIENT
Start: 2021-12-23 | End: 2021-12-23 | Stop reason: SDUPTHER

## 2021-12-23 RX ORDER — SIMETHICONE 20 MG/.3ML
EMULSION ORAL PRN
Status: DISCONTINUED | OUTPATIENT
Start: 2021-12-23 | End: 2021-12-23 | Stop reason: ALTCHOICE

## 2021-12-23 RX ADMIN — SODIUM CHLORIDE 500 ML: 9 INJECTION, SOLUTION INTRAVENOUS at 10:46

## 2021-12-23 RX ADMIN — PROPOFOL 30 MG: 10 INJECTION, EMULSION INTRAVENOUS at 10:56

## 2021-12-23 RX ADMIN — PROPOFOL 100 MG: 10 INJECTION, EMULSION INTRAVENOUS at 10:53

## 2021-12-23 RX ADMIN — LIDOCAINE HYDROCHLORIDE 40 MG: 20 INJECTION, SOLUTION EPIDURAL; INFILTRATION; INTRACAUDAL; PERINEURAL at 10:53

## 2021-12-23 ASSESSMENT — PAIN - FUNCTIONAL ASSESSMENT: PAIN_FUNCTIONAL_ASSESSMENT: 0-10

## 2021-12-23 ASSESSMENT — ENCOUNTER SYMPTOMS: SHORTNESS OF BREATH: 1

## 2021-12-23 NOTE — H&P
Patient Name: Joann Palumbo  : 1950  MRN: [id number]  DATE: 21      ENDOSCOPY  History and Physical    Procedure:    [] Diagnostic Colonoscopy       [] Screening Colonoscopy  [x] EGD      [] ERCP      [] EUS       [] Other    [x] Previous office notes/History and Physical reviewed from the patients chart. Please see EMR for further details of HPI. I have examined the patient's status immediately prior to the procedure and:      Indications/HPI:    []Abdominal Pain  []Cancer- GI/Lung  []Fhx of colon CA/polyps  []History of Polyps  [][place]s   []Melena  []Abnormal Imaging  [x]Dysphagia    []Persistent Pneumonia  []Anemia  []Food Impaction  []History of Polyps  []GI Bleed  []Pulmonary nodule/Mass  []Change in bowel habits []Heartburn/Reflux  []Rectal Bleed (BRBPR)  []Chest Pain - Non Cardiac []Heme (+) Stoo  l[]Ulcers  []Constipation  []Hemoptysis   []Varices  []Diarrhea  []Hypoxemia  []Nausea/Vomiting  []Screening   []Crohns/Colitis  []Other:     Anesthesia:   [x] MAC [] Moderate Sedation   [] General   [] None     ROS: 12 pt Review of Symptoms was negative unless mentioned above    Medications:   Prior to Admission medications    Medication Sig Start Date End Date Taking?  Authorizing Provider   pravastatin (PRAVACHOL) 20 MG tablet Take 1 tablet by mouth daily 21  Yes Mingo Rodríguez MD   furosemide (LASIX) 40 MG tablet Take 40 mg by mouth as needed   Yes Historical Provider, MD   umeclidinium-vilanterol (ANORO ELLIPTA) 62.5-25 MCG/INH AEPB inhaler Inhale 1 puff into the lungs daily 21  Yes Marianna Adams MD   dilTIAZem (CARDIZEM CD) 240 MG extended release capsule TAKE 1 CAPSULE BY MOUTH EVERY DAY 21  Yes Clary Lyon MD   Calcium Carb-Cholecalciferol (CALCIUM + D3 PO) Take 1,200 ampules by mouth    Yes Historical Provider, MD   b complex vitamins tablet Take 1 tablet by mouth daily   Yes Historical Provider, MD   magnesium oxide (MAG-OX) 400 MG tablet Take 400 mg by mouth daily Yes Historical Provider, MD   Vitamin D (CHOLECALCIFEROL) 1000 UNITS CAPS capsule Take 2,000 Units by mouth daily    Yes Historical Provider, MD   OXYGEN Discontinue Oxygen , she is not using it . 12/14/21   Sujey Mcghee MD   potassium chloride (KLOR-CON M) 20 MEQ extended release tablet Take 20 mEq by mouth daily as needed    Historical Provider, MD   amiodarone (CORDARONE) 200 MG tablet TAKE 1 TABLET BY MOUTH TWICE WEEKLY 6/28/21   Mejia Mccarty MD   rivaroxaban (XARELTO) 20 MG TABS tablet TAKE 1 TABLET BY MOUTH EVERY DAY WITH BREAKFAST 6/28/21   Mejia Mccarty MD       Allergies: Allergies   Allergen Reactions    Aldactone [Spironolactone] Other (See Comments)     When taken daily,Leg weakness, was falling down        History of allergic reaction to anesthesia:  No    Past Medical History:  Past Medical History:   Diagnosis Date    Abnormal echocardiogram 2020    possible PFO?  further eval suggested    Breast cancer ([facility] [address] 02/04/2021    left    Cancer [facility])     left breast    COPD (chronic obstructive pulmonary disease) ([facility] [address] 2015    Dr Patti Arana Current use of long term anticoagulation     Cyst of neck 2012    after plastic surgery    Emphysema of lung ([facility] [address]     History of compression fracture of vertebral column 2020    T2, L1    History of humerus fracture     LEFT    History of left heart catheterization 03/2020    at [facility], normal LVEF with impaired relaxation, mild AV calcification and mild MV thickening, normal RV    History of sustained ventricular tachycardia 03/2020    had to wear a LifeVest, Dr Jil Martinez History of therapeutic radiation 2021    4 weeks    Hx antineoplastic chemo 2021    12 weeks    Hyperlipidemia LDL goal < 130     Intermittent atrial fibrillation [facility]) 2015    Dr Emiliano Pitts, Dr Tae Freeman, Dr Marla Mahoney Osteopenia 2014   Dakota Styles history of skin cancer     had area excised from nose    S/P colonoscopic polypectomy 2008, 2015, 2018    Dr Marcellus Self    Smoking history  Vitamin D deficiency        Past Surgical History:  Past Surgical History:   Procedure Laterality Date    ATRIAL ABLATION SURGERY      BREAST BIOPSY Left 2021    BREAST BIOPSY Left 2021    LEFT BREAST LUMPECTOMY AND  SENTINEL LYMPH NODE BIOPSY performed by Marilu Nixon MD at [address]    staph infection, postpartum    COLONOSCOPY  2015    w/polypectomy Dr Jo Ann Dimas    COLONOSCOPY N/A 10/18/2021    COLORECTAL CANCER SCREENING, HIGH RISK performed by Neelam Bernal MD at [facility]      Dr Luisa Ornelas; HI RISK IND N/A 2018    COLONOSCOPY performed by Annamarie Mendoza MD at [facility]       Social History:  Social History     Tobacco Use    Smoking status: Former Smoker     Packs/day: 1.00     Years: 50.00     Pack years: 50.00     Types: Cigarettes     Start date:      Quit date: 2015     Years since quittin.0    Smokeless tobacco: Never Used    Tobacco comment: pt will try smoking cessation   Vaping Use    Vaping Use: Never used   Substance Use Topics    Alcohol use: No    Drug use: No       Vital Signs:   Vitals:    21 1023   BP: 138/64   Pulse: 77   Resp: 18   Temp: 98.5 °F (36.9 °C)   SpO2: 93%        Physical Exam:  Cardiac:  [x]WNL  []Comments:  Pulmonary:  [x]WNL   []Comments:   Neuro/Mental Status:  [x]WNL  []Comments:  Abdominal:  [x]WNL    []Comments:  Other:   []WNL  []Comments:    Informed Consent:  The risks and benefits of the procedure have been discussed with either the patient or if they cannot consent, their representative. Assessment:  Patient examined and appropriate for planned sedation and procedure. Plan:  Proceed with planned sedation and procedure as above.     Neelam Bernal MD  10:36 AM

## 2021-12-23 NOTE — ANESTHESIA PRE PROCEDURE
Department of Anesthesiology  Preprocedure Note       Name:  Krystyna Gautam   Age:  66 y.o.  :  1950                                          MRN:  [id number]         Date:  2021      Surgeon: Jessie Mohs):  Apurva Reese MD    Procedure: Procedure(s):  EGD ESOPHAGOGASTRODUODENOSCOPY/ possible dilation    Medications prior to admission:   Prior to Admission medications    Medication Sig Start Date End Date Taking? Authorizing Provider   OXYGEN Discontinue Oxygen , she is not using it . 21   Carola Sweeney MD   pravastatin (PRAVACHOL) 20 MG tablet Take 1 tablet by mouth daily 21   Corina Murry MD   furosemide (LASIX) 40 MG tablet Take 40 mg by mouth as needed    Historical Provider, MD   potassium chloride (KLOR-CON M) 20 MEQ extended release tablet Take 20 mEq by mouth daily as needed    Historical Provider, MD   umeclidinium-vilanterol (ANORO ELLIPTA) 62.5-25 MCG/INH AEPB inhaler Inhale 1 puff into the lungs daily 21   Carola Sweeney MD   amiodarone (CORDARONE) 200 MG tablet TAKE 1 TABLET BY MOUTH TWICE WEEKLY 21   Dillan Espinoza MD   dilTIAZem (CARDIZEM CD) 240 MG extended release capsule TAKE 1 CAPSULE BY MOUTH EVERY DAY 21   Dillan Espinoza MD   rivaroxaban (XARELTO) 20 MG TABS tablet TAKE 1 TABLET BY MOUTH EVERY DAY WITH BREAKFAST 21   Dillan Espinoza MD   Calcium Carb-Cholecalciferol (CALCIUM + D3 PO) Take 1,200 ampules by mouth     Historical Provider, MD   b complex vitamins tablet Take 1 tablet by mouth daily    Historical Provider, MD   magnesium oxide (MAG-OX) 400 MG tablet Take 400 mg by mouth daily     Historical Provider, MD   Vitamin D (CHOLECALCIFEROL) 1000 UNITS CAPS capsule Take 2,000 Units by mouth daily     Historical Provider, MD       Current medications:    Current Facility-Administered Medications   Medication Dose Route Frequency Provider Last Rate Last Admin    sodium chloride 0.9 % infusion                Allergies:     Allergies anticoagulation     Cyst of neck     after plastic surgery    Emphysema of lung ([address]     History of compression fracture of vertebral column     T2, L1    History of humerus fracture     LEFT    History of left heart catheterization 2020    at [facility], normal LVEF with impaired relaxation, mild AV calcification and mild MV thickening, normal RV    History of sustained ventricular tachycardia 2020    had to wear a LifeVest, Dr Teddy Oakley History of therapeutic radiation     4 weeks    Hx antineoplastic chemo     12 weeks    Hyperlipidemia LDL goal < 130     Intermittent atrial fibrillation [facility])     Dr Dionne Morales, Dr Dany Tesfaye, Dr Jamison Median Osteopenia     Personal history of skin cancer     had area excised from nose    S/P colonoscopic polypectomy , ,     Dr Jojo Sosa    Smoking history     Vitamin D deficiency        Past Surgical History:        Procedure Laterality Date    ATRIAL ABLATION SURGERY      BREAST BIOPSY Left 2021    BREAST BIOPSY Left 2021    LEFT BREAST LUMPECTOMY AND  SENTINEL LYMPH NODE BIOPSY performed by Mayte Mcdonald MD at [address] [facility]. infection, postpartum    COLONOSCOPY  2015    w/polypectomy Dr Jojo Sosa    COLONOSCOPY N/A 10/18/2021    COLORECTAL CANCER SCREENING, HIGH RISK performed by Kiersten Moreno MD at [address]      Dr Abby Ramirez; HI RISK IND N/A 2018    COLONOSCOPY performed by Torres Ba MD at [address] [place] [address] History:    Social History     Tobacco Use    Smoking status: Former Smoker     Packs/day: 1.00     Years: 50.00     Pack years: 50.00     Types: Cigarettes     Start date:      Quit date: 2015     Years since quittin.0    Smokeless tobacco: Never Used    Tobacco comment: pt will try smoking cessation   Substance Use Topics    Alcohol use:  No                                Counseling given: Not Answered  Comment: pt will try smoking cessation      Vital Signs (Current): There were no vitals filed for this visit. BP Readings from Last 3 Encounters:   12/14/21 130/66   12/02/21 116/70   11/17/21 110/60       NPO Status:                          Time of last solid consumption: 2300                                                 Date of last solid food consumption: 12/22/21    BMI:   Wt Readings from Last 3 Encounters:   12/14/21 140 lb (63.5 kg)   12/02/21 137 lb 12.8 oz (62.5 kg)   12/01/21 141 lb (64 kg)     There is no height or weight on file to calculate BMI.    CBC:   Lab Results   Component Value Date    WBC 6.1 11/05/2021    RBC 4.91 11/05/2021    HGB 14.4 11/05/2021    HCT 43.5 11/05/2021    MCV 88.5 11/05/2021    RDW 14.7 11/05/2021     11/05/2021       CMP:   Lab Results   Component Value Date     11/09/2021    K 4.6 11/09/2021    K 4.0 05/21/2021     11/09/2021    CO2 26 11/09/2021    BUN 14 11/09/2021    CREATININE 1.09 11/09/2021    GFRAA 59.5 11/09/2021    LABGLOM 49.2 11/09/2021    GLUCOSE 82 11/09/2021    GLUCOSE 98 06/12/2020    PROT 7.5 11/09/2021    CALCIUM 10.1 12/13/2021    BILITOT 0.4 11/09/2021    ALKPHOS 96 11/09/2021    AST 23 11/09/2021    ALT 14 11/09/2021       POC Tests: No results for input(s): POCGLU, POCNA, POCK, POCCL, POCBUN, POCHEMO, POCHCT in the last 72 hours.     Coags:   Lab Results   Component Value Date    PROTIME 14.5 06/12/2020    INR 1.2 06/12/2020    APTT 34.0 12/22/2015       HCG (If Applicable): No results found for: PREGTESTUR, PREGSERUM, HCG, HCGQUANT     ABGs:   Lab Results   Component Value Date    PHART 7.33 03/13/2020    PO2ART 82 03/13/2020    IWV4EPW 41 03/13/2020    MDS4JRL 23.3 12/22/2015    BEART -2 12/22/2015    N0JMZWQQ 95 03/13/2020        Type & Screen (If Applicable):  No results found for: LABABO, LABRH    Drug/Infectious Status (If Applicable):  No results found for: HIV, HEPCAB    COVID-19 Screening (If Applicable):   Lab Results   Component Value Date    COVID19 Not Detected 12/16/2021           Anesthesia Evaluation  Patient summary reviewed and Nursing notes reviewed no history of anesthetic complications:   Airway: Mallampati: II  TM distance: >3 FB   Neck ROM: full  Mouth opening: > = 3 FB Dental: normal exam         Pulmonary:normal exam    (+) COPD:  shortness of breath:                             Cardiovascular:  Exercise tolerance: good (>4 METS),   (+) dysrhythmias: atrial fibrillation, CHF:,                   Neuro/Psych:   Negative Neuro/Psych ROS              GI/Hepatic/Renal: Neg GI/Hepatic/Renal ROS            Endo/Other: Negative Endo/Other ROS                    Abdominal:             Vascular: negative vascular ROS. Other Findings:             Anesthesia Plan      MAC     ASA 3       Induction: intravenous. Anesthetic plan and risks discussed with patient. Use of blood products discussed with patient whom. Plan discussed with CRNA.                   LOREN Pulido - JOSE   12/23/2021

## 2021-12-23 NOTE — ANESTHESIA POSTPROCEDURE EVALUATION
Department of Anesthesiology  Postprocedure Note    Patient: Krystyna Gautam  MRN: [id number]  YOB: 1950  Date of evaluation: 12/23/2021  Time:  11:09 AM     Procedure Summary     Date: 12/23/21 Room / Location: [address] [place] [address]    Anesthesia Start: 1626 Anesthesia Stop: 1109    Procedure: EGD ESOPHAGOGASTRODUODENOSCOPY WITH BIOPSIES (N/A ) Diagnosis: (dysphagia)    Surgeons: Apurva Reese MD Responsible Provider: LOREN Velasquez CRNA    Anesthesia Type: MAC ASA Status: 3          Anesthesia Type: MAC    Michael Phase I: Michael Score: 10    Michael Phase II:      Last vitals: Reviewed and per EMR flowsheets.        Anesthesia Post Evaluation    Patient location during evaluation: bedside  Patient participation: complete - patient participated  Level of consciousness: awake and awake and alert  Pain score: 0  Airway patency: patent  Nausea & Vomiting: no nausea and no vomiting  Complications: no  Cardiovascular status: blood pressure returned to baseline and hemodynamically stable  Respiratory status: acceptable and face mask  Hydration status: euvolemic

## 2021-12-27 NOTE — TELEPHONE ENCOUNTER
Requesting medication refill. Please approve or deny this request.    Rx requested:  Requested Prescriptions     Pending Prescriptions Disp Refills    potassium chloride (KLOR-CON M) 20 MEQ extended release tablet 90 tablet 3     Sig: Take 1 tablet by mouth daily         Last Office Visit:   9/2/2021      Next Visit Date:  Future Appointments   Date Time Provider Stewart Hernandez   1/14/2022  8:15 AM Kiersten Moreno MD [address]   1/17/2022  8:45 AM [place] [address]   1/20/2022  8:00 AM Radha Acharya MD [facility]   2/15/2022 10:00 AM [address] [facility] Connie Velasco   2/15/2022 10:30 [facility] [address] [facility]   2/25/2022  8:00 AM ROXANA PFT ROOM 1 [address] [facility]   3/1/2022  9:30 AM Mayte Mcdonald MD [facility]   4/14/2022  9:30 AM Roland Hollins, [address] [facility]   5/10/2022  9:30 AM [facility]               Last refill 09/10/2021. Please approve or deny.

## 2021-12-28 RX ORDER — POTASSIUM CHLORIDE 20 MEQ/1
20 TABLET, EXTENDED RELEASE ORAL DAILY
Qty: 90 TABLET | Refills: 3 | Status: SHIPPED | OUTPATIENT
Start: 2021-12-28 | End: 2022-10-31

## 2022-01-14 ENCOUNTER — OFFICE VISIT (OUTPATIENT)
Dept: GASTROENTEROLOGY | Age: 72
End: 2022-01-14
Payer: COMMERCIAL

## 2022-01-14 VITALS — HEIGHT: 62 IN | BODY MASS INDEX: 25.95 KG/M2 | WEIGHT: 141 LBS

## 2022-01-14 DIAGNOSIS — K21.00 GASTROESOPHAGEAL REFLUX DISEASE WITH ESOPHAGITIS WITHOUT HEMORRHAGE: Primary | ICD-10-CM

## 2022-01-14 PROCEDURE — 99213 OFFICE O/P EST LOW 20 MIN: CPT | Performed by: SPECIALIST

## 2022-01-14 ASSESSMENT — ENCOUNTER SYMPTOMS
RECTAL PAIN: 0
GASTROINTESTINAL NEGATIVE: 1
VOMITING: 0
EYES NEGATIVE: 1
NAUSEA: 0
RESPIRATORY NEGATIVE: 1
ABDOMINAL DISTENTION: 0
ABDOMINAL PAIN: 0
BLOOD IN STOOL: 0
CONSTIPATION: 0
DIARRHEA: 0
ANAL BLEEDING: 0

## 2022-01-14 NOTE — PROGRESS NOTES
Gastroenterology Clinic Follow up Visit    Kendra Adams  [id number]  Chief Complaint   Patient presents with    Follow-up     patient is here today for a follow up after an EGD 12/23       HPI and A/P at last visit summarized below:  Patient is here for follow-up, ED showed LA grade a erosive esophagitis and a small hiatal hernia. Patient is on omeprazole 20 g once a day. No dysphagia no pain    Review of Systems   Constitutional: Negative. HENT: Negative. Eyes: Negative. Respiratory: Negative. Cardiovascular: Negative. Gastrointestinal: Negative. Negative for abdominal distention, abdominal pain, anal bleeding, blood in stool, constipation, diarrhea, nausea, rectal pain and vomiting. No dysphagia. Endocrine: Negative. Genitourinary: Negative. Musculoskeletal: Negative. Skin: Negative. Allergic/Immunologic: Negative for food allergies. Neurological: Negative. Hematological: Negative. Psychiatric/Behavioral: Negative. Past medical history, past surgical history, medication list, social and familyhistory reviewed    Height 5' 2\" (1.575 m), weight 141 lb (64 kg), not currently breastfeeding. Physical Exam  Constitutional:       Appearance: She is well-developed. HENT:      Head: Normocephalic and atraumatic. Eyes:      Conjunctiva/sclera: Conjunctivae normal.      Pupils: Pupils are equal, round, and reactive to light. Cardiovascular:      Rate and Rhythm: Normal rate. Pulmonary:      Effort: Pulmonary effort is normal.   Abdominal:      General: Bowel sounds are normal.      Palpations: Abdomen is soft. Comments: Soft nontender no palpable mass   Musculoskeletal:         General: Normal range of motion. Cervical back: Normal range of motion. Skin:     General: Skin is warm. Neurological:      Mental Status: She is alert.          Laboratory, Pathology, Radiology reviewed in detail with relevantimportant investigations summarized below:    No results for input(s): WBC, HGB, HCT, MCV, PLT in the last 720 hours. Lab Results   Component Value Date    ALT 14 11/09/2021    AST 23 11/09/2021    ALKPHOS 96 11/09/2021    BILITOT 0.4 11/09/2021     No results found. Endoscopic investigations:     Assessment and Plan:  Brannonflorinda Fuentes 66 y.o. female for follow up. History of GERD controlled with omeprazole 2 mg once a day, dietary regulations and antireflux measures, return as needed   Diagnosis Orders   1. Gastroesophageal reflux disease with esophagitis without hemorrhage         Return if symptoms worsen or fail to improve. Elsie Lisa MD   StaffGastroenterologist  [facility]    Please note this report has been partially produced using speech recognitionsoftware  and may cause contain errors related to that system including grammar, punctuation and spelling as well as words andphrases that may seem inappropriate. If there are questions or concerns please feel free to contact me to clarify.

## 2022-01-20 ENCOUNTER — TELEPHONE (OUTPATIENT)
Dept: FAMILY MEDICINE CLINIC | Age: 72
End: 2022-01-20

## 2022-01-20 NOTE — TELEPHONE ENCOUNTER
----- Message from [place] sent at 1/20/2022  8:27 AM EST -----  Subject: Message to Provider    QUESTIONS  Information for Provider? Patient Luz Lomeli is calling to inform the   office she will no longer need the follow-up appointment today with Dr. Molly Wilhelm at 3:30p.  ---------------------------------------------------------------------------  --------------  [address]  What is the best way for the office to contact you? OK to leave message on   voicemail  Preferred Call Back Phone Number? [phone]  ---------------------------------------------------------------------------  --------------  SCRIPT ANSWERS  Relationship to Patient?  Self

## 2022-01-25 RX ORDER — FUROSEMIDE 40 MG/1
40 TABLET ORAL DAILY PRN
Qty: 30 TABLET | Refills: 1 | Status: SHIPPED | OUTPATIENT
Start: 2022-01-25 | End: 2022-02-11 | Stop reason: SDUPTHER

## 2022-02-11 ENCOUNTER — OFFICE VISIT (OUTPATIENT)
Dept: CARDIOLOGY CLINIC | Age: 72
End: 2022-02-11
Payer: COMMERCIAL

## 2022-02-11 VITALS
DIASTOLIC BLOOD PRESSURE: 82 MMHG | BODY MASS INDEX: 25.61 KG/M2 | WEIGHT: 140 LBS | SYSTOLIC BLOOD PRESSURE: 140 MMHG | HEART RATE: 90 BPM

## 2022-02-11 DIAGNOSIS — I48.91 ATRIAL FIBRILLATION, UNSPECIFIED TYPE (HCC): Primary | ICD-10-CM

## 2022-02-11 DIAGNOSIS — E78.5 HYPERLIPIDEMIA WITH TARGET LDL LESS THAN 130: ICD-10-CM

## 2022-02-11 DIAGNOSIS — Z72.0 TOBACCO ABUSE: ICD-10-CM

## 2022-02-11 DIAGNOSIS — R06.02 SHORTNESS OF BREATH: ICD-10-CM

## 2022-02-11 DIAGNOSIS — E66.3 OVERWEIGHT (BMI 25.0-29.9): ICD-10-CM

## 2022-02-11 PROBLEM — I31.8 PERICARDIAL CALCIFICATION: Status: RESOLVED | Noted: 2020-03-23 | Resolved: 2022-02-11

## 2022-02-11 PROCEDURE — 93000 ELECTROCARDIOGRAM COMPLETE: CPT | Performed by: INTERNAL MEDICINE

## 2022-02-11 PROCEDURE — 99214 OFFICE O/P EST MOD 30 MIN: CPT | Performed by: INTERNAL MEDICINE

## 2022-02-11 RX ORDER — FUROSEMIDE 40 MG/1
40 TABLET ORAL DAILY PRN
Qty: 90 TABLET | Refills: 3 | Status: SHIPPED | OUTPATIENT
Start: 2022-02-11

## 2022-02-11 NOTE — PROGRESS NOTES
Chief Complaint   Patient presents with    Atrial Fibrillation       2-11-22: Patient presents for initial medical evaluation. Patient is followed on a regular basis by Dr. Kristy Guillen MD. Hx of Pafib. On DOAC. On Amiodarone 2x/week now, has been on Amiodarone for more than 2 yrs. Pt denies chest pain, dyspnea, dyspnea on exertion, change in exercise capacity, fatigue,  nausea, vomiting, diarrhea, constipation, motor weakness, insomnia, weight loss, syncope, dizziness, lightheadedness, palpitations, PND, orthopnea, or claudication. Her mother is dying in ICU. Does have COPD, following with [place]. Quit smoking 6 yrs ago. TSH is ok. Lipid profile is abnormal. Hx of breast cancer s/p chemo and radiation. Did have LE symptoms with chemo and statins. Hx of negative stress test in 2019. Echo in 2019 with EF of 60%, mild LVH, grade I DD, mild MR.   EKG with NSR no ischemia. Patient Active Problem List   Diagnosis    Hyperlipidemia with target LDL less than 130    Vitamin D deficiency    Atrial fibrillation ([facility]    Pulmonary emphysema (HCC)    Bilateral edema of lower extremity    Other plastic surgery for unacceptable cosmetic appearance    Tobacco abuse    Fatigue    Chronic obstructive pulmonary disease (HCC)    Acute bronchitis    Hypoxemia    Shortness of breath    Fracture of humerus, proximal    Benign neoplasm of sigmoid colon    Other hemorrhoids    Hx of colonic polyps    Family history of colon cancer    Osteopenia of necks of both femurs    Congestive heart failure (HCC)    Bilateral pulmonary infiltrates on CXR    Pericardial calcification    Pericardial effusion    Pleural effusion    Atelectasis, left    Abnormal mammogram of left breast    Left breast mass    Overweight (BMI 25.0-29. 9)    Malignant neoplasm of upper-inner quadrant of left breast in female, estrogen receptor negative (HCC)    Neck nodule    Hemoptysis    Eczema    Dysphonia    Hypoxia  Acute respiratory failure with hypoxia (HCC)    Radiation pneumonitis (HCC)    Colitis    Vaccination side effects, initial encounter    Lump of skin of right upper extremity    Esophagitis    Esophageal dysphagia       Past Surgical History:   Procedure Laterality Date    ATRIAL ABLATION SURGERY      BREAST BIOPSY Left 2021    BREAST BIOPSY Left 2021    LEFT BREAST LUMPECTOMY AND  SENTINEL LYMPH NODE BIOPSY performed by Rosanne Knox MD at [address]    staph infection, postpartum    COLONOSCOPY  2015    w/polypectomy Dr Zamora Brought    COLONOSCOPY N/A 10/18/2021    COLORECTAL CANCER SCREENING, HIGH RISK performed by Justa Briseno MD at [address] [place] [address]      Dr Brenda Mckoy; HI RISK IND N/A 2018    COLONOSCOPY performed by Scott De Leon MD at [address] [place] [place] [address] 2021    EGD ESOPHAGOGASTRODUODENOSCOPY WITH BIOPSIES performed by Justa Briseno MD at [facility] Marital status:      Spouse name: Not on file    Number of children: 2    Years of education: Not on file    Highest education level: Not on file   Occupational History    Occupation: [employer], retired   Tobacco Use    Smoking status: Former Smoker     Packs/day: 1.00     Years: 50.00     Pack years: 50.00     Types: Cigarettes     Start date:      Quit date: 2015     Years since quittin.1    Smokeless tobacco: Never Used    Tobacco comment: pt will try smoking cessation   Vaping Use    Vaping Use: Never used   Substance and Sexual Activity    Alcohol use: No    Drug use: No    Sexual activity: Not on file   Other Topics Concern    Not on file   Social History Narrative    Born in [place], one brother in [place], keeps in touch    , 2 children, one son in [place], one daughter in [place]    5 grandchildren     Lives in a house in [place] with spouse     Worked for [employer] and full time at [employer], [employer], fashion, family life     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      Social Determinants of Health     Financial Resource Strain: Low Risk     Difficulty of Paying Living Expenses: Not hard at all   Food Insecurity: No Food Insecurity    Worried About Running Out of Food in the Last Year: Never true    [place] of Food in the Last Year: Never true   Transportation Needs: No Transportation Needs    Lack of Transportation (Medical): No    Lack of Transportation (Non-Medical):  No   Physical Activity:     Days of Exercise per Week: Not on file    Minutes of Exercise per Session: Not on file   Stress:     Feeling of Stress : Not on file   Social Connections:     Frequency of Communication with Friends and Family: Not on file    Frequency of Social Gatherings with Friends and Family: Not on file    Attends Denominational Services: Not on file    Active Member of [address] [place] [address] [employer] or Organizations: Not on file    Attends Club or Organization Meetings: Not on file    Marital Status: Not on file   Intimate Partner Violence:     Fear of Current or Ex-Partner: Not on file    Emotionally Abused: Not on file    Physically Abused: Not on file    Sexually Abused: Not on file   Housing Stability:     Unable to Pay for Housing in the Last Year: Not on file    Number of [place] in the Last Year: Not on file    Unstable Housing in the Last Year: Not on file       Family History   Problem Relation Age of Onset    Osteoarthritis Mother     Osteoporosis Mother     Cancer Mother         anorectal     Colon Cancer Mother        Current Outpatient Medications   Medication Sig Dispense Refill    furosemide (LASIX) 40 MG tablet Take 1 tablet by mouth daily as needed (for LE swelling) 90 tablet 3    potassium chloride (KLOR-CON M) 20 MEQ extended release tablet Take 1 tablet by mouth daily 90 tablet 3    pravastatin (PRAVACHOL) 20 MG tablet Take 1 tablet by mouth daily 90 tablet 3    umeclidinium-vilanterol (ANORO ELLIPTA) 62.5-25 MCG/INH AEPB inhaler Inhale 1 puff into the lungs daily 1 each 3    amiodarone (CORDARONE) 200 MG tablet TAKE 1 TABLET BY MOUTH TWICE WEEKLY 180 tablet 3    dilTIAZem (CARDIZEM CD) 240 MG extended release capsule TAKE 1 CAPSULE BY MOUTH EVERY DAY 90 capsule 3    rivaroxaban (XARELTO) 20 MG TABS tablet TAKE 1 TABLET BY MOUTH EVERY DAY WITH BREAKFAST 90 tablet 3    Calcium Carb-Cholecalciferol (CALCIUM + D3 PO) Take 1,200 ampules by mouth       b complex vitamins tablet Take 1 tablet by mouth daily      magnesium oxide (MAG-OX) 400 MG tablet Take 400 mg by mouth daily       Vitamin D (CHOLECALCIFEROL) 1000 UNITS CAPS capsule Take 2,000 Units by mouth daily        No current facility-administered medications for this visit. Aldactone [spironolactone]    Review of Systems:  General ROS: negative  Psychological ROS: negative  Hematological and Lymphatic ROS: No history of blood clots or bleeding disorder. Respiratory ROS: no cough, shortness of breath, or wheezing  Cardiovascular ROS: no chest pain or dyspnea on exertion  Gastrointestinal ROS: no abdominal pain, change in bowel habits, or black or bloody stools  Genito-Urinary ROS: no dysuria, trouble voiding, or hematuria  Musculoskeletal ROS: negative  Neurological ROS: no TIA or stroke symptoms  Dermatological ROS: negative    VITALS:  Blood pressure (!) 140/82, pulse 90, weight 140 lb (63.5 kg), not currently breastfeeding. Body mass index is 25.61 kg/m². Physical Examination:  General appearance - alert, well appearing, and in no distress  Mental status - alert, oriented to person, place, and time  Neck - Neck is supple, no JVD or carotid bruits. No thyromegaly or adenopathy.    Chest - clear to auscultation, no wheezes, rales or rhonchi, symmetric air entry  Heart - normal rate, regular rhythm, normal S1, S2, no murmurs, rubs, clicks or gallops  Abdomen - soft, nontender, nondistended, no masses or organomegaly  Neurological - alert, oriented, normal speech, no focal findings or movement disorder noted  Extremities - peripheral pulses normal, no pedal edema, no clubbing or cyanosis  Skin - normal coloration and turgor, no rashes, no suspicious skin lesions noted        Pulses:   Carotid pulses are 3+ on the right side, and 3+ on the left side. Radial pulses are 3+ on the right side, and 3+ on the left side. Femoral pulses are 3+ on the right side, and 3+ on the left side. Popliteal pulses are 3+ on the right side, and 3+ on the left side. EKG: normal sinus rhythm, nonspecific ST and T waves changes    Orders Placed This Encounter   Procedures    EKG 12 Lead       ASSESSMENT:     Diagnosis Orders   1. Atrial fibrillation, unspecified type (HCC)  EKG 12 Lead   2. Hyperlipidemia with target LDL less than 130     3. Shortness of breath     4. Tobacco abuse           PLAN:         As always, aggressive risk factor modification is strongly recommended. We should adhere to the JNC VIII guidelines for HTN management and the NCEP ATP III guidelines for LDL-C management. Cardiac diet is always recommended with low fat, cholesterol, calories and sodium. Continue medications at current doses. Take pravachol on a daily basis. If not controlled then consider Repatha    Will consider switching amiodarone to SOtalol in near future given long term side affects of amiodarone    Cont with DOAC. Monitor H/H. Check EKG    Patient was advised and encouraged to check blood pressure at home or at a pharmacy, maintain a logbook, and also call us back if blood pressure are above the target ranges or if it is low. Patient clearly understands and agrees to the instructions. We will need to continue to monitor muscle and liver enzymes, BUN, CR, and electrolytes.     Follow up with Pulmonary

## 2022-02-14 RX ORDER — OMEPRAZOLE 20 MG/1
CAPSULE, DELAYED RELEASE ORAL
Qty: 60 CAPSULE | Refills: 1 | Status: SHIPPED | OUTPATIENT
Start: 2022-02-14 | End: 2022-02-16

## 2022-02-15 ENCOUNTER — HOSPITAL ENCOUNTER (OUTPATIENT)
Dept: WOMENS IMAGING | Age: 72
Discharge: HOME | End: 2022-02-17
Payer: COMMERCIAL

## 2022-02-15 ENCOUNTER — HOSPITAL ENCOUNTER (OUTPATIENT)
Dept: ULTRASOUND IMAGING | Age: 72
Discharge: HOME | End: 2022-02-17
Payer: COMMERCIAL

## 2022-02-15 VITALS — BODY MASS INDEX: 25.61 KG/M2 | HEIGHT: 62 IN

## 2022-02-15 DIAGNOSIS — C50.212 MALIGNANT NEOPLASM OF UPPER-INNER QUADRANT OF LEFT BREAST IN FEMALE, ESTROGEN RECEPTOR NEGATIVE (HCC): ICD-10-CM

## 2022-02-15 DIAGNOSIS — Z17.1 MALIGNANT NEOPLASM OF UPPER-INNER QUADRANT OF LEFT BREAST IN FEMALE, ESTROGEN RECEPTOR NEGATIVE (HCC): ICD-10-CM

## 2022-02-15 DIAGNOSIS — N64.9 BREAST DISORDER: ICD-10-CM

## 2022-02-15 PROCEDURE — G0279 TOMOSYNTHESIS, MAMMO: HCPCS

## 2022-02-15 PROCEDURE — 76642 ULTRASOUND BREAST LIMITED: CPT

## 2022-02-15 RX ORDER — LIDOCAINE HYDROCHLORIDE AND EPINEPHRINE 10; 10 MG/ML; UG/ML
10 INJECTION, SOLUTION INFILTRATION; PERINEURAL ONCE
Status: CANCELLED | OUTPATIENT
Start: 2022-02-16

## 2022-02-16 ENCOUNTER — OFFICE VISIT (OUTPATIENT)
Dept: SURGERY | Age: 72
End: 2022-02-16
Payer: COMMERCIAL

## 2022-02-16 VITALS
RESPIRATION RATE: 16 BRPM | TEMPERATURE: 98.1 F | WEIGHT: 138.2 LBS | HEIGHT: 62 IN | DIASTOLIC BLOOD PRESSURE: 67 MMHG | BODY MASS INDEX: 25.43 KG/M2 | SYSTOLIC BLOOD PRESSURE: 141 MMHG | HEART RATE: 81 BPM

## 2022-02-16 DIAGNOSIS — C50.212 MALIGNANT NEOPLASM OF UPPER-INNER QUADRANT OF LEFT BREAST IN FEMALE, ESTROGEN RECEPTOR NEGATIVE (HCC): Primary | ICD-10-CM

## 2022-02-16 DIAGNOSIS — R92.8 ABNORMAL MAMMOGRAM OF LEFT BREAST: ICD-10-CM

## 2022-02-16 DIAGNOSIS — Z17.1 MALIGNANT NEOPLASM OF UPPER-INNER QUADRANT OF LEFT BREAST IN FEMALE, ESTROGEN RECEPTOR NEGATIVE (HCC): Primary | ICD-10-CM

## 2022-02-16 DIAGNOSIS — E66.3 OVERWEIGHT (BMI 25.0-29.9): ICD-10-CM

## 2022-02-16 PROCEDURE — 99213 OFFICE O/P EST LOW 20 MIN: CPT | Performed by: SURGERY

## 2022-02-16 ASSESSMENT — ENCOUNTER SYMPTOMS
COUGH: 0
COLOR CHANGE: 0
CHEST TIGHTNESS: 0
ABDOMINAL PAIN: 0
SHORTNESS OF BREATH: 0
SORE THROAT: 0
VOMITING: 0
NAUSEA: 0

## 2022-02-16 NOTE — PROGRESS NOTES
PATIENT:    Yeimi Rankin     DATE:      2/16/2022     TIME: 2:04 PM     Subjective:     Yeimi Rankin presents for follow-up of breast cancer. Recent mamm saw a category 4 on the left    The breast cancer is Cancer Staging  Malignant neoplasm of upper-inner quadrant of left breast in female, estrogen receptor negative ([facility] [address]  Staging form: Breast, AJCC 8th Edition  - Clinical stage from 1/26/2021: Stage IB (cT1, cN0, cM0, G3, ER-, OH-, HER2-) - Signed by Luiz Hou MD on 2/8/2021     She does perform self breast exams. She denies breast pain, masses, skin changes, nipple discharge, nipple retraction, or recent breast trauma bilaterally. Patient's medications, allergies, past medical, surgical, social and family histories were reviewed and updated as appropriate.     Current Outpatient Medications on File Prior to Visit   Medication Sig Dispense Refill    furosemide (LASIX) 40 MG tablet Take 1 tablet by mouth daily as needed (for LE swelling) 90 tablet 3    potassium chloride (KLOR-CON M) 20 MEQ extended release tablet Take 1 tablet by mouth daily 90 tablet 3    pravastatin (PRAVACHOL) 20 MG tablet Take 1 tablet by mouth daily 90 tablet 3    umeclidinium-vilanterol (ANORO ELLIPTA) 62.5-25 MCG/INH AEPB inhaler Inhale 1 puff into the lungs daily 1 each 3    amiodarone (CORDARONE) 200 MG tablet TAKE 1 TABLET BY MOUTH TWICE WEEKLY 180 tablet 3    dilTIAZem (CARDIZEM CD) 240 MG extended release capsule TAKE 1 CAPSULE BY MOUTH EVERY DAY 90 capsule 3    rivaroxaban (XARELTO) 20 MG TABS tablet TAKE 1 TABLET BY MOUTH EVERY DAY WITH BREAKFAST 90 tablet 3    Calcium Carb-Cholecalciferol (CALCIUM + D3 PO) Take 1,200 ampules by mouth       b complex vitamins tablet Take 1 tablet by mouth daily      magnesium oxide (MAG-OX) 400 MG tablet Take 400 mg by mouth daily       Vitamin D (CHOLECALCIFEROL) 1000 UNITS CAPS capsule Take 2,000 Units by mouth daily        No current facility-administered medications on file prior to visit. Any over the counter meds / herbal supplements / vitamins: yes    Review of Systems  Review of Systems   Constitutional: Negative for activity change, appetite change, chills, diaphoresis, fatigue, fever and unexpected weight change. HENT: Negative for congestion, ear pain, hearing loss, mouth sores, nosebleeds and sore throat. Respiratory: Negative for cough, chest tightness and shortness of breath. Cardiovascular: Negative for chest pain, palpitations and leg swelling. Gastrointestinal: Negative for abdominal pain, nausea and vomiting. Endocrine: Negative for cold intolerance, heat intolerance, polydipsia, polyphagia and polyuria. Genitourinary: Negative for difficulty urinating, menstrual problem and vaginal bleeding. Musculoskeletal: Negative for neck pain and neck stiffness. Skin: Negative for color change, pallor, rash and wound. Allergic/Immunologic: Negative for environmental allergies and immunocompromised state. Neurological: Negative for weakness. Hematological: Does not bruise/bleed easily. Psychiatric/Behavioral: Negative for agitation, confusion, sleep disturbance and suicidal ideas. The patient is not nervous/anxious. Objective:     Vitals:    02/16/22 1339 02/16/22 1343   BP: (!) 144/65 (!) 141/67   Site: Left Upper Arm    Pulse: 81    Resp: 16    Temp: 98.1 °F (36.7 °C)    Weight: 138 lb 3.2 oz (62.7 kg)    Height: 5' 2\" (1.575 m)       Physical Exam  Vitals reviewed. Constitutional:       Appearance: Normal appearance. She is well-developed. HENT:      Head: Normocephalic and atraumatic. Nose: Nose normal.   Eyes:      Conjunctiva/sclera: Conjunctivae normal.      Right eye: No hemorrhage. Left eye: No hemorrhage. Cardiovascular:      Rate and Rhythm: Normal rate. Pulmonary:      Effort: Pulmonary effort is normal. No respiratory distress.    Chest:   Breasts:      Breasts are asymmetrical (left smaller than right from previous surgeries). Right: Skin change (radiation skin changes) present. No inverted nipple, mass, nipple discharge, tenderness, axillary adenopathy or supraclavicular adenopathy. Left: No inverted nipple, mass, nipple discharge, skin change, tenderness, axillary adenopathy or supraclavicular adenopathy. Musculoskeletal:         General: Normal range of motion. Cervical back: Normal range of motion and neck supple. Comments: Normal Range of motion in upper and lower extremities. Lymphadenopathy:      Cervical: No cervical adenopathy. Right cervical: No superficial, deep or posterior cervical adenopathy. Left cervical: No superficial, deep or posterior cervical adenopathy. Upper Body:      Right upper body: No supraclavicular, axillary or pectoral adenopathy. Left upper body: No supraclavicular, axillary or pectoral adenopathy. Skin:     General: Skin is warm and dry. Findings: No abrasion, bruising, erythema or lesion. Neurological:      Mental Status: She is alert and oriented to person, place, and time. She is not disoriented. Psychiatric:         Speech: Speech normal.         Behavior: Behavior normal. Behavior is cooperative. Thought Content: Thought content normal.         Judgment: Judgment normal.               IMAGING:     US BREAST LIMITED LEFT    Result Date: 2/15/2022   BILATERAL DIGITAL DIAGNOSTIC MAMMOGRAM: COMPARISONS:  Dating back to 12/14/2018 CLINICAL HISTORY:  History of left breast cancer with lumpectomy, radiation and chemotherapy 2/21. Follow-up exam. FINDINGS: Routine two view mammography was performed and compared to previous study. 3-D Tomosynthesis images were obtained. Spot compression views of the right and left breast were obtained. There are scattered fibroglandular densities. No suspicious  mass or calcifications within  the right breast. No evidence of skin thickening.  Left breast: Postoperative changes with clips within the central left breast. There is some retraction of the left nipple after surgery. There is increased density at the surgical site most likely secondary to scar tissue. Within the lateral aspect of the breast at posterior depth there is a small asymmetric density with mild architectural distortion. This is new since prior exam. A focused sonogram was obtained of this area. At the 3:00 position on sonogram and 7 cm from the nipple there is a small irregular hypoechoic lesion with posterior acoustic shadowing that measures 0.4 x 0.4 x 0.2 cm. This is best visualized on the cine images. This is suspicious for neoplasm and biopsy is recommended. CAD analysis was performed and used in the interpretation. LEFT BREAST: BI-RADS 4: SUSPICIOUS FINDING--BIOPSY SHOULD BE CONSIDERED. BREAST DENSITY: SCATTERED FIBROGLANDULAR DENSITIES. RIGHT BREAST: BI-RADS 2: BENIGN FINDINGS. RESULTS WERE DISCUSSED WITH THE PATIENT. Board Certified Radiologists. Accredited by the ACR and FDA. MAMMOGRAPHY IS VERY IMPORTANT TO YOUR HEALTH. THE AMERICAN CANCER SOCIETY GUIDELINES RECOMMEND THAT WOMEN 36YEARS OF AGE AND OLDER SHOULD HAVE A MAMMOGRAM EVERY YEAR. A REMINDER LETTER WILL BE SENT AT THE APPROPRIATE TIME.      [facility] DIGITAL DIAGNOSTIC BILATERAL    Result Date: 2/15/2022   BILATERAL DIGITAL DIAGNOSTIC MAMMOGRAM: COMPARISONS:  Dating back to 12/14/2018 CLINICAL HISTORY:  History of left breast cancer with lumpectomy, radiation and chemotherapy 2/21. Follow-up exam. FINDINGS: Routine two view mammography was performed and compared to previous study. 3-D Tomosynthesis images were obtained. Spot compression views of the right and left breast were obtained. There are scattered fibroglandular densities. No suspicious  mass or calcifications within  the right breast. No evidence of skin thickening.  Left breast: Postoperative changes with clips within the central left breast. There is some retraction of the left nipple after surgery. There is increased density at the surgical site most likely secondary to scar tissue. Within the lateral aspect of the breast at posterior depth there is a small asymmetric density with mild architectural distortion. This is new since prior exam. A focused sonogram was obtained of this area. At the 3:00 position on sonogram and 7 cm from the nipple there is a small irregular hypoechoic lesion with posterior acoustic shadowing that measures 0.4 x 0.4 x 0.2 cm. This is best visualized on the cine images. This is suspicious for neoplasm and biopsy is recommended. CAD analysis was performed and used in the interpretation. LEFT BREAST: BI-RADS 4: SUSPICIOUS FINDING--BIOPSY SHOULD BE CONSIDERED. BREAST DENSITY: SCATTERED FIBROGLANDULAR DENSITIES. RIGHT BREAST: BI-RADS 2: BENIGN FINDINGS. RESULTS WERE DISCUSSED WITH THE PATIENT. Board Certified Radiologists. Accredited by the ACR and FDA. MAMMOGRAPHY IS VERY IMPORTANT TO YOUR HEALTH. THE AMERICAN CANCER SOCIETY GUIDELINES RECOMMEND THAT WOMEN 36YEARS OF AGE AND OLDER SHOULD HAVE A MAMMOGRAM EVERY YEAR. A REMINDER LETTER WILL BE SENT AT THE APPROPRIATE TIME. Assessment:       ICD-10-CM    1. Malignant neoplasm of upper-inner quadrant of left breast in female, estrogen receptor [facility]  C50.212     Z17.1    2. Abnormal mammogram of left breast  R92.8 US BREAST BIOPSY W LOC DEVICE 1ST LESION LEFT   3. Overweight (BMI 25.0-29. 9)  E66.3          Plan:     I was not able to visualize this area on office ultrasound, therefore, I will refer her to Radiology for a biopsy. I will call her with the results. She had the opportunity to ask questions and all were answered to her satisfaction. Total face to face time was 20 minutes with greater than 50% spent on counseling the patient or coordinating her care.      Dictated by Rosa Elena Carter MD 2/16/2022 2:04 PM       This note was partially generated using Dragon voice recognition system, and there may be some incorrect words, spellings, punctuation that were not noticed in checking the note before saving.

## 2022-02-16 NOTE — PATIENT INSTRUCTIONS
Patient Education        Core Needle Breast Biopsy: About This Test  What is it? A core needle breast biopsy removes samples of breast tissue using a needle with a special tip. The samples are looked at under a microscope to check for cancer cells. Why is this test done? A core needle breast biopsy is most often done to check a lump found during a breast exam or a suspicious area found on a mammogram or other imaging. How do you prepare for the test?  If you take aspirin or some other blood thinner, ask your doctor if you should stop taking it before your test. Make sure that you understand exactly what your doctor wants you to do. These medicines increase the risk of bleeding. How is the test done? · You may sit in a chair or lie face up on a table. Or you may lie on your stomach on a table that has a hole for your breast to hang through. · When the area in your breast is numb, a small cut (incision) is made in the skin. · Using imaging, the doctor will guide the needle into the biopsy area. · The doctor will take several samples of breast tissue. This may be done with the needle or with a probe that uses a gentle vacuum to remove the samples. · A small clip is usually inserted into your breast to beny the biopsy site. · The needle is removed and pressure is put on the needle site to stop any bleeding. · A bandage is put on the needle site. How long does the test take? The test may take 15 minutes to 60 minutes, depending on how the procedure is done. What happens after the test?  · You'll be told how long it may take to get your results back. · You will probably be able to go home right away. · After a specialist looks at the biopsy sample for signs of cancer, your doctor's office will let you know the results. · If the test results aren't clear, you may have another biopsy or test.  How can you care for yourself at home? · You can go back to your usual activities right away.  But avoid heavy lifting for 24 hours. · The site may be tender for 2 or 3 days. You may also have some bruising, swelling, or slight bleeding. ? You can use an ice pack. Put ice or a cold pack on the area for 10 to 20 minutes at a time. Put a thin cloth between the ice and your skin. ? Ask your doctor if you can take an over-the-counter pain medicine, such as acetaminophen (Tylenol), ibuprofen (Advil, Motrin), or naproxen (Aleve). Be safe with medicines. Read and follow all instructions on the label. Follow-up care is a key part of your treatment and safety. Be sure to make and go to all appointments, and call your doctor if you are having problems. It's also a good idea to keep a list of the medicines you take. Ask your doctor when you can expect to have your test results. Where can you learn more? Go to [email or web address] org and sign in to your [employer] account. Enter Y286 in the Collusion box to learn more about \"Core Needle Breast Biopsy: About This Test.\"     If you do not have an account, please click on the \"Sign Up Now\" link. Current as of: September 8, 2021               Content Version: 13.1  © 2006-2021 Healthwise, Incorporated. Care instructions adapted under license by [facility] ([facility]). If you have questions about a medical condition or this instruction, always ask your healthcare professional. [place] [address] any warranty or liability for your use of this information.

## 2022-02-23 ENCOUNTER — HOSPITAL ENCOUNTER (OUTPATIENT)
Dept: ULTRASOUND IMAGING | Age: 72
Discharge: HOME | End: 2022-02-25
Payer: COMMERCIAL

## 2022-02-23 ENCOUNTER — HOSPITAL ENCOUNTER (OUTPATIENT)
Dept: WOMENS IMAGING | Age: 72
Discharge: HOME | End: 2022-02-25
Payer: COMMERCIAL

## 2022-02-23 VITALS — HEART RATE: 76 BPM | SYSTOLIC BLOOD PRESSURE: 136 MMHG | RESPIRATION RATE: 20 BRPM | DIASTOLIC BLOOD PRESSURE: 83 MMHG

## 2022-02-23 DIAGNOSIS — R92.8 ABNORMAL MAMMOGRAM OF LEFT BREAST: ICD-10-CM

## 2022-02-23 PROCEDURE — 19083 BX BREAST 1ST LESION US IMAG: CPT

## 2022-02-23 PROCEDURE — 2580000003 HC RX 258: Performed by: RADIOLOGY

## 2022-02-23 PROCEDURE — A4217 STERILE WATER/SALINE, 500 ML: HCPCS | Performed by: RADIOLOGY

## 2022-02-23 PROCEDURE — 77065 DX MAMMO INCL CAD UNI: CPT

## 2022-02-23 PROCEDURE — 88305 TISSUE EXAM BY PATHOLOGIST: CPT

## 2022-02-23 PROCEDURE — 2500000003 HC RX 250 WO HCPCS: Performed by: RADIOLOGY

## 2022-02-23 RX ORDER — LIDOCAINE HYDROCHLORIDE 20 MG/ML
20 INJECTION, SOLUTION INFILTRATION; PERINEURAL ONCE
Status: COMPLETED | OUTPATIENT
Start: 2022-02-23 | End: 2022-02-23

## 2022-02-23 RX ORDER — MAGNESIUM HYDROXIDE 1200 MG/15ML
250 LIQUID ORAL CONTINUOUS
Status: DISCONTINUED | OUTPATIENT
Start: 2022-02-23 | End: 2022-02-26 | Stop reason: HOSPADM

## 2022-02-23 RX ORDER — LIDOCAINE HYDROCHLORIDE 10 MG/ML
30 INJECTION, SOLUTION INFILTRATION; PERINEURAL ONCE
Status: DISCONTINUED | OUTPATIENT
Start: 2022-02-23 | End: 2022-02-23 | Stop reason: CLARIF

## 2022-02-23 RX ORDER — LIDOCAINE HYDROCHLORIDE AND EPINEPHRINE 10; 10 MG/ML; UG/ML
20 INJECTION, SOLUTION INFILTRATION; PERINEURAL ONCE
Status: COMPLETED | OUTPATIENT
Start: 2022-02-23 | End: 2022-02-23

## 2022-02-23 RX ADMIN — LIDOCAINE HYDROCHLORIDE,EPINEPHRINE BITARTRATE 2 ML: 10; .01 INJECTION, SOLUTION INFILTRATION; PERINEURAL at 08:55

## 2022-02-23 RX ADMIN — LIDOCAINE HYDROCHLORIDE 5 ML: 20 INJECTION, SOLUTION INFILTRATION; PERINEURAL at 08:58

## 2022-02-23 RX ADMIN — SODIUM CHLORIDE 250 ML: 900 IRRIGANT IRRIGATION at 10:09

## 2022-02-23 ASSESSMENT — PAIN SCALES - GENERAL
PAINLEVEL_OUTOF10: 0
PAINLEVEL_OUTOF10: 0

## 2022-02-23 NOTE — SEDATION DOCUMENTATION
8346 Patient arrived to [facility] with steady gait. 1437 Reviewed procedure with patient and patient verbalizes understanding. VS taken. Consent signed. Reviewed history, medications, and allergies. Ultrasound tech arrived to [facility] . Patient assisted into ultrasound room and onto ultrasound cart. Tech scanning patient. 2468 Dr. Sumit Herrera arrived talking with patient and looking at scans. Dr. Sumit Herrera scanning. Invasive procedure checklist completed. Area of concern located. 1329 Dr. Sumit Herrera marked area. He then cleaned area with chloraprep x 3. Sterile towels draped around area and sterile probe cover over probe. He then injected lido 2% into area of concern to numb area. Scanning he then injected lido 1% with epi deeper into tissue to numb area. Small incision madeusing scalpel. He then took samples of tissue using [facility] 0912-20 lot [id number] exp 09-. Patient tolerated well. Tissue out at   0858 and into formalin. Dr. Sumit Herrera [id number]  Exp 05-. Patient tolerated well. Compression held to site for 10 minutes. Steri strips, gauze, and tegaderm applied. 2562 Post bx mamm completed. 0945 Dressing clean, dry, and intact. Chest area wrapped with ace wrap with ice pack to site. Reviewed discharge instructions with patient and patient verbalizes understanding. VS taken. 200 Encouraged patient to call with any questions or concerns. Patient left [facility] with steady gait accompanied by . Delfino Adams

## 2022-02-25 ENCOUNTER — HOSPITAL ENCOUNTER (OUTPATIENT)
Dept: PULMONOLOGY | Age: 72
Discharge: HOME | End: 2022-02-25
Payer: COMMERCIAL

## 2022-02-25 DIAGNOSIS — Z79.899 NEED FOR PROPHYLACTIC CHEMOTHERAPY: ICD-10-CM

## 2022-02-25 PROCEDURE — 94726 PLETHYSMOGRAPHY LUNG VOLUMES: CPT

## 2022-02-25 PROCEDURE — 94729 DIFFUSING CAPACITY: CPT

## 2022-02-25 PROCEDURE — 6360000002 HC RX W HCPCS: Performed by: INTERNAL MEDICINE

## 2022-02-25 PROCEDURE — 94060 EVALUATION OF WHEEZING: CPT

## 2022-02-25 RX ORDER — ALBUTEROL SULFATE 2.5 MG/3ML
2.5 SOLUTION RESPIRATORY (INHALATION) ONCE
Status: COMPLETED | OUTPATIENT
Start: 2022-02-25 | End: 2022-02-25

## 2022-02-25 RX ADMIN — ALBUTEROL SULFATE 2.5 MG: 2.5 SOLUTION RESPIRATORY (INHALATION) at 08:18

## 2022-02-28 ENCOUNTER — TELEPHONE (OUTPATIENT)
Dept: OBGYN CLINIC | Age: 72
End: 2022-02-28

## 2022-02-28 DIAGNOSIS — J44.9 CHRONIC OBSTRUCTIVE PULMONARY DISEASE, UNSPECIFIED COPD TYPE (HCC): Primary | ICD-10-CM

## 2022-02-28 RX ORDER — UMECLIDINIUM BROMIDE AND VILANTEROL TRIFENATATE 62.5; 25 UG/1; UG/1
1 POWDER RESPIRATORY (INHALATION) DAILY
Qty: 1 EACH | Refills: 3 | Status: SHIPPED | OUTPATIENT
Start: 2022-02-28 | End: 2022-10-10

## 2022-02-28 NOTE — TELEPHONE ENCOUNTER
Rx requested:  Requested Prescriptions     Pending Prescriptions Disp Refills    umeclidinium-vilanterol (ANORO ELLIPTA) 62.5-25 MCG/INH AEPB inhaler 1 each 3     Sig: Inhale 1 puff into the lungs daily       Last Office Visit:   12/14/2021      Next Visit Date:  Future Appointments   Date Time Provider Stewart Hernandez   3/8/2022 11:00 AM Antwon Prado MD [facility] [place]   4/14/2022  9:30 AM Clay Barajas, [address] [facility] [facility]   5/10/2022  9:30 AM Tiana Machado  [place] [address]   6/10/2022  8:15 AM DO Kiera Whitley [facility] [place]   8/1/2022  7:30 AM [facility] ULTRASOUND ROOM [address] [facility]

## 2022-02-28 NOTE — TELEPHONE ENCOUNTER
I called the patient and notified her that her Left Breast Core Biopsy pathology results are benign and are concordant with Dr. Aimee Simpson findings. The patient verbalized understanding of her benign pathology results. I verified date/time/location of follow up appointment with the patient. The patient has no questions or concerns at this time.

## 2022-03-02 PROCEDURE — 94060 EVALUATION OF WHEEZING: CPT | Performed by: INTERNAL MEDICINE

## 2022-03-02 PROCEDURE — 94729 DIFFUSING CAPACITY: CPT | Performed by: INTERNAL MEDICINE

## 2022-03-02 PROCEDURE — 94726 PLETHYSMOGRAPHY LUNG VOLUMES: CPT | Performed by: INTERNAL MEDICINE

## 2022-03-02 NOTE — PROCEDURES
[address]                      [address], [address] [facility]                               PULMONARY FUNCTION    PATIENT NAME: Agustina Cesar                    :        1950  MED REC NO:   [id number]                            ROOM:  ACCOUNT NO:   [de-identified]                           ADMIT DATE: 2022  PROVIDER:     Steph Guillen MD    DATE OF PROCEDURE:  2022    REQUESTING PROVIDER:  Tri Leiva MD    INTERPRETING PROVIDER:  Mekhi Paul MD    REASON FOR STUDY:  Shortness of breath. INTERPRETATION:  FVC is 1.80, 67% of predicted; FEV1 is 0.94, 47% of  predicted; FEV1/FVC is 52%; FEF 25-75% is 3.36, 65% of predicted. Postbronchodilator study shows FVC is 1.83, 1% improvement; FEV1 is  1.01, 6% improvement. At midflow, FEF 25-75% is 0.47, 25% improvement. Lung volume study shows residual volume is 2.73, 127% of predicted. TLC  is 4.75, 99% of predicted. RV to TLC ratio is 57.62, 128% of predicted. Diffusion capacity is 10.32, 52% of predicted. Airway resistance is  2.74, 147% of predicted. SUMMARY:  Severe obstructive pulmonary disease. There is positive  response at [facility], though there is no significant improvement at [facility]  and FEV1. Static lung volume study suggests air trapping and  hyperinflation. Diffusion capacity moderately to severely impaired. Airway resistance is increased. Clinical correlation is requested.         Lamar Rodriguez MD    D: 2022 20:16:30       T: 2022 1:19:26     [email or web address]  Job#: [id number]     Doc#: [id number]    CC:

## 2022-03-08 ENCOUNTER — OFFICE VISIT (OUTPATIENT)
Dept: SURGERY | Age: 72
End: 2022-03-08
Payer: COMMERCIAL

## 2022-03-08 VITALS
WEIGHT: 142.6 LBS | RESPIRATION RATE: 12 BRPM | HEIGHT: 62 IN | OXYGEN SATURATION: 97 % | TEMPERATURE: 97 F | BODY MASS INDEX: 26.24 KG/M2 | DIASTOLIC BLOOD PRESSURE: 60 MMHG | SYSTOLIC BLOOD PRESSURE: 132 MMHG | HEART RATE: 69 BPM

## 2022-03-08 DIAGNOSIS — Z17.1 MALIGNANT NEOPLASM OF UPPER-INNER QUADRANT OF LEFT BREAST IN FEMALE, ESTROGEN RECEPTOR NEGATIVE (HCC): Primary | ICD-10-CM

## 2022-03-08 DIAGNOSIS — E66.3 OVERWEIGHT (BMI 25.0-29.9): ICD-10-CM

## 2022-03-08 DIAGNOSIS — N64.9 BREAST DISORDER: ICD-10-CM

## 2022-03-08 DIAGNOSIS — C50.212 MALIGNANT NEOPLASM OF UPPER-INNER QUADRANT OF LEFT BREAST IN FEMALE, ESTROGEN RECEPTOR NEGATIVE (HCC): Primary | ICD-10-CM

## 2022-03-08 PROCEDURE — 99213 OFFICE O/P EST LOW 20 MIN: CPT | Performed by: SURGERY

## 2022-03-08 NOTE — PROGRESS NOTES
PATIENT:    Jocelin Madrid     DATE:      3/8/2022     TIME: 10:49 AM     Subjective:     Jocelin Madrid presents for follow-up of breast cancer. She had an abnormal mammogram on the left breast recently and the biopsy was negative. The breast cancer is Cancer Staging  Malignant neoplasm of upper-inner quadrant of left breast in female, estrogen receptor negative ([address]  Staging form: Breast, AJCC 8th Edition  - Clinical stage from 1/26/2021: Stage IB (cT1, cN0, cM0, G3, ER-, NV-, HER2-) - Signed by Esther Dean MD on 2/8/2021    Patient's medications, allergies, past medical, surgical, social and family histories were reviewed and updated as appropriate. Current Outpatient Medications on File Prior to Visit   Medication Sig Dispense Refill    umeclidinium-vilanterol (ANORO ELLIPTA) 62.5-25 MCG/INH AEPB inhaler Inhale 1 puff into the lungs daily 1 each 3    potassium chloride (KLOR-CON M) 20 MEQ extended release tablet Take 1 tablet by mouth daily (Patient taking differently: Take 20 mEq by mouth daily as needed With Lasix) 90 tablet 3    furosemide (LASIX) 40 MG tablet Take 1 tablet by mouth daily as needed (for LE swelling) 90 tablet 3    pravastatin (PRAVACHOL) 20 MG tablet Take 1 tablet by mouth daily 90 tablet 3    amiodarone (CORDARONE) 200 MG tablet TAKE 1 TABLET BY MOUTH TWICE WEEKLY 180 tablet 3    dilTIAZem (CARDIZEM CD) 240 MG extended release capsule TAKE 1 CAPSULE BY MOUTH EVERY DAY 90 capsule 3    rivaroxaban (XARELTO) 20 MG TABS tablet TAKE 1 TABLET BY MOUTH EVERY DAY WITH BREAKFAST 90 tablet 3    Calcium Carb-Cholecalciferol (CALCIUM + D3 PO) Take 1,200 ampules by mouth       b complex vitamins tablet Take 1 tablet by mouth daily      magnesium oxide (MAG-OX) 400 MG tablet Take 400 mg by mouth daily       Vitamin D (CHOLECALCIFEROL) 1000 UNITS CAPS capsule Take 2,000 Units by mouth daily        No current facility-administered medications on file prior to visit.         Any over the counter meds / herbal supplements / vitamins: yes    Objective:     Vitals:    03/08/22 1037   BP: 132/60   Site: Right Upper Arm   Pulse: 69   Resp: 12   Temp: 97 °F (36.1 °C)   SpO2: 97%   Weight: 142 lb 9.6 oz (64.7 kg)   Height: 5' 2\" (1.575 m)        IMAGING:     [facility] DIGITAL DIAGNOSTIC W OR WO CAD LEFT    Result Date: 2/23/2022  US BREAST BIOPSY W LOC DEVICE 1ST LESION LEFT, [facility]ARA DIGITAL DIAGNOSTIC W OR WO CAD LEFT : 2/23/2022 CLINICAL HISTORY: R92.8 Abnormal mammogram of left breast ICD10. COMPARISON: 2/15/2022 PROCEDURE: Informed consent was obtained. Sterile technique, local lidocaine anesthesia and ultrasound guidance was used to place a 9-gauge vacuum-assisted biopsy needle directly adjacent to an approximately 3 to 4 mm hypoechoic area within the 3:00 position of the left breast approximately 7 cm  from the nipple. Several samples were obtained in the usual fashion. A biopsy marking clip was then deployed. Full field CC and ML digital 2D mammograms demonstrated the clip to be approximately 4 cm anterior and lateral to the mammographic density, which appears much less prominent than 2/15/2022. The patient tolerated the procedure without evidence of immediate complication, and was discharged home in stable condition with the usual instructions. ULTRASOUND-GUIDED LEFT BREAST BIOPSY AND POST BIOPSY MAMMOGRAMS. BIOPSY RESULTS ARE PENDING. US BREAST LIMITED LEFT    Result Date: 2/15/2022   BILATERAL DIGITAL DIAGNOSTIC MAMMOGRAM: COMPARISONS:  Dating back to 12/14/2018 CLINICAL HISTORY:  History of left breast cancer with lumpectomy, radiation and chemotherapy 2/21. Follow-up exam. FINDINGS: Routine two view mammography was performed and compared to previous study. 3-D Tomosynthesis images were obtained. Spot compression views of the right and left breast were obtained. There are scattered fibroglandular densities.  No suspicious  mass or calcifications within  the right breast. No evidence of skin thickening. Left breast: Postoperative changes with clips within the central left breast. There is some retraction of the left nipple after surgery. There is increased density at the surgical site most likely secondary to scar tissue. Within the lateral aspect of the breast at posterior depth there is a small asymmetric density with mild architectural distortion. This is new since prior exam. A focused sonogram was obtained of this area. At the 3:00 position on sonogram and 7 cm from the nipple there is a small irregular hypoechoic lesion with posterior acoustic shadowing that measures 0.4 x 0.4 x 0.2 cm. This is best visualized on the cine images. This is suspicious for neoplasm and biopsy is recommended. CAD analysis was performed and used in the interpretation. LEFT BREAST: BI-RADS 4: SUSPICIOUS FINDING--BIOPSY SHOULD BE CONSIDERED. BREAST DENSITY: SCATTERED FIBROGLANDULAR DENSITIES. RIGHT BREAST: BI-RADS 2: BENIGN FINDINGS. RESULTS WERE DISCUSSED WITH THE PATIENT. Board Certified Radiologists. Accredited by the ACR and FDA. MAMMOGRAPHY IS VERY IMPORTANT TO YOUR HEALTH. THE AMERICAN CANC[employer] SOCIETY GUIDELINES RECOMMEND THAT WOMEN 36YEARS OF AGE AND OLDER SHOULD HAVE A MAMMOGRAM EVERY YEAR. A REMINDER LETTER WILL BE SENT AT THE APPROPRIATE TIME. US BREAST BIOPSY W LOC DEVICE 1ST LESION LEFT    Result Date: 2/23/2022  US BREAST BIOPSY W LOC DEVICE 1ST LESION LEFT, SARA DIGITAL DIAGNOSTIC W OR WO CAD LEFT : 2/23/2022 CLINICAL HISTORY: R92.8 Abnormal mammogram of left breast ICD10. COMPARISON: 2/15/2022 PROCEDURE: Informed consent was obtained. Sterile technique, local lidocaine anesthesia and ultrasound guidance was used to place a 9-gauge vacuum-assisted biopsy needle directly adjacent to an approximately 3 to 4 mm hypoechoic area within the 3:00 position of the left breast approximately 7 cm  from the nipple. Several samples were obtained in the usual fashion. A biopsy marking clip was then deployed. Full field CC and ML digital 2D mammograms demonstrated the clip to be approximately 4 cm anterior and lateral to the mammographic density, which appears much less prominent than 2/15/2022. The patient tolerated the procedure without evidence of immediate complication, and was discharged home in stable condition with the usual instructions. ULTRASOUND-GUIDED LEFT BREAST BIOPSY AND POST BIOPSY MAMMOGRAMS. BIOPSY RESULTS ARE PENDING. [facility]O DIGITAL DIAGNOSTIC BILATERAL    Result Date: 2/15/2022   BILATERAL DIGITAL DIAGNOSTIC MAMMOGRAM: COMPARISONS:  Dating back to 12/14/2018 CLINICAL HISTORY:  History of left breast cancer with lumpectomy, radiation and chemotherapy 2/21. Follow-up exam. FINDINGS: Routine two view mammography was performed and compared to previous study. 3-D Tomosynthesis images were obtained. Spot compression views of the right and left breast were obtained. There are scattered fibroglandular densities. No suspicious  mass or calcifications within  the right breast. No evidence of skin thickening. Left breast: Postoperative changes with clips within the central left breast. There is some retraction of the left nipple after surgery. There is increased density at the surgical site most likely secondary to scar tissue. Within the lateral aspect of the breast at posterior depth there is a small asymmetric density with mild architectural distortion. This is new since prior exam. A focused sonogram was obtained of this area. At the 3:00 position on sonogram and 7 cm from the nipple there is a small irregular hypoechoic lesion with posterior acoustic shadowing that measures 0.4 x 0.4 x 0.2 cm. This is best visualized on the cine images. This is suspicious for neoplasm and biopsy is recommended. CAD analysis was performed and used in the interpretation. LEFT BREAST: BI-RADS 4: SUSPICIOUS FINDING--BIOPSY SHOULD BE CONSIDERED. BREAST DENSITY: SCATTERED FIBROGLANDULAR DENSITIES.  RIGHT BREAST: BI-RADS 2: BENIGN FINDINGS. RESULTS WERE DISCUSSED WITH THE PATIENT. Board Certified Radiologists. Accredited by the ACR and FDA. MAMMOGRAPHY IS VERY IMPORTANT TO YOUR HEALTH. THE AMERICAN CANCER SOCIETY GUIDELINES RECOMMEND THAT WOMEN 36YEARS OF AGE AND OLDER SHOULD HAVE A MAMMOGRAM EVERY YEAR. A REMINDER LETTER WILL BE SENT AT THE APPROPRIATE TIME. PATHOLOGY    FINAL DIAGNOSIS:   LEFT BREAST CORE BIOPSY -   FIBROCYSTIC CHANGES AND STROMAL SCLEROSIS. NO MALIGNANCY IS IDENTIFIED. CORRELATION WITH CLINICAL AND RADIOLOGIC FINDINGS IS HIGHLY RECOMMENDED. Assessment:       ICD-10-CM    1. Malignant neoplasm of upper-inner quadrant of left breast in female, estrogen receptor negative ([facility]  C50.212     Z17.1    2. Breast disorder  N64.9 [facility]ARRO DIGITAL DIAGNOSTIC UNILATERAL LEFT   3. Overweight (BMI 25.0-29. 9)  E66.3          Plan:     Reassured patient. Reviewed the pathology. Patient is doing well. Recommend clinical breast exams in the breast surgical suite in 6 month(s)  Mammography in 6 month(s)  Monitor signs of lymphedema  Recommend an active lifestyle, healthy diet, limited alcohol intake, achieve and maintain a healthy BMI to optimize breast cancer outcomes / decrease risk of breast cancer. Referral to or Follow up with Medical Oncology  Referral to or Follow up with Radiation Oncology  Follow up with PCP regarding all medical problems          Total face to face time was 20 minutes with greater than 50% spent on counseling the patient or coordinating her care. Dictated by Brett Larsen MD 3/8/2022 10:49 AM       This note was partially generated using Dragon voice recognition system, and there may be some incorrect words, spellings, punctuation that were not noticed in checking the note before saving.

## 2022-03-08 NOTE — PATIENT INSTRUCTIONS
Patient Education        Preventing Lymphedema After Treatment for Breast Cancer: Care Instructions  Your Care Instructions     Lymphedema is a buildup of fluid in the soft tissues of the body. It can happen in the arm after breast cancer surgery to remove lymph nodes. If there are few or no lymph nodes, fluid can build up in the arm. It can also happen if the lymph system in an arm has been damaged. Infection, tumors, and scar tissue from radiation therapy to the armpit area also can cause fluid to build up. You may be able to avoid lymphedema or keep it under control by following the tips below. Make sure that you take good care of the skin on your arm and hand. Your skin acts as a barrier to keep out bacteria and prevent infection. It is also important not to overuse the muscles in the arm. And don't expose your arm to very hot or cold temperatures. Lymphedema can happen soon after breast cancer treatment. Or it may happen many years later. It may affect only part of your arm or hand. In some cases, it affects all of the arm. Make sure to follow these precautions even after you finish treatment. Do not ignore tightness or swelling in or around your arm or hand. You are less likely to have long-term problems if you get these symptoms treated right away. Follow-up care is a key part of your treatment and safety. Be sure to make and go to all appointments, and call your doctor if you are having problems. It's also a good idea to know your test results and keep a list of the medicines you take. How can you care for yourself at home? Skin care    · Keep your arm, hand, and armpit clean. Use a mild soap that does not dry out your skin.     · Moisturize your skin often.     · Take good care of the skin around your fingernails.  Do not bite or cut your cuticles.     · Ask your doctor how to handle any cuts, scratches, insect bites, or other injuries you may get.     · Use sunscreen and insect repellent outdoors to protect your skin from sunburn and insect bites.     · Use an electric razor if you shave your armpit. It's less likely to cut or irritate your skin. Activity    · Don't wear clothing or jewelry that is tight on your arm or hand. Your doctor may advise you not to wear a watch or rings on the affected hand.     · Wear gloves when you do activities that could hurt the skin on your fingers or hand. Wear them when you garden, do yard work, wash dishes, and clean with chemicals. Use oven mitts when you handle hot food.     · Do not have blood drawn from the arm on the side of the lymph node surgery. Do not get injections (shots) or have an IV put in the affected arm.     · Do not allow a blood pressure cuff to be placed on that arm. If you are in the hospital, make sure you tell your nurse and other hospital staff about your condition.     · Do not expose your arm to very hot or very cold temperatures. For example, don't use hot tubs, saunas, or steam rooms. Don't use a heating pad or cold pack on that arm or shoulder.     · Rest your arm often when you do repeated movements, such as vacuum, scrub, or mop.     · Try to use your other arm to carry heavy things, such as grocery bags. If you carry a briefcase or a purse, avoid shoulder straps and carry it on your good arm.     · Ask your doctor about wearing a compression sleeve and glove (gauntlet). Your doctor may want you to wear these when you exercise or when you fly in an airplane. They can help keep fluid from pooling in your arm and hand. Exercise    · Ask your doctor about exercises for your arm and hand. Your doctor may recommend that you see a physical therapist. This person can teach you how to do self-massage to move fluid out of your arm.     · Check with your doctor before you start exercises that use the arm. This includes tennis, rowing, or weight lifting. Your doctor can help you find an activity level that's right for you.    When should you call for

## 2022-04-14 ENCOUNTER — TELEPHONE (OUTPATIENT)
Dept: CASE MANAGEMENT | Age: 72
End: 2022-04-14

## 2022-04-14 ENCOUNTER — OFFICE VISIT (OUTPATIENT)
Dept: PULMONOLOGY | Age: 72
End: 2022-04-14
Payer: COMMERCIAL

## 2022-04-14 VITALS
DIASTOLIC BLOOD PRESSURE: 71 MMHG | OXYGEN SATURATION: 94 % | HEART RATE: 90 BPM | SYSTOLIC BLOOD PRESSURE: 140 MMHG | BODY MASS INDEX: 25.58 KG/M2 | HEIGHT: 62 IN | WEIGHT: 139 LBS

## 2022-04-14 DIAGNOSIS — R09.02 HYPOXIA: ICD-10-CM

## 2022-04-14 DIAGNOSIS — J70.0 RADIATION PNEUMONITIS (HCC): ICD-10-CM

## 2022-04-14 DIAGNOSIS — I50.9 CONGESTIVE HEART FAILURE, UNSPECIFIED HF CHRONICITY, UNSPECIFIED HEART FAILURE TYPE (HCC): ICD-10-CM

## 2022-04-14 DIAGNOSIS — I48.91 ATRIAL FIBRILLATION, UNSPECIFIED TYPE (HCC): ICD-10-CM

## 2022-04-14 DIAGNOSIS — J44.9 CHRONIC OBSTRUCTIVE PULMONARY DISEASE, UNSPECIFIED COPD TYPE (HCC): Primary | ICD-10-CM

## 2022-04-14 PROCEDURE — 99213 OFFICE O/P EST LOW 20 MIN: CPT | Performed by: INTERNAL MEDICINE

## 2022-04-14 ASSESSMENT — ENCOUNTER SYMPTOMS
VOICE CHANGE: 0
COUGH: 0
WHEEZING: 0
SORE THROAT: 0
NAUSEA: 0
DIARRHEA: 0
RHINORRHEA: 0
SINUS PRESSURE: 0
CHEST TIGHTNESS: 0
VOMITING: 0
EYE ITCHING: 0
ABDOMINAL PAIN: 0
SHORTNESS OF BREATH: 1
EYE DISCHARGE: 0
TROUBLE SWALLOWING: 0

## 2022-04-14 NOTE — PROGRESS NOTES
Subjective:     Steve Vidal is a 66 y.o. female who complains today of:     Chief Complaint   Patient presents with    Follow-up     4 month f/u        HPI  She is on bronchodilator with Anoro Ellipta 1 puff daily. She said she got cold last week but now over with it . Mild C/o shortness of breath  with exertion. No Wheezing. No Cough with Sputum. No Hemoptysis. No Chest tightness. No Chest pain with radiation  or pleuritic pain. No orthopnea. No Fever or chills. No Rhinorrhea and postnasal drip. She said she was having stuffy nose last week        Allergies:  Aldactone [spironolactone]  Past Medical History:   Diagnosis Date    Abnormal echocardiogram 2020    possible PFO?  further eval suggested    Breast cancer ([facility] [address] 02/04/2021    left    Cancer [facility])     left breast    COPD (chronic obstructive pulmonary disease) ([facility] [address] 2015    Dr Cuate Doyle Current use of long term anticoagulation     Cyst of neck 2012    after plastic surgery    Emphysema of lung ([facility] [address]     History of compression fracture of vertebral column 2020    T2, L1    History of humerus fracture     LEFT    History of left heart catheterization 03/2020    at [facility], normal LVEF with impaired relaxation, mild AV calcification and mild MV thickening, normal RV    History of sustained ventricular tachycardia 03/2020    had to wear a LifeVest, Dr Andrew Mobley History of therapeutic radiation 2021    4 weeks    Hx antineoplastic chemo 2021    12 weeks    Hyperlipidemia LDL goal < 130     Intermittent atrial fibrillation [facility]) 2015    Dr Rod Camargo, Dr Sadia Varghese, Dr Brenda Jones Osteopenia 2014   Jeraline Citizen history of skin cancer     had area excised from nose    S/P colonoscopic polypectomy 2008, 2015, 2018    Dr Luciano Vilchis    Smoking history     Vitamin D deficiency      Past Surgical History:   Procedure Laterality Date    ATRIAL ABLATION SURGERY      BREAST BIOPSY Left 01/20/2021    BREAST BIOPSY Left 2/4/2021    LEFT BREAST LUMPECTOMY AND  SENTINEL LYMPH NODE BIOPSY performed by Evelyn Cohn MD at [place] [address]    staph infection, postpartum    COLONOSCOPY  2015    w/polypectomy Dr Allyssa Thomas    COLONOSCOPY N/A 10/18/2021    COLORECTAL CANCER SCREENING, HIGH RISK performed by Chana Etienne MD at [address] [place] [address]      Dr Carmelo Corbin; HI RISK IND N/A 2018    COLONOSCOPY performed by Demetria Anderson MD at [place] [id number] 2021    EGD ESOPHAGOGASTRODUODENOSCOPY WITH BIOPSIES performed by Chana Etienne MD at [facility] LEFT Left 2022    US BREAST NEEDLE BIOPSY LEFT MLOZ ULTRASOUND     Family History   Problem Relation Age of Onset    Osteoarthritis Mother     Osteoporosis Mother     Cancer Mother         anorectal     Colon Cancer Mother     Breast Cancer Paternal Aunt      Social History     Socioeconomic History    Marital status:      Spouse name: Not on file    Number of children: 2    Years of education: Not on file    Highest education level: Not on file   Occupational History    Occupation: [employer], retired   Tobacco Use    Smoking status: Former Smoker     Packs/day: 1.00     Years: 50.00     Pack years: 50.00     Types: Cigarettes     Start date:      Quit date: 2015     Years since quittin.3    Smokeless tobacco: Never Used    Tobacco comment: pt will try smoking cessation   Vaping Use    Vaping Use: Never used   Substance and Sexual Activity    Alcohol use: No    Drug use: No    Sexual activity: Not on file   Other Topics Concern    Not on file   Social History Narrative    Born in [place], one brother in [place], keeps in touch    , 2 children, one son in [place], one daughter in [place]    5 grandchildren     Lives in a house in [place] with spouse     Worked for [employer] abdominal pain, diarrhea, nausea and vomiting. Genitourinary: Negative for difficulty urinating and hematuria. Musculoskeletal: Negative for arthralgias, joint swelling and myalgias. Skin: Negative for rash. Allergic/Immunologic: Negative for environmental allergies and food allergies. Neurological: Negative for dizziness, tremors, weakness and headaches. Psychiatric/Behavioral: Negative for behavioral problems and sleep disturbance.         :     Vitals:    04/14/22 0926 04/14/22 0929   BP: (!) 145/79 (!) 140/71   Site: Right Upper Arm Right Upper Arm   Position: Sitting Sitting   Cuff Size: Medium Adult Medium Adult   Pulse: 90    SpO2: 94%    Weight: 139 lb (63 kg)    Height: 5' 1.5\" (1.562 m)      Wt Readings from Last 3 Encounters:   04/14/22 139 lb (63 kg)   03/08/22 142 lb 9.6 oz (64.7 kg)   02/16/22 138 lb 3.2 oz (62.7 kg)         Physical Exam  Constitutional:       General: She is not in acute distress. Appearance: She is well-developed. She is not diaphoretic. HENT:      Head: Normocephalic and atraumatic. Nose: Nose normal.   Eyes:      Pupils: Pupils are equal, round, and reactive to light. Neck:      Thyroid: No thyromegaly. Vascular: No JVD. Trachea: No tracheal deviation. Cardiovascular:      Rate and Rhythm: Normal rate and regular rhythm. Heart sounds: No murmur heard. No friction rub. No gallop. Pulmonary:      Effort: No respiratory distress. Breath sounds: No wheezing or rales. Comments: diminished Breath sound bilaterally. Chest:      Chest wall: No tenderness. Abdominal:      General: There is no distension. Tenderness: There is no abdominal tenderness. There is no rebound. Musculoskeletal:         General: Normal range of motion. Lymphadenopathy:      Cervical: No cervical adenopathy. Skin:     General: Skin is warm and dry. Neurological:      Mental Status: She is alert and oriented to person, place, and time. Coordination: Coordination normal.         Current Outpatient Medications   Medication Sig Dispense Refill    umeclidinium-vilanterol (ANORO ELLIPTA) 62.5-25 MCG/INH AEPB inhaler Inhale 1 puff into the lungs daily 1 each 3    furosemide (LASIX) 40 MG tablet Take 1 tablet by mouth daily as needed (for LE swelling) 90 tablet 3    potassium chloride (KLOR-CON M) 20 MEQ extended release tablet Take 1 tablet by mouth daily (Patient taking differently: Take 20 mEq by mouth daily as needed With Lasix) 90 tablet 3    pravastatin (PRAVACHOL) 20 MG tablet Take 1 tablet by mouth daily 90 tablet 3    amiodarone (CORDARONE) 200 MG tablet TAKE 1 TABLET BY MOUTH TWICE WEEKLY 180 tablet 3    dilTIAZem (CARDIZEM CD) 240 MG extended release capsule TAKE 1 CAPSULE BY MOUTH EVERY DAY 90 capsule 3    rivaroxaban (XARELTO) 20 MG TABS tablet TAKE 1 TABLET BY MOUTH EVERY DAY WITH BREAKFAST 90 tablet 3    Calcium Carb-Cholecalciferol (CALCIUM + D3 PO) Take 1,200 ampules by mouth       b complex vitamins tablet Take 1 tablet by mouth daily      magnesium oxide (MAG-OX) 400 MG tablet Take 400 mg by mouth daily       Vitamin D (CHOLECALCIFEROL) 1000 UNITS CAPS capsule Take 2,000 Units by mouth daily        No current facility-administered medications for this visit. Results for orders placed in visit on 06/09/21    XR CHEST (2 VW)    Narrative  DIAGNOSIS:Z92.3 Hx of radiation therapy ICD10    COMPARISON: May 19, 2021 and January 28, 2021    TECHNIQUE: PA and lateral chest    FINDINGS: Ill-defined opacity is again seen in the left mid lung field in the perihilar region. The lungs are also hyperinflated. Flattening of the diaphragm and increased AP diameter is seen. The lungs contain no pleural effusion or pneumothorax. The  cardiac silhouette is not enlarged. There is loss of anterior superior endplate height of I76-I1 which is unchanged.     Impression  The opacity seen in the left mid lung field may be related to patient's history of radiation therapy. Also findings are suggestive of COPD. Results for orders placed during the hospital encounter of 01/28/21    XR CHEST (2 VW)    Narrative  EXAMINATION: XR CHEST (2 VW)    CLINICAL HISTORY: J44.9 Chronic obstructive pulmonary disease, unspecified COPD type ([facility] [address] ICD10    COMPARISONS: May 11, 2020    FINDINGS:    Two views of the chest are submitted. The cardiac silhouette is of normal size configuration. Pulmonary vascular attenuated. Widening of the AP diameter the chest.  Right sided trachea. No focal infiltrates. No effusions. No Pneumothoraces. Impression  NO ACUTE ACTIVE CARDIOPULMONARY PROCESS. RADIOGRAPHIC FINDINGS OF COPD      Results for orders placed during the hospital encounter of 05/11/20    XR CHEST STANDARD (2 VW)    Narrative  EXAMINATION: XR CHEST (2 VW)    CLINICAL HISTORY: SHORTNESS OF BREATH    COMPARISONS: CT CHEST, MARCH 5, 2020, CHEST RADIOGRAPH, SEPTEMBER 25, 2019    FINDINGS: Osseous structures intact. Cardiopericardial silhouette normal. Aorta calcified. Pulmonary vasculature normal. Ill-defined area increased opacity left lung base posteriorly. Diaphragms flattened. Impression  EMPHYSEMA. LEFT LOWER LUNG SUBSEGMENTAL ATELECTASIS/PNEUMONIA.  ]  Results for orders placed during the hospital encounter of 05/19/21    XR CHEST PORTABLE    Narrative  EXAMINATION: XR CHEST PORTABLE    CLINICAL HISTORY: SHORTNESS OF BREATH    COMPARISONS: CT CHEST, APRIL 26, 2021, CHEST RADIOGRAPH, JANUARY 28, 2021    FINDINGS: Remote right sixth rib fracture. Cardiopericardial silhouette normal. Aorta calcified. Ill-defined area of increased opacity found bilaterally, left mid and left lower lung. Increased opacity also demonstrated right lung base.     Impression  BILATERAL ATELECTASIS/PNEUMONIA AS DISCUSSED.  ]  No results found for this or any previous visit.  ]  Results for orders placed during the hospital encounter of 06/24/21    CT CHEST WO CONTRAST    Narrative  EXAMINATION: CT CHEST WITHOUT CONTRAST    CLINICAL HISTORY:J70.0 Radiation pneumonitis (HCC) ICD10, history left breast cancer diagnosed 2/21. History radiation treatment to left breast completed 4/2/2021. COMPARISONS: 5/19/2021    TECHNIQUE: TECHNIQUE: Contiguous axial images were obtained through the chest without contrast. Coronal and sagittal reformations were made. FINDINGS:  No evidence of aortic aneurysm. There are atherosclerotic calcifications in the aorta and coronary arteries. No significant mediastinal or hilar or axillary lymphadenopathy. No pericardial effusion. There are small bilateral pleural effusions  in the posterior costophrenic angles both lungs. On the prior exam there is reticular infiltrates have developed within the left upper lobe at level of left breast surgery. Today's exam is reticular infiltrate has decreased in size with some residual changes in the peripheral lung and an area of the  atelectasis and mild pleural thickening. On axial image the peripheral infiltrate measures approximately 2.6 x 3.1 cm. Previously infiltrate measured approximately 6 x 10 cm on axial images. Mild reticular infiltrate anterior to the left major fissure. There are centrilobular emphysematous changes in the lungs. There is a new 3 mm nodule in the right lower lobe base, series 3 image 40. There is minimal infiltrate anteriorly in the right middle lobe, series 3 image 37. There is infiltrate/atelectasis  posteriorly in the base of the right lower lobe. No pneumothorax. Upper abdomen is unremarkable. There is depression of the superior endplate of E78, unchanged. There is volume loss in this vertebra, unchanged. Impression  RADIATION PNEUMONIA WITHIN THE LEFT UPPER LOBE IS DECREASING IN SIZE WITH SOME RESIDUAL INFILTRATE. THERE IS MINIMAL ADJACENT PLEURAL THICKENING. SMALL INFILTRATE/ATELECTASIS POSTERIOR BASE RIGHT LOWER LOBE. OTHER DETAILS ABOVE.     NO ADENOPATHY. SMALL BILATERAL PLEURAL EFFUSIONS.    3 MM NODULE RIGHT LOWER LOBE. T12 FRACTURE, UNCHANGED. All CT scans at this facility use dose modulation, iterative reconstruction, and/or weight based dosing when appropriate to reduce radiation dose to as low as reasonably achievable. Results for orders placed during the hospital encounter of 05/19/21    CT CHEST WO CONTRAST    Narrative  CT of the Chest without intravenous contrast medium. HISTORY:  New hypoxia. h/o radiation to L breast, completed, April 2, 2021. Receiving paclitaxel, and amiodarone. History of COPD. TECHNICAL FACTORS:    CT imaging of the chest was obtained and formatted as 5 mm contiguous axial images from the thoracic inlet through the adrenal glands. Sagittal and coronal reconstructions obtained during postprocessing. Intravenous contrast medium:  None. Comparison:  Low dose CT chest, April 26, 2021, March 5, 2020, January 4, 2019. CT chest, August 23, 2016. FINDINGS:      Right lung: No nodules, mass, pleural effusion, pneumothorax. Mild dependent subsegmental atelectatic change. Left lung: No nodules, masses, pleural effusion, pneumothorax. Mild dependent subsegmental atelectatic change. Focal interval development of reticular change, left upper lobe, at level of postsurgical change, left breast (series 2, images 21 through 37). Lymph nodes: No hilar, mediastinal, or axillary lymph node enlargement. Chest Wall: Increased attenuation, left retroareolar, and upper inner quadrant, with calcification, and surgical clips, unchanged (series 2, images 24 through 29). Thoracic aorta: Normal in course and caliber. Cardiac Size: Normal.    Pericardial effusion: None. Upper abdomen:Limited imaging upper abdomen shows no gross anomaly. Musculoskeletal:No osteoblastic, and no osteolytic lesions. Anterior wedge compression, T12-L1, again demonstrated.     Impression  Left upper lobe interstitial changes described. Given clinical history, changes may be secondary to recent radiation therapy. Other inflammatory and infectious etiologies secondary considerations. Recurrent malignancy less likely. All CT scans at this facility use dose modulation, iterative reconstruction, and/or weight based dosing when appropriate to reduce radiation dose to as low as reasonably achievable.  ]  Results for orders placed during the hospital encounter of 08/23/16    CT Chest W Contrast    Narrative  CT CHEST    REASON FOR EXAM  HEMOPTYSIS    COMPARISONS  none    FINDINGS  Multiplanar images were obtained following the IV injection  of 75 cc Isovue-370. A focal area of consolidation involving the right  lower lobe posteriorly is seen. There is a suggestion of air  bronchogram present. Mild atelectasis at the left lung base is seen. Remainder lungs are clear. No pleural effusions are seen. No definite  pulmonary nodules are identified. Emphysematous changes are seen. No  pneumothorax is noted. Heart appears normal in size. No pericardial  effusion is seen. No mediastinal, hilar or axillary adenopathy is  noted. Bone windows demonstrate no gross osseous abnormalities. Surrounding soft tissues are unremarkable. Limited images of the upper  abdomen demonstrate no contributory findings. IMPRESSION  FOCAL AREA OF CONSOLIDATION INVOLVING THE RIGHT LOWER LOBE  IS SEEN. SHORT-TERM INTERVAL FOLLOWUP CT WITHOUT CONTRAST IN 3-4 MONTHS  IS SUGGESTED TO NOTE RESOLUTION. MILD ATELECTATIC CHANGES AT THE LEFT  LUNG BASE. NO OTHER SIGNIFICANT ABNORMALITIES ARE SEEN. All CT scans at this facility use dose modulation, iterative  reconstruction, and/or weight based dosing when appropriate to reduce  radiation dose to as low as reasonably achievable. Vicki Vyas M.D. Released Brenda Vyas M.D.   Released Date Time- 08/24/16 2645  This document has been electronically signed. ------------------------------------------------------------------------------  ]    Assessment/Plan:     1. Chronic obstructive pulmonary disease, unspecified COPD type ([address]  She is on bronchodilator with Anoro Ellipta 1 puff daily. She said she got cold last week but now over with it . Mild C/o shortness of breath  with exertion. No Wheezing. No Cough with Sputum. Continue same . 2. Hypoxia  She is on RA 94%, she said she off O2 .     3. Radiation pneumonitis (HCC)  Resolved     4. Congestive heart failure, unspecified HF chronicity, unspecified heart failure type [facility])  Following with dr. Andrew Go on lasix    5. Atrial fibrillation, unspecified type [facility])  She is on Xarelto     Return in about 4 months (around 8/14/2022) for COPD.       Ammon Farah MD

## 2022-04-14 NOTE — TELEPHONE ENCOUNTER
Physician documentation on smoking history and CT Lung Screening reviewed. All required documentation complete. Patient is a former smoker (quit 2015) with a 50 pack year history ( 1 ppd x 50 years) per physician documentation.

## 2022-04-28 ENCOUNTER — HOSPITAL ENCOUNTER (OUTPATIENT)
Dept: CT IMAGING | Age: 72
Discharge: HOME | End: 2022-04-30
Payer: COMMERCIAL

## 2022-04-28 DIAGNOSIS — Z87.891 PERSONAL HISTORY OF TOBACCO USE: ICD-10-CM

## 2022-04-28 PROCEDURE — 71271 CT THORAX LUNG CANCER SCR C-: CPT

## 2022-05-10 ENCOUNTER — OFFICE VISIT (OUTPATIENT)
Dept: FAMILY MEDICINE CLINIC | Age: 72
End: 2022-05-10
Payer: COMMERCIAL

## 2022-05-10 VITALS
DIASTOLIC BLOOD PRESSURE: 76 MMHG | TEMPERATURE: 97.4 F | HEIGHT: 62 IN | SYSTOLIC BLOOD PRESSURE: 120 MMHG | OXYGEN SATURATION: 94 % | WEIGHT: 141.2 LBS | HEART RATE: 75 BPM | BODY MASS INDEX: 25.98 KG/M2

## 2022-05-10 DIAGNOSIS — R25.2 CRAMPS, EXTREMITY: ICD-10-CM

## 2022-05-10 DIAGNOSIS — K76.89 LIVER CYST: ICD-10-CM

## 2022-05-10 DIAGNOSIS — E78.5 HYPERLIPIDEMIA, UNSPECIFIED HYPERLIPIDEMIA TYPE: Primary | ICD-10-CM

## 2022-05-10 LAB
MAGNESIUM: 2.2 MG/DL (ref 1.7–2.4)
PHOSPHORUS: 3.4 MG/DL (ref 2.3–4.8)
TOTAL CK: 78 U/L (ref 0–170)

## 2022-05-10 PROCEDURE — 99213 OFFICE O/P EST LOW 20 MIN: CPT | Performed by: INTERNAL MEDICINE

## 2022-05-10 ASSESSMENT — ENCOUNTER SYMPTOMS
BACK PAIN: 0
EYE PAIN: 0
ABDOMINAL PAIN: 0
SHORTNESS OF BREATH: 0

## 2022-05-10 NOTE — PROGRESS NOTES
Subjective:      Patient ID: Brandee Cohen is a 66 y.o. female who presents today with:  Chief Complaint   Patient presents with    Follow-up     Patient is here for 6 month follow-up. HPI    Here for follow up   Past Medical History:   Diagnosis Date    Abnormal echocardiogram 2020    possible PFO?  further eval suggested    Breast cancer ([facility] 02/04/2021    left    Cancer [facility])     left breast    COPD (chronic obstructive pulmonary disease) ([facility] 2015    Dr Clarence Garcia Current use of long term anticoagulation     Cyst of neck 2012    after plastic surgery    Emphysema of lung ([facility]     History of compression fracture of vertebral column 2020    T2, L1    History of humerus fracture     LEFT    History of left heart catheterization 03/2020    at [facility], normal LVEF with impaired relaxation, mild AV calcification and mild MV thickening, normal RV    History of sustained ventricular tachycardia 03/2020    had to wear a LifeVest, Dr Viktoriya Ga History of therapeutic radiation 2021    4 weeks    Hx antineoplastic chemo 2021    12 weeks    Hyperlipidemia LDL goal < 130     Intermittent atrial fibrillation (HCC) 2015    Dr Jason Mazariegos, Dr Richards [facility], Dr Lizzette Jones Osteopenia 2014    Personal history of skin cancer     had area excised from nose    S/P colonoscopic polypectomy 2008, 2015, 2018    Dr Radhames Spivey    Smoking history     Vitamin D deficiency      Past Surgical History:   Procedure Laterality Date    ATRIAL ABLATION SURGERY      BREAST BIOPSY Left 01/20/2021    BREAST BIOPSY Left 2/4/2021    LEFT BREAST LUMPECTOMY AND  SENTINEL LYMPH NODE BIOPSY performed by Patricia Carrasco MD at [place] [address]    staph infection, postpartum    COLONOSCOPY  07/22/2015    w/polypectomy Dr Radhames Spivey    COLONOSCOPY N/A 10/18/2021    COLORECTAL CANCER SCREENING, HIGH RISK performed by Karlos Blair MD at [address] [place] [address]  2012    Dr Everardo Arizmendi COLORECTAL SCRN; HI RISK IND N/A 2018    COLONOSCOPY performed by Dede Mcghee MD at [facility] 9 2021    EGD ESOPHAGOGASTRODUODENOSCOPY WITH BIOPSIES performed by Dara Wen MD at [facility] LEFT Left 2022     BREAST NEEDLE BIOPSY LEFT MLOZ ULTRASOUND     Social History     Socioeconomic History    Marital status:      Spouse name: Not on file    Number of children: 2    Years of education: Not on file    Highest education level: Not on file   Occupational History    Occupation: [employer], retired   Tobacco Use    Smoking status: Former Smoker     Packs/day: 1.00     Years: 50.00     Pack years: 50.00     Types: Cigarettes     Start date:      Quit date: 2015     Years since quittin.4    Smokeless tobacco: Never Used    Tobacco comment: pt will try smoking cessation   Vaping Use    Vaping Use: Never used   Substance and Sexual Activity    Alcohol use: No    Drug use: No    Sexual activity: Not on file   Other Topics Concern    Not on file   Social History Narrative    Born in [place], one brother in [place], keeps in touch    , 2 children, one son in [place], one daughter in [place]    5 grandchildren     Lives in a house in [place] with spouse     Worked for [employer] and full time at [employer], [employer], fashion, family life     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      Social Determinants of Health     Financial Resource Strain: Low Risk     Difficulty of Paying Living Expenses: Not hard at all   Food Insecurity: No Food Insecurity    Worried About [address] in the Last Year: Never true    [facility] in the Last Year: Never true   Transportation Needs: No Transportation Needs    Lack of Transportation (Medical): No    Lack of Transportation (Non-Medical):  No   Physical Activity:     Days of Exercise per Week: Not on file  Minutes of Exercise per Session: Not on file   Stress:     Feeling of Stress : Not on file   Social Connections:     Frequency of Communication with Friends and Family: Not on file    Frequency of Social Gatherings with Friends and Family: Not on file    Attends Hindu Services: Not on file    Active Member of [address] [place] [address] or Organizations: Not on file    Attends Club or Organization Meetings: Not on file    Marital Status: Not on file   Intimate Partner Violence:     Fear of Current or Ex-Partner: Not on file    Emotionally Abused: Not on file    Physically Abused: Not on file    Sexually Abused: Not on file   Housing Stability:     Unable to Pay for Housing in the Last Year: Not on file    Number of [place] in the Last Year: Not on file    Unstable Housing in the Last Year: Not on file     Allergies   Allergen Reactions    Aldactone [Spironolactone] Other (See Comments)     When taken daily,Leg weakness, was falling down     Current Outpatient Medications on File Prior to Visit   Medication Sig Dispense Refill    umeclidinium-vilanterol (ANORO ELLIPTA) 62.5-25 MCG/INH AEPB inhaler Inhale 1 puff into the lungs daily 1 each 3    furosemide (LASIX) 40 MG tablet Take 1 tablet by mouth daily as needed (for LE swelling) 90 tablet 3    potassium chloride (KLOR-CON M) 20 MEQ extended release tablet Take 1 tablet by mouth daily (Patient taking differently: Take 20 mEq by mouth daily as needed With Lasix) 90 tablet 3    pravastatin (PRAVACHOL) 20 MG tablet Take 1 tablet by mouth daily 90 tablet 3    amiodarone (CORDARONE) 200 MG tablet TAKE 1 TABLET BY MOUTH TWICE WEEKLY 180 tablet 3    dilTIAZem (CARDIZEM CD) 240 MG extended release capsule TAKE 1 CAPSULE BY MOUTH EVERY DAY 90 capsule 3    rivaroxaban (XARELTO) 20 MG TABS tablet TAKE 1 TABLET BY MOUTH EVERY DAY WITH BREAKFAST 90 tablet 3    Calcium Carb-Cholecalciferol (CALCIUM + D3 PO) Take 1,200 ampules by mouth       b complex vitamins tablet Take 1 tablet by mouth daily      magnesium oxide (MAG-OX) 400 MG tablet Take 400 mg by mouth daily       Vitamin D (CHOLECALCIFEROL) 1000 UNITS CAPS capsule Take 2,000 Units by mouth daily        No current facility-administered medications on file prior to visit. I have personally reviewed the ROS, PMH, PFH, and social history     Review of Systems   Constitutional: Negative for chills and fever. HENT: Negative for congestion. Eyes: Negative for pain. Respiratory: Negative for shortness of breath. Cardiovascular: Negative for chest pain. Gastrointestinal: Negative for abdominal pain. Genitourinary: Negative for hematuria. Musculoskeletal: Negative for back pain. Allergic/Immunologic: Negative for immunocompromised state. Neurological: Negative for headaches. Psychiatric/Behavioral: Negative for hallucinations. Objective:   /76 (Site: Right Upper Arm, Position: Sitting, Cuff Size: Small Adult)   Pulse 75   Temp 97.4 °F (36.3 °C)   Ht 5' 2\" (1.575 m)   Wt 141 lb 3.2 oz (64 kg)   LMP  (LMP Unknown)   SpO2 94%   BMI 25.83 kg/m²     Physical Exam  Constitutional:       General: She is not in acute distress. Appearance: Normal appearance. She is not ill-appearing, toxic-appearing or diaphoretic. HENT:      Head: Normocephalic. Neck:      Vascular: No carotid bruit. Cardiovascular:      Rate and Rhythm: Normal rate and regular rhythm. Pulses: Normal pulses. Heart sounds: Normal heart sounds. No murmur heard. No friction rub. No gallop. Pulmonary:      Effort: Pulmonary effort is normal. No respiratory distress. Breath sounds: Normal breath sounds. No wheezing, rhonchi or rales. Abdominal:      General: Abdomen is flat. There is no distension. Palpations: Abdomen is soft. Tenderness: There is no abdominal tenderness. There is no right CVA tenderness, left CVA tenderness, guarding or rebound.    Musculoskeletal:      Cervical back: Neck supple. Right lower leg: No edema. Left lower leg: No edema. Skin:     General: Skin is warm. Findings: No erythema or rash. Neurological:      Mental Status: She is alert. Psychiatric:         Mood and Affect: Mood normal.       Neg homans sign  No calf pain to palpitation    Assessment:       Diagnosis Orders   1. Hyperlipidemia, unspecified hyperlipidemia type  Lipid, Fasting   2. Liver cyst           Plan:     vc  Amiodarone will probably be stopped through cardiology  Continue chronic medications. She will schedule her 1st covid booster. Us thyroid 08/2022   F/u with pulmonary AND GI  Renal us (No F/u)   Consider liver US fu in 10/28/2022   Rec fosamax,(Wants to hold off and on other options)   Keep f/u with mumtaz allan/rad/onc (from before)  Will take statin every day, fearful of dose increase, lipid in 3 months. If cramps not improving with conservative care she will contact me and pursue additional testing (anita, referral to IR, etc)             Orders Placed This Encounter   Procedures    Lipid, Fasting     Standing Status:   Future     Standing Expiration Date:   5/10/2023     No orders of the defined types were placed in this encounter. RISK OF STROKE and mi explained. If anything should change or worsen call ASAP, don't wait for next scheduled appointment. Return in about 4 months (around 9/10/2022) for Chronic condition management/appointment, worsening symptoms, call ASAP for appointment.       Deb Hernandez MD

## 2022-05-14 ENCOUNTER — TELEPHONE (OUTPATIENT)
Dept: FAMILY MEDICINE CLINIC | Age: 72
End: 2022-05-14

## 2022-05-19 ENCOUNTER — TELEPHONE (OUTPATIENT)
Dept: FAMILY MEDICINE CLINIC | Age: 72
End: 2022-05-19

## 2022-06-10 ENCOUNTER — OFFICE VISIT (OUTPATIENT)
Dept: CARDIOLOGY CLINIC | Age: 72
End: 2022-06-10
Payer: COMMERCIAL

## 2022-06-10 VITALS
WEIGHT: 139 LBS | HEART RATE: 66 BPM | SYSTOLIC BLOOD PRESSURE: 110 MMHG | BODY MASS INDEX: 25.42 KG/M2 | DIASTOLIC BLOOD PRESSURE: 60 MMHG

## 2022-06-10 DIAGNOSIS — I50.32 CHRONIC DIASTOLIC CONGESTIVE HEART FAILURE (HCC): ICD-10-CM

## 2022-06-10 DIAGNOSIS — Z87.891 HISTORY OF TOBACCO ABUSE: ICD-10-CM

## 2022-06-10 DIAGNOSIS — I48.91 ATRIAL FIBRILLATION, UNSPECIFIED TYPE (HCC): Primary | ICD-10-CM

## 2022-06-10 DIAGNOSIS — E66.3 OVERWEIGHT (BMI 25.0-29.9): ICD-10-CM

## 2022-06-10 DIAGNOSIS — N18.31 STAGE 3A CHRONIC KIDNEY DISEASE (HCC): ICD-10-CM

## 2022-06-10 PROBLEM — J98.11 ATELECTASIS, LEFT: Status: RESOLVED | Noted: 2020-07-24 | Resolved: 2022-06-10

## 2022-06-10 PROBLEM — J90 PLEURAL EFFUSION: Status: RESOLVED | Noted: 2020-03-23 | Resolved: 2022-06-10

## 2022-06-10 PROBLEM — R91.8 BILATERAL PULMONARY INFILTRATES ON CXR: Status: RESOLVED | Noted: 2020-03-23 | Resolved: 2022-06-10

## 2022-06-10 PROBLEM — I31.39 PERICARDIAL EFFUSION: Status: RESOLVED | Noted: 2020-03-23 | Resolved: 2022-06-10

## 2022-06-10 PROBLEM — J44.9 CHRONIC OBSTRUCTIVE PULMONARY DISEASE (HCC): Status: RESOLVED | Noted: 2017-09-28 | Resolved: 2022-06-10

## 2022-06-10 PROCEDURE — 99214 OFFICE O/P EST MOD 30 MIN: CPT | Performed by: INTERNAL MEDICINE

## 2022-06-10 PROCEDURE — 93000 ELECTROCARDIOGRAM COMPLETE: CPT | Performed by: INTERNAL MEDICINE

## 2022-06-10 PROCEDURE — 1123F ACP DISCUSS/DSCN MKR DOCD: CPT | Performed by: INTERNAL MEDICINE

## 2022-06-10 NOTE — PROGRESS NOTES
Chief Complaint   Patient presents with    Atrial Fibrillation       2-11-22: Patient presents for initial medical evaluation. Patient is followed on a regular basis by Dr. Makayla Aguilar MD. Hx of Pafib. On DOAC. On Amiodarone 2x/week now, has been on Amiodarone for more than 2 yrs. Pt denies chest pain, dyspnea, dyspnea on exertion, change in exercise capacity, fatigue,  nausea, vomiting, diarrhea, constipation, motor weakness, insomnia, weight loss, syncope, dizziness, lightheadedness, palpitations, PND, orthopnea, or claudication. Her mother is dying in ICU. Does have COPD, following with [place]. Quit smoking 6 yrs ago. TSH is ok. Lipid profile is abnormal. Hx of breast cancer s/p chemo and radiation. Did have LE symptoms with chemo and statins. Hx of negative stress test in 2019. Echo in 2019 with EF of 60%, mild LVH, grade I DD, mild MR.   EKG with NSR no ischemia. 6-10-22: hx of Pafib, on amiodarone and DOAC. Hx of negative stress test in 2019. Echo in 2019 with EF of 60%, mild LVH, grade I DD, mild MR. Hx of COPD. No smoking. Hx of breast cancer s/p chemo and radiation  Pt denies chest pain, dyspnea, dyspnea on exertion, change in exercise capacity, fatigue,  nausea, vomiting, diarrhea, constipation, motor weakness, insomnia, weight loss, syncope, dizziness, lightheadedness, palpitations, PND, orthopnea, or claudication. TSH as well as liver function test are within normal limits. Does have stage III chronic kidney disease  EKG with NSR, PAC.          Patient Active Problem List   Diagnosis    Hyperlipidemia with target LDL less than 130    Vitamin D deficiency    Atrial fibrillation ([address]    Other emphysema ([address]    Other plastic surgery for unacceptable cosmetic appearance    Fatigue    Acute bronchitis    Hypoxemia    Fracture of humerus, proximal    Benign neoplasm of sigmoid colon    Other hemorrhoids    Hx of colonic polyps    Family history of colon cancer    Osteopenia of necks of both femurs    Congestive heart failure (HCC)    Abnormal mammogram of left breast    Left breast mass    Overweight (BMI 25.0-29. 9)    Malignant neoplasm of upper-inner quadrant of left breast in female, estrogen receptor negative (HCC)    Neck nodule    Hemoptysis    Eczema    Dysphonia    Hypoxia    Acute respiratory failure with hypoxia (HCC)    Radiation pneumonitis (HCC)    Colitis    Vaccination side effects, initial encounter    Lump of skin of right upper extremity    Esophagitis    Esophageal dysphagia    Need for prophylactic chemotherapy    History of tobacco abuse    Stage 3a chronic kidney disease (HCC)       Past Surgical History:   Procedure Laterality Date    ATRIAL ABLATION SURGERY      BREAST BIOPSY Left 01/20/2021    BREAST BIOPSY Left 2/4/2021    LEFT BREAST LUMPECTOMY AND  SENTINEL LYMPH NODE BIOPSY performed by Izabella Arreola MD at [place] [address]    staph infection, postpartum    COLONOSCOPY  07/22/2015    w/polypectomy Dr Carmine Tadeo    COLONOSCOPY N/A 10/18/2021    COLORECTAL CANCER SCREENING, HIGH RISK performed by Britany Rodriguez MD at [address] [place] [address]  2012    Dr Hao Mcdonald; HI RISK IND N/A 07/13/2018    COLONOSCOPY performed by Tae Farrell MD at [place] [address] 12/23/2021    EGD ESOPHAGOGASTRODUODENOSCOPY WITH BIOPSIES performed by Britany Rodriguez MD at [place] [address] LEFT Left 2/23/2022    US BREAST NEEDLE BIOPSY LEFT MLOZ ULTRASOUND       Social History     Socioeconomic History    Marital status:      Spouse name: Not on file    Number of children: 2    Years of education: Not on file    Highest education level: Not on file   Occupational History    Occupation: [employer], cafeteria, retired   Tobacco Use    Smoking status: Former Smoker     Packs/day: 1.00 Years: 50.00     Pack years: 50.00     Types: Cigarettes     Start date: 54     Quit date: 2015     Years since quittin.4    Smokeless tobacco: Never Used    Tobacco comment: pt will try smoking cessation   Vaping Use    Vaping Use: Never used   Substance and Sexual Activity    Alcohol use: No    Drug use: No    Sexual activity: Not on file   Other Topics Concern    Not on file   Social History Narrative    Born in [place], one brother in [place], keeps in touch    , 2 children, one son in [place], one daughter in [place]    5 grandchildren     Lives in a house in [place] with spouse     Worked for [employer] and full time at [employer], [employer], fashion, family life     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      Social Determinants of Health     Financial Resource Strain:     Difficulty of Paying Living Expenses: Not on file   Food Insecurity:     Worried About [address] in the Last Year: Not on file    [address] in the Last Year: Not on file   Transportation Needs: No Transportation Needs    Lack of Transportation (Medical): No    Lack of Transportation (Non-Medical):  No   Physical Activity:     Days of Exercise per Week: Not on file    Minutes of Exercise per Session: Not on file   Stress:     Feeling of Stress : Not on file   Social Connections:     Frequency of Communication with Friends and Family: Not on file    Frequency of Social Gatherings with Friends and Family: Not on file    Attends Muslim Services: Not on file    Active Member of Clubs or Organizations: Not on file    Attends Club or Organization Meetings: Not on file    Marital Status: Not on file   Intimate Partner Violence:     Fear of Current or Ex-Partner: Not on file    Emotionally Abused: Not on file    Physically Abused: Not on file    Sexually Abused: Not on file   Housing Stability:     Unable to Pay for Housing in the Last Year: Not on file    Number of [place] in the Last Year: Not on file    Unstable Housing in the Last Year: Not on file       Family History   Problem Relation Age of Onset    Osteoarthritis Mother     Osteoporosis Mother     Cancer Mother         anorectal     Colon Cancer Mother     Breast Cancer Paternal Aunt        Current Outpatient Medications   Medication Sig Dispense Refill    rivaroxaban (XARELTO) 20 MG TABS tablet TAKE 1 TABLET BY MOUTH EVERY DAY WITH BREAKFAST 90 tablet 3    umeclidinium-vilanterol (ANORO ELLIPTA) 62.5-25 MCG/INH AEPB inhaler Inhale 1 puff into the lungs daily 1 each 3    furosemide (LASIX) 40 MG tablet Take 1 tablet by mouth daily as needed (for LE swelling) 90 tablet 3    potassium chloride (KLOR-CON M) 20 MEQ extended release tablet Take 1 tablet by mouth daily (Patient taking differently: Take 20 mEq by mouth daily as needed With Lasix) 90 tablet 3    pravastatin (PRAVACHOL) 20 MG tablet Take 1 tablet by mouth daily 90 tablet 3    amiodarone (CORDARONE) 200 MG tablet TAKE 1 TABLET BY MOUTH TWICE WEEKLY 180 tablet 3    dilTIAZem (CARDIZEM CD) 240 MG extended release capsule TAKE 1 CAPSULE BY MOUTH EVERY DAY 90 capsule 3    Calcium Carb-Cholecalciferol (CALCIUM + D3 PO) Take 1,200 ampules by mouth       b complex vitamins tablet Take 1 tablet by mouth daily      magnesium oxide (MAG-OX) 400 MG tablet Take 400 mg by mouth daily       Vitamin D (CHOLECALCIFEROL) 1000 UNITS CAPS capsule Take 2,000 Units by mouth daily        No current facility-administered medications for this visit. Aldactone [spironolactone]    Review of Systems:  General ROS: negative  Psychological ROS: negative  Hematological and Lymphatic ROS: No history of blood clots or bleeding disorder.    Respiratory ROS: no cough, shortness of breath, or wheezing  Cardiovascular ROS: no chest pain or dyspnea on exertion  Gastrointestinal ROS: no abdominal pain, change in bowel habits, or black or bloody stools  Genito-Urinary ROS: no dysuria, trouble voiding, or hematuria  Musculoskeletal ROS: negative  Neurological ROS: no TIA or stroke symptoms  Dermatological ROS: negative    VITALS:  Blood pressure 110/60, pulse 66, weight 139 lb (63 kg), not currently breastfeeding. Body mass index is 25.42 kg/m². Physical Examination:  General appearance - alert, well appearing, and in no distress  Mental status - alert, oriented to person, place, and time  Neck - Neck is supple, no JVD or carotid bruits. No thyromegaly or adenopathy. Chest - clear to auscultation, no wheezes, rales or rhonchi, symmetric air entry  Heart - normal rate, regular rhythm, normal S1, S2, no murmurs, rubs, clicks or gallops  Abdomen - soft, nontender, nondistended, no masses or organomegaly  Neurological - alert, oriented, normal speech, no focal findings or movement disorder noted  Extremities - peripheral pulses normal, no pedal edema, no clubbing or cyanosis  Skin - normal coloration and turgor, no rashes, no suspicious skin lesions noted        Pulses:   Carotid pulses are 3+ on the right side, and 3+ on the left side. Radial pulses are 3+ on the right side, and 3+ on the left side. Femoral pulses are 3+ on the right side, and 3+ on the left side. Popliteal pulses are 3+ on the right side, and 3+ on the left side. EKG: normal sinus rhythm, nonspecific ST and T waves changes    Orders Placed This Encounter   Procedures    EKG 12 Lead       ASSESSMENT:     Diagnosis Orders   1. Atrial fibrillation, unspecified type (HCC)  EKG 12 Lead   2. Overweight (BMI 25.0-29.9)     3. History of tobacco abuse     4. Chronic diastolic congestive heart failure (HCC)     5. Stage 3a chronic kidney disease (HCC)           PLAN:         As always, aggressive risk factor modification is strongly recommended. We should adhere to the JNC VIII guidelines for HTN management and the NCEP ATP III guidelines for LDL-C management.     Cardiac diet is always recommended with low fat, cholesterol, calories and sodium. Continue medications at current doses. Take Lasix as needed or every other day if need    Take pravachol on a daily basis. If not controlled then consider Repatha    Will consider switching amiodarone to SOtalol in near future given long term side affects of amiodarone. Doing ok now. Cont with DOAC. Monitor H/H. Check EKG    Patient was advised and encouraged to check blood pressure at home or at a pharmacy, maintain a logbook, and also call us back if blood pressure are above the target ranges or if it is low. Patient clearly understands and agrees to the instructions. We will need to continue to monitor muscle and liver enzymes, BUN, CR, and electrolytes.     Follow up with Pulmonary

## 2022-07-07 ENCOUNTER — IMMUNIZATION (OUTPATIENT)
Dept: FAMILY MEDICINE CLINIC | Age: 72
End: 2022-07-07
Payer: COMMERCIAL

## 2022-07-07 DIAGNOSIS — I48.91 ATRIAL FIBRILLATION, UNSPECIFIED TYPE (HCC): ICD-10-CM

## 2022-07-07 PROCEDURE — 91306 COVID-19, MODERNA BOOSTER BLUE BORDER, (AGE 18Y+), IM, 50MCG/0.25ML: CPT | Performed by: FAMILY MEDICINE

## 2022-07-07 PROCEDURE — 0064A COVID-19, MODERNA BOOSTER BLUE BORDER, (AGE 18Y+), IM, 50MCG/0.25ML: CPT | Performed by: FAMILY MEDICINE

## 2022-07-07 RX ORDER — AMIODARONE HYDROCHLORIDE 200 MG/1
TABLET ORAL
Qty: 24 TABLET | Refills: 3 | Status: SHIPPED | OUTPATIENT
Start: 2022-07-07 | End: 2022-10-31

## 2022-07-07 NOTE — TELEPHONE ENCOUNTER
Please approve or deny this refill request. The order is pended. Thank you.     LOV 6/10/2022    Next Visit Date:  Future Appointments   Date Time Provider Stewart Jacobsisti   8/1/2022  7:30 AM [facility] ULTRASOUND ROOM [address] [facility]   8/8/2022  9:00 AM Magaly Argueta  [facility],[address]   8/18/2022  8:30 AM [facility], [address] [facility],4Th Floor   9/26/2022  8:00 AM [address] [place]   10/4/2022  9:30 AM Raghu Ugalde MD [facility]   3/10/2023  8:45 AM Howard Zuñiga [facility]         Verbal order received with read back

## 2022-07-12 ENCOUNTER — TELEPHONE (OUTPATIENT)
Dept: CARDIOLOGY CLINIC | Age: 72
End: 2022-07-12

## 2022-07-12 NOTE — TELEPHONE ENCOUNTER
Patient received her booster (Covid) on Thursday-starting Friday her BP has been fluctuating  (7/12) 6:30AM  112/70 (91)             7:32AM  107/71 (96)             8:40AM  134/102 (102)  Is this normal following the shot? Should she take her BP hourly?   She is feeling fine-  Please call to discuss

## 2022-07-25 DIAGNOSIS — I42.1 HOCM (HYPERTROPHIC OBSTRUCTIVE CARDIOMYOPATHY) (HCC): Primary | ICD-10-CM

## 2022-07-29 DIAGNOSIS — I48.91 ATRIAL FIBRILLATION, UNSPECIFIED TYPE (HCC): Primary | ICD-10-CM

## 2022-07-29 RX ORDER — DILTIAZEM HYDROCHLORIDE 240 MG/1
CAPSULE, COATED, EXTENDED RELEASE ORAL
Qty: 90 CAPSULE | Refills: 3 | Status: SHIPPED | OUTPATIENT
Start: 2022-07-29

## 2022-07-29 NOTE — TELEPHONE ENCOUNTER
Requesting medication refill. Please approve or deny this request.    Rx requested:  Requested Prescriptions     Pending Prescriptions Disp Refills    dilTIAZem (CARDIZEM CD) 240 MG extended release capsule 90 capsule 3     Sig: TAKE 1 CAPSULE BY MOUTH EVERY DAY         Last Office Visit:   6/10/2022      Next Visit Date:  Future Appointments   Date Time Provider Stewart Lutzi   8/1/2022  7:30 AM [facility]   8/8/2022  9:00 AM Bibiana Kamara  [place], [address]   8/18/2022  8:30 AM Sandhya Ventura, [address] [facility],4Th Floor   9/26/2022  8:00 AM David [address] [place]   10/4/2022  9:30 AM Brenda Wagoner MD [facility]   3/10/2023  8:45 [facility]               Last refill 6/28/21. Please approve or deny.

## 2022-08-01 ENCOUNTER — HOSPITAL ENCOUNTER (OUTPATIENT)
Dept: ULTRASOUND IMAGING | Age: 72
Discharge: HOME | End: 2022-08-03
Payer: COMMERCIAL

## 2022-08-01 DIAGNOSIS — E04.1 RIGHT THYROID NODULE: ICD-10-CM

## 2022-08-01 PROCEDURE — 76536 US EXAM OF HEAD AND NECK: CPT

## 2022-08-08 ENCOUNTER — OFFICE VISIT (OUTPATIENT)
Dept: FAMILY MEDICINE CLINIC | Age: 72
End: 2022-08-08
Payer: COMMERCIAL

## 2022-08-08 ENCOUNTER — TELEPHONE (OUTPATIENT)
Dept: FAMILY MEDICINE CLINIC | Age: 72
End: 2022-08-08

## 2022-08-08 VITALS
WEIGHT: 143 LBS | BODY MASS INDEX: 26.16 KG/M2 | SYSTOLIC BLOOD PRESSURE: 133 MMHG | DIASTOLIC BLOOD PRESSURE: 89 MMHG | OXYGEN SATURATION: 96 % | HEART RATE: 116 BPM | TEMPERATURE: 97.3 F

## 2022-08-08 DIAGNOSIS — R25.2 LEG CRAMPS: ICD-10-CM

## 2022-08-08 DIAGNOSIS — R09.89 PULSE IRREGULARITY: ICD-10-CM

## 2022-08-08 DIAGNOSIS — I10 PRIMARY HYPERTENSION: ICD-10-CM

## 2022-08-08 DIAGNOSIS — I83.93 VARICOSE VEINS OF BOTH LOWER EXTREMITIES, UNSPECIFIED WHETHER COMPLICATED: ICD-10-CM

## 2022-08-08 DIAGNOSIS — R93.89 ABNORMAL CXR: ICD-10-CM

## 2022-08-08 DIAGNOSIS — E78.5 HYPERLIPIDEMIA, UNSPECIFIED HYPERLIPIDEMIA TYPE: Primary | ICD-10-CM

## 2022-08-08 DIAGNOSIS — M81.0 OSTEOPOROSIS, UNSPECIFIED OSTEOPOROSIS TYPE, UNSPECIFIED PATHOLOGICAL FRACTURE PRESENCE: ICD-10-CM

## 2022-08-08 DIAGNOSIS — K76.89 LIVER CYST: ICD-10-CM

## 2022-08-08 DIAGNOSIS — R09.89 CHEST CRACKLES: ICD-10-CM

## 2022-08-08 LAB — PROCALCITONIN: 0.03 NG/ML (ref 0–0.15)

## 2022-08-08 PROCEDURE — 99214 OFFICE O/P EST MOD 30 MIN: CPT | Performed by: INTERNAL MEDICINE

## 2022-08-08 PROCEDURE — 93000 ELECTROCARDIOGRAM COMPLETE: CPT | Performed by: INTERNAL MEDICINE

## 2022-08-08 PROCEDURE — 1123F ACP DISCUSS/DSCN MKR DOCD: CPT | Performed by: INTERNAL MEDICINE

## 2022-08-08 SDOH — ECONOMIC STABILITY: FOOD INSECURITY: WITHIN THE PAST 12 MONTHS, YOU WORRIED THAT YOUR FOOD WOULD RUN OUT BEFORE YOU GOT MONEY TO BUY MORE.: NEVER TRUE

## 2022-08-08 SDOH — ECONOMIC STABILITY: FOOD INSECURITY: WITHIN THE PAST 12 MONTHS, THE FOOD YOU BOUGHT JUST DIDN'T LAST AND YOU DIDN'T HAVE MONEY TO GET MORE.: NEVER TRUE

## 2022-08-08 ASSESSMENT — PATIENT HEALTH QUESTIONNAIRE - PHQ9
1. LITTLE INTEREST OR PLEASURE IN DOING THINGS: 0
SUM OF ALL RESPONSES TO PHQ QUESTIONS 1-9: 0
SUM OF ALL RESPONSES TO PHQ9 QUESTIONS 1 & 2: 0
2. FEELING DOWN, DEPRESSED OR HOPELESS: 0
SUM OF ALL RESPONSES TO PHQ QUESTIONS 1-9: 0

## 2022-08-08 ASSESSMENT — SOCIAL DETERMINANTS OF HEALTH (SDOH): HOW HARD IS IT FOR YOU TO PAY FOR THE VERY BASICS LIKE FOOD, HOUSING, MEDICAL CARE, AND HEATING?: NOT HARD AT ALL

## 2022-08-08 NOTE — PROGRESS NOTES
[facility]      Dr Brenton Cole; HI RISK IND N/A 2018    COLONOSCOPY performed by Elke Bach MD at [address] [facility] 2021    EGD ESOPHAGOGASTRODUODENOSCOPY WITH BIOPSIES performed by Davey Lagos MD at [address] [place] [address] [facility] [facility] [address] LEFT Left 2022    US BREAST NEEDLE BIOPSY LEFT MLOZ ULTRASOUND     Social History     Socioeconomic History    Marital status:      Spouse name: Not on file    Number of children: 2    Years of education: Not on file    Highest education level: Not on file   Occupational History    Occupation: [address] [place] [employer], retired   Tobacco Use    Smoking status: Former     Packs/day: 1.00     Years: 50.00     Pack years: 50.00     Types: Cigarettes     Start date:      Quit date: 2015     Years since quittin.6    Smokeless tobacco: Never    Tobacco comments:     pt will try smoking cessation   Vaping Use    Vaping Use: Never used   Substance and Sexual Activity    Alcohol use: No    Drug use: No    Sexual activity: Not on file   Other Topics Concern    Not on file   Social History Narrative    Born in [place], one brother in [facility], keeps in touch    , 2 children, one son in [place], one daughter in [place]    5 grandchildren     Lives in a house in [place] with spouse     Worked for [employer] and full time at [employer], [employer], fashion, family life     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      Social Determinants of Health     Financial Resource Strain: Low Risk     Difficulty of Paying Living Expenses: Not hard at all   Food Insecurity: No Food Insecurity    Worried About [address] in the Last Year: Never true    [address] [facility] [address] in the Last Year: Never true   Transportation Needs: Not on file   Physical Activity: Not on file   Stress: Not on file   Social Connections: Not on file   Intimate Partner Violence: Not on file   Housing Stability: Not on file     Allergies   Allergen Reactions    Aldactone [Spironolactone] Other (See Comments)     When taken daily,Leg weakness, was falling down     Current Outpatient Medications on File Prior to Visit   Medication Sig Dispense Refill    dilTIAZem (CARDIZEM CD) 240 MG extended release capsule TAKE 1 CAPSULE BY MOUTH EVERY DAY 90 capsule 3    amiodarone (CORDARONE) 200 MG tablet TAKE 1 TABLET BY MOUTH TWICE WEEKLY 24 tablet 3    rivaroxaban (XARELTO) 20 MG TABS tablet TAKE 1 TABLET BY MOUTH EVERY DAY WITH BREAKFAST 90 tablet 3    umeclidinium-vilanterol (ANORO ELLIPTA) 62.5-25 MCG/INH AEPB inhaler Inhale 1 puff into the lungs daily 1 each 3    furosemide (LASIX) 40 MG tablet Take 1 tablet by mouth daily as needed (for LE swelling) 90 tablet 3    potassium chloride (KLOR-CON M) 20 MEQ extended release tablet Take 1 tablet by mouth daily (Patient taking differently: Take 20 mEq by mouth daily as needed With Lasix) 90 tablet 3    Calcium Carb-Cholecalciferol (CALCIUM + D3 PO) Take 1,200 ampules by mouth       b complex vitamins tablet Take 1 tablet by mouth daily      magnesium oxide (MAG-OX) 400 MG tablet Take 400 mg by mouth daily       Vitamin D (CHOLECALCIFEROL) 1000 UNITS CAPS capsule Take 2,000 Units by mouth daily        No current facility-administered medications on file prior to visit. I have personally reviewed the ROS, PMH, PFH, and social history     Review of Systems    Objective:   /89 (Site: Right Wrist, Position: Sitting)   Pulse (!) 116   Temp 97.3 °F (36.3 °C) (Temporal)   Wt 143 lb (64.9 kg)   LMP  (LMP Unknown)   SpO2 96%   BMI 26.16 kg/m²     Physical Exam      Assessment:       Diagnosis Orders   1. Hyperlipidemia, unspecified hyperlipidemia type        2. Primary hypertension              Plan:     vc.  Wants to explore other options for cholesterol, see med list.   ENT fu in about 24 months.    F/u with pulmonary AND GI  Us thyroid 08/2024   Renal us (No F/u)   Consider liver US fu in 10/28/2022, If no change will stop following. Refusing treatment for osteoporosis, on repeat dexa if worse or not better will want treatment. Keep f/u with mumtaz allan/rad/onc (                 No orders of the defined types were placed in this encounter. No orders of the defined types were placed in this encounter. Risk of mi and stroke explained without statin  Offered alternatives, refused  Doesn't want a rx right now for hpl  Continue chronic medications. She will schedule her 1st covid booster.  (done this)   If chest pains or dyspnea call 911   No falls or trauma. If anything should change or worsen call ASAP, don't wait for next scheduled appointment. Return for Chronic condition management/appointment, worsening symptoms, call ASAP for appointment.       Kristin Solis MD

## 2022-08-09 ENCOUNTER — TELEPHONE (OUTPATIENT)
Dept: CARDIOLOGY CLINIC | Age: 72
End: 2022-08-09

## 2022-08-09 NOTE — TELEPHONE ENCOUNTER
Patient saw her PCP (8/8/2022)had an EKG-she has SOB and Afib; she has an appointment 9/2/2022  BP-120/77 (HR-100)  Left finger-82  Right finger-61  Please call to discuss (should she go to ER?)

## 2022-08-10 ENCOUNTER — TELEPHONE (OUTPATIENT)
Dept: FAMILY MEDICINE CLINIC | Age: 72
End: 2022-08-10

## 2022-08-10 NOTE — TELEPHONE ENCOUNTER
Increasing markings seen on cxr  Might be organizing pnumonia  Will get ct and ask her to see pulmonary dr elise  If any fevers, chills, cough, sputum production, call me asap even if afterhours  Thanks,     Call immediately should something change.       Glendon Cockayne

## 2022-08-11 ENCOUNTER — OFFICE VISIT (OUTPATIENT)
Dept: CARDIOLOGY CLINIC | Age: 72
End: 2022-08-11
Payer: COMMERCIAL

## 2022-08-11 ENCOUNTER — TELEPHONE (OUTPATIENT)
Dept: FAMILY MEDICINE CLINIC | Age: 72
End: 2022-08-11

## 2022-08-11 VITALS
SYSTOLIC BLOOD PRESSURE: 138 MMHG | RESPIRATION RATE: 16 BRPM | DIASTOLIC BLOOD PRESSURE: 82 MMHG | WEIGHT: 138 LBS | HEART RATE: 96 BPM | BODY MASS INDEX: 25.24 KG/M2 | OXYGEN SATURATION: 96 %

## 2022-08-11 DIAGNOSIS — R06.02 SOB (SHORTNESS OF BREATH): ICD-10-CM

## 2022-08-11 DIAGNOSIS — I48.91 ATRIAL FIBRILLATION, UNSPECIFIED TYPE (HCC): ICD-10-CM

## 2022-08-11 DIAGNOSIS — I50.32 CHRONIC DIASTOLIC CONGESTIVE HEART FAILURE (HCC): Primary | ICD-10-CM

## 2022-08-11 LAB — D DIMER: 0.29 MG/L FEU (ref 0–0.5)

## 2022-08-11 PROCEDURE — 1123F ACP DISCUSS/DSCN MKR DOCD: CPT | Performed by: NURSE PRACTITIONER

## 2022-08-11 PROCEDURE — 99214 OFFICE O/P EST MOD 30 MIN: CPT | Performed by: NURSE PRACTITIONER

## 2022-08-11 NOTE — PROGRESS NOTES
Chief Complaint   Patient presents with    Atrial Fibrillation     EKG 8/8/2022    Shortness of Breath       2-11-22: Patient presents for initial medical evaluation. Patient is followed on a regular basis by Dr. Giovanny Malave MD. Hx of Pafib. On DOAC. On Amiodarone 2x/week now, has been on Amiodarone for more than 2 yrs. Pt denies chest pain, dyspnea, dyspnea on exertion, change in exercise capacity, fatigue,  nausea, vomiting, diarrhea, constipation, motor weakness, insomnia, weight loss, syncope, dizziness, lightheadedness, palpitations, PND, orthopnea, or claudication. Her mother is dying in ICU. Does have COPD, following with [facility]. Quit smoking 6 yrs ago. TSH is ok. Lipid profile is abnormal. Hx of breast cancer s/p chemo and radiation. Did have LE symptoms with chemo and statins. Hx of negative stress test in 2019. Echo in 2019 with EF of 60%, mild LVH, grade I DD, mild MR.   EKG with NSR no ischemia. 6-10-22: hx of Pafib, on amiodarone and DOAC. Hx of negative stress test in 2019. Echo in 2019 with EF of 60%, mild LVH, grade I DD, mild MR. Hx of COPD. No smoking. Hx of breast cancer s/p chemo and radiation  Pt denies chest pain, dyspnea, dyspnea on exertion, change in exercise capacity, fatigue,  nausea, vomiting, diarrhea, constipation, motor weakness, insomnia, weight loss, syncope, dizziness, lightheadedness, palpitations, PND, orthopnea, or claudication. TSH as well as liver function test are within normal limits. Does have stage III chronic kidney disease  EKG with NSR, PAC.     8/11/2022: Patient with medical history of PAF on amiodarone and DOAC, COPD, breast cancer s/p chemo and radiation. Negative stress test in 2019, echo at that time with EF 60%. Patient seen in office today for A. fib. States heart rate at home has been 130s to 140s. She had EKG in PCPs office on 8/8/2022 showing A. fib, heart rate 122. Patient denies chest pain but does complain of shortness of breath. Patient with mild conversational dyspnea. No lower extremity edema noted. Patient states all of her symptoms started the day after receiving COVID-vaccine booster approximately 3 to 4 weeks ago. Case discussed with Dr. Nesha Corbin and then with patient. At this time we will plan stat D-dimer and if it is abnormal patient will need stat CTA chest to rule out PE. If dimer within normal limits we will plan to adjust medications and closely follow patient in office next week. Patient is agreeable to this plan. Patient Active Problem List   Diagnosis    Hyperlipidemia with target LDL less than 130    Vitamin D deficiency    Atrial fibrillation ([address]    Other emphysema ([address]    Other plastic surgery for unacceptable cosmetic appearance    Fatigue    Acute bronchitis    Hypoxemia    Fracture of humerus, proximal    Benign neoplasm of sigmoid colon    Other hemorrhoids    Hx of colonic polyps    Family history of colon cancer    Osteopenia of necks of both femurs    Congestive heart failure (HCC)    Abnormal mammogram of left breast    Left breast mass    Overweight (BMI 25.0-29. 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)    Neck nodule    Hemoptysis    Eczema    Dysphonia    Hypoxia    Acute respiratory failure with hypoxia (HCC)    Radiation pneumonitis (HCC)    Colitis    Vaccination side effects, initial encounter    Lump of skin of right upper extremity    Esophagitis    Esophageal dysphagia    Need for prophylactic chemotherapy    History of tobacco abuse    Stage 3a chronic kidney disease ([address]       Past Surgical History:   Procedure Laterality Date    ATRIAL ABLATION SURGERY      BREAST BIOPSY Left 01/20/2021    BREAST BIOPSY Left 2/4/2021    LEFT BREAST LUMPECTOMY AND  SENTINEL LYMPH NODE BIOPSY performed by Yvonne Mckeon MD at [address] [place] [address]    stap infection, postpartum    COLONOSCOPY  07/22/2015 w/polypectomy Dr Sheela Sandoval    COLONOSCOPY N/A 10/18/2021    COLORECTAL CANCER SCREENING, HIGH RISK performed by Xander Willett MD at . [facility]      Dr Merly Nash; HI RISK IND N/A 2018    COLONOSCOPY performed by Maria D Leon MD at [facility] 2021    EGD ESOPHAGOGASTRODUODENOSCOPY WITH BIOPSIES performed by Xander Willett MD at [address] LEFT Left 2022     BREAST NEEDLE BIOPSY LEFT MLOZ ULTRASOUND       Social History     Socioeconomic History    Marital status:      Spouse name: None    Number of children: 2    Years of education: None    Highest education level: None   Occupational History    Occupation: [employer], retired   Tobacco Use    Smoking status: Former     Packs/day: 1.00     Years: 50.00     Pack years: 50.00     Types: Cigarettes     Start date:      Quit date: 2015     Years since quittin.6    Smokeless tobacco: Never    Tobacco comments:     pt will try smoking cessation   Vaping Use    Vaping Use: Never used   Substance and Sexual Activity    Alcohol use: No    Drug use: No   Social History Narrative    Born in [facility], one brother in [facility], keeps in touch    , 2 children, one son in [place], one daughter in [place]    5 grandchildren     Lives in a house in [place] with spouse     Worked for [employer] and full time at [employer], [employer], fashion, family life     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      Social Determinants of Health     Financial Resource Strain: Low Risk     Difficulty of Paying Living Expenses: Not hard at all   Food Insecurity: No Food Insecurity    Worried About [address] in the Last Year: Never true    [address] in the Last Year: Never true       Family History   Problem Relation Age of Onset    Osteoarthritis Mother     Osteoporosis Mother     Cancer negative    VITALS:  Blood pressure 138/82, pulse 96, resp. rate 16, weight 138 lb (62.6 kg), SpO2 96 %, not currently breastfeeding. Body mass index is 25.24 kg/m². Physical Examination:  General appearance - alert, well appearing, and in no distress  Mental status - alert, oriented to person, place, and time  Neck - Neck is supple, no JVD or carotid bruits. No thyromegaly or adenopathy. Chest - clear to auscultation, no wheezes, rales or rhonchi, symmetric air entry  Heart - normal rate, regular rhythm, normal S1, S2, no murmurs, rubs, clicks or gallops  Abdomen - soft, nontender, nondistended, no masses or organomegaly  Neurological - alert, oriented, normal speech, no focal findings or movement disorder noted  Extremities - peripheral pulses normal, no pedal edema, no clubbing or cyanosis  Skin - normal coloration and turgor, no rashes, no suspicious skin lesions noted        Pulses:   Carotid pulses are 3+ on the right side, and 3+ on the left side. Radial pulses are 3+ on the right side, and 3+ on the left side. Femoral pulses are 3+ on the right side, and 3+ on the left side. Popliteal pulses are 3+ on the right side, and 3+ on the left side. EKG: normal sinus rhythm, nonspecific ST and T waves changes    Orders Placed This Encounter   Procedures    D-Dimer, Quantitative    ECHO Complete 2D W Doppler W Color       ASSESSMENT:     Diagnosis Orders   1. Chronic diastolic congestive heart failure (HCC)  ECHO Complete 2D W Doppler W Color      2. Atrial fibrillation, unspecified type ([address]  ECHO Complete 2D W Doppler W Color      3. SOB (shortness of breath)  D-Dimer, Quantitative    ECHO Complete 2D W Doppler W Color            PLAN:         As always, aggressive risk factor modification is strongly recommended. We should adhere to the JNC VIII guidelines for HTN management and the NCEP ATP III guidelines for LDL-C management.     Cardiac diet is always recommended with low fat, cholesterol, calories and sodium. Continue medications at current doses. Take Lasix as needed or every other day if need    Take pravachol on a daily basis. If not controlled then consider Repatha    Will consider switching amiodarone to Sotalol in near future given long term side affects of amiodarone. Doing ok now. Add metoprolol 25 mg p.o. twice daily    Check 2D Echo for LV function, PA pressures, wall motion abnormalities and any significant valvular disease. Stat D-dimer, possible stat CTA chest depending on D-dimer results    Follow-up next week, repeat EKG at that time    Cont with DOAC. Monitor H/H. Check EKG    Patient was advised and encouraged to check blood pressure at home or at a pharmacy, maintain a logbook, and also call us back if blood pressure are above the target ranges or if it is low. Patient clearly understands and agrees to the instructions. We will need to continue to monitor muscle and liver enzymes, BUN, CR, and electrolytes.     Follow up with Pulmonary

## 2022-08-12 NOTE — TELEPHONE ENCOUNTER
Spoke w patient, she saw Dr Milena Leon today and they did dup US on her legs. She will schedule US AB and CT Chest. Called Dr Milena Leon office and confirmed test that was completed, they will fax results over once it is signed off on but no blood clots seen per office. Do you still want the US D[place]in Post done?

## 2022-08-18 ENCOUNTER — OFFICE VISIT (OUTPATIENT)
Dept: PULMONOLOGY | Age: 72
End: 2022-08-18
Payer: COMMERCIAL

## 2022-08-18 VITALS
HEART RATE: 71 BPM | DIASTOLIC BLOOD PRESSURE: 75 MMHG | OXYGEN SATURATION: 94 % | BODY MASS INDEX: 26.5 KG/M2 | WEIGHT: 144 LBS | HEIGHT: 62 IN | SYSTOLIC BLOOD PRESSURE: 124 MMHG | TEMPERATURE: 97.8 F

## 2022-08-18 DIAGNOSIS — R93.89 ABNORMAL CXR: ICD-10-CM

## 2022-08-18 DIAGNOSIS — J44.9 CHRONIC OBSTRUCTIVE PULMONARY DISEASE, UNSPECIFIED COPD TYPE (HCC): Primary | ICD-10-CM

## 2022-08-18 DIAGNOSIS — R09.02 HYPOXIA: ICD-10-CM

## 2022-08-18 DIAGNOSIS — I48.91 ATRIAL FIBRILLATION, UNSPECIFIED TYPE (HCC): ICD-10-CM

## 2022-08-18 PROCEDURE — 99214 OFFICE O/P EST MOD 30 MIN: CPT | Performed by: INTERNAL MEDICINE

## 2022-08-18 PROCEDURE — 1123F ACP DISCUSS/DSCN MKR DOCD: CPT | Performed by: INTERNAL MEDICINE

## 2022-08-18 ASSESSMENT — ENCOUNTER SYMPTOMS
EYE DISCHARGE: 0
SHORTNESS OF BREATH: 1
TROUBLE SWALLOWING: 0
COUGH: 0
EYE ITCHING: 0
NAUSEA: 0
WHEEZING: 0
SINUS PRESSURE: 0
DIARRHEA: 0
ABDOMINAL PAIN: 0
VOICE CHANGE: 0
RHINORRHEA: 0
VOMITING: 0
SORE THROAT: 0
CHEST TIGHTNESS: 0

## 2022-08-18 NOTE — PROGRESS NOTES
Subjective:     Alfonso Petit is a 66 y.o. female who complains today of:     Chief Complaint   Patient presents with    Follow-up     4m F/U - COPD       HPI  She said she had a.fib. but heart rate is not high. She is on amiodarone 2 times a week . She had CXR  show  Increased linear markings at the lung bases consistent with interstitial reticular opacities. While these opacities are nonspecific, given the changes seen on the previous  the possibility of organizing pneumonia is questioned. Consider further correlation with CT chest .  She going to have Ct chest  on 8/22/22. Mild C/o shortness of breath  with exertion. No Wheezing. No Cough with Sputum. No Hemoptysis. No Chest tightness. No Chest pain with radiation  or pleuritic pain. No leg swelling   No orthopnea. No Fever or chills. No Rhinorrhea and postnasal drip. She is on bronchodilator with Anoro Ellipta 1 puff daily. She has 7/1/22 first covid booster      Allergies:  Aldactone [spironolactone]  Past Medical History:   Diagnosis Date    Abnormal echocardiogram 2020    possible PFO?  further eval suggested    Atrial fibrillation ([address] 1/5/2016    Breast cancer ([address] 02/04/2021    left    Cancer ([address]     left breast    COPD (chronic obstructive pulmonary disease) ([address] 2015    Dr Kyra Flores    Current use of long term anticoagulation     Cyst of neck 2012    after plastic surgery    Emphysema of lung ([address]     History of compression fracture of vertebral column 2020    T2, L1    History of humerus fracture     LEFT    History of left heart catheterization 03/2020    at [facility], normal LVEF with impaired relaxation, mild AV calcification and mild MV thickening, normal RV    History of sustained ventricular tachycardia 03/2020    had to wear a LifeVest, Dr James Millard    History of therapeutic radiation 2021    4 weeks    History of tobacco abuse 6/10/2022    Hx antineoplastic chemo 2021    12 weeks    Hyperlipidemia LDL goal < 130     Intermittent atrial fibrillation [facility])     Dr Emiliano Pitts, Dr Tae Freeman, Dr Eduardo Siu    Osteopenia 2014    Personal history of skin cancer     had area excised from nose    S/P colonoscopic polypectomy , ,     Dr Marcellus Self    Smoking history     Stage 3a chronic kidney disease ([facility] 6/10/2022    Vitamin D deficiency      Past Surgical History:   Procedure Laterality Date    ATRIAL ABLATION SURGERY      BREAST BIOPSY Left 2021    BREAST BIOPSY Left 2021    LEFT BREAST LUMPECTOMY AND  SENTINEL LYMPH NODE BIOPSY performed by Fabiano Gaffney MD at [facility]    staph infection, postpartum    COLONOSCOPY  2015    w/polypectomy Dr Marcellus Self    COLONOSCOPY N/A 10/18/2021    COLORECTAL CANCER SCREENING, HIGH RISK performed by Melburn Buerger, MD at [place] [address]      Dr Sheela Carlos; HI RISK IND N/A 2018    COLONOSCOPY performed by Mathew Daniel MD at [address] [place] [address] 2021    EGD ESOPHAGOGASTRODUODENOSCOPY WITH BIOPSIES performed by Melburn Buerger, MD at [address] [place] [address] LEFT Left 2022    US BREAST NEEDLE BIOPSY LEFT MLOZ ULTRASOUND     Family History   Problem Relation Age of Onset    Osteoarthritis Mother     Osteoporosis Mother     Cancer Mother         anorectal     Colon Cancer Mother     Breast Cancer Paternal Aunt      Social History     Socioeconomic History    Marital status:      Spouse name: Not on file    Number of children: 2    Years of education: Not on file    Highest education level: Not on file   Occupational History    Occupation: [employer], [employer], retired   Tobacco Use    Smoking status: Former     Packs/day: 1.00     Years: 50.00     Pack years: 50.00     Types: Cigarettes     Start date:      Quit date: 2015     Years since quittin.6     Passive exposure: Past    Smokeless tobacco: Never Tobacco comments:     pt will try smoking cessation   Vaping Use    Vaping Use: Never used   Substance and Sexual Activity    Alcohol use: Never    Drug use: No    Sexual activity: Yes   Other Topics Concern    Not on file   Social History Narrative    Born in [place], one brother in [facility], keeps in touch    , 2 children, one son in [place], one daughter in [place]    5 grandchildren     Lives in a house in [place] with spouse     Worked for [employer] and full time at [employer], [employer], fashion, family life     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      Social Determinants of Health     Financial Resource Strain: Low Risk     Difficulty of Paying Living Expenses: Not hard at all   Food Insecurity: No Food Insecurity    Worried About Running Out of Food in the Last Year: Never true    Ran Out of Food in the Last Year: Never true   Transportation Needs: Not on file   Physical Activity: Not on file   Stress: Not on file   Social Connections: Not on file   Intimate Partner Violence: Not on file   Housing Stability: Not on file         Review of Systems   Constitutional:  Negative for chills, diaphoresis, fatigue and fever. HENT:  Negative for congestion, mouth sores, nosebleeds, postnasal drip, rhinorrhea, sinus pressure, sneezing, sore throat, trouble swallowing and voice change. Eyes:  Negative for discharge, itching and visual disturbance. Respiratory:  Positive for shortness of breath. Negative for cough, chest tightness and wheezing. Cardiovascular:  Negative for chest pain, palpitations and leg swelling. Gastrointestinal:  Negative for abdominal pain, diarrhea, nausea and vomiting. Genitourinary:  Negative for difficulty urinating and hematuria. Musculoskeletal:  Negative for arthralgias, joint swelling and myalgias. Skin:  Negative for rash. Allergic/Immunologic: Negative for environmental allergies and food allergies.    Neurological:  Negative for dizziness, tremors, weakness and headaches. Psychiatric/Behavioral:  Negative for behavioral problems and sleep disturbance.        :     Vitals:    08/18/22 0826   BP: 124/75   Site: Right Upper Arm   Position: Sitting   Cuff Size: Medium Adult   Pulse: 71   Temp: 97.8 °F (36.6 °C)   TempSrc: Temporal   SpO2: 94%   Weight: 144 lb (65.3 kg)   Height: 5' 1.5\" (1.562 m)     Wt Readings from Last 3 Encounters:   08/18/22 144 lb (65.3 kg)   08/11/22 138 lb (62.6 kg)   08/08/22 143 lb (64.9 kg)         Physical Exam  Constitutional:       General: She is not in acute distress. Appearance: She is well-developed. She is not diaphoretic. HENT:      Head: Normocephalic and atraumatic. Nose: Nose normal.   Eyes:      Pupils: Pupils are equal, round, and reactive to light. Neck:      Thyroid: No thyromegaly. Vascular: No JVD. Trachea: No tracheal deviation. Cardiovascular:      Rate and Rhythm: Normal rate and regular rhythm. Heart sounds: No murmur heard. No friction rub. No gallop. Pulmonary:      Effort: No respiratory distress. Breath sounds: No wheezing or rales. Chest:      Chest wall: No tenderness. Abdominal:      General: There is no distension. Tenderness: There is no abdominal tenderness. There is no rebound. Musculoskeletal:         General: Normal range of motion. Lymphadenopathy:      Cervical: No cervical adenopathy. Skin:     General: Skin is warm and dry. Neurological:      Mental Status: She is alert and oriented to person, place, and time. Coordination: Coordination normal.   Psychiatric:         Mood and Affect: Mood normal.         Behavior: Behavior normal.       Current Outpatient Medications   Medication Sig Dispense Refill    metoprolol tartrate (LOPRESSOR) 25 MG tablet Take 1 tablet by mouth in the morning and 1 tablet before bedtime.  60 tablet 5    dilTIAZem (CARDIZEM CD) 240 MG extended release capsule TAKE 1 CAPSULE BY MOUTH EVERY DAY 90 capsule 3    amiodarone (CORDARONE) 200 MG tablet TAKE 1 TABLET BY MOUTH TWICE WEEKLY 24 tablet 3    rivaroxaban (XARELTO) 20 MG TABS tablet TAKE 1 TABLET BY MOUTH EVERY DAY WITH BREAKFAST 90 tablet 3    umeclidinium-vilanterol (ANORO ELLIPTA) 62.5-25 MCG/INH AEPB inhaler Inhale 1 puff into the lungs daily 1 each 3    furosemide (LASIX) 40 MG tablet Take 1 tablet by mouth daily as needed (for LE swelling) 90 tablet 3    potassium chloride (KLOR-CON M) 20 MEQ extended release tablet Take 1 tablet by mouth daily (Patient taking differently: Take 20 mEq by mouth daily as needed With Lasix) 90 tablet 3    Calcium Carb-Cholecalciferol (CALCIUM + D3 PO) Take 1,200 ampules by mouth       b complex vitamins tablet Take 1 tablet by mouth daily      magnesium oxide (MAG-OX) 400 MG tablet Take 400 mg by mouth in the morning and 400 mg before bedtime. Vitamin D (CHOLECALCIFEROL) 1000 UNITS CAPS capsule Take 2,000 Units by mouth daily        No current facility-administered medications for this visit. Results for orders placed in visit on 08/08/22    XR CHEST (2 VW)    Narrative  EXAMINATION: XR CHEST (2 VW). DATE AND TIME:8/8/2022 10:19 AM    CLINICAL HISTORY:  R09.89 PULSE IRREGULARITY ICD10    COMPARISONS: 6/9/21    FINDINGS: Increased linear markings at the lung bases consistent with interstitial reticular opacities. While these opacities are nonspecific, given the changes seen on the previous CT studies from April 28, 2022, the possibility of organizing pneumonia  is questioned. Consider further correlation with CT. Impression  INCREASING BIBASILAR INFILTRATES      Results for orders placed in visit on 06/09/21    XR CHEST (2 VW)    Narrative  DIAGNOSIS:Z92.3 Hx of radiation therapy ICD10    COMPARISON: May 19, 2021 and January 28, 2021    TECHNIQUE: PA and lateral chest    FINDINGS: Ill-defined opacity is again seen in the left mid lung field in the perihilar region. The lungs are also hyperinflated.  Flattening of the diaphragm and increased AP diameter is seen. The lungs contain no pleural effusion or pneumothorax. The  cardiac silhouette is not enlarged. There is loss of anterior superior endplate height of H65-O3 which is unchanged. Impression  The opacity seen in the left mid lung field may be related to patient's history of radiation therapy. Also findings are suggestive of COPD. Results for orders placed during the hospital encounter of 01/28/21    XR CHEST (2 VW)    Narrative  EXAMINATION: XR CHEST (2 VW)    CLINICAL HISTORY: J44.9 Chronic obstructive pulmonary disease, unspecified COPD type ([address] ICD10    COMPARISONS: May 11, 2020    FINDINGS:    Two views of the chest are submitted. The cardiac silhouette is of normal size configuration. Pulmonary vascular attenuated. Widening of the AP diameter the chest.  Right sided trachea. No focal infiltrates. No effusions. No Pneumothoraces. Impression  NO ACUTE ACTIVE CARDIOPULMONARY PROCESS. RADIOGRAPHIC FINDINGS OF COPD  ]  Results for orders placed during the hospital encounter of 05/19/21    XR CHEST PORTABLE    Narrative  EXAMINATION: XR CHEST PORTABLE    CLINICAL HISTORY: SHORTNESS OF BREATH    COMPARISONS: CT CHEST, APRIL 26, 2021, CHEST RADIOGRAPH, JANUARY 28, 2021    FINDINGS: Remote right sixth rib fracture. Cardiopericardial silhouette normal. Aorta calcified. Ill-defined area of increased opacity found bilaterally, left mid and left lower lung. Increased opacity also demonstrated right lung base. Impression  BILATERAL ATELECTASIS/PNEUMONIA AS DISCUSSED.  ]  No results found for this or any previous visit.  ]  Results for orders placed during the hospital encounter of 06/24/21    CT CHEST WO CONTRAST    Narrative  EXAMINATION: CT CHEST WITHOUT CONTRAST    CLINICAL HISTORY:J70.0 Radiation pneumonitis ([address] ICD10, history left breast cancer diagnosed 2/21. History radiation treatment to left breast completed 4/2/2021.     COMPARISONS: 5/19/2021    TECHNIQUE: TECHNIQUE: Contiguous axial images were obtained through the chest without contrast. Coronal and sagittal reformations were made. FINDINGS:  No evidence of aortic aneurysm. There are atherosclerotic calcifications in the aorta and coronary arteries. No significant mediastinal or hilar or axillary lymphadenopathy. No pericardial effusion. There are small bilateral pleural effusions  in the posterior costophrenic angles both lungs. On the prior exam there is reticular infiltrates have developed within the left upper lobe at level of left breast surgery. Today's exam is reticular infiltrate has decreased in size with some residual changes in the peripheral lung and an area of the  atelectasis and mild pleural thickening. On axial image the peripheral infiltrate measures approximately 2.6 x 3.1 cm. Previously infiltrate measured approximately 6 x 10 cm on axial images. Mild reticular infiltrate anterior to the left major fissure. There are centrilobular emphysematous changes in the lungs. There is a new 3 mm nodule in the right lower lobe base, series 3 image 40. There is minimal infiltrate anteriorly in the right middle lobe, series 3 image 37. There is infiltrate/atelectasis  posteriorly in the base of the right lower lobe. No pneumothorax. Upper abdomen is unremarkable. There is depression of the superior endplate of I92, unchanged. There is volume loss in this vertebra, unchanged. Impression  RADIATION PNEUMONIA WITHIN THE LEFT UPPER LOBE IS DECREASING IN SIZE WITH SOME RESIDUAL INFILTRATE. THERE IS MINIMAL ADJACENT PLEURAL THICKENING. SMALL INFILTRATE/ATELECTASIS POSTERIOR BASE RIGHT LOWER LOBE. OTHER DETAILS ABOVE. NO ADENOPATHY. SMALL BILATERAL PLEURAL EFFUSIONS.    3 MM NODULE RIGHT LOWER LOBE. T12 FRACTURE, UNCHANGED.       All CT scans at this facility use dose modulation, iterative reconstruction, and/or weight based dosing when appropriate to reduce radiation dose to as low as reasonably achievable. Results for orders placed during the hospital encounter of 05/19/21    CT CHEST WO CONTRAST    Narrative  CT of the Chest without intravenous contrast medium. HISTORY:  New hypoxia. h/o radiation to L breast, completed, April 2, 2021. Receiving paclitaxel, and amiodarone. History of COPD. TECHNICAL FACTORS:    CT imaging of the chest was obtained and formatted as 5 mm contiguous axial images from the thoracic inlet through the adrenal glands. Sagittal and coronal reconstructions obtained during postprocessing. Intravenous contrast medium:  None. Comparison:  Low dose CT chest, April 26, 2021, March 5, 2020, January 4, 2019. CT chest, August 23, 2016. FINDINGS:      Right lung: No nodules, mass, pleural effusion, pneumothorax. Mild dependent subsegmental atelectatic change. Left lung: No nodules, masses, pleural effusion, pneumothorax. Mild dependent subsegmental atelectatic change. Focal interval development of reticular change, left upper lobe, at level of postsurgical change, left breast (series 2, images 21 through 37). Lymph nodes: No hilar, mediastinal, or axillary lymph node enlargement. Chest Wall: Increased attenuation, left retroareolar, and upper inner quadrant, with calcification, and surgical clips, unchanged (series 2, images 24 through 29). Thoracic aorta: Normal in course and caliber. Cardiac Size: Normal.    Pericardial effusion: None. Upper abdomen:Limited imaging upper abdomen shows no gross anomaly. Musculoskeletal:No osteoblastic, and no osteolytic lesions. Anterior wedge compression, T12-L1, again demonstrated. Impression  Left upper lobe interstitial changes described. Given clinical history, changes may be secondary to recent radiation therapy. Other inflammatory and infectious etiologies secondary considerations. Recurrent malignancy less likely.       All CT scans at this facility use dose modulation, iterative reconstruction, and/or weight based dosing when appropriate to reduce radiation dose to as low as reasonably achievable.  ]  Results for orders placed during the hospital encounter of 08/23/16    CT Chest W Contrast    Narrative  CT CHEST    REASON FOR EXAM  HEMOPTYSIS    COMPARISONS  none    FINDINGS  Multiplanar images were obtained following the IV injection  of 75 cc [id number]-[id number]. A focal area of consolidation involving the right  lower lobe posteriorly is seen. There is a suggestion of air  bronchogram present. Mild atelectasis at the left lung base is seen. Remainder lungs are clear. No pleural effusions are seen. No definite  pulmonary nodules are identified. Emphysematous changes are seen. No  pneumothorax is noted. Heart appears normal in size. No pericardial  effusion is seen. No mediastinal, hilar or axillary adenopathy is  noted. Bone windows demonstrate no gross osseous abnormalities. Surrounding soft tissues are unremarkable. Limited images of the upper  abdomen demonstrate no contributory findings. IMPRESSION  FOCAL AREA OF CONSOLIDATION INVOLVING THE RIGHT LOWER LOBE  IS SEEN. SHORT-TERM INTERVAL FOLLOWUP CT WITHOUT CONTRAST IN 3-4 MONTHS  IS SUGGESTED TO NOTE RESOLUTION. MILD ATELECTATIC CHANGES AT THE LEFT  LUNG BASE. NO OTHER SIGNIFICANT ABNORMALITIES ARE SEEN. All CT scans at this facility use dose modulation, iterative  reconstruction, and/or weight based dosing when appropriate to reduce  radiation dose to as low as reasonably achievable. Debbie Chaudhry M.D. Released Bruce Chaudhry M.D. Released Date Time- 08/24/16 5752  This document has been electronically signed. ------------------------------------------------------------------------------  ]    Assessment/Plan:     1. Chronic obstructive pulmonary disease, unspecified COPD type ([address]  She is having Mild C/o shortness of breath  with exertion. No Wheezing. No Cough with Sputum. No fever. She is on bronchodilator with Anoro Ellipta 1 puff daily. She has 7/1/22 first covid booster, continue bronchodilator as before. 2. Hypoxia  She is off O2 since almost 1 year , Spo2 94% on RA     3. Abnormal CXR  She had CXR  show  Increased linear markings at the lung bases consistent with interstitial reticular opacities. While these opacities are nonspecific, given the changes seen on the previous  the possibility of organizing pneumonia is questioned. She has no symptoms of pneumonia,  She is on amiodarone and also had radiation for breast cancer in past , she is going for CT chest next week. 4. Atrial fibrillation   Following with cardiology    Return in about 2 weeks (around 9/1/2022) for Ct chest f/u.       Cyndie Mora MD

## 2022-08-19 ENCOUNTER — OFFICE VISIT (OUTPATIENT)
Dept: CARDIOLOGY CLINIC | Age: 72
End: 2022-08-19
Payer: COMMERCIAL

## 2022-08-19 VITALS
OXYGEN SATURATION: 94 % | RESPIRATION RATE: 145 BRPM | BODY MASS INDEX: 26.58 KG/M2 | DIASTOLIC BLOOD PRESSURE: 84 MMHG | HEART RATE: 78 BPM | WEIGHT: 143 LBS | SYSTOLIC BLOOD PRESSURE: 136 MMHG

## 2022-08-19 DIAGNOSIS — I48.91 ATRIAL FIBRILLATION, UNSPECIFIED TYPE (HCC): Primary | ICD-10-CM

## 2022-08-19 DIAGNOSIS — I50.32 CHRONIC DIASTOLIC CONGESTIVE HEART FAILURE (HCC): ICD-10-CM

## 2022-08-19 PROCEDURE — 99213 OFFICE O/P EST LOW 20 MIN: CPT | Performed by: NURSE PRACTITIONER

## 2022-08-19 PROCEDURE — 93000 ELECTROCARDIOGRAM COMPLETE: CPT | Performed by: NURSE PRACTITIONER

## 2022-08-19 PROCEDURE — 1123F ACP DISCUSS/DSCN MKR DOCD: CPT | Performed by: NURSE PRACTITIONER

## 2022-08-19 NOTE — PROGRESS NOTES
Chief Complaint   Patient presents with    Atrial Fibrillation     Ekg 8/8/2022 2-11-22: Patient presents for initial medical evaluation. Patient is followed on a regular basis by Dr. Gladys Vallejo MD. Hx of Pafib. On DOAC. On Amiodarone 2x/week now, has been on Amiodarone for more than 2 yrs. Pt denies chest pain, dyspnea, dyspnea on exertion, change in exercise capacity, fatigue,  nausea, vomiting, diarrhea, constipation, motor weakness, insomnia, weight loss, syncope, dizziness, lightheadedness, palpitations, PND, orthopnea, or claudication. Her mother is dying in ICU. Does have COPD, following with [place]. Quit smoking 6 yrs ago. TSH is ok. Lipid profile is abnormal. Hx of breast cancer s/p chemo and radiation. Did have LE symptoms with chemo and statins. Hx of negative stress test in 2019. Echo in 2019 with EF of 60%, mild LVH, grade I DD, mild MR.   EKG with NSR no ischemia. 6-10-22: hx of Pafib, on amiodarone and DOAC. Hx of negative stress test in 2019. Echo in 2019 with EF of 60%, mild LVH, grade I DD, mild MR. Hx of COPD. No smoking. Hx of breast cancer s/p chemo and radiation  Pt denies chest pain, dyspnea, dyspnea on exertion, change in exercise capacity, fatigue,  nausea, vomiting, diarrhea, constipation, motor weakness, insomnia, weight loss, syncope, dizziness, lightheadedness, palpitations, PND, orthopnea, or claudication. TSH as well as liver function test are within normal limits. Does have stage III chronic kidney disease  EKG with NSR, PAC.     8/11/2022: Patient with medical history of PAF on amiodarone and DOAC, COPD, breast cancer s/p chemo and radiation. Negative stress test in 2019, echo at that time with EF 60%. Patient seen in office today for A. fib. States heart rate at home has been 130s to 140s. She had EKG in PCPs office on 8/8/2022 showing A. fib, heart rate 122. Patient denies chest pain but does complain of shortness of breath.   Patient with mild conversational dyspnea. No lower extremity edema noted. Patient states all of her symptoms started the day after receiving COVID-vaccine booster approximately 3 to 4 weeks ago. Case discussed with Dr. Caroline Mullen and then with patient. At this time we will plan stat D-dimer and if it is abnormal patient will need stat CTA chest to rule out PE. If dimer within normal limits we will plan to adjust medications and closely follow patient in office next week. Patient is agreeable to this plan. 8/19/2022: pt seen in office today for afib follow up. Pt seen last week, was noted to be in afib with RVR. Her medications were adjusted and she presents for repeat ekg. EKG today shows afib, HR 78, /84. Pt reports she is feeling much better than last week. States SOB is significantly improved. Denies CP, palpitations. She checks her HR and BP at home regularly and they have been WNL. Pt denies chest pain, dyspnea, dyspnea on exertion, change in exercise capacity, fatigue,  nausea, vomiting, diarrhea, constipation, motor weakness, insomnia, weight loss, syncope, dizziness, lightheadedness, palpitations, PND, orthopnea, or claudication. No bleeding issues. Pt denies any angina or CHF type symptoms. No recent hospitalizations. Pt is compliant with all Rx medications. Blood pressure and heart rate are under control.       Patient Active Problem List   Diagnosis    Hyperlipidemia with target LDL less than 130    Vitamin D deficiency    Atrial fibrillation ([address]    Other emphysema ([address]    Other plastic surgery for unacceptable cosmetic appearance    Fatigue    Chronic obstructive pulmonary disease (HCC)    Acute bronchitis    Hypoxemia    Fracture of humerus, proximal    Benign neoplasm of sigmoid colon    Other hemorrhoids    Hx of colonic polyps    Family history of colon cancer    Osteopenia of necks of both femurs    Congestive heart failure (HCC)    Abnormal CXR    Left breast mass    Overweight (BMI 25.0-29. 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)    Neck nodule    Hemoptysis    Eczema    Dysphonia    Hypoxia    Acute respiratory failure with hypoxia (HCC)    Radiation pneumonitis (HCC)    Colitis    Vaccination side effects, initial encounter    Lump of skin of right upper extremity    Esophagitis    Esophageal dysphagia    Need for prophylactic chemotherapy    History of tobacco abuse    Stage 3a chronic kidney disease ([facility]       Past Surgical History:   Procedure Laterality Date    ATRIAL ABLATION SURGERY      BREAST BIOPSY Left 2021    BREAST BIOPSY Left 2021    LEFT BREAST LUMPECTOMY AND  SENTINEL LYMPH NODE BIOPSY performed by Norberto Delcid MD at [facility]    stap infection, postpartum    COLONOSCOPY  2015    w/polypectomy Dr Francesca Laureano    COLONOSCOPY N/A 10/18/2021    COLORECTAL CANCER SCREENING, HIGH RISK performed by Lesli Lemus MD at [facility]      Dr Bong Wesley; HI RISK IND N/A 2018    COLONOSCOPY performed by Belinda Aguilera MD at [address] [place] [address] 2021    EGD ESOPHAGOGASTRODUODENOSCOPY WITH BIOPSIES performed by Lesli Lemus MD at [address] [place] [address] LEFT Left 2022    US BREAST NEEDLE BIOPSY LEFT MLOZ ULTRASOUND       Social History     Socioeconomic History    Marital status:      Spouse name: None    Number of children: 2    Years of education: None    Highest education level: None   Occupational History    Occupation: [employer], [email or web address], retired   Tobacco Use    Smoking status: Former     Packs/day: 1.00     Years: 50.00     Pack years: 50.00     Types: Cigarettes     Start date:      Quit date: 2015     Years since quittin.6     Passive exposure: Past    Smokeless tobacco: Never    Tobacco comments: pt will try smoking cessation   Vaping Use    Vaping Use: Never used   Substance and Sexual Activity    Alcohol use: Never    Drug use: No    Sexual activity: Yes   Social History Narrative    Born in [place] [place], one brother in [facility], keeps in touch    , 2 children, one son in [place], one daughter in [place]    5 grandchildren     Lives in a house in [place] with spouse     Worked for [employer] and full time at [employer], [employer], fashion, family life     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      Social Determinants of Health     Financial Resource Strain: Low Risk     Difficulty of Paying Living Expenses: Not hard at all   Food Insecurity: No Food Insecurity    Worried About [employer] in the Last Year: Never true    [employer] in the Last Year: Never true       Family History   Problem Relation Age of Onset    Osteoarthritis Mother     Osteoporosis Mother     Cancer Mother         anorectal     Colon Cancer Mother     Breast Cancer Paternal Aunt        Current Outpatient Medications   Medication Sig Dispense Refill    metoprolol tartrate (LOPRESSOR) 25 MG tablet Take 1 tablet by mouth in the morning and 1 tablet before bedtime.  60 tablet 5    dilTIAZem (CARDIZEM CD) 240 MG extended release capsule TAKE 1 CAPSULE BY MOUTH EVERY DAY 90 capsule 3    amiodarone (CORDARONE) 200 MG tablet TAKE 1 TABLET BY MOUTH TWICE WEEKLY 24 tablet 3    rivaroxaban (XARELTO) 20 MG TABS tablet TAKE 1 TABLET BY MOUTH EVERY DAY WITH BREAKFAST 90 tablet 3    umeclidinium-vilanterol (ANORO ELLIPTA) 62.5-25 MCG/INH AEPB inhaler Inhale 1 puff into the lungs daily 1 each 3    furosemide (LASIX) 40 MG tablet Take 1 tablet by mouth daily as needed (for LE swelling) 90 tablet 3    potassium chloride (KLOR-CON M) 20 MEQ extended release tablet Take 1 tablet by mouth daily (Patient taking differently: Take 20 mEq by mouth daily as needed With Lasix) 90 tablet 3    Calcium Carb-Cholecalciferol (CALCIUM + D3 PO) Take 1,200 ampules by mouth       b complex vitamins tablet Take 1 tablet by mouth daily      magnesium oxide (MAG-OX) 400 MG tablet Take 400 mg by mouth in the morning and 400 mg before bedtime. Vitamin D (CHOLECALCIFEROL) 1000 UNITS CAPS capsule Take 2,000 Units by mouth daily        No current facility-administered medications for this visit. Aldactone [spironolactone]    Review of Systems:  General ROS: negative  Psychological ROS: negative  Hematological and Lymphatic ROS: No history of blood clots or bleeding disorder. Respiratory ROS: no cough, shortness of breath, or wheezing  Cardiovascular ROS: no chest pain or dyspnea on exertion  Gastrointestinal ROS: no abdominal pain, change in bowel habits, or black or bloody stools  Genito-Urinary ROS: no dysuria, trouble voiding, or hematuria  Musculoskeletal ROS: negative  Neurological ROS: no TIA or stroke symptoms  Dermatological ROS: negative    VITALS:  Blood pressure 136/84, pulse 78, resp. rate (!) 145, weight 143 lb (64.9 kg), SpO2 94 %, not currently breastfeeding. Body mass index is 26.58 kg/m². Physical Examination:  General appearance - alert, well appearing, and in no distress  Mental status - alert, oriented to person, place, and time  Neck - Neck is supple, no JVD or carotid bruits. No thyromegaly or adenopathy. Chest - clear to auscultation, no wheezes, rales or rhonchi, symmetric air entry  Heart - normal rate, regular rhythm, normal S1, S2, no murmurs, rubs, clicks or gallops  Abdomen - soft, nontender, nondistended, no masses or organomegaly  Neurological - alert, oriented, normal speech, no focal findings or movement disorder noted  Extremities - peripheral pulses normal, no pedal edema, no clubbing or cyanosis  Skin - normal coloration and turgor, no rashes, no suspicious skin lesions noted        Pulses:   Carotid pulses are 3+ on the right side, and 3+ on the left side.    Radial pulses are 3+ on the right side, and 3+ on the left side. Femoral pulses are 3+ on the right side, and 3+ on the left side. Popliteal pulses are 3+ on the right side, and 3+ on the left side. EKG: normal sinus rhythm, nonspecific ST and T waves changes    Orders Placed This Encounter   Procedures    EKG 12 lead       ASSESSMENT:     Diagnosis Orders   1. Atrial fibrillation, unspecified type ([address]  EKG 12 lead      2. Chronic diastolic congestive heart failure (HCC)              PLAN:       As always, aggressive risk factor modification is strongly recommended. We should adhere to the JNC VIII guidelines for HTN management and the NCEP ATP III guidelines for LDL-C management. Cardiac diet is always recommended with low fat, cholesterol, calories and sodium. Continue medications at current doses. Take Lasix as needed     Take pravachol on a daily basis. If not controlled then consider Repatha    Will consider switching amiodarone to Sotalol in near future given long term side affects of amiodarone. Doing ok now. Continue metoprolol 25 mg p.o. twice daily, Cardizem CD 240mg    Cont with DOAC. Monitor H/H. Check EKG next office visit    Patient was advised and encouraged to check blood pressure at home or at a pharmacy, maintain a logbook, and also call us back if blood pressure are above the target ranges or if it is low. Patient clearly understands and agrees to the instructions. We will need to continue to monitor muscle and liver enzymes, BUN, CR, and electrolytes.     Follow up with Pulmonary

## 2022-08-22 ENCOUNTER — HOSPITAL ENCOUNTER (OUTPATIENT)
Dept: CT IMAGING | Age: 72
Discharge: HOME | End: 2022-08-24
Payer: COMMERCIAL

## 2022-08-22 ENCOUNTER — HOSPITAL ENCOUNTER (OUTPATIENT)
Dept: ULTRASOUND IMAGING | Age: 72
Discharge: HOME | End: 2022-08-24
Payer: COMMERCIAL

## 2022-08-22 DIAGNOSIS — K76.89 LIVER CYST: ICD-10-CM

## 2022-08-22 DIAGNOSIS — R93.89 ABNORMAL CXR: ICD-10-CM

## 2022-08-22 DIAGNOSIS — R09.89 CHEST CRACKLES: ICD-10-CM

## 2022-08-22 PROCEDURE — 76705 ECHO EXAM OF ABDOMEN: CPT

## 2022-08-22 PROCEDURE — 71250 CT THORAX DX C-: CPT

## 2022-08-23 ENCOUNTER — TELEPHONE (OUTPATIENT)
Dept: FAMILY MEDICINE CLINIC | Age: 72
End: 2022-08-23

## 2022-08-24 NOTE — TELEPHONE ENCOUNTER
Left  at [phone] for patient to call back tonight to verify no s/s of pneumonia  If not call back tomorrow

## 2022-08-24 NOTE — TELEPHONE ENCOUNTER
Patient called back and stated she has no signs or symptoms of pneumonia. Is requesting a call back asap to discuss CT results. Please advise. Thank you.

## 2022-08-25 NOTE — TELEPHONE ENCOUNTER
Spoke with patient and she understands, she sees Dr Radha Booker next Thursday and will discuss with them.

## 2022-08-25 NOTE — TELEPHONE ENCOUNTER
I think this would best be discussed via dr Checo Roberts  She can follow up with me after  I don't deal too often with this

## 2022-08-26 ENCOUNTER — HOSPITAL ENCOUNTER (OUTPATIENT)
Dept: ULTRASOUND IMAGING | Age: 72
Discharge: HOME | End: 2022-08-28
Payer: COMMERCIAL

## 2022-08-26 DIAGNOSIS — R25.2 LEG CRAMPS: ICD-10-CM

## 2022-08-26 PROCEDURE — 93924 LWR XTR VASC STDY BILAT: CPT

## 2022-09-01 ENCOUNTER — OFFICE VISIT (OUTPATIENT)
Dept: PULMONOLOGY | Age: 72
End: 2022-09-01
Payer: COMMERCIAL

## 2022-09-01 VITALS
WEIGHT: 140 LBS | DIASTOLIC BLOOD PRESSURE: 60 MMHG | HEART RATE: 64 BPM | OXYGEN SATURATION: 99 % | HEIGHT: 62 IN | SYSTOLIC BLOOD PRESSURE: 110 MMHG | BODY MASS INDEX: 25.76 KG/M2

## 2022-09-01 DIAGNOSIS — J70.0 RADIATION PNEUMONITIS (HCC): ICD-10-CM

## 2022-09-01 DIAGNOSIS — I48.91 ATRIAL FIBRILLATION, UNSPECIFIED TYPE (HCC): ICD-10-CM

## 2022-09-01 DIAGNOSIS — J44.9 CHRONIC OBSTRUCTIVE PULMONARY DISEASE, UNSPECIFIED COPD TYPE (HCC): Primary | ICD-10-CM

## 2022-09-01 DIAGNOSIS — R93.89 ABNORMAL CXR: ICD-10-CM

## 2022-09-01 PROCEDURE — 99214 OFFICE O/P EST MOD 30 MIN: CPT | Performed by: INTERNAL MEDICINE

## 2022-09-01 PROCEDURE — 1123F ACP DISCUSS/DSCN MKR DOCD: CPT | Performed by: INTERNAL MEDICINE

## 2022-09-01 ASSESSMENT — ENCOUNTER SYMPTOMS
NAUSEA: 0
ABDOMINAL PAIN: 0
WHEEZING: 0
CHEST TIGHTNESS: 0
EYE ITCHING: 0
TROUBLE SWALLOWING: 0
SHORTNESS OF BREATH: 1
VOICE CHANGE: 0
DIARRHEA: 0
SINUS PRESSURE: 0
EYE DISCHARGE: 0
SORE THROAT: 0
VOMITING: 0
RHINORRHEA: 0
COUGH: 0

## 2022-09-01 NOTE — PROGRESS NOTES
Subjective:     Sierra Goodman is a 66 y.o. female who complains today of:     Chief Complaint   Patient presents with    Follow-up     2 wk follow up  CT results and medication refill       HPI  She said she had a.fib. but heart rate is not high. She is on amiodarone 2 times a week . She had CXR  show  Increased linear markings at the lung bases consistent with interstitial reticular opacities. While these opacities are nonspecific, given the changes seen on the previous  the possibility of organizing pneumonia is questioned. Consider further correlation with CT chest .    She had CT chest Small pleural effusion is seen on the right and trace pleural fluid is seen on the left. Also mild opacities are seen bilaterally that could represent small infiltrates. Emphysematous changes are visualized as well. Mild C/o shortness of breath  with exertion. No Wheezing. No Cough with Sputum. No Hemoptysis. No Chest tightness. No Chest pain with radiation  or pleuritic pain. No leg swelling   No orthopnea. No Fever or chills. No Rhinorrhea and postnasal drip. She is on bronchodilator with Anoro Ellipta 1 puff daily. She has 7/1/22 first covid booster    Allergies:  Aldactone [spironolactone]  Past Medical History:   Diagnosis Date    Abnormal echocardiogram 2020    possible PFO?  further eval suggested    Atrial fibrillation ([address] 1/5/2016    Breast cancer ([address] 02/04/2021    left    Cancer ([address]     left breast    COPD (chronic obstructive pulmonary disease) ([address] 2015    Dr Chadd [place]    Current use of long term anticoagulation     Cyst of neck 2012    after plastic surgery    Emphysema of lung ([address]     History of compression fracture of vertebral column 2020    T2, L1    History of humerus fracture     LEFT    History of left heart catheterization 03/2020    at [address] [place] [address], normal LVEF with impaired relaxation, mild AV calcification and mild MV thickening, normal RV    History of sustained ventricular tachycardia 03/2020    had to wear a LifeVest, Dr Maged Chacon    History of therapeutic radiation 2021    4 weeks    History of tobacco abuse 6/10/2022    Hx antineoplastic chemo 2021    12 weeks    Hyperlipidemia LDL goal < 130     Intermittent atrial fibrillation [facility]) 2015    Dr Leopold Massing, Dr Fred Hoff, Dr Nupur Wheeler    Osteopenia 2014    Personal history of skin cancer     had area excised from nose    S/P colonoscopic polypectomy 2008, 2015, 2018    Dr Angel Dobbs    Smoking history     Stage 3a chronic kidney disease ([facility] 6/10/2022    Vitamin D deficiency      Past Surgical History:   Procedure Laterality Date    ATRIAL ABLATION SURGERY      BREAST BIOPSY Left 01/20/2021    BREAST BIOPSY Left 2/4/2021    LEFT BREAST LUMPECTOMY AND  SENTINEL LYMPH NODE BIOPSY performed by Rodriguez Perry MD at [address] [facility] infection, postpartum    COLONOSCOPY  07/22/2015    w/polypectomy Dr Angel Dobbs    COLONOSCOPY N/A 10/18/2021    COLORECTAL CANCER SCREENING, HIGH RISK performed by Bethany Marie MD at [place] [address]  2012    Dr Tia Alfredo; HI RISK IND N/A 07/13/2018    COLONOSCOPY performed by Azalia Halsted, MD at [address] [place] [address] 12/23/2021    EGD ESOPHAGOGASTRODUODENOSCOPY WITH BIOPSIES performed by Bethany Marie MD at [address] [place] [address] LEFT Left 2/23/2022    US BREAST NEEDLE BIOPSY LEFT MLOZ ULTRASOUND     Family History   Problem Relation Age of Onset    Osteoarthritis Mother     Osteoporosis Mother     Cancer Mother         anorectal     Colon Cancer Mother     Breast Cancer Paternal Aunt      Social History     Socioeconomic History    Marital status:      Spouse name: Not on file    Number of children: 2    Years of education: Not on file    Highest education level: Not on file   Occupational History    Occupation: [employer], retired   Tobacco Use    Smoking status: Former     Packs/day: 1.00     Years: 50.00     Pack years: 50.00     Types: Cigarettes     Start date:      Quit date: 2015     Years since quittin.7     Passive exposure: Past    Smokeless tobacco: Never    Tobacco comments:     pt will try smoking cessation   Vaping Use    Vaping Use: Never used   Substance and Sexual Activity    Alcohol use: Never    Drug use: No    Sexual activity: Yes   Other Topics Concern    Not on file   Social History Narrative    Born in [place], one brother in [place], keeps in touch    , 2 children, one son in [place], one daughter in [place]    5 grandchildren     Lives in a house in [place] with spouse     Worked for [employer] and full time at [employer], [employer], fashion, family life     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      Social Determinants of Health     Financial Resource Strain: Low Risk     Difficulty of Paying Living Expenses: Not hard at all   Food Insecurity: No Food Insecurity    Worried About Running Out of Food in the Last Year: Never true    Ran Out of Food in the Last Year: Never true   Transportation Needs: Not on file   Physical Activity: Not on file   Stress: Not on file   Social Connections: Not on file   Intimate Partner Violence: Not on file   Housing Stability: Not on file         Review of Systems   Constitutional:  Negative for chills, diaphoresis, fatigue and fever. HENT:  Negative for congestion, mouth sores, nosebleeds, postnasal drip, rhinorrhea, sinus pressure, sneezing, sore throat, trouble swallowing and voice change. Eyes:  Negative for discharge, itching and visual disturbance. Respiratory:  Positive for shortness of breath. Negative for cough, chest tightness and wheezing. Cardiovascular:  Negative for chest pain, palpitations and leg swelling. Gastrointestinal:  Negative for abdominal pain, diarrhea, nausea and vomiting. Genitourinary:  Negative for difficulty urinating and hematuria. Musculoskeletal:  Negative for arthralgias, joint swelling and myalgias. Skin:  Negative for rash. Allergic/Immunologic: Negative for environmental allergies and food allergies. Neurological:  Negative for dizziness, tremors, weakness and headaches. Psychiatric/Behavioral:  Negative for behavioral problems and sleep disturbance.        :     Vitals:    09/01/22 0905   BP: 110/60   Pulse: 64   SpO2: 99%   Weight: 140 lb (63.5 kg)   Height: 5' 1.5\" (1.562 m)     Wt Readings from Last 3 Encounters:   09/01/22 140 lb (63.5 kg)   08/19/22 143 lb (64.9 kg)   08/18/22 144 lb (65.3 kg)         Physical Exam  Constitutional:       General: She is not in acute distress. Appearance: She is well-developed. She is not diaphoretic. HENT:      Head: Normocephalic and atraumatic. Nose: Nose normal.   Eyes:      Pupils: Pupils are equal, round, and reactive to light. Neck:      Thyroid: No thyromegaly. Vascular: No JVD. Trachea: No tracheal deviation. Cardiovascular:      Rate and Rhythm: Normal rate and regular rhythm. Heart sounds: No murmur heard. No friction rub. No gallop. Pulmonary:      Effort: No respiratory distress. Breath sounds: No wheezing or rales. Comments: diminished Breath sound bilaterally. Chest:      Chest wall: No tenderness. Abdominal:      General: There is no distension. Tenderness: There is no abdominal tenderness. There is no rebound. Musculoskeletal:         General: Normal range of motion. Lymphadenopathy:      Cervical: No cervical adenopathy. Skin:     General: Skin is warm and dry. Neurological:      Mental Status: She is alert and oriented to person, place, and time.       Coordination: Coordination normal.   Psychiatric:         Mood and Affect: Mood normal.         Behavior: Behavior normal.       Current Outpatient Medications   Medication Sig Dispense Refill    metoprolol tartrate (LOPRESSOR) 25 MG tablet Take 1 tablet by mouth in the morning and 1 tablet before bedtime. 60 tablet 5    dilTIAZem (CARDIZEM CD) 240 MG extended release capsule TAKE 1 CAPSULE BY MOUTH EVERY DAY 90 capsule 3    amiodarone (CORDARONE) 200 MG tablet TAKE 1 TABLET BY MOUTH TWICE WEEKLY 24 tablet 3    rivaroxaban (XARELTO) 20 MG TABS tablet TAKE 1 TABLET BY MOUTH EVERY DAY WITH BREAKFAST 90 tablet 3    umeclidinium-vilanterol (ANORO ELLIPTA) 62.5-25 MCG/INH AEPB inhaler Inhale 1 puff into the lungs daily 1 each 3    furosemide (LASIX) 40 MG tablet Take 1 tablet by mouth daily as needed (for LE swelling) 90 tablet 3    potassium chloride (KLOR-CON M) 20 MEQ extended release tablet Take 1 tablet by mouth daily (Patient taking differently: Take 20 mEq by mouth daily as needed With Lasix) 90 tablet 3    Calcium Carb-Cholecalciferol (CALCIUM + D3 PO) Take 1,200 ampules by mouth       b complex vitamins tablet Take 1 tablet by mouth daily      magnesium oxide (MAG-OX) 400 MG tablet Take 400 mg by mouth in the morning and 400 mg before bedtime. Vitamin D (CHOLECALCIFEROL) 1000 UNITS CAPS capsule Take 2,000 Units by mouth daily        No current facility-administered medications for this visit. Results for orders placed in visit on 08/08/22    XR CHEST (2 VW)    Narrative  EXAMINATION: XR CHEST (2 VW). DATE AND TIME:8/8/2022 10:19 AM    CLINICAL HISTORY:  R09.89 PULSE IRREGULARITY ICD10    COMPARISONS: 6/9/21    FINDINGS: Increased linear markings at the lung bases consistent with interstitial reticular opacities. While these opacities are nonspecific, given the changes seen on the previous CT studies from April 28, 2022, the possibility of organizing pneumonia  is questioned. Consider further correlation with CT.     Impression  INCREASING BIBASILAR INFILTRATES      Results for orders placed in visit on 06/09/21    XR CHEST (2 VW)    Narrative  DIAGNOSIS:Z92.3 Hx of radiation therapy ICD10    COMPARISON: May 19, 2021 and January 28, 2021    TECHNIQUE: PA and lateral chest    FINDINGS: Ill-defined opacity is again seen in the left mid lung field in the perihilar region. The lungs are also hyperinflated. Flattening of the diaphragm and increased AP diameter is seen. The lungs contain no pleural effusion or pneumothorax. The  cardiac silhouette is not enlarged. There is loss of anterior superior endplate height of S82-U5 which is unchanged. Impression  The opacity seen in the left mid lung field may be related to patient's history of radiation therapy. Also findings are suggestive of COPD. Results for orders placed during the hospital encounter of 01/28/21    XR CHEST (2 VW)    Narrative  EXAMINATION: XR CHEST (2 VW)    CLINICAL HISTORY: J44.9 Chronic obstructive pulmonary disease, unspecified COPD type ([facility] ICD10    COMPARISONS: May 11, 2020    FINDINGS:    Two views of the chest are submitted. The cardiac silhouette is of normal size configuration. Pulmonary vascular attenuated. Widening of the AP diameter the chest.  Right sided trachea. No focal infiltrates. No effusions. No Pneumothoraces. Impression  NO ACUTE ACTIVE CARDIOPULMONARY PROCESS. RADIOGRAPHIC FINDINGS OF COPD  ]  Results for orders placed during the hospital encounter of 05/19/21    XR CHEST PORTABLE    Narrative  EXAMINATION: XR CHEST PORTABLE    CLINICAL HISTORY: SHORTNESS OF BREATH    COMPARISONS: CT CHEST, APRIL 26, 2021, CHEST RADIOGRAPH, JANUARY 28, 2021    FINDINGS: Remote right sixth rib fracture. Cardiopericardial silhouette normal. Aorta calcified. Ill-defined area of increased opacity found bilaterally, left mid and left lower lung. Increased opacity also demonstrated right lung base. Impression  BILATERAL ATELECTASIS/PNEUMONIA AS DISCUSSED.  ]  No results found for this or any previous visit.  ]  Results for orders placed during the hospital encounter of 08/22/22    CT CHEST WO CONTRAST    Narrative  HISTORY: Loulou Cherry is a Female of 66 years age.   DIAGNOSIS: R09.89 Chest crackles ICD10      COMPARISON: April 28, 2022.; April 26, 2021    TECHNIQUE:  Thin spiral axial CT was performed from the thoracic inlet to the lung bases, without intravenous contrast administration. 2-D reformatted axial, sagittal and coronal images were obtained. 3-D MIPS images were also performed. FINDINGS:      A small pleural effusion is seen on the right and areas of opacity are visualized. Trace pleural effusion is seen on the left and there is mild opacity. In the cardiophrenic angles, mild atelectasis or scarring is seen. Tree-in-bud opacities are also  seen in the bases. Mild centrilobular emphysematous changes are seen in the upper lung fields. Paraseptal emphysematous changes are seen in the lower lung fields. 2 mm nodule is seen in the right lower lobe (series 3, image 41). A pleural-based nodular  density is seen in the left lower lobe that measures 9.5 mm. Slightly prominent right paratracheal lymph node is seen. Also near the ana maría slightly to the right of midline there is a slightly prominent lymph node. The heart is not enlarged. Small trace pericardial effusion is seen. No aneurysm or dissection  evident on this examination. In the visualized upper abdomen there is a small hiatal hernia. In the left adrenal gland there is a 9 mm nodule. In the right lobe of the liver near the dome a 5 mm hypodensity is seen. This is unchanged from previous. Compression deformities are seen in T8 and T11 and T12 vertebral bodies. Impression  Small pleural effusion is seen on the right and trace pleural fluid is seen on the left. Also mild opacities are seen bilaterally that could represent small infiltrates. Emphysematous changes are visualized as well. All CT scans at this facility use dose modulation, iterative reconstruction, and/or weight based dosing when appropriate to reduce radiation dose to as low as reasonably achievable.       ULTRASOUND ABDOMEN LIMITED    COMPARISON: No prior studies available for comparison. HISTORY: Liver cyst          TECHNIQUE: Limited ultrasound of the abdomen    FINDINGS:    In the right lobe of the liver, a hypoechoic areas are seen that measures 0.7 x 0.7 x 0.6 cm and 0.9 x 0.9 x 0.5 cm. These. No intra-hepatic ductal dilatation is seen. Within the gallbladder no echogenic foci seen to suggest gallstones or polyps. The common duct is normal.  No pericholecystic edema or gallbladder wall thickening seen. The pancreas is normal in echogenicity in the visualized portion. The tail is not well seen. No peripancreatic fluid collection or focal mass seen. IMPRESSION: 1. The liver contains 2 hypoechoic areas the small area appears to be a definitive cyst. The largest area may be cystic with internal echoes. Recommend follow-up in 4 months to determine continued stability. fatty infiltrated  2. No evidence for cholecystitis or cholelithiasis      Results for orders placed during the hospital encounter of 06/24/21    CT CHEST WO CONTRAST    Narrative  EXAMINATION: CT CHEST WITHOUT CONTRAST    CLINICAL HISTORY:J70.0 Radiation pneumonitis ([facility] ICD10, history left breast cancer diagnosed 2/21. History radiation treatment to left breast completed 4/2/2021. COMPARISONS: 5/19/2021    TECHNIQUE: TECHNIQUE: Contiguous axial images were obtained through the chest without contrast. Coronal and sagittal reformations were made. FINDINGS:  No evidence of aortic aneurysm. There are atherosclerotic calcifications in the aorta and coronary arteries. No significant mediastinal or hilar or axillary lymphadenopathy. No pericardial effusion. There are small bilateral pleural effusions  in the posterior costophrenic angles both lungs. On the prior exam there is reticular infiltrates have developed within the left upper lobe at level of left breast surgery.  Today's exam is reticular infiltrate has decreased in size with some residual changes in the peripheral Emphysematous changes are seen. No  pneumothorax is noted. Heart appears normal in size. No pericardial  effusion is seen. No mediastinal, hilar or axillary adenopathy is  noted. Bone windows demonstrate no gross osseous abnormalities. Surrounding soft tissues are unremarkable. Limited images of the upper  abdomen demonstrate no contributory findings. IMPRESSION  FOCAL AREA OF CONSOLIDATION INVOLVING THE RIGHT LOWER LOBE  IS SEEN. SHORT-TERM INTERVAL FOLLOWUP CT WITHOUT CONTRAST IN 3-4 MONTHS  IS SUGGESTED TO NOTE RESOLUTION. MILD ATELECTATIC CHANGES AT THE LEFT  LUNG BASE. NO OTHER SIGNIFICANT ABNORMALITIES ARE SEEN. All CT scans at this facility use dose modulation, iterative  reconstruction, and/or weight based dosing when appropriate to reduce  radiation dose to as low as reasonably achievable. [employer] Alfredo Webb M.D. Released Alfredo Webb M.D. Released Date Time- 08/24/16 9997  This document has been electronically signed. ------------------------------------------------------------------------------  ]    Assessment/Plan:     1. Chronic obstructive pulmonary disease, unspecified COPD type (HCC)  Mild C/o shortness of breath  with exertion. No Wheezing. No Cough with Sputum. She is on bronchodilator with Anoro Ellipta 1 puff daily. She has 7/1/22 first covid booster. Continue bronchodilator therapy as before    2. Abnormal CXR  lS had CXR  show  Increased linear markings at the lung bases consistent with interstitial reticular opacities. While these opacities are nonspecific, given the changes seen on the previous  the possibility of organizing pneumonia is questioned. Consider further correlation with   She had CT chest for further evaluation which reported small pleural effusion is seen on the right and trace pleural fluid is seen on the left. Also mild opacities are seen bilaterally that could represent small infiltrates. Emphysematous changes are visualized as well. 3. Atrial fibrillation, unspecified type ([address]  She said she had a.fib. but heart rate is not high. She is on amiodarone 2 times a week . 4. history of  Radiation pneumonitis ([facility]  History of pneumonitis after radiation therapy and she was treated with prednisone therapy. Return in about 3 months (around 12/12/2022) for COPD.       Elly Gonzalez MD

## 2022-09-07 PROCEDURE — 93924 LWR XTR VASC STDY BILAT: CPT | Performed by: INTERNAL MEDICINE

## 2022-09-26 ENCOUNTER — HOSPITAL ENCOUNTER (OUTPATIENT)
Dept: ULTRASOUND IMAGING | Age: 72
End: 2022-09-26
Payer: COMMERCIAL

## 2022-09-26 ENCOUNTER — HOSPITAL ENCOUNTER (OUTPATIENT)
Dept: WOMENS IMAGING | Age: 72
Discharge: HOME | End: 2022-09-28
Payer: COMMERCIAL

## 2022-09-26 VITALS — BODY MASS INDEX: 26.02 KG/M2 | HEIGHT: 62 IN

## 2022-09-26 DIAGNOSIS — N64.9 BREAST DISORDER: ICD-10-CM

## 2022-09-26 PROCEDURE — G0279 TOMOSYNTHESIS, MAMMO: HCPCS

## 2022-09-27 ENCOUNTER — TELEPHONE (OUTPATIENT)
Dept: SURGERY | Age: 72
End: 2022-09-27

## 2022-10-08 DIAGNOSIS — J44.9 CHRONIC OBSTRUCTIVE PULMONARY DISEASE, UNSPECIFIED COPD TYPE (HCC): ICD-10-CM

## 2022-10-10 RX ORDER — UMECLIDINIUM BROMIDE AND VILANTEROL TRIFENATATE 62.5; 25 UG/1; UG/1
POWDER RESPIRATORY (INHALATION)
Qty: 60 EACH | Refills: 3 | Status: SHIPPED | OUTPATIENT
Start: 2022-10-10

## 2022-10-31 ENCOUNTER — OFFICE VISIT (OUTPATIENT)
Dept: SURGERY | Age: 72
End: 2022-10-31
Payer: COMMERCIAL

## 2022-10-31 VITALS
WEIGHT: 144 LBS | TEMPERATURE: 97.3 F | HEART RATE: 80 BPM | HEIGHT: 61 IN | SYSTOLIC BLOOD PRESSURE: 124 MMHG | DIASTOLIC BLOOD PRESSURE: 68 MMHG | BODY MASS INDEX: 27.19 KG/M2 | OXYGEN SATURATION: 93 % | RESPIRATION RATE: 16 BRPM

## 2022-10-31 DIAGNOSIS — E66.3 OVERWEIGHT (BMI 25.0-29.9): ICD-10-CM

## 2022-10-31 DIAGNOSIS — Z12.31 ENCOUNTER FOR SCREENING MAMMOGRAM FOR BREAST CANCER: ICD-10-CM

## 2022-10-31 DIAGNOSIS — N64.89 POSTOPERATIVE BREAST ASYMMETRY: ICD-10-CM

## 2022-10-31 DIAGNOSIS — C50.212 MALIGNANT NEOPLASM OF UPPER-INNER QUADRANT OF LEFT BREAST IN FEMALE, ESTROGEN RECEPTOR NEGATIVE (HCC): Primary | ICD-10-CM

## 2022-10-31 DIAGNOSIS — Z17.1 MALIGNANT NEOPLASM OF UPPER-INNER QUADRANT OF LEFT BREAST IN FEMALE, ESTROGEN RECEPTOR NEGATIVE (HCC): Primary | ICD-10-CM

## 2022-10-31 PROCEDURE — 99214 OFFICE O/P EST MOD 30 MIN: CPT | Performed by: SURGERY

## 2022-10-31 PROCEDURE — 1123F ACP DISCUSS/DSCN MKR DOCD: CPT | Performed by: SURGERY

## 2022-10-31 RX ORDER — EPLERENONE 25 MG/1
1 TABLET, FILM COATED ORAL DAILY
COMMUNITY
Start: 2022-10-18

## 2022-10-31 ASSESSMENT — ENCOUNTER SYMPTOMS
CHEST TIGHTNESS: 0
COLOR CHANGE: 0
NAUSEA: 0
SORE THROAT: 0
COUGH: 0
ABDOMINAL PAIN: 0
VOMITING: 0
SHORTNESS OF BREATH: 0

## 2022-10-31 NOTE — PROGRESS NOTES
PATIENT:    Jose Pereira     DATE:      10/31/2022     TIME: 8:44 AM     Subjective:     Jose Pereira presents for follow-up of breast cancer. She was diagnosed with [id number] with hypertrophic cardiomyopathy. She feels her left breast has indented more. She wants to see plastic surgery. The breast cancer is Cancer Staging  Malignant neoplasm of upper-inner quadrant of left breast in female, estrogen receptor negative ([facility] [address]  Staging form: Breast, AJCC 8th Edition  - Clinical stage from 1/26/2021: Stage IB (cT1, cN0, cM0, G3, ER-, ME-, HER2-) - Signed by Cielo Nguyen MD on 2/8/2021       She does perform self breast exams. She denies breast pain, masses, skin changes, nipple discharge, nipple retraction, or recent breast trauma bilaterally. Patient's medications, allergies, past medical, surgical, social and family histories were reviewed and updated as appropriate. Current Outpatient Medications on File Prior to Visit   Medication Sig Dispense Refill    eplerenone (INSPRA) 25 MG tablet Take 1 tablet by mouth daily      ANORO ELLIPTA 62.5-25 MCG/INH AEPB inhaler TAKE 1 PUFF BY MOUTH EVERY DAY 60 each 3    metoprolol tartrate (LOPRESSOR) 25 MG tablet Take 1 tablet by mouth in the morning and 1 tablet before bedtime. 60 tablet 5    dilTIAZem (CARDIZEM CD) 240 MG extended release capsule TAKE 1 CAPSULE BY MOUTH EVERY DAY 90 capsule 3    rivaroxaban (XARELTO) 20 MG TABS tablet TAKE 1 TABLET BY MOUTH EVERY DAY WITH BREAKFAST 90 tablet 3    furosemide (LASIX) 40 MG tablet Take 1 tablet by mouth daily as needed (for LE swelling) 90 tablet 3    Calcium Carb-Cholecalciferol (CALCIUM + D3 PO) Take 1,200 ampules by mouth       b complex vitamins tablet Take 1 tablet by mouth daily      magnesium oxide (MAG-OX) 400 MG tablet Take 400 mg by mouth in the morning and 400 mg before bedtime.       Vitamin D (CHOLECALCIFEROL) 1000 UNITS CAPS capsule Take 2,000 Units by mouth daily        No current facility-administered medications on file prior to visit. Any over the counter meds / herbal supplements / vitamins: yes    Review of Systems  Review of Systems   Constitutional:  Negative for activity change, appetite change, chills, diaphoresis, fatigue, fever and unexpected weight change. HENT:  Negative for congestion, ear pain, hearing loss, mouth sores, nosebleeds and sore throat. Respiratory:  Negative for cough, chest tightness and shortness of breath. Cardiovascular:  Negative for chest pain, palpitations and leg swelling. Gastrointestinal:  Negative for abdominal pain, nausea and vomiting. Endocrine: Negative for cold intolerance, heat intolerance, polydipsia, polyphagia and polyuria. Genitourinary:  Negative for difficulty urinating, menstrual problem and vaginal bleeding. Musculoskeletal:  Negative for neck pain and neck stiffness. Skin:  Negative for color change, pallor, rash and wound. Allergic/Immunologic: Negative for environmental allergies and immunocompromised state. Neurological:  Negative for weakness. Hematological:  Does not bruise/bleed easily. Psychiatric/Behavioral:  Negative for agitation, confusion, sleep disturbance and suicidal ideas. The patient is not nervous/anxious. Objective:     Vitals:    10/31/22 0820   BP: 124/68   Site: Right Upper Arm   Pulse: 80   Resp: 16   Temp: 97.3 °F (36.3 °C)   SpO2: 93%   Weight: 144 lb (65.3 kg)   Height: 5' 1.25\" (1.556 m)        Lymphedema Screening 10/31/2022   LEFT 10 cm (Hand) 19   LEFT 20 cm (Wrist) 14.5   LEFT 30 cm (Forearm) 18.5   LEFT 60 cm (Upper Arm) 24.5       Physical Exam  Vitals reviewed. Constitutional:       Appearance: Normal appearance. She is well-developed. HENT:      Head: Normocephalic and atraumatic. Nose: Nose normal.   Eyes:      Conjunctiva/sclera: Conjunctivae normal.      Right eye: No hemorrhage. Left eye: No hemorrhage. Cardiovascular:      Rate and Rhythm: Normal rate.    Pulmonary: Effort: Pulmonary effort is normal. No respiratory distress. Chest:   Breasts:     Breasts are asymmetrical (s/p left scaring and indentation of central breast from scarring). Right: No inverted nipple, mass, nipple discharge, skin change or tenderness. Left: No inverted nipple, mass, nipple discharge, skin change or tenderness. Musculoskeletal:         General: Normal range of motion. Cervical back: Normal range of motion and neck supple. Comments: Normal Range of motion in upper and lower extremities. Lymphadenopathy:      Cervical: No cervical adenopathy. Right cervical: No superficial, deep or posterior cervical adenopathy. Left cervical: No superficial, deep or posterior cervical adenopathy. Upper Body:      Right upper body: No supraclavicular, axillary or pectoral adenopathy. Left upper body: No supraclavicular, axillary or pectoral adenopathy. Skin:     General: Skin is warm and dry. Findings: No abrasion, bruising, erythema or lesion. Neurological:      Mental Status: She is alert and oriented to person, place, and time. She is not disoriented. Psychiatric:         Speech: Speech normal.         Behavior: Behavior normal. Behavior is cooperative. Thought Content: Thought content normal.         Judgment: Judgment normal.             IMAGING:     No results found. Assessment:       ICD-10-CM    1. Malignant neoplasm of upper-inner quadrant of left breast in female, [facility]  C50.212     Z17.1       2. Encounter for screening mammogram for breast cancer  Z12.31       3. Overweight (BMI 25.0-29. 9)  E66.3       4. Postoperative breast asymmetry  N64.89             Plan:     Patient is doing well.   Recommend clinical breast exams in the breast surgical suite in 6 month(s)  Mammography in 4 month(s)  Recommend an active lifestyle, healthy diet, limited alcohol intake, achieve and maintain a healthy BMI to optimize breast cancer outcomes / decrease risk of breast cancer. Referral to or Follow up with Medical Oncology  Referral to or Follow up with Radiation Oncology  Plastic Surgery Referral or Follow Up  Follow up with PCP regarding all medical problems  F/u cardiology regarding HCM     I spent a total of 30 minutes on the date of the service which included preparing to see the patient, face-to-face patient care, completing clinical documentation, obtaining and/or reviewing separately obtained history, performing a medically appropriate examination, counseling and educating the patient/family/caregiver, ordering medications, tests, or procedures, communicating with other HCPs (not separately reported), independently interpreting results (not separately reported), communicating results to the patient/family/caregiver and care coordination (not separately reported). Discussion regarding natural history and potential progression of breast changes, timing of surveillance visits, timing and type of surveillance imaging, life-style modification, exercise, and limiting alcohol intake were performed today. I personally and independently saw and examined patient and reviewed all pertinent laboratory data and imaging studies. I have reviewed and agree with the history and review of systems as documented in the above note. This document is generated, in part, by voice recognition software and thus syntax and grammatical errors are possible. Dictated by Ana Tripp MD 10/31/2022 8:44 AM       This note was partially generated using Dragon voice recognition system, and there may be some incorrect words, spellings, punctuation that were not noticed in checking the note before saving.

## 2022-10-31 NOTE — PROGRESS NOTES
Reason for today's visit:  6 month follow up left breast cancer    Palpates a breast change?  No, the left breast is smaller and appears to be sinking in per patient  Nipple discharge: no  Breast Pain: no  Breast Mass: no  Swelling in either upper extremity? no    Wellness:    Self breast self exams: yes   Regular exercise routine: does some exercise  Following Lymphedema awareness/precautions: yes   Managing stress:yes   Stress level on a scale of 1 - 10: 1    Instruction:    Follow up per provider  Imaging per provider  Monthly self breast exams  Healthy diet  Exercise program  Lymphedema precautions/awareness    Other:    Requisitions needed:no  Bras/prosthesis: no  Compression Sleeve/glove: no  Lymphedema Measurements: see flow sheet  Therapy: no  PT/OT/Lymphedema: no  Follow up as advised per Dr John Mark  Lymphedema Screening 10/31/2022   LEFT 10 cm (Hand) 19   LEFT 20 cm (Wrist) 14.5   LEFT 30 cm (Forearm) 18.5   LEFT 60 cm (Upper Arm) 24.5

## 2022-11-28 NOTE — TELEPHONE ENCOUNTER
Requesting medication refill. Please approve or deny this request.    Rx requested:  Requested Prescriptions     Pending Prescriptions Disp Refills    metoprolol tartrate (LOPRESSOR) 25 MG tablet [Pharmacy Med Name: METOPROLOL TARTRATE 25 MG TAB] 180 tablet 1     Sig: TAKE 1 TABLET BY MOUTH IN THE MORNING AND 1 TABLET BEFORE BEDTIME. Last Office Visit:   8/19/2022      Next Visit Date:  Future Appointments   Date Time Provider Stewart Hernandez   12/9/2022  9:30 AM Sherry Hubbard MD [facility]   4/28/2023  7:40 AM [facility]   5/1/2023  9:45 AM Elham Mcdonald MD [address] [facility] [address]               Last refill 09/02/22. Please approve or deny.

## 2022-11-30 PROBLEM — Z12.31 ENCOUNTER FOR SCREENING MAMMOGRAM FOR BREAST CANCER: Status: RESOLVED | Noted: 2022-10-31 | Resolved: 2022-11-30

## 2022-12-09 ENCOUNTER — OFFICE VISIT (OUTPATIENT)
Dept: PULMONOLOGY | Age: 72
End: 2022-12-09
Payer: COMMERCIAL

## 2022-12-09 VITALS
HEART RATE: 94 BPM | SYSTOLIC BLOOD PRESSURE: 130 MMHG | OXYGEN SATURATION: 96 % | BODY MASS INDEX: 27.17 KG/M2 | WEIGHT: 145 LBS | DIASTOLIC BLOOD PRESSURE: 74 MMHG

## 2022-12-09 DIAGNOSIS — J44.9 CHRONIC OBSTRUCTIVE PULMONARY DISEASE, UNSPECIFIED COPD TYPE (HCC): Primary | ICD-10-CM

## 2022-12-09 DIAGNOSIS — I48.91 ATRIAL FIBRILLATION, UNSPECIFIED TYPE (HCC): ICD-10-CM

## 2022-12-09 PROCEDURE — 99213 OFFICE O/P EST LOW 20 MIN: CPT | Performed by: INTERNAL MEDICINE

## 2022-12-09 PROCEDURE — 1123F ACP DISCUSS/DSCN MKR DOCD: CPT | Performed by: INTERNAL MEDICINE

## 2022-12-09 RX ORDER — EMPAGLIFLOZIN 10 MG/1
TABLET, FILM COATED ORAL
COMMUNITY
Start: 2022-12-06

## 2022-12-09 RX ORDER — UMECLIDINIUM BROMIDE AND VILANTEROL TRIFENATATE 62.5; 25 UG/1; UG/1
1 POWDER RESPIRATORY (INHALATION) DAILY
Qty: 3 EACH | Refills: 3 | Status: SHIPPED | OUTPATIENT
Start: 2022-12-09

## 2022-12-09 RX ORDER — METOPROLOL SUCCINATE 50 MG/1
TABLET, EXTENDED RELEASE ORAL
COMMUNITY
Start: 2022-12-05

## 2022-12-09 ASSESSMENT — ENCOUNTER SYMPTOMS
WHEEZING: 0
TROUBLE SWALLOWING: 0
CHEST TIGHTNESS: 0
SHORTNESS OF BREATH: 1
VOMITING: 0
COUGH: 0
ABDOMINAL PAIN: 0
VOICE CHANGE: 0
NAUSEA: 0
SINUS PRESSURE: 0
EYE ITCHING: 0
SORE THROAT: 0
RHINORRHEA: 0
DIARRHEA: 0
EYE DISCHARGE: 0

## 2022-12-09 NOTE — PROGRESS NOTES
Subjective:     Vasu Pinedo is a 74 y.o. female who complains today of:     Chief Complaint   Patient presents with    Follow-up     3m f/u for COPD       HPI  Mild C/o shortness of breath  with exertion. No Wheezing. No Cough with Sputum. No Hemoptysis. No Chest tightness. No Chest pain with radiation  or pleuritic pain. No leg swelling . No orthopnea. No Fever or chills. No Rhinorrhea and postnasal drip. She is on bronchodilator with Anoro Ellipta 1 puff daily. She said she had a.fib. but heart rate is not high. She is not taking amiodarone. She has genetic test for HOCM and she is positive and she is following at [facility] cardi[facility]. She is following dr. Stephenie Dee for her h/o ca breast.    Allergies:  Aldactone [spironolactone]  Past Medical History:   Diagnosis Date    Abnormal echocardiogram 2020    possible PFO?  further eval suggested    Atrial fibrillation ([address] 01/05/2016    Breast cancer ([address] 02/04/2021    left    Cancer ([address]     left breast    COPD (chronic obstructive pulmonary disease) ([address] 2015    Dr Leola Tripp    Current use of long term anticoagulation     Cyst of neck 2012    after plastic surgery    Emphysema of lung ([address]     History of compression fracture of vertebral column 2020    T2, L1    History of humerus fracture     LEFT    History of left heart catheterization 03/2020    at [facility], normal LVEF with impaired relaxation, mild AV calcification and mild MV thickening, normal RV    History of sustained ventricular tachycardia 03/2020    had to wear a LifeVest, Dr Amado Morin    History of therapeutic radiation 2021    4 weeks    History of tobacco abuse 06/10/2022    HOCM (hypertrophic obstructive cardiomyopathy) ([address]     Hx antineoplastic chemo 2021    12 weeks    Hyperlipidemia LDL goal < 130     Intermittent atrial fibrillation (HCC) 2015    Dr Peter Dinh, Dr Jerome Sanon, Dr Arianna Vera    Osteopenia 2014    Personal history of skin cancer     had area excised from nose    S/P colonoscopic polypectomy , ,     Dr Edie Guevara    Smoking history     Stage 3a chronic kidney disease ([facility] 06/10/2022    Vitamin D deficiency      Past Surgical History:   Procedure Laterality Date    ATRIAL ABLATION SURGERY      BREAST BIOPSY Left 2021    BREAST BIOPSY Left 2021    LEFT BREAST LUMPECTOMY AND  SENTINEL LYMPH NODE BIOPSY performed by Layla Chacon MD at [facility]    staph infection, postpartum    COLONOSCOPY  2015    w/polypectomy Dr Edie Guevara    COLONOSCOPY N/A 10/18/2021    COLORECTAL CANCER SCREENING, HIGH RISK performed by Jennifer Ny MD at [place] [address]    Dr Neela Garces; HI RISK IND N/A 2018    COLONOSCOPY performed by Vanda Freed MD at [address] [place] [address] 2021    EGD ESOPHAGOGASTRODUODENOSCOPY WITH BIOPSIES performed by Jennifer Ny MD at [place] [id number] Left 2022    US BREAST NEEDLE BIOPSY LEFT MLOZ ULTRASOUND     Family History   Problem Relation Age of Onset    Osteoarthritis Mother     Osteoporosis Mother     Cancer Mother         anorectal     Colon Cancer Mother     Breast Cancer Paternal Aunt      Social History     Socioeconomic History    Marital status:      Spouse name: Not on file    Number of children: 2    Years of education: Not on file    Highest education level: Not on file   Occupational History    Occupation: [employer] [place], retired   Tobacco Use    Smoking status: Former     Packs/day: 1.00     Years: 50.00     Pack years: 50.00     Types: Cigarettes     Start date:      Quit date: 2015     Years since quittin.0     Passive exposure: Past    Smokeless tobacco: Never    Tobacco comments:     pt will try smoking cessation   Vaping Use    Vaping Use: Never used   Substance and Sexual Activity    Alcohol use: Never    Drug use: No    Sexual activity: Yes   Other Topics Concern    Not on file   Social History Narrative    Born in [place], one brother in [facility] [place], keeps in touch    , 2 children, one son in [place], one daughter in [place]    5 grandchildren     Lives in a house in [place] with spouse     Worked for [employer] and full time at [employer], [employer], fashion, family life     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      Social Determinants of Health     Financial Resource Strain: Low Risk     Difficulty of Paying Living Expenses: Not hard at all   Food Insecurity: No Food Insecurity    Worried About [address] [employer] in the Last Year: Never true    Ran Out of Food in the Last Year: Never true   Transportation Needs: Not on file   Physical Activity: Not on file   Stress: Not on file   Social Connections: Not on file   Intimate Partner Violence: Not on file   Housing Stability: Not on file         Review of Systems   Constitutional:  Negative for chills, diaphoresis, fatigue and fever. HENT:  Negative for congestion, mouth sores, nosebleeds, postnasal drip, rhinorrhea, sinus pressure, sneezing, sore throat, trouble swallowing and voice change. Eyes:  Negative for discharge, itching and visual disturbance. Respiratory:  Positive for shortness of breath. Negative for cough, chest tightness and wheezing. Cardiovascular:  Negative for chest pain, palpitations and leg swelling. Gastrointestinal:  Negative for abdominal pain, diarrhea, nausea and vomiting. Genitourinary:  Negative for difficulty urinating and hematuria. Musculoskeletal:  Negative for arthralgias, joint swelling and myalgias. Skin:  Negative for rash. Allergic/Immunologic: Negative for environmental allergies and food allergies. Neurological:  Negative for dizziness, tremors, weakness and headaches.    Psychiatric/Behavioral:  Negative for behavioral problems and sleep disturbance.        :     Vitals:    12/09/22 0923   BP: 130/74   Site: Right Upper Arm   Position: Sitting   Cuff Size: Medium Adult   Pulse: 94   SpO2: 96%   Weight: 145 lb (65.8 kg)     Wt Readings from Last 3 Encounters:   12/09/22 145 lb (65.8 kg)   10/31/22 144 lb (65.3 kg)   09/01/22 140 lb (63.5 kg)         Physical Exam  Constitutional:       General: She is not in acute distress. Appearance: She is well-developed. She is not diaphoretic. HENT:      Head: Normocephalic and atraumatic. Nose: Nose normal.   Eyes:      Pupils: Pupils are equal, round, and reactive to light. Neck:      Thyroid: No thyromegaly. Vascular: No JVD. Trachea: No tracheal deviation. Cardiovascular:      Rate and Rhythm: Normal rate and regular rhythm. Heart sounds: No murmur heard. No friction rub. No gallop. Pulmonary:      Effort: No respiratory distress. Breath sounds: No wheezing or rales. Comments: diminished Breath sound bilaterally. Chest:      Chest wall: No tenderness. Abdominal:      General: There is no distension. Tenderness: There is no abdominal tenderness. There is no rebound. Musculoskeletal:         General: Normal range of motion. Lymphadenopathy:      Cervical: No cervical adenopathy. Skin:     General: Skin is warm and dry. Neurological:      Mental Status: She is alert and oriented to person, place, and time.       Coordination: Coordination normal.   Psychiatric:         Mood and Affect: Mood normal.         Behavior: Behavior normal.       Current Outpatient Medications   Medication Sig Dispense Refill    JARDIANCE 10 MG tablet TAKE 1 TABLET DAILY WITH BREAKFAST      metoprolol succinate (TOPROL XL) 50 MG extended release tablet TAKE 1 TABLET BY MOUTH EVERY DAY      Umeclidinium-Vilanterol (ANORO ELLIPTA) 62.5-25 MCG/ACT AEPB Inhale 1 puff into the lungs daily 3 each 3    eplerenone (INSPRA) 25 MG tablet Take 1 tablet by mouth daily      ANORO ELLIPTA 62.5-25 MCG/INH AEPB inhaler TAKE 1 PUFF BY MOUTH EVERY DAY 60 each 3    dilTIAZem (CARDIZEM CD) 240 MG extended release capsule TAKE 1 CAPSULE BY MOUTH EVERY DAY 90 capsule 3    rivaroxaban (XARELTO) 20 MG TABS tablet TAKE 1 TABLET BY MOUTH EVERY DAY WITH BREAKFAST 90 tablet 3    furosemide (LASIX) 40 MG tablet Take 1 tablet by mouth daily as needed (for LE swelling) 90 tablet 3    Calcium Carb-Cholecalciferol (CALCIUM + D3 PO) Take 1,200 ampules by mouth       Vitamin D (CHOLECALCIFEROL) 1000 UNITS CAPS capsule Take 2,000 Units by mouth daily        No current facility-administered medications for this visit. Results for orders placed in visit on 08/08/22    XR CHEST (2 VW)    Narrative  EXAMINATION: XR CHEST (2 VW). DATE AND TIME:8/8/2022 10:19 AM    CLINICAL HISTORY:  R09.89 PULSE IRREGULARITY ICD10    COMPARISONS: 6/9/21    FINDINGS: Increased linear markings at the lung bases consistent with interstitial reticular opacities. While these opacities are nonspecific, given the changes seen on the previous CT studies from April 28, 2022, the possibility of organizing pneumonia  is questioned. Consider further correlation with CT. Impression  INCREASING BIBASILAR INFILTRATES      Results for orders placed in visit on 06/09/21    XR CHEST (2 VW)    Narrative  DIAGNOSIS:Z92.3 Hx of radiation therapy ICD10    COMPARISON: May 19, 2021 and January 28, 2021    TECHNIQUE: PA and lateral chest    FINDINGS: Ill-defined opacity is again seen in the left mid lung field in the perihilar region. The lungs are also hyperinflated. Flattening of the diaphragm and increased AP diameter is seen. The lungs contain no pleural effusion or pneumothorax. The  cardiac silhouette is not enlarged. There is loss of anterior superior endplate height of O83-Z1 which is unchanged. Impression  The opacity seen in the left mid lung field may be related to patient's history of radiation therapy. Also findings are suggestive of COPD.       Results for orders placed during the hospital encounter of 01/28/21    XR CHEST (2 VW)    Narrative  EXAMINATION: XR CHEST (2 VW)    CLINICAL HISTORY: J44.9 Chronic obstructive pulmonary disease, unspecified COPD type ([facility] ICD10    COMPARISONS: May 11, 2020    FINDINGS:    Two views of the chest are submitted. The cardiac silhouette is of normal size configuration. Pulmonary vascular attenuated. Widening of the AP diameter the chest.  Right sided trachea. No focal infiltrates. No effusions. No Pneumothoraces. Impression  NO ACUTE ACTIVE CARDIOPULMONARY PROCESS. RADIOGRAPHIC FINDINGS OF COPD  ]  Results for orders placed during the hospital encounter of 05/19/21    XR CHEST PORTABLE    Narrative  EXAMINATION: XR CHEST PORTABLE    CLINICAL HISTORY: SHORTNESS OF BREATH    COMPARISONS: CT CHEST, APRIL 26, 2021, CHEST RADIOGRAPH, JANUARY 28, 2021    FINDINGS: Remote right sixth rib fracture. Cardiopericardial silhouette normal. Aorta calcified. Ill-defined area of increased opacity found bilaterally, left mid and left lower lung. Increased opacity also demonstrated right lung base. Impression  BILATERAL ATELECTASIS/PNEUMONIA AS DISCUSSED.  ]  No results found for this or any previous visit.  ]  Results for orders placed during the hospital encounter of 08/22/22    CT CHEST WO CONTRAST    Narrative  HISTORY: Malena Carter is a Female of 66 years age. DIAGNOSIS: R09.89 Chest crackles ICD10      COMPARISON: April 28, 2022.; April 26, 2021    TECHNIQUE:  Thin spiral axial CT was performed from the thoracic inlet to the lung bases, without intravenous contrast administration. 2-D reformatted axial, sagittal and coronal images were obtained. 3-D MIPS images were also performed. FINDINGS:      A small pleural effusion is seen on the right and areas of opacity are visualized. Trace pleural effusion is seen on the left and there is mild opacity. In the cardiophrenic angles, mild atelectasis or scarring is seen. Tree-in-bud opacities are also  seen in the bases.  Mild centrilobular emphysematous changes are seen in the upper lung fields. Paraseptal emphysematous changes are seen in the lower lung fields. 2 mm nodule is seen in the right lower lobe (series 3, image 41). A pleural-based nodular  density is seen in the left lower lobe that measures 9.5 mm. Slightly prominent right paratracheal lymph node is seen. Also near the ana maría slightly to the right of midline there is a slightly prominent lymph node. The heart is not enlarged. Small trace pericardial effusion is seen. No aneurysm or dissection  evident on this examination. In the visualized upper abdomen there is a small hiatal hernia. In the left adrenal gland there is a 9 mm nodule. In the right lobe of the liver near the dome a 5 mm hypodensity is seen. This is unchanged from previous. Compression deformities are seen in T8 and T11 and T12 vertebral bodies. Impression  Small pleural effusion is seen on the right and trace pleural fluid is seen on the left. Also mild opacities are seen bilaterally that could represent small infiltrates. Emphysematous changes are visualized as well. All CT scans at this facility use dose modulation, iterative reconstruction, and/or weight based dosing when appropriate to reduce radiation dose to as low as reasonably achievable. ULTRASOUND ABDOMEN LIMITED    COMPARISON: No prior studies available for comparison. HISTORY: Liver cyst          TECHNIQUE: Limited ultrasound of the abdomen    FINDINGS:    In the right lobe of the liver, a hypoechoic areas are seen that measures 0.7 x 0.7 x 0.6 cm and 0.9 x 0.9 x 0.5 cm. These. No intra-hepatic ductal dilatation is seen. Within the gallbladder no echogenic foci seen to suggest gallstones or polyps. The common duct is normal.  No pericholecystic edema or gallbladder wall thickening seen. The pancreas is normal in echogenicity in the visualized portion. The tail is not well seen.  No peripancreatic fluid collection or focal mass seen.    IMPRESSION: 1. The liver contains 2 hypoechoic areas the small area appears to be a definitive cyst. The largest area may be cystic with internal echoes. Recommend follow-up in 4 months to determine continued stability. fatty infiltrated  2. No evidence for cholecystitis or cholelithiasis      Results for orders placed during the hospital encounter of 06/24/21    CT CHEST WO CONTRAST    Narrative  EXAMINATION: CT CHEST WITHOUT CONTRAST    CLINICAL HISTORY:J70.0 Radiation pneumonitis ([address] ICD10, history left breast cancer diagnosed 2/21. History radiation treatment to left breast completed 4/2/2021. COMPARISONS: 5/19/2021    TECHNIQUE: TECHNIQUE: Contiguous axial images were obtained through the chest without contrast. Coronal and sagittal reformations were made. FINDINGS:  No evidence of aortic aneurysm. There are atherosclerotic calcifications in the aorta and coronary arteries. No significant mediastinal or hilar or axillary lymphadenopathy. No pericardial effusion. There are small bilateral pleural effusions  in the posterior costophrenic angles both lungs. On the prior exam there is reticular infiltrates have developed within the left upper lobe at level of left breast surgery. Today's exam is reticular infiltrate has decreased in size with some residual changes in the peripheral lung and an area of the  atelectasis and mild pleural thickening. On axial image the peripheral infiltrate measures approximately 2.6 x 3.1 cm. Previously infiltrate measured approximately 6 x 10 cm on axial images. Mild reticular infiltrate anterior to the left major fissure. There are centrilobular emphysematous changes in the lungs. There is a new 3 mm nodule in the right lower lobe base, series 3 image 40. There is minimal infiltrate anteriorly in the right middle lobe, series 3 image 37. There is infiltrate/atelectasis  posteriorly in the base of the right lower lobe. No pneumothorax.     Upper abdomen is unremarkable. There is depression of the superior endplate of O80, unchanged. There is volume loss in this vertebra, unchanged. Impression  RADIATION PNEUMONIA WITHIN THE LEFT UPPER LOBE IS DECREASING IN SIZE WITH SOME RESIDUAL INFILTRATE. THERE IS MINIMAL ADJACENT PLEURAL THICKENING. SMALL INFILTRATE/ATELECTASIS POSTERIOR BASE RIGHT LOWER LOBE. OTHER DETAILS ABOVE. NO ADENOPATHY. SMALL BILATERAL PLEURAL EFFUSIONS.    3 MM NODULE RIGHT LOWER LOBE. T12 FRACTURE, UNCHANGED. All CT scans at this facility use dose modulation, iterative reconstruction, and/or weight based dosing when appropriate to reduce radiation dose to as low as reasonably achievable. Results for orders placed during the hospital encounter of 05/19/21    CT CHEST WO CONTRAST    Narrative  CT of the Chest without intravenous contrast medium. HISTORY:  New hypoxia. h/o radiation to L breast, completed, April 2, 2021. Receiving paclitaxel, and amiodarone. History of COPD. TECHNICAL FACTORS:    CT imaging of the chest was obtained and formatted as 5 mm contiguous axial images from the thoracic inlet through the adrenal glands. Sagittal and coronal reconstructions obtained during postprocessing. Intravenous contrast medium:  None. Comparison:  Low dose CT chest, April 26, 2021, March 5, 2020, January 4, 2019. CT chest, August 23, 2016. FINDINGS:      Right lung: No nodules, mass, pleural effusion, pneumothorax. Mild dependent subsegmental atelectatic change. Left lung: No nodules, masses, pleural effusion, pneumothorax. Mild dependent subsegmental atelectatic change. Focal interval development of reticular change, left upper lobe, at level of postsurgical change, left breast (series 2, images 21 through 37). Lymph nodes: No hilar, mediastinal, or axillary lymph node enlargement.     Chest Wall: Increased attenuation, left retroareolar, and upper inner quadrant, with calcification, and surgical clips, unchanged (series 2, images 24 through 29). Thoracic aorta: Normal in course and caliber. Cardiac Size: Normal.    Pericardial effusion: None. Upper abdomen:Limited imaging upper abdomen shows no gross anomaly. Musculoskeletal:No osteoblastic, and no osteolytic lesions. Anterior wedge compression, T12-L1, again demonstrated. Impression  Left upper lobe interstitial changes described. Given clinical history, changes may be secondary to recent radiation therapy. Other inflammatory and infectious etiologies secondary considerations. Recurrent malignancy less likely. All CT scans at this facility use dose modulation, iterative reconstruction, and/or weight based dosing when appropriate to reduce radiation dose to as low as reasonably achievable.  ]  Results for orders placed during the hospital encounter of 08/23/16    CT Chest W Contrast    Narrative  CT CHEST    REASON FOR EXAM  HEMOPTYSIS    COMPARISONS  none    FINDINGS  Multiplanar images were obtained following the IV injection  of 75 cc Isovue-370. A focal area of consolidation involving the right  lower lobe posteriorly is seen. There is a suggestion of air  bronchogram present. Mild atelectasis at the left lung base is seen. Remainder lungs are clear. No pleural effusions are seen. No definite  pulmonary nodules are identified. Emphysematous changes are seen. No  pneumothorax is noted. Heart appears normal in size. No pericardial  effusion is seen. No mediastinal, hilar or axillary adenopathy is  noted. Bone windows demonstrate no gross osseous abnormalities. Surrounding soft tissues are unremarkable. Limited images of the upper  abdomen demonstrate no contributory findings. IMPRESSION  FOCAL AREA OF CONSOLIDATION INVOLVING THE RIGHT LOWER LOBE  IS SEEN. SHORT-TERM INTERVAL FOLLOWUP CT WITHOUT CONTRAST IN 3-4 MONTHS  IS SUGGESTED TO NOTE RESOLUTION. MILD ATELECTATIC CHANGES AT THE LEFT  LUNG BASE.  NO OTHER SIGNIFICANT ABNORMALITIES ARE SEEN.      All CT scans at this facility use dose modulation, iterative  reconstruction, and/or weight based dosing when appropriate to reduce  radiation dose to as low as reasonably achievable. Katt Warren Matias Cruz M.D. Released Matias Cruz M.D. Released Date Time- 08/24/16 1306  This document has been electronically signed. ------------------------------------------------------------------------------  ]    Assessment/Plan:     1. Chronic obstructive pulmonary disease, unspecified COPD type ([facility]  She is mild C/o shortness of breath  with exertion. No Wheezing. No Cough with Sputum. She is on bronchodilator with Anoro Ellipta 1 puff daily.    - Umeclidinium-Vilanterol (ANORO ELLIPTA) 62.5-25 MCG/ACT AEPB; Inhale 1 puff into the lungs daily  Dispense: 3 each; Refill: 3    2. Atrial fibrillation, unspecified type ([facility]  She said she had a.fib. but heart rate is not high. She is not taking amiodarone. She has genetic test for HOCM and she is positive and she is following at [facility] cardiology. She is following dr. Maggy Escudero for her h/o ca breast.      Return in about 4 months (around 4/9/2023) for COPD.       Jayce Grijalva MD

## 2022-12-13 ENCOUNTER — TELEPHONE (OUTPATIENT)
Dept: CARDIOLOGY CLINIC | Age: 72
End: 2022-12-13

## 2022-12-13 NOTE — TELEPHONE ENCOUNTER
Pharmacy via fax is requesting medication refill of KLOR-CON M20 TABLET.  Please approve or deny this request.    Rx requested:  Requested Prescriptions      No prescriptions requested or ordered in this encounter         Last Office Visit:   8/19/2022      Next Visit Date:  Future Appointments   Date Time Provider Stewart Mary   4/11/2023  9:30 AM Ashley Bradley MD [facility]   4/28/2023  7:40 AM [address] [facility]   5/1/2023  9:45 AM Felix Machado MD [address] [place] [address]

## 2023-04-11 ENCOUNTER — OFFICE VISIT (OUTPATIENT)
Dept: PULMONOLOGY | Age: 73
End: 2023-04-11
Payer: MEDICARE

## 2023-04-11 VITALS
SYSTOLIC BLOOD PRESSURE: 120 MMHG | HEART RATE: 83 BPM | HEIGHT: 62 IN | WEIGHT: 145 LBS | OXYGEN SATURATION: 94 % | DIASTOLIC BLOOD PRESSURE: 68 MMHG | BODY MASS INDEX: 26.68 KG/M2 | TEMPERATURE: 97 F

## 2023-04-11 DIAGNOSIS — I50.9 CONGESTIVE HEART FAILURE, UNSPECIFIED HF CHRONICITY, UNSPECIFIED HEART FAILURE TYPE (HCC): ICD-10-CM

## 2023-04-11 DIAGNOSIS — Z87.891 PERSONAL HISTORY OF TOBACCO USE: ICD-10-CM

## 2023-04-11 DIAGNOSIS — I48.91 ATRIAL FIBRILLATION, UNSPECIFIED TYPE (HCC): ICD-10-CM

## 2023-04-11 DIAGNOSIS — J70.0 RADIATION PNEUMONITIS (HCC): ICD-10-CM

## 2023-04-11 DIAGNOSIS — J44.9 CHRONIC OBSTRUCTIVE PULMONARY DISEASE, UNSPECIFIED COPD TYPE (HCC): Primary | ICD-10-CM

## 2023-04-11 PROCEDURE — G8399 PT W/DXA RESULTS DOCUMENT: HCPCS | Performed by: INTERNAL MEDICINE

## 2023-04-11 PROCEDURE — 1036F TOBACCO NON-USER: CPT | Performed by: INTERNAL MEDICINE

## 2023-04-11 PROCEDURE — G8417 CALC BMI ABV UP PARAM F/U: HCPCS | Performed by: INTERNAL MEDICINE

## 2023-04-11 PROCEDURE — 3023F SPIROM DOC REV: CPT | Performed by: INTERNAL MEDICINE

## 2023-04-11 PROCEDURE — G0296 VISIT TO DETERM LDCT ELIG: HCPCS | Performed by: INTERNAL MEDICINE

## 2023-04-11 PROCEDURE — 3017F COLORECTAL CA SCREEN DOC REV: CPT | Performed by: INTERNAL MEDICINE

## 2023-04-11 PROCEDURE — 1123F ACP DISCUSS/DSCN MKR DOCD: CPT | Performed by: INTERNAL MEDICINE

## 2023-04-11 PROCEDURE — 99214 OFFICE O/P EST MOD 30 MIN: CPT | Performed by: INTERNAL MEDICINE

## 2023-04-11 PROCEDURE — 1090F PRES/ABSN URINE INCON ASSESS: CPT | Performed by: INTERNAL MEDICINE

## 2023-04-11 PROCEDURE — G8427 DOCREV CUR MEDS BY ELIG CLIN: HCPCS | Performed by: INTERNAL MEDICINE

## 2023-04-11 ASSESSMENT — ENCOUNTER SYMPTOMS
SINUS PRESSURE: 0
DIARRHEA: 0
COUGH: 1
NAUSEA: 0
SHORTNESS OF BREATH: 1
VOMITING: 0
CHEST TIGHTNESS: 0
TROUBLE SWALLOWING: 0
ABDOMINAL PAIN: 0
WHEEZING: 0
EYE DISCHARGE: 0
SORE THROAT: 0
EYE ITCHING: 0
RHINORRHEA: 0
VOICE CHANGE: 0

## 2023-04-17 DIAGNOSIS — J20.9 ACUTE BRONCHITIS, UNSPECIFIED ORGANISM: ICD-10-CM

## 2023-04-17 RX ORDER — CEFUROXIME AXETIL 250 MG/1
250 TABLET ORAL 2 TIMES DAILY
Qty: 20 TABLET | Refills: 0 | Status: SHIPPED | OUTPATIENT
Start: 2023-04-17 | End: 2023-04-27

## 2023-04-17 NOTE — TELEPHONE ENCOUNTER
PT WAS SEEN 4/11/23 AND YOU STATED YOU WOULD GIVE HER AN ANTIBIOTIC FOR HER BRONCHITIS IF SHE WANTED.          PT  DOES WANT RX SENT       Rx requested:  Requested Prescriptions     Pending Prescriptions Disp Refills    cefUROXime (CEFTIN) 250 MG tablet 20 tablet 0     Sig: Take 1 tablet by mouth 2 times daily for 10 days       Last Office Visit:   4/11/2023      Next Visit Date:  Future Appointments   Date Time Provider Stewart Hernandez   4/28/2023  7:40 AM [facility] MAMMOGRAPHY ROOM 2 [facility]   5/1/2023  8:00 AM [facility] CT ROOM 1 [facility] [address] [place]   5/1/2023  9:45 AM MD Meera Flynn   5/9/2023  9:15 AM [id number], MD [facility]

## 2023-04-28 ENCOUNTER — HOSPITAL ENCOUNTER (OUTPATIENT)
Dept: WOMENS IMAGING | Age: 73
End: 2023-04-28
Payer: MEDICARE

## 2023-04-28 DIAGNOSIS — Z12.31 ENCOUNTER FOR SCREENING MAMMOGRAM FOR BREAST CANCER: ICD-10-CM

## 2023-04-28 PROCEDURE — 77063 BREAST TOMOSYNTHESIS BI: CPT

## 2023-05-01 ENCOUNTER — OFFICE VISIT (OUTPATIENT)
Dept: SURGERY | Age: 73
End: 2023-05-01
Payer: MEDICARE

## 2023-05-01 ENCOUNTER — HOSPITAL ENCOUNTER (OUTPATIENT)
Dept: CT IMAGING | Age: 73
Discharge: HOME | End: 2023-04-30

## 2023-05-01 VITALS
SYSTOLIC BLOOD PRESSURE: 125 MMHG | BODY MASS INDEX: 26.39 KG/M2 | HEART RATE: 74 BPM | HEIGHT: 62 IN | DIASTOLIC BLOOD PRESSURE: 67 MMHG | WEIGHT: 143.4 LBS | RESPIRATION RATE: 16 BRPM | TEMPERATURE: 97.3 F | OXYGEN SATURATION: 93 %

## 2023-05-01 DIAGNOSIS — Z12.31 ENCOUNTER FOR SCREENING MAMMOGRAM FOR BREAST CANCER: ICD-10-CM

## 2023-05-01 DIAGNOSIS — R23.4 BREAST SKIN CHANGES: ICD-10-CM

## 2023-05-01 DIAGNOSIS — Z17.1 MALIGNANT NEOPLASM OF UPPER-INNER QUADRANT OF LEFT BREAST IN FEMALE, ESTROGEN RECEPTOR NEGATIVE (HCC): Primary | ICD-10-CM

## 2023-05-01 DIAGNOSIS — C50.212 MALIGNANT NEOPLASM OF UPPER-INNER QUADRANT OF LEFT BREAST IN FEMALE, ESTROGEN RECEPTOR NEGATIVE (HCC): Primary | ICD-10-CM

## 2023-05-01 DIAGNOSIS — E66.3 OVERWEIGHT (BMI 25.0-29.9): ICD-10-CM

## 2023-05-01 DIAGNOSIS — Z87.891 PERSONAL HISTORY OF TOBACCO USE: ICD-10-CM

## 2023-05-01 PROCEDURE — 1123F ACP DISCUSS/DSCN MKR DOCD: CPT | Performed by: SURGERY

## 2023-05-01 PROCEDURE — G8427 DOCREV CUR MEDS BY ELIG CLIN: HCPCS | Performed by: SURGERY

## 2023-05-01 PROCEDURE — 1090F PRES/ABSN URINE INCON ASSESS: CPT | Performed by: SURGERY

## 2023-05-01 PROCEDURE — G8399 PT W/DXA RESULTS DOCUMENT: HCPCS | Performed by: SURGERY

## 2023-05-01 PROCEDURE — 1036F TOBACCO NON-USER: CPT | Performed by: SURGERY

## 2023-05-01 PROCEDURE — 3017F COLORECTAL CA SCREEN DOC REV: CPT | Performed by: SURGERY

## 2023-05-01 PROCEDURE — G8417 CALC BMI ABV UP PARAM F/U: HCPCS | Performed by: SURGERY

## 2023-05-01 PROCEDURE — 99214 OFFICE O/P EST MOD 30 MIN: CPT | Performed by: SURGERY

## 2023-05-01 RX ORDER — CLOTRIMAZOLE AND BETAMETHASONE DIPROPIONATE 10; .64 MG/G; MG/G
CREAM TOPICAL
Qty: 45 G | Refills: 1 | Status: SHIPPED | OUTPATIENT
Start: 2023-05-01

## 2023-05-01 NOTE — PROGRESS NOTES
Reason for today's visit:  6 month follow up left breast cancer    Palpates a breast change? no  Nipple discharge: no  Breast Pain: no  Breast Mass: no  Swelling in either upper extremity? no    Wellness:    Self breast self exams: yes   Regular exercise routine: yes   Following Lymphedema awareness/precautions: yes  Managing stress:yes   Stress level on a scale of 1 - 10: 1    Instruction:    Follow up per provider  Imaging per provider  Monthly self breast exams  Healthy diet  Exercise program  Lymphedema precautions/awareness    Other:    Requisitions needed:no  Bras/prosthesis: no  Compression Sleeve/glove: no  Lymphedema Measurements: see flow sheet  Therapy: no  PT/OT/Lymphedema: no  Follow up as advised per Dr Angelica Du    Lymphedema Screening 5/1/2023 10/31/2022   LEFT 10 cm (Hand) 18.5 19   LEFT 20 cm (Wrist) 14 14.5   LEFT 30 cm (Forearm) 18.5 18.5   LEFT 60 cm (Upper Arm) 24.5 24.5

## 2023-05-01 NOTE — PROGRESS NOTES
PATIENT:    Homa Meraz     DATE:      5/1/2023     TIME: 10:08 AM     Subjective:     Homa Meraz presents for follow-up of breast cancer. She complains of a rash under left breast.    The breast cancer is  Cancer Staging   Malignant neoplasm of upper-inner quadrant of left breast in female, estrogen receptor negative ([facility]  Staging form: Breast, AJCC 8th Edition  - Clinical stage from 1/26/2021: Stage IB (cT1, cN0, cM0, G3, ER-, ID-, HER2-) - Signed by Adrienne Frye MD on 2/8/2021       Patient's medications, allergies, past medical, surgical, social and family histories were reviewed and updated as appropriate. Current Outpatient Medications on File Prior to Visit   Medication Sig Dispense Refill    JARDIANCE 10 MG tablet TAKE 1 TABLET DAILY WITH BREAKFAST      metoprolol succinate (TOPROL XL) 50 MG extended release tablet TAKE 1 TABLET BY MOUTH EVERY DAY      eplerenone (INSPRA) 25 MG tablet Take 1 tablet by mouth daily      ANORO ELLIPTA 62.5-25 MCG/INH AEPB inhaler TAKE 1 PUFF BY MOUTH EVERY DAY 60 each 3    dilTIAZem (CARDIZEM CD) 240 MG extended release capsule TAKE 1 CAPSULE BY MOUTH EVERY DAY 90 capsule 3    rivaroxaban (XARELTO) 20 MG TABS tablet TAKE 1 TABLET BY MOUTH EVERY DAY WITH BREAKFAST 90 tablet 3    furosemide (LASIX) 40 MG tablet Take 1 tablet by mouth daily as needed (for LE swelling) 90 tablet 3    Calcium Carb-Cholecalciferol (CALCIUM + D3 PO) Take 1,200 ampules by mouth       Vitamin D (CHOLECALCIFEROL) 1000 UNITS CAPS capsule Take 2 capsules by mouth daily       No current facility-administered medications on file prior to visit.         Any over the counter meds / herbal supplements / vitamins: yes        Objective:     Vitals:    05/01/23 0941   BP: 125/67   Site: Right Upper Arm   Pulse: 74   Resp: 16   Temp: 97.3 °F (36.3 °C)   SpO2: 93%   Weight: 143 lb 6.4 oz (65 kg)   Height: 5' 2\" (1.575 m)        Lymphedema Screening 5/1/2023 10/31/2022   LEFT 10 cm (Hand) 18.5 19   LEFT 20

## 2023-05-09 ENCOUNTER — OFFICE VISIT (OUTPATIENT)
Dept: PULMONOLOGY | Age: 73
End: 2023-05-09
Payer: MEDICARE

## 2023-05-09 VITALS
OXYGEN SATURATION: 93 % | BODY MASS INDEX: 26.34 KG/M2 | TEMPERATURE: 97.1 F | DIASTOLIC BLOOD PRESSURE: 86 MMHG | WEIGHT: 144 LBS | HEART RATE: 88 BPM | SYSTOLIC BLOOD PRESSURE: 132 MMHG

## 2023-05-09 DIAGNOSIS — J20.9 ACUTE BRONCHITIS, UNSPECIFIED ORGANISM: ICD-10-CM

## 2023-05-09 DIAGNOSIS — R91.8 LUNG NODULES: ICD-10-CM

## 2023-05-09 DIAGNOSIS — J44.9 CHRONIC OBSTRUCTIVE PULMONARY DISEASE, UNSPECIFIED COPD TYPE (HCC): Primary | ICD-10-CM

## 2023-05-09 PROCEDURE — 1123F ACP DISCUSS/DSCN MKR DOCD: CPT | Performed by: INTERNAL MEDICINE

## 2023-05-09 PROCEDURE — G8399 PT W/DXA RESULTS DOCUMENT: HCPCS | Performed by: INTERNAL MEDICINE

## 2023-05-09 PROCEDURE — G8427 DOCREV CUR MEDS BY ELIG CLIN: HCPCS | Performed by: INTERNAL MEDICINE

## 2023-05-09 PROCEDURE — 3017F COLORECTAL CA SCREEN DOC REV: CPT | Performed by: INTERNAL MEDICINE

## 2023-05-09 PROCEDURE — 99214 OFFICE O/P EST MOD 30 MIN: CPT | Performed by: INTERNAL MEDICINE

## 2023-05-09 PROCEDURE — 1036F TOBACCO NON-USER: CPT | Performed by: INTERNAL MEDICINE

## 2023-05-09 PROCEDURE — 1090F PRES/ABSN URINE INCON ASSESS: CPT | Performed by: INTERNAL MEDICINE

## 2023-05-09 PROCEDURE — 3023F SPIROM DOC REV: CPT | Performed by: INTERNAL MEDICINE

## 2023-05-09 PROCEDURE — G8417 CALC BMI ABV UP PARAM F/U: HCPCS | Performed by: INTERNAL MEDICINE

## 2023-05-09 ASSESSMENT — ENCOUNTER SYMPTOMS
EYE ITCHING: 0
SINUS PRESSURE: 0
WHEEZING: 0
TROUBLE SWALLOWING: 0
CHEST TIGHTNESS: 0
COUGH: 1
SORE THROAT: 0
ABDOMINAL PAIN: 0
DIARRHEA: 0
NAUSEA: 0
RHINORRHEA: 0
VOMITING: 0
EYE DISCHARGE: 0
SHORTNESS OF BREATH: 1
VOICE CHANGE: 0

## 2023-05-09 NOTE — PROGRESS NOTES
Subjective:     Annie Lopez is a 74 y.o. female who complains today of:     Chief Complaint   Patient presents with    Follow-up     4wk f/u for CT results        HPI  She had chest cold but now she is better. She had Ct chest done on 5/1/23  She is on bronchodilator with Anoro Ellipta 1 puff daily. C/o shortness of breath  with exertion. Occasional Wheezing. C/o cough occasional white mucus  No Hemoptysis. No Chest tightness. No Chest pain with radiation or pleuritic pain. No leg swelling . No orthopnea. No Fever or chills. No Rhinorrhea and postnasal drip. She said she had a.fib. but heart rate is not high. She is not taking amiodarone for last 7 month , she is on metoprolol  She has genetic test for HOCM and she is positive and she is following at [facility] cardiology. She is following dr. Mary Elias for her h/o ca breast.  Allergies:  Aldactone [spironolactone]  Past Medical History:   Diagnosis Date    Abnormal echocardiogram 2020    possible PFO?  further eval suggested    Atrial fibrillation ([address] 01/05/2016    Breast cancer ([address] 02/04/2021    left    Cancer ([address]     left breast    COPD (chronic obstructive pulmonary disease) ([address] 2015    Dr Sivakumar Mariano    Current use of long term anticoagulation     Cyst of neck 2012    after plastic surgery    Emphysema of lung ([address]     History of compression fracture of vertebral column 2020    T2, L1    History of humerus fracture     LEFT    History of left heart catheterization 03/2020    at [facility], normal LVEF with impaired relaxation, mild AV calcification and mild MV thickening, normal RV    History of sustained ventricular tachycardia 03/2020    had to wear a LifeVest, Dr Brandy Conti    History of therapeutic radiation 2021    5 weeks    History of tobacco abuse 06/10/2022    HOCM (hypertrophic obstructive cardiomyopathy) ([address]     Hx antineoplastic chemo 2021    12 weeks    Hyperlipidemia LDL goal < 130     Intermittent atrial fibrillation (HCC) 2015

## 2023-05-15 DIAGNOSIS — I48.91 ATRIAL FIBRILLATION, UNSPECIFIED TYPE (HCC): ICD-10-CM

## 2023-05-15 RX ORDER — DILTIAZEM HYDROCHLORIDE 240 MG/1
CAPSULE, COATED, EXTENDED RELEASE ORAL
Qty: 90 CAPSULE | Refills: 3 | Status: SHIPPED | OUTPATIENT
Start: 2023-05-15

## 2023-05-15 NOTE — TELEPHONE ENCOUNTER
Requesting medication refill. Please approve or deny this request.    Rx requested:  Requested Prescriptions     Pending Prescriptions Disp Refills    dilTIAZem (CARDIZEM CD) 240 MG extended release capsule [Pharmacy Med Name: DILTIAZEM 24H ER(CD) 240 MG CP] 90 capsule 3     Sig: TAKE 1 CAPSULE BY MOUTH EVERY DAY         Last Office Visit:   8/19/2022      Next Visit Date:  Future Appointments   Date Time Provider Stewart Hernandez   9/11/2023  9:15 AM Jason Zafar, [address] [facility]   11/9/2023  8:00 AM [facility] ROOM 1 [facility]   4/30/2024  7:20 AM [facility] ROOM 1 [facility]   5/1/2024  8:15 AM Owen Rodarte MD [facility] AT [place]             Please approve or deny.

## 2023-09-11 ENCOUNTER — OFFICE VISIT (OUTPATIENT)
Dept: PULMONOLOGY | Age: 73
End: 2023-09-11
Payer: MEDICARE

## 2023-09-11 VITALS
HEART RATE: 64 BPM | OXYGEN SATURATION: 98 % | TEMPERATURE: 97.1 F | DIASTOLIC BLOOD PRESSURE: 64 MMHG | BODY MASS INDEX: 27.07 KG/M2 | SYSTOLIC BLOOD PRESSURE: 116 MMHG | WEIGHT: 148 LBS

## 2023-09-11 DIAGNOSIS — R91.8 LUNG NODULES: ICD-10-CM

## 2023-09-11 DIAGNOSIS — I48.91 ATRIAL FIBRILLATION, UNSPECIFIED TYPE (HCC): ICD-10-CM

## 2023-09-11 DIAGNOSIS — J44.9 CHRONIC OBSTRUCTIVE PULMONARY DISEASE, UNSPECIFIED COPD TYPE (HCC): Primary | ICD-10-CM

## 2023-09-11 PROBLEM — D68.69 SECONDARY HYPERCOAGULABLE STATE (HCC): Status: ACTIVE | Noted: 2023-09-11

## 2023-09-11 PROCEDURE — 1123F ACP DISCUSS/DSCN MKR DOCD: CPT | Performed by: INTERNAL MEDICINE

## 2023-09-11 PROCEDURE — 1090F PRES/ABSN URINE INCON ASSESS: CPT | Performed by: INTERNAL MEDICINE

## 2023-09-11 PROCEDURE — 1036F TOBACCO NON-USER: CPT | Performed by: INTERNAL MEDICINE

## 2023-09-11 PROCEDURE — 99214 OFFICE O/P EST MOD 30 MIN: CPT | Performed by: INTERNAL MEDICINE

## 2023-09-11 PROCEDURE — G8399 PT W/DXA RESULTS DOCUMENT: HCPCS | Performed by: INTERNAL MEDICINE

## 2023-09-11 PROCEDURE — G8427 DOCREV CUR MEDS BY ELIG CLIN: HCPCS | Performed by: INTERNAL MEDICINE

## 2023-09-11 PROCEDURE — 3017F COLORECTAL CA SCREEN DOC REV: CPT | Performed by: INTERNAL MEDICINE

## 2023-09-11 PROCEDURE — G8417 CALC BMI ABV UP PARAM F/U: HCPCS | Performed by: INTERNAL MEDICINE

## 2023-09-11 PROCEDURE — 3023F SPIROM DOC REV: CPT | Performed by: INTERNAL MEDICINE

## 2023-09-11 RX ORDER — UMECLIDINIUM BROMIDE AND VILANTEROL TRIFENATATE 62.5; 25 UG/1; UG/1
1 POWDER RESPIRATORY (INHALATION) DAILY
Qty: 60 EACH | Refills: 2 | Status: SHIPPED | OUTPATIENT
Start: 2023-09-11

## 2023-09-11 RX ORDER — EPLERENONE 50 MG/1
50 TABLET, FILM COATED ORAL DAILY
COMMUNITY
Start: 2023-08-21

## 2023-09-11 ASSESSMENT — ENCOUNTER SYMPTOMS
CHEST TIGHTNESS: 0
WHEEZING: 0
EYE ITCHING: 0
NAUSEA: 0
DIARRHEA: 0
SHORTNESS OF BREATH: 1
TROUBLE SWALLOWING: 0
SINUS PRESSURE: 0
VOMITING: 0
EYE DISCHARGE: 0
SORE THROAT: 0
RHINORRHEA: 0
COUGH: 0
VOICE CHANGE: 0
ABDOMINAL PAIN: 0

## 2023-09-11 NOTE — PROGRESS NOTES
Subjective:     Malcolm Engel is a 74 y.o. female who complains today of:     Chief Complaint   Patient presents with    Follow-up     4m f/u on COPD       HPI  She has singles 5 weeks ago and she was given vencyclovir for 7 days   She had Ct chest done on 5/1/23  She is on bronchodilator with Anoro Ellipta 1 puff daily. C/o shortness of breath  with exertion. No  Wheezing. No C/o cough No Hemoptysis. No Chest tightness. No Chest pain with radiation or pleuritic pain. No leg swelling . No orthopnea. No Fever or chills. No Rhinorrhea and postnasal drip. She said she had a.fib. but heart rate is not high. She is not taking amiodarone for last 7 month , She is on metoprolol. She has genetic test for HOCM and she is positive and she is following at [facility] cardiology and going see him tomorrow     She is following dr. Priti Lowe for her h/o ca breast and going to see him on 9/14/23. Allergies:  Aldactone [spironolactone]  Past Medical History:   Diagnosis Date    Abnormal echocardiogram 2020    possible PFO?  further eval suggested    Atrial fibrillation ([address]) 01/05/2016    Breast cancer ([address]) 02/04/2021    left    Cancer ([address])     left breast    COPD (chronic obstructive pulmonary disease) ([address]) 2015    Dr Alexx Washburn    Current use of long term anticoagulation     Cyst of neck 2012    after plastic surgery    Emphysema of lung ([address])     History of compression fracture of vertebral column 2020    T2, L1    History of humerus fracture     LEFT    History of left heart catheterization 03/2020    at [facility], normal LVEF with impaired relaxation, mild AV calcification and mild MV thickening, normal RV    History of sustained ventricular tachycardia 03/2020    had to wear a LifeVest, Dr Kirstie Wilson    History of therapeutic radiation 2021    5 weeks    History of tobacco abuse 06/10/2022    HOCM (hypertrophic obstructive cardiomyopathy) ([address])     Hx antineoplastic chemo 2021    12 weeks    Hyperlipidemia LDL goal < 130

## 2023-11-09 ENCOUNTER — HOSPITAL ENCOUNTER (OUTPATIENT)
Dept: CT IMAGING | Age: 73
Discharge: HOME | End: 2023-11-11
Payer: MEDICARE

## 2023-11-09 DIAGNOSIS — R91.8 LUNG NODULES: ICD-10-CM

## 2023-11-09 PROCEDURE — 71250 CT THORAX DX C-: CPT

## 2023-11-13 ENCOUNTER — OFFICE VISIT (OUTPATIENT)
Dept: PULMONOLOGY | Age: 73
End: 2023-11-13
Payer: MEDICARE

## 2023-11-13 VITALS
OXYGEN SATURATION: 93 % | SYSTOLIC BLOOD PRESSURE: 114 MMHG | TEMPERATURE: 97.1 F | HEART RATE: 115 BPM | DIASTOLIC BLOOD PRESSURE: 80 MMHG | BODY MASS INDEX: 26.52 KG/M2 | WEIGHT: 145 LBS

## 2023-11-13 DIAGNOSIS — I48.91 ATRIAL FIBRILLATION, UNSPECIFIED TYPE (HCC): ICD-10-CM

## 2023-11-13 DIAGNOSIS — J44.9 CHRONIC OBSTRUCTIVE PULMONARY DISEASE, UNSPECIFIED COPD TYPE (HCC): Primary | ICD-10-CM

## 2023-11-13 DIAGNOSIS — R91.8 LUNG NODULES: ICD-10-CM

## 2023-11-13 PROCEDURE — 3017F COLORECTAL CA SCREEN DOC REV: CPT | Performed by: INTERNAL MEDICINE

## 2023-11-13 PROCEDURE — 99214 OFFICE O/P EST MOD 30 MIN: CPT | Performed by: INTERNAL MEDICINE

## 2023-11-13 PROCEDURE — 3023F SPIROM DOC REV: CPT | Performed by: INTERNAL MEDICINE

## 2023-11-13 PROCEDURE — G8417 CALC BMI ABV UP PARAM F/U: HCPCS | Performed by: INTERNAL MEDICINE

## 2023-11-13 PROCEDURE — G8427 DOCREV CUR MEDS BY ELIG CLIN: HCPCS | Performed by: INTERNAL MEDICINE

## 2023-11-13 PROCEDURE — 1036F TOBACCO NON-USER: CPT | Performed by: INTERNAL MEDICINE

## 2023-11-13 PROCEDURE — 1123F ACP DISCUSS/DSCN MKR DOCD: CPT | Performed by: INTERNAL MEDICINE

## 2023-11-13 PROCEDURE — G8399 PT W/DXA RESULTS DOCUMENT: HCPCS | Performed by: INTERNAL MEDICINE

## 2023-11-13 PROCEDURE — 1090F PRES/ABSN URINE INCON ASSESS: CPT | Performed by: INTERNAL MEDICINE

## 2023-11-13 PROCEDURE — G8484 FLU IMMUNIZE NO ADMIN: HCPCS | Performed by: INTERNAL MEDICINE

## 2023-11-13 RX ORDER — UMECLIDINIUM BROMIDE AND VILANTEROL TRIFENATATE 62.5; 25 UG/1; UG/1
1 POWDER RESPIRATORY (INHALATION) DAILY
Qty: 60 EACH | Refills: 2 | Status: SHIPPED | OUTPATIENT
Start: 2023-11-13

## 2023-11-13 NOTE — PROGRESS NOTES
Subjective:             Kaye Rehman is a 74 y.o. female who complains today of:     Chief Complaint   Patient presents with    Follow-up     2m f/u on COPD, CT results        HPI    She had Ct chest done on 11/9/23  She is on bronchodilator with Anoro Ellipta 1 puff daily. Need refill  C/o shortness of breath  with exertion. No  Wheezing. No C/o cough No Hemoptysis. No Chest tightness. No Chest pain with radiation or pleuritic pain. No leg swelling . No orthopnea. No Fever or chills. No Rhinorrhea and postnasal drip. She is following with cardiology for [facility], she has HOCM  She said she had a.fib. She is on metoprolol. On Xarelto   She is following  for her h/o ca breast and going to see him on . Allergies:  Aldactone [spironolactone]  Past Medical History:   Diagnosis Date    Abnormal echocardiogram 2020    possible PFO?  further eval suggested    Atrial fibrillation ([address]) 01/05/2016    Breast cancer ([address]) 02/04/2021    left    Cancer ([address])     left breast    COPD (chronic obstructive pulmonary disease) ([address]) 2015    Dr Holley Doyle    Current use of long term anticoagulation     Cyst of neck 2012    after plastic surgery    Emphysema of lung ([address])     History of compression fracture of vertebral column 2020    T2, L1    History of humerus fracture     LEFT    History of left heart catheterization 03/2020    at [facility], normal LVEF with impaired relaxation, mild AV calcification and mild MV thickening, normal RV    History of sustained ventricular tachycardia 03/2020    had to wear a LifeVest, Dr aRkel Zuniga    History of therapeutic radiation 2021    5 weeks    History of tobacco abuse 06/10/2022    HOCM (hypertrophic obstructive cardiomyopathy) ([address])     Hx antineoplastic chemo 2021    12 weeks    Hyperlipidemia LDL goal < 130     Intermittent atrial fibrillation (HCC) 2015    Dr Woody Ross, Dr Black Ratliff, Dr Moulton Coad    Osteopenia 2014    Personal history of skin cancer     had area excised from nose    S/P

## 2023-11-19 ASSESSMENT — ENCOUNTER SYMPTOMS
COUGH: 0
RHINORRHEA: 0
ABDOMINAL PAIN: 0
CHEST TIGHTNESS: 0
EYE ITCHING: 0
WHEEZING: 0
VOICE CHANGE: 0
TROUBLE SWALLOWING: 0
SORE THROAT: 0
SINUS PRESSURE: 0
SHORTNESS OF BREATH: 1
EYE DISCHARGE: 0
NAUSEA: 0
DIARRHEA: 0
VOMITING: 0

## 2024-01-08 DIAGNOSIS — J44.9 CHRONIC OBSTRUCTIVE PULMONARY DISEASE, UNSPECIFIED COPD TYPE (HCC): ICD-10-CM

## 2024-01-08 RX ORDER — UMECLIDINIUM BROMIDE AND VILANTEROL TRIFENATATE 62.5; 25 UG/1; UG/1
1 POWDER RESPIRATORY (INHALATION) DAILY
Qty: 60 EACH | Refills: 2 | Status: SHIPPED | OUTPATIENT
Start: 2024-01-08

## 2024-01-08 NOTE — TELEPHONE ENCOUNTER
Rx requested:  Requested Prescriptions     Pending Prescriptions Disp Refills    umeclidinium-vilanterol (ANORO ELLIPTA) 62.5-25 MCG/ACT inhaler 60 each 2     Sig: Inhale 1 puff into the lungs daily       Last Office Visit:   11/13/2023      Next Visit Date:  Future Appointments   Date Time Provider Department Center   3/11/2024  9:00 AM Luke Malik MD [facility]   4/30/2024  7:20 AM [facility] ROOM 1 [facility]   5/1/2024  8:15 AM Arlene Caceres MD [facility]

## 2024-02-07 NOTE — PROGRESS NOTES
PATIENT:    Anders Barcaly     DATE:      8/27/2021     TIME: 8:12 AM     Subjective:     Anders Barclay presents for follow-up of breast cancer. She is well and feels side effects from her medications. She will follow up with Dr. Jeremy Caruso       The breast cancer is Cancer Staging  Malignant neoplasm of upper-inner quadrant of left breast in female, estrogen receptor negative ([facility]  Staging form: Breast, AJCC 8th Edition  - Clinical stage from 1/26/2021: Stage IB (cT1, cN0, cM0, G3, ER-, OK-, HER2-) - Signed by Evelyn Cohn MD on 2/8/2021     She complains of a skin lesion on left breast. She recently had cellulitis of right leg  No fever or chills    Patient's medications, allergies, past medical, surgical, social and family histories were reviewed and updated as appropriate. Current Outpatient Medications on File Prior to Visit   Medication Sig Dispense Refill    spironolactone (ALDACTONE) 25 MG tablet Take 25 mg by mouth Twice a Week TAKE HALF A TABLET ON TUESDAY & THURSDAY ONLY      umeclidinium-vilanterol (ANORO ELLIPTA) 62.5-25 MCG/INH AEPB inhaler Inhale 1 puff into the lungs daily 60 puff 2    amiodarone (CORDARONE) 200 MG tablet TAKE 1 TABLET BY MOUTH TWICE WEEKLY 180 tablet 3    dilTIAZem (CARDIZEM CD) 240 MG extended release capsule TAKE 1 CAPSULE BY MOUTH EVERY DAY 90 capsule 3    rivaroxaban (XARELTO) 20 MG TABS tablet TAKE 1 TABLET BY MOUTH EVERY DAY WITH BREAKFAST 90 tablet 3    pravastatin (PRAVACHOL) 20 MG tablet Take 20 mg by mouth daily      potassium chloride (KLOR-CON M) 20 MEQ TBCR extended release tablet TAKE 1 TABLET BY MOUTH EVERY DAY 90 tablet 2    furosemide (LASIX) 40 MG tablet TAKE 1 TABLET BY MOUTH EVERY DAY 90 tablet 3    Handicap Placard MISC by Does not apply route Good for 5 years.  Dispense 1 1 each 0    Calcium Carb-Cholecalciferol (CALCIUM + D3 PO) Take by mouth      b complex vitamins tablet Take 1 tablet by mouth daily      magnesium oxide (MAG-OX) 400 MG tablet Take 400 mg by mouth daily       Vitamin D (CHOLECALCIFEROL) 1000 UNITS CAPS capsule Take 1,000 Units by mouth daily. No current facility-administered medications on file prior to visit. Any over the counter meds / herbal supplements / vitamins: yes        Objective: There were no vitals filed for this visit. Physical Exam  Vitals reviewed. Constitutional:       Appearance: Normal appearance. She is well-developed. HENT:      Head: Normocephalic and atraumatic. Nose: Nose normal.   Eyes:      Conjunctiva/sclera: Conjunctivae normal.      Right eye: No hemorrhage. Left eye: No hemorrhage. Cardiovascular:      Rate and Rhythm: Normal rate. Pulmonary:      Effort: Pulmonary effort is normal. No respiratory distress. Chest:      Breasts: Breasts are asymmetrical (slight due to left lumpectomy). Right: No inverted nipple, mass, nipple discharge, skin change or tenderness. Left: No inverted nipple, mass, nipple discharge, skin change (due to radiation) or tenderness. Comments: Small skin mole, similar to others in her body. She sees a dermatologist yearly. Musculoskeletal:         General: Normal range of motion. Cervical back: Normal range of motion and neck supple. Comments: Normal Range of motion in upper and lower extremities. Lymphadenopathy:      Cervical: No cervical adenopathy. Right cervical: No superficial, deep or posterior cervical adenopathy. Left cervical: No superficial, deep or posterior cervical adenopathy. Upper Body:      Right upper body: No supraclavicular, axillary or pectoral adenopathy. Left upper body: No supraclavicular, axillary or pectoral adenopathy. Skin:     General: Skin is warm and dry. Findings: No abrasion, bruising, erythema or lesion. Neurological:      Mental Status: She is alert and oriented to person, place, and time. She is not disoriented.    Psychiatric:         Speech: Speech normal.         Behavior: Behavior normal. Behavior is cooperative. Thought Content: Thought content normal.         Judgment: Judgment normal.             IMAGING:          SARA NAVARRO DIGITAL DIAGNOSTIC UNILATERAL LEFT    Result Date: 8/19/2021  EXAMINATION: SARA DIGITAL DIAGNOSTIC W OR WO CAD LEFT CLINICAL HISTORY:N64.9 Breast disorder ICD10 COMPARISON: January 11, 2021 and January 6, 2021. FINDINGS:3-D tomosynthesis imaging of the left breast was performed. Interval posttreatment changes in the central left breast. Otherwise no suspicious masses or microcalcifications. CAD analysis was performed and used in the interpretation. BI-RADS 3: PROBABLY BENIGN--SHORT INTERVAL FOLLOW-UP SUGGESTED. Board Certified Radiologists. Accredited by the ACR and FDA. MAMMOGRAPHY IS VERY IMPORTANT TO YOUR HEALTH. THE AMERICAN CANCER SOCIETY GUIDELINES RECOMMEND THAT WOMEN 36YEARS OF AGE AND OLDER SHOULD HAVE A MAMMOGRAM EVERY YEAR. A REMINDER LETTER WILL BE SENT AT THE APPROPRIATE TIME.  THIS FACILITY UTILIZES A REMINDER SYSTEM TO ENSURE ALL PATIENTS RECEIVE REMINDER NOTIFICATIONS AT THE APPROPRIATE TIME BASED ON THE RECOMMENDATIONS OF THIS EXAM. THIS INCLUDES REMINDERS FOR ROUTINE  SCREENING MAMMOGRAMS, DIAGNOSTIC MAMMOGRAMS IN WHICH THE PATIENT IS ASKED TO RETURN FOR ADDITIONAL VIEWS, OR OTHER BREAST IMAGING INTERVENTIONS WHEN APPROPRIATE. THE PATIENT WILL BE PLACED IN THE APPROPRIATE REMINDER SYSTEM INCLUDING A REMINDER AT THE APPROPRIATE TIME FOR ANY PENDING ADDITIONAL VIEWS. Assessment:       ICD-10-CM    1. Malignant neoplasm of upper-inner quadrant of left breast in female, estrogen receptor negative ([facility]  C50.212     Z17.1    2. Breast disorder  N64.9 SARA DIGITAL DIAGNOSTIC W OR WO CAD BILATERAL   3. Overweight (BMI 25.0-29. 9)  E66.3          Plan:     Patient is doing well.   Recommend clinical breast exams in the breast surgical suite in 6 month(s)  Mammography in 6 month(s)  Recommend an active lifestyle, healthy diet, limited alcohol intake, achieve and maintain a healthy BMI to optimize breast cancer outcomes / decrease risk of breast cancer. Survivorship Document Reviewed  Gave patient the survivorship booklet  Referral to or Follow up with Medical Oncology  Referral to or Follow up with Radiation Oncology  Follow up with PCP        Total face to face time was 30 minutes with greater than 50% spent on counseling the patient or coordinating her care. Dictated by Nadine Hagan MD 8/27/2021 8:12 AM       This note was partially generated using Dragon voice recognition system, and there may be some incorrect words, spellings, punctuation that were not noticed in checking the note before saving. Unknown, Doctor  [facility] PreAdmits  Scheduled Appointment: 02/10/2024    [facility] Physician [facility]  CATSCAN SI [address]  Scheduled Appointment: 02/10/2024    Chavez Leiva  [facility] Physician [facility]  VASCULAR [address]  Scheduled Appointment: 04/09/2024

## 2024-03-11 ENCOUNTER — OFFICE VISIT (OUTPATIENT)
Dept: PULMONOLOGY | Age: 74
End: 2024-03-11

## 2024-03-11 VITALS
DIASTOLIC BLOOD PRESSURE: 74 MMHG | BODY MASS INDEX: 26.52 KG/M2 | OXYGEN SATURATION: 94 % | SYSTOLIC BLOOD PRESSURE: 128 MMHG | HEART RATE: 62 BPM | WEIGHT: 145 LBS

## 2024-03-11 DIAGNOSIS — J44.9 CHRONIC OBSTRUCTIVE PULMONARY DISEASE, UNSPECIFIED COPD TYPE (HCC): Primary | ICD-10-CM

## 2024-03-11 DIAGNOSIS — R91.8 LUNG NODULES: ICD-10-CM

## 2024-03-11 DIAGNOSIS — Z87.891 PERSONAL HISTORY OF TOBACCO USE: ICD-10-CM

## 2024-03-11 DIAGNOSIS — I48.91 ATRIAL FIBRILLATION, UNSPECIFIED TYPE (HCC): ICD-10-CM

## 2024-03-11 ASSESSMENT — ENCOUNTER SYMPTOMS
ABDOMINAL PAIN: 0
COUGH: 0
EYE DISCHARGE: 0
VOMITING: 0
WHEEZING: 0
SHORTNESS OF BREATH: 1
SINUS PRESSURE: 0
RHINORRHEA: 0
SORE THROAT: 0
NAUSEA: 0
DIARRHEA: 0
VOICE CHANGE: 0
TROUBLE SWALLOWING: 0
EYE ITCHING: 0
CHEST TIGHTNESS: 0

## 2024-03-11 NOTE — PROGRESS NOTES
right lower lobe.    No pneumothorax.    Upper abdomen is unremarkable. There is depression of the superior endplate of T12, unchanged. There is volume loss in this vertebra, unchanged.    Impression  RADIATION PNEUMONIA WITHIN THE LEFT UPPER LOBE IS DECREASING IN SIZE WITH SOME RESIDUAL INFILTRATE. THERE IS MINIMAL ADJACENT PLEURAL THICKENING.    SMALL INFILTRATE/ATELECTASIS POSTERIOR BASE RIGHT LOWER LOBE. OTHER DETAILS ABOVE.    NO ADENOPATHY.    SMALL BILATERAL PLEURAL EFFUSIONS.    3 MM NODULE RIGHT LOWER LOBE.    T12 FRACTURE, UNCHANGED.      All CT scans at this facility use dose modulation, iterative reconstruction, and/or weight based dosing when appropriate to reduce radiation dose to as low as reasonably achievable.  ]  Results for orders placed during the hospital encounter of 08/23/16    CT Chest W Contrast    Narrative  CT CHEST    REASON FOR EXAM  HEMOPTYSIS    COMPARISONS  none    FINDINGS  Multiplanar images were obtained following the IV injection  of 75 cc Isovue-370. A focal area of consolidation involving the right  lower lobe posteriorly is seen. There is a suggestion of air  bronchogram present. Mild atelectasis at the left lung base is seen.  Remainder lungs are clear. No pleural effusions are seen. No definite  pulmonary nodules are identified. Emphysematous changes are seen. No  pneumothorax is noted. Heart appears normal in size. No pericardial  effusion is seen. No mediastinal, hilar or axillary adenopathy is  noted. Bone windows demonstrate no gross osseous abnormalities.  Surrounding soft tissues are unremarkable. Limited images of the upper  abdomen demonstrate no contributory findings.    IMPRESSION  FOCAL AREA OF CONSOLIDATION INVOLVING THE RIGHT LOWER LOBE  IS SEEN. SHORT-TERM INTERVAL FOLLOWUP CT WITHOUT CONTRAST IN 3-4 MONTHS  IS SUGGESTED TO NOTE RESOLUTION. MILD ATELECTATIC CHANGES AT THE LEFT  LUNG BASE. NO OTHER SIGNIFICANT ABNORMALITIES ARE SEEN.      All CT scans at this

## 2024-04-24 ENCOUNTER — TELEPHONE (OUTPATIENT)
Dept: SURGERY | Age: 74
End: 2024-04-24

## 2024-04-24 NOTE — TELEPHONE ENCOUNTER
DEVANG for patient to contact our office. We must r/s her appointment for 5/1/2024. Dr. Caceres will be in surgery that day.  Patient can see Diane Hyatt for this follow up and likely [facility].

## 2024-04-30 ENCOUNTER — HOSPITAL ENCOUNTER (OUTPATIENT)
Dept: WOMENS IMAGING | Age: 74
Discharge: HOME | End: 2024-05-02
Payer: MEDICARE

## 2024-04-30 VITALS — HEIGHT: 62 IN | BODY MASS INDEX: 26.68 KG/M2

## 2024-04-30 DIAGNOSIS — Z12.31 ENCOUNTER FOR SCREENING MAMMOGRAM FOR BREAST CANCER: ICD-10-CM

## 2024-04-30 PROCEDURE — 77063 BREAST TOMOSYNTHESIS BI: CPT

## 2024-05-01 ENCOUNTER — OFFICE VISIT (OUTPATIENT)
Dept: RADIATION ONCOLOGY | Age: 74
End: 2024-05-01

## 2024-05-01 VITALS
HEIGHT: 62 IN | SYSTOLIC BLOOD PRESSURE: 109 MMHG | DIASTOLIC BLOOD PRESSURE: 60 MMHG | TEMPERATURE: 97.7 F | RESPIRATION RATE: 16 BRPM | WEIGHT: 144 LBS | OXYGEN SATURATION: 97 % | BODY MASS INDEX: 26.5 KG/M2 | HEART RATE: 114 BPM

## 2024-05-01 DIAGNOSIS — Z85.3 ENCOUNTER FOR FOLLOW-UP SURVEILLANCE OF BREAST CANCER: Primary | ICD-10-CM

## 2024-05-01 DIAGNOSIS — Z08 ENCOUNTER FOR FOLLOW-UP SURVEILLANCE OF BREAST CANCER: Primary | ICD-10-CM

## 2024-05-01 ASSESSMENT — ENCOUNTER SYMPTOMS
NAUSEA: 0
ABDOMINAL PAIN: 0
CHEST TIGHTNESS: 0
EYE DISCHARGE: 0
COUGH: 1
TROUBLE SWALLOWING: 0
VOMITING: 0
BACK PAIN: 1
EYE PAIN: 0
BLOOD IN STOOL: 0
SHORTNESS OF BREATH: 0
FACIAL SWELLING: 0

## 2024-05-01 NOTE — PROGRESS NOTES
FOLLOW UP VISIT  Chief Complaint   Patient presents with    Follow-up     Routine follow up      DIAGNOSIS: Left IDC, Grade 3, TNBC, s/p lumpectomy, SLNB 2/2021, s/p XRT 4/2021, s/p adjuvant single agent Taxol x 12 4/2021-7/2021, s/p XRT   STAGE:  Cancer Staging   Malignant neoplasm of upper-inner quadrant of left breast in female, estrogen receptor negative (HCC)  Staging form: Breast, AJCC 8th Edition  - Clinical stage from 1/26/2021: Stage IB (cT1, cN0, cM0, G3, ER-, KS-, HER2-) - Signed by Arlene Caceres MD on 2/8/2021  pT1bN0  DATE OF DIAGNOSIS:1/19/2021    DISEASE STATUS: JOE    GENETICS: [employer] panel negative; TNNI3 c.407G>A (cardiac/risk for hypertrophic cardiomyopathy)    MED ONC: Dr. Rojas  RAD ONC: Dr. Montalvo    HISTORY:  Michelle Fuentes is a 73 y.o.  female who presents today with her spouse for routine follow up. Today she reports recent onset bilateral wrist swelling and tenderness in last week, difficulty getting up from sitting position. Denies any injury/trauma, denies any new or repetitive activities. Denies new/worsening cough, CP, SOB, leg pain/swelling, or weight change. History of pina. Liliane and FATOUMATA, scheduled for follow up with cardiology at [facility] next week.     She denies any new breast complaints. She denies new breast masses, nipple discharge, new skin changes, axillary masses and breast pain. She denies new headaches, visual changes, new or unusual fatigue or new bone pain. History of skin cancer, saw dermatology yesterday for procedure on nose. She otherwise denies any new medical problems or changes in family history. Continues follow up with Dr. Rojas, saw  4/1/24. Walking 1.5 mile/day at gym.     Last imaging bilateral mammogram 4/28/23, BI-RADS 2.     PAST MEDICAL HISTORY  Past Medical History:   Diagnosis Date    Abnormal echocardiogram 2020    possible PFO? further eval suggested    Atrial fibrillation (HCC) 01/05/2016    Breast cancer (HCC) 02/04/2021    left

## 2024-05-13 DIAGNOSIS — J44.9 CHRONIC OBSTRUCTIVE PULMONARY DISEASE, UNSPECIFIED COPD TYPE (HCC): ICD-10-CM

## 2024-05-13 RX ORDER — UMECLIDINIUM BROMIDE AND VILANTEROL TRIFENATATE 62.5; 25 UG/1; UG/1
1 POWDER RESPIRATORY (INHALATION) DAILY
Qty: 60 EACH | Refills: 2 | Status: SHIPPED | OUTPATIENT
Start: 2024-05-13

## 2024-05-13 NOTE — TELEPHONE ENCOUNTER
Rx requested:  Requested Prescriptions     Pending Prescriptions Disp Refills    umeclidinium-vilanterol (ANORO ELLIPTA) 62.5-25 MCG/ACT inhaler 60 each 2     Sig: Inhale 1 puff into the lungs daily       Last Office Visit:   3/11/2024      Next Visit Date:  Future Appointments   Date Time Provider Department Center   5/30/2024  8:00 AM [facility] ROOM 1 [facility]   6/11/2024  9:00 AM Luke Malik MD [place] [facility]   11/4/2024  8:00 AM Diane Hyatt APRN - CNP [facility]

## 2024-06-11 ENCOUNTER — OFFICE VISIT (OUTPATIENT)
Dept: PULMONOLOGY | Age: 74
End: 2024-06-11
Payer: MEDICARE

## 2024-06-11 VITALS
OXYGEN SATURATION: 97 % | HEART RATE: 103 BPM | WEIGHT: 141 LBS | BODY MASS INDEX: 25.95 KG/M2 | DIASTOLIC BLOOD PRESSURE: 84 MMHG | SYSTOLIC BLOOD PRESSURE: 128 MMHG

## 2024-06-11 DIAGNOSIS — I48.91 ATRIAL FIBRILLATION, UNSPECIFIED TYPE (HCC): ICD-10-CM

## 2024-06-11 DIAGNOSIS — J44.9 CHRONIC OBSTRUCTIVE PULMONARY DISEASE, UNSPECIFIED COPD TYPE (HCC): Primary | ICD-10-CM

## 2024-06-11 DIAGNOSIS — Z87.891 PERSONAL HISTORY OF TOBACCO USE: ICD-10-CM

## 2024-06-11 DIAGNOSIS — R91.8 LUNG NODULES: ICD-10-CM

## 2024-06-11 PROCEDURE — 3017F COLORECTAL CA SCREEN DOC REV: CPT | Performed by: INTERNAL MEDICINE

## 2024-06-11 PROCEDURE — 3023F SPIROM DOC REV: CPT | Performed by: INTERNAL MEDICINE

## 2024-06-11 PROCEDURE — 99214 OFFICE O/P EST MOD 30 MIN: CPT | Performed by: INTERNAL MEDICINE

## 2024-06-11 PROCEDURE — 1090F PRES/ABSN URINE INCON ASSESS: CPT | Performed by: INTERNAL MEDICINE

## 2024-06-11 PROCEDURE — 1123F ACP DISCUSS/DSCN MKR DOCD: CPT | Performed by: INTERNAL MEDICINE

## 2024-06-11 PROCEDURE — G8417 CALC BMI ABV UP PARAM F/U: HCPCS | Performed by: INTERNAL MEDICINE

## 2024-06-11 PROCEDURE — G8399 PT W/DXA RESULTS DOCUMENT: HCPCS | Performed by: INTERNAL MEDICINE

## 2024-06-11 PROCEDURE — G8427 DOCREV CUR MEDS BY ELIG CLIN: HCPCS | Performed by: INTERNAL MEDICINE

## 2024-06-11 PROCEDURE — 1036F TOBACCO NON-USER: CPT | Performed by: INTERNAL MEDICINE

## 2024-06-11 RX ORDER — PREDNISONE 5 MG/1
TABLET ORAL
COMMUNITY
Start: 2024-06-09

## 2024-06-11 ASSESSMENT — ENCOUNTER SYMPTOMS
CHEST TIGHTNESS: 0
VOMITING: 0
SINUS PRESSURE: 0
RHINORRHEA: 0
NAUSEA: 0
DIARRHEA: 0
WHEEZING: 0
SORE THROAT: 0
EYE ITCHING: 0
TROUBLE SWALLOWING: 0
VOICE CHANGE: 0
COUGH: 0
SHORTNESS OF BREATH: 1
EYE DISCHARGE: 0
ABDOMINAL PAIN: 0

## 2024-06-11 NOTE — PROGRESS NOTES
left major fissure.    There are centrilobular emphysematous changes in the lungs. There is a new 3 mm nodule in the right lower lobe base, series 3 image 40. There is minimal infiltrate anteriorly in the right middle lobe, series 3 image 37. There is infiltrate/atelectasis  posteriorly in the base of the right lower lobe.    No pneumothorax.    Upper abdomen is unremarkable. There is depression of the superior endplate of T12, unchanged. There is volume loss in this vertebra, unchanged.    Impression  RADIATION PNEUMONIA WITHIN THE LEFT UPPER LOBE IS DECREASING IN SIZE WITH SOME RESIDUAL INFILTRATE. THERE IS MINIMAL ADJACENT PLEURAL THICKENING.    SMALL INFILTRATE/ATELECTASIS POSTERIOR BASE RIGHT LOWER LOBE. OTHER DETAILS ABOVE.    NO ADENOPATHY.    SMALL BILATERAL PLEURAL EFFUSIONS.    3 MM NODULE RIGHT LOWER LOBE.    T12 FRACTURE, UNCHANGED.      All CT scans at this facility use dose modulation, iterative reconstruction, and/or weight based dosing when appropriate to reduce radiation dose to as low as reasonably achievable.  ]  Results for orders placed during the hospital encounter of 08/23/16    CT Chest W Contrast    Narrative  CT CHEST    REASON FOR EXAM  HEMOPTYSIS    COMPARISONS  none    FINDINGS  Multiplanar images were obtained following the IV injection  of 75 cc Isovue-370. A focal area of consolidation involving the right  lower lobe posteriorly is seen. There is a suggestion of air  bronchogram present. Mild atelectasis at the left lung base is seen.  Remainder lungs are clear. No pleural effusions are seen. No definite  pulmonary nodules are identified. Emphysematous changes are seen. No  pneumothorax is noted. Heart appears normal in size. No pericardial  effusion is seen. No mediastinal, hilar or axillary adenopathy is  noted. Bone windows demonstrate no gross osseous abnormalities.  Surrounding soft tissues are unremarkable. Limited images of the upper  abdomen demonstrate no contributory

## 2024-10-11 ENCOUNTER — OFFICE VISIT (OUTPATIENT)
Dept: PULMONOLOGY | Age: 74
End: 2024-10-11
Payer: MEDICARE

## 2024-10-11 VITALS
OXYGEN SATURATION: 98 % | SYSTOLIC BLOOD PRESSURE: 126 MMHG | BODY MASS INDEX: 25.21 KG/M2 | DIASTOLIC BLOOD PRESSURE: 70 MMHG | WEIGHT: 137 LBS | HEART RATE: 82 BPM

## 2024-10-11 DIAGNOSIS — Z87.891 PERSONAL HISTORY OF TOBACCO USE: ICD-10-CM

## 2024-10-11 DIAGNOSIS — I48.91 ATRIAL FIBRILLATION, UNSPECIFIED TYPE (HCC): ICD-10-CM

## 2024-10-11 DIAGNOSIS — J44.9 CHRONIC OBSTRUCTIVE PULMONARY DISEASE, UNSPECIFIED COPD TYPE (HCC): Primary | ICD-10-CM

## 2024-10-11 DIAGNOSIS — R91.8 LUNG NODULES: ICD-10-CM

## 2024-10-11 DIAGNOSIS — J70.0 RADIATION PNEUMONITIS (HCC): ICD-10-CM

## 2024-10-11 PROCEDURE — G8399 PT W/DXA RESULTS DOCUMENT: HCPCS | Performed by: INTERNAL MEDICINE

## 2024-10-11 PROCEDURE — 1036F TOBACCO NON-USER: CPT | Performed by: INTERNAL MEDICINE

## 2024-10-11 PROCEDURE — 3023F SPIROM DOC REV: CPT | Performed by: INTERNAL MEDICINE

## 2024-10-11 PROCEDURE — G8417 CALC BMI ABV UP PARAM F/U: HCPCS | Performed by: INTERNAL MEDICINE

## 2024-10-11 PROCEDURE — 99214 OFFICE O/P EST MOD 30 MIN: CPT | Performed by: INTERNAL MEDICINE

## 2024-10-11 PROCEDURE — 1090F PRES/ABSN URINE INCON ASSESS: CPT | Performed by: INTERNAL MEDICINE

## 2024-10-11 PROCEDURE — 1123F ACP DISCUSS/DSCN MKR DOCD: CPT | Performed by: INTERNAL MEDICINE

## 2024-10-11 PROCEDURE — G8427 DOCREV CUR MEDS BY ELIG CLIN: HCPCS | Performed by: INTERNAL MEDICINE

## 2024-10-11 PROCEDURE — 3017F COLORECTAL CA SCREEN DOC REV: CPT | Performed by: INTERNAL MEDICINE

## 2024-10-11 PROCEDURE — G8484 FLU IMMUNIZE NO ADMIN: HCPCS | Performed by: INTERNAL MEDICINE

## 2024-10-11 RX ORDER — UMECLIDINIUM BROMIDE AND VILANTEROL TRIFENATATE 62.5; 25 UG/1; UG/1
1 POWDER RESPIRATORY (INHALATION) DAILY
Qty: 90 EACH | Refills: 1 | Status: SHIPPED | OUTPATIENT
Start: 2024-10-11

## 2024-10-11 ASSESSMENT — ENCOUNTER SYMPTOMS
SHORTNESS OF BREATH: 1
WHEEZING: 0
ABDOMINAL PAIN: 0
COUGH: 1
CHEST TIGHTNESS: 0
DIARRHEA: 0
SORE THROAT: 0
EYE DISCHARGE: 0
VOICE CHANGE: 0
TROUBLE SWALLOWING: 0
NAUSEA: 0
SINUS PRESSURE: 0
EYE ITCHING: 0
RHINORRHEA: 0
VOMITING: 0

## 2024-10-11 NOTE — PROGRESS NOTES
Moderate cardiomegaly with moderate-sized pericardial effusion.  Small hiatal hernia.  Mildly prominent subcarinal lymph nodes with more  subcentimeter pretracheal lymph nodes.  Scattered coronary calcifications.  Scattered aortic calcifications.    Lungs/pleura: Anterior left lung subpleural reticulation, likely radiation  related.  Moderate generalized bronchial wall thickening.  Curvilinear  atelectasis seen in the dependent portions of the bilateral lungs.  Stable 9  mm calcification left costophrenic sulcus (3, 104).  Round 4 mm nodule right  lower lobe (3, 83).    Upper Abdomen:  Limited images of the upper abdomen demonstrate no acute  abnormality.    Soft Tissues/Bones: Likely chronic compression deformities in the upper  lumbar spine.    Impression  Stable 9 mm left lower lobe and 4 mm right lower lobe nodules when compared  to May 2023. the patient may return to yearly low-dose screening evaluation,  12 months from this date    RECOMMENDATIONS:  Lung-RADS 2 - Benign (v2022)    Management:  12 month screening LDCT      Results for orders placed during the hospital encounter of 08/22/22    CT CHEST WO CONTRAST    Narrative  HISTORY: DA ARELLANO is a Female of 71 years age.  DIAGNOSIS: R09.89 Chest crackles ICD10      COMPARISON: April 28, 2022.; April 26, 2021    TECHNIQUE:  Thin spiral axial CT was performed from the thoracic inlet to the lung bases, without intravenous contrast administration. 2-D reformatted axial, sagittal and coronal images were obtained. 3-D MIPS images were also performed.    FINDINGS:      A small pleural effusion is seen on the right and areas of opacity are visualized. Trace pleural effusion is seen on the left and there is mild opacity. In the cardiophrenic angles, mild atelectasis or scarring is seen. Tree-in-bud opacities are also  seen in the bases. Mild centrilobular emphysematous changes are seen in the upper lung fields. Paraseptal emphysematous changes are seen in the

## 2024-11-06 ENCOUNTER — OFFICE VISIT (OUTPATIENT)
Dept: SURGERY | Age: 74
End: 2024-11-06
Payer: MEDICARE

## 2024-11-06 VITALS
RESPIRATION RATE: 14 BRPM | BODY MASS INDEX: 25.25 KG/M2 | SYSTOLIC BLOOD PRESSURE: 132 MMHG | TEMPERATURE: 97.8 F | WEIGHT: 137.2 LBS | HEART RATE: 76 BPM | HEIGHT: 62 IN | OXYGEN SATURATION: 96 % | DIASTOLIC BLOOD PRESSURE: 64 MMHG

## 2024-11-06 DIAGNOSIS — C50.212 MALIGNANT NEOPLASM OF UPPER-INNER QUADRANT OF LEFT BREAST IN FEMALE, ESTROGEN RECEPTOR NEGATIVE (HCC): Primary | ICD-10-CM

## 2024-11-06 DIAGNOSIS — E66.3 OVERWEIGHT (BMI 25.0-29.9): ICD-10-CM

## 2024-11-06 DIAGNOSIS — Z17.1 MALIGNANT NEOPLASM OF UPPER-INNER QUADRANT OF LEFT BREAST IN FEMALE, ESTROGEN RECEPTOR NEGATIVE (HCC): Primary | ICD-10-CM

## 2024-11-06 DIAGNOSIS — Z12.31 ENCOUNTER FOR SCREENING MAMMOGRAM FOR BREAST CANCER: ICD-10-CM

## 2024-11-06 PROBLEM — I48.11 LONGSTANDING PERSISTENT ATRIAL FIBRILLATION (HCC): Status: ACTIVE | Noted: 2023-01-04

## 2024-11-06 PROBLEM — E04.1 THYROID NODULE: Status: ACTIVE | Noted: 2024-11-06

## 2024-11-06 PROBLEM — I50.32 CHRONIC DIASTOLIC HEART FAILURE (HCC): Status: ACTIVE | Noted: 2024-05-07

## 2024-11-06 PROBLEM — B02.9 SHINGLES OUTBREAK: Status: ACTIVE | Noted: 2023-08-13

## 2024-11-06 PROCEDURE — G8484 FLU IMMUNIZE NO ADMIN: HCPCS | Performed by: SURGERY

## 2024-11-06 PROCEDURE — 99214 OFFICE O/P EST MOD 30 MIN: CPT | Performed by: SURGERY

## 2024-11-06 PROCEDURE — 1159F MED LIST DOCD IN RCRD: CPT | Performed by: SURGERY

## 2024-11-06 PROCEDURE — 1123F ACP DISCUSS/DSCN MKR DOCD: CPT | Performed by: SURGERY

## 2024-11-06 PROCEDURE — 1036F TOBACCO NON-USER: CPT | Performed by: SURGERY

## 2024-11-06 PROCEDURE — G8427 DOCREV CUR MEDS BY ELIG CLIN: HCPCS | Performed by: SURGERY

## 2024-11-06 PROCEDURE — 1126F AMNT PAIN NOTED NONE PRSNT: CPT | Performed by: SURGERY

## 2024-11-06 PROCEDURE — G8417 CALC BMI ABV UP PARAM F/U: HCPCS | Performed by: SURGERY

## 2024-11-06 PROCEDURE — 3017F COLORECTAL CA SCREEN DOC REV: CPT | Performed by: SURGERY

## 2024-11-06 PROCEDURE — 1090F PRES/ABSN URINE INCON ASSESS: CPT | Performed by: SURGERY

## 2024-11-06 PROCEDURE — G8399 PT W/DXA RESULTS DOCUMENT: HCPCS | Performed by: SURGERY

## 2024-11-06 RX ORDER — SENNOSIDES 8.6 MG
650 CAPSULE ORAL EVERY 8 HOURS PRN
COMMUNITY

## 2024-11-06 NOTE — PROGRESS NOTES
Reason for today's visit:  6 month follow up left breast cancer, cardioversion and ablation 6/24/2024 at [facility]    Palpates a breast change? no  Nipple discharge: no, left nipple sunken since surgery  Breast Pain: yes - pressure left breast  Breast Mass: no  Swelling in either upper extremity? no    Wellness:    Self breast self exams: yes   Regular exercise routine: walks  Following Lymphedema awareness/precautions: yes   Managing stress:yes  Stress level on a scale of 1 - 10: 1    Instruction:    Follow up per provider  Imaging per provider  Monthly self breast exams  Healthy diet  Exercise program  Lymphedema precautions/awareness    Other:    Requisitions needed:no  Bras/prosthesis: no  Compression Sleeve/glove: no  Lymphedema Measurements: see flow sheet  Therapy: no  PT/OT/Lymphedema: no  Follow up as advised per Dr Caceres        5/1/2024     9:07 AM 5/1/2023     9:48 AM 10/31/2022     8:29 AM   Lymphedema Screening   LEFT 10 cm (Hand) 19 18.5 19   LEFT 20 cm (Wrist) 16 14 14.5   LEFT 30 cm (Forearm) 19 18.5 18.5   LEFT 60 cm (Upper Arm) 25 24.5 24.5        
note.     This document is generated, in part, by voice recognition software and thus syntax and grammatical errors are possible.     Dictated by NUHA TAI MD 11/6/2024 8:30 AM       This note was partially generated using Dragon voice recognition system, and there may be some incorrect words, spellings, punctuation that were not noticed in checking the note before saving.

## 2024-11-08 ENCOUNTER — TELEPHONE (OUTPATIENT)
Age: 74
End: 2024-11-08

## 2024-11-08 NOTE — TELEPHONE ENCOUNTER
I informed the patient that I changed the appointment she had scheduled with Diane Hyatt NP on 11/4/2024 to a cancellation.The provider is no longer with [facility]. The patient has no further questions at this time. The patient is aware to call the office with any questions or concerns.

## 2024-11-08 NOTE — TELEPHONE ENCOUNTER
Patient calling due to appointment on 11/4 is marked as a no show.    Patient stated she was called to r/s appt due to staffing issues.  Patient stated she is concerned that her insurance will not pay for this and that she will be billed.  It does look like the system marked her a \"no show\"   Please advise

## 2024-12-06 PROBLEM — Z12.31 ENCOUNTER FOR SCREENING MAMMOGRAM FOR BREAST CANCER: Status: RESOLVED | Noted: 2022-10-31 | Resolved: 2024-12-06

## 2025-01-20 DIAGNOSIS — J44.9 CHRONIC OBSTRUCTIVE PULMONARY DISEASE, UNSPECIFIED COPD TYPE (HCC): ICD-10-CM

## 2025-01-20 RX ORDER — UMECLIDINIUM BROMIDE AND VILANTEROL TRIFENATATE 62.5; 25 UG/1; UG/1
1 POWDER RESPIRATORY (INHALATION) DAILY
Qty: 3 EACH | Refills: 1 | Status: SHIPPED | OUTPATIENT
Start: 2025-01-20

## 2025-01-20 RX ORDER — UMECLIDINIUM BROMIDE AND VILANTEROL TRIFENATATE 62.5; 25 UG/1; UG/1
1 POWDER RESPIRATORY (INHALATION) DAILY
Qty: 90 EACH | Refills: 1 | Status: SHIPPED | OUTPATIENT
Start: 2025-01-20

## 2025-01-20 NOTE — TELEPHONE ENCOUNTER
Rx requested:  Requested Prescriptions     Pending Prescriptions Disp Refills    umeclidinium-vilanterol (ANORO ELLIPTA) 62.5-25 MCG/ACT inhaler 90 each 1     Sig: Inhale 1 puff into the lungs daily    umeclidinium-vilanterol (ANORO ELLIPTA) 62.5-25 MCG/ACT inhaler       Sig: Inhale 1 puff into the lungs daily       Last Office Visit:   10/11/2024      Next Visit Date:  Future Appointments   Date Time Provider Department Center   2/17/2025  9:15 AM Luke Malik MD [facility]   5/5/2025  8:00 AM [facility]   5/14/2025  9:30 AM Arlene Caceres MD [facility]  [facility]

## 2025-02-17 ENCOUNTER — OFFICE VISIT (OUTPATIENT)
Dept: PULMONOLOGY | Age: 75
End: 2025-02-17

## 2025-02-17 ENCOUNTER — HOSPITAL ENCOUNTER (OUTPATIENT)
Dept: GENERAL RADIOLOGY | Age: 75
Discharge: HOME | End: 2025-02-19
Attending: INTERNAL MEDICINE
Payer: MEDICARE

## 2025-02-17 VITALS
HEART RATE: 74 BPM | DIASTOLIC BLOOD PRESSURE: 68 MMHG | BODY MASS INDEX: 24.84 KG/M2 | SYSTOLIC BLOOD PRESSURE: 126 MMHG | OXYGEN SATURATION: 96 % | WEIGHT: 135 LBS

## 2025-02-17 DIAGNOSIS — J44.9 CHRONIC OBSTRUCTIVE PULMONARY DISEASE, UNSPECIFIED COPD TYPE (HCC): Primary | ICD-10-CM

## 2025-02-17 DIAGNOSIS — Z87.891 PERSONAL HISTORY OF TOBACCO USE: ICD-10-CM

## 2025-02-17 DIAGNOSIS — J44.9 CHRONIC OBSTRUCTIVE PULMONARY DISEASE, UNSPECIFIED COPD TYPE (HCC): ICD-10-CM

## 2025-02-17 DIAGNOSIS — R91.8 LUNG NODULES: ICD-10-CM

## 2025-02-17 DIAGNOSIS — J70.0 RADIATION PNEUMONITIS: ICD-10-CM

## 2025-02-17 DIAGNOSIS — I48.91 ATRIAL FIBRILLATION, UNSPECIFIED TYPE (HCC): ICD-10-CM

## 2025-02-17 PROCEDURE — 71046 X-RAY EXAM CHEST 2 VIEWS: CPT

## 2025-02-17 RX ORDER — METOPROLOL SUCCINATE 50 MG/1
50 TABLET, EXTENDED RELEASE ORAL DAILY
COMMUNITY
Start: 2025-01-28

## 2025-02-17 ASSESSMENT — ENCOUNTER SYMPTOMS
WHEEZING: 0
VOMITING: 0
CHEST TIGHTNESS: 0
EYE ITCHING: 0
TROUBLE SWALLOWING: 0
VOICE CHANGE: 0
NAUSEA: 0
SORE THROAT: 0
RHINORRHEA: 0
SHORTNESS OF BREATH: 1
COUGH: 0
EYE DISCHARGE: 0
ABDOMINAL PAIN: 0
SINUS PRESSURE: 0
DIARRHEA: 0

## 2025-02-17 NOTE — PROGRESS NOTES
Subjective:             Michelle Fuentes is a 74 y.o. female who complains today of:     Chief Complaint   Patient presents with    Follow-up     4m f/u on COPD       HPI  She is on bronchodilator with Anoro Ellipta 1 puff daily. she is on mucinex prn   C/o shortness of breath  with exertion.  No  Wheezing.  No C/o cough No Hemoptysis.No Chest tightness.  No Chest pain with radiation or pleuritic pain.  No leg swelling . No orthopnea.  No Fever or chills.No Rhinorrhea and postnasal drip.      She is following  for her h/o ca breast and going to see him on.    She said she has Afib and she had ablation 6/24/24 .   [facility]  She is on metoprolol. On Xarelto. She is following with cardiology for [facility], she has HOCM.      She said she develop arthritis and it helped by prednisone for  short period. She said she show rheumatologist.     She had CT chest done on: 6/14/24  CT PULMONARY VEIN W IVCON   limited CHEST: visualized CHEST: Chest wall anatomy: Postsurgical changes   left breast. visualized LUNGS: bibasilar atelectasis. visualized MEDIASTINUM: unremarkable.   PERICARDIUM: unremarkable CENTRAL PULMONARY ARTERY: normal dimensions. Assessment is limited due to limited contrast enhancement.       Allergies:  Fosamax [alendronate sodium] and Aldactone [spironolactone]  Past Medical History:   Diagnosis Date    Abnormal echocardiogram 2020    possible PFO? further eval suggested    Atrial fibrillation (HCC) 01/05/2016    Breast cancer (HCC) 02/04/2021    left    Cancer (HCC)     left breast    COPD (chronic obstructive pulmonary disease) ([facility]) 2015    Dr Malik    Current use of long term anticoagulation     Cyst of neck 2012    after plastic surgery    Emphysema of lung (HCC)     History of compression fracture of vertebral column 2020    T2, L1    History of humerus fracture     LEFT    History of left heart catheterization 03/2020    at [facility], normal LVEF with impaired relaxation, mild AV

## 2025-05-05 ENCOUNTER — HOSPITAL ENCOUNTER (OUTPATIENT)
Dept: WOMENS IMAGING | Age: 75
Discharge: HOME | End: 2025-05-07
Payer: MEDICARE

## 2025-05-05 VITALS — HEIGHT: 60 IN | BODY MASS INDEX: 26.12 KG/M2

## 2025-05-05 DIAGNOSIS — Z12.31 ENCOUNTER FOR SCREENING MAMMOGRAM FOR BREAST CANCER: ICD-10-CM

## 2025-05-05 PROCEDURE — 77063 BREAST TOMOSYNTHESIS BI: CPT

## 2025-05-12 ENCOUNTER — TELEPHONE (OUTPATIENT)
Age: 75
End: 2025-05-12

## 2025-05-12 NOTE — TELEPHONE ENCOUNTER
Patient returned my call. I explained that Dr. Caceres stated that she was indicated to f/u w/ the NP. The patient requested to keep her upcoming appt. W/ Dr. Caceres. I let her know that we would keep her appointment as scheduled and that she may be scheduled with the NP for follow up moving forward.

## 2025-05-12 NOTE — TELEPHONE ENCOUNTER
LM for patient to call the office regarding scheduled appointment on 5/14/2025. I was going to attempt to move the pt to the NP schedule.

## 2025-05-14 ENCOUNTER — OFFICE VISIT (OUTPATIENT)
Dept: SURGERY | Age: 75
End: 2025-05-14
Payer: MEDICARE

## 2025-05-14 VITALS
HEIGHT: 60 IN | SYSTOLIC BLOOD PRESSURE: 128 MMHG | DIASTOLIC BLOOD PRESSURE: 62 MMHG | WEIGHT: 133.6 LBS | BODY MASS INDEX: 26.23 KG/M2 | TEMPERATURE: 97.7 F | HEART RATE: 61 BPM | OXYGEN SATURATION: 93 % | RESPIRATION RATE: 14 BRPM

## 2025-05-14 DIAGNOSIS — E66.3 OVERWEIGHT (BMI 25.0-29.9): ICD-10-CM

## 2025-05-14 DIAGNOSIS — B36.9 FUNGAL DERMATITIS: ICD-10-CM

## 2025-05-14 DIAGNOSIS — Z17.1 MALIGNANT NEOPLASM OF UPPER-INNER QUADRANT OF LEFT BREAST IN FEMALE, ESTROGEN RECEPTOR NEGATIVE (HCC): Primary | ICD-10-CM

## 2025-05-14 DIAGNOSIS — C50.212 MALIGNANT NEOPLASM OF UPPER-INNER QUADRANT OF LEFT BREAST IN FEMALE, ESTROGEN RECEPTOR NEGATIVE (HCC): Primary | ICD-10-CM

## 2025-05-14 DIAGNOSIS — Z12.31 ENCOUNTER FOR SCREENING MAMMOGRAM FOR BREAST CANCER: ICD-10-CM

## 2025-05-14 PROCEDURE — 1159F MED LIST DOCD IN RCRD: CPT | Performed by: SURGERY

## 2025-05-14 PROCEDURE — 1126F AMNT PAIN NOTED NONE PRSNT: CPT | Performed by: SURGERY

## 2025-05-14 PROCEDURE — 99213 OFFICE O/P EST LOW 20 MIN: CPT | Performed by: SURGERY

## 2025-05-14 PROCEDURE — 1123F ACP DISCUSS/DSCN MKR DOCD: CPT | Performed by: SURGERY

## 2025-05-14 RX ORDER — CLOTRIMAZOLE 1 %
CREAM (GRAM) TOPICAL
Qty: 1 EACH | Refills: 1 | Status: SHIPPED | OUTPATIENT
Start: 2025-05-14 | End: 2025-05-21

## 2025-05-14 ASSESSMENT — ENCOUNTER SYMPTOMS
SORE THROAT: 0
VOMITING: 0
CHEST TIGHTNESS: 0
COUGH: 0
NAUSEA: 0
ABDOMINAL PAIN: 0
SHORTNESS OF BREATH: 0
COLOR CHANGE: 0

## 2025-05-14 NOTE — PROGRESS NOTES
Reason for today's visit:  6 month follow up triple negative left breast cancer    Palpates a breast change? no  Nipple discharge: yes - left white at times  Breast Pain: no  Breast Mass: no  Swelling in either upper extremity? no    Wellness:    Self breast self exams: yes   Regular exercise routine: yes -walks  Following Lymphedema awareness/precautions: yes  Managing stress:yes   Stress level on a scale of 1 - 10: 1    Instruction:    Follow up per provider  Imaging per provider  Monthly self breast exams  Healthy diet  Exercise program  Lymphedema precautions/awareness    Other:    Requisitions needed:no  Bras/prosthesis: no  Compression Sleeve/glove: no  Lymphedema Measurements: see flow sheet  Therapy: no  PT/OT/Lymphedema: no  Follow up as advised per Dr Caceres            11/6/2024     8:14 AM 5/1/2024     9:07 AM 5/1/2023     9:48 AM 10/31/2022     8:29 AM   Lymphedema Screening   LEFT 10 cm (Hand) 17.5 19 18.5 19   LEFT 20 cm (Wrist) 14.5 16 14 14.5   LEFT 30 cm (Forearm) 17 19 18.5 18.5   LEFT 60 cm (Upper Arm) 24 25 24.5 24.5

## 2025-05-14 NOTE — PROGRESS NOTES
PATIENT:    Michelle Fuentes     DATE:      5/14/2025     TIME: 10:12 AM     Subjective:     Michelle Fuentes presents for follow-up of breast cancer. She is doing well. No complaints. We are monitoring closely as this was an aggressive cancer.    The breast cancer is Cancer Staging   Malignant neoplasm of upper-inner quadrant of left breast in female, estrogen receptor negative (HCC)  Staging form: Breast, AJCC 8th Edition  - Clinical stage from 1/26/2021: Stage IB (cT1, cN0, cM0, G3, ER-, FL-, HER2-) - Signed by Arlene Caceres MD on 2/8/2021       She does perform self breast exams.  She denies breast pain, masses, skin changes, nipple discharge, nipple retraction, or recent breast trauma bilaterally.      Patient's medications, allergies, past medical, surgical, social and family histories were reviewed and updated as appropriate.    Current Outpatient Medications on File Prior to Visit   Medication Sig Dispense Refill    diclofenac sodium (VOLTAREN) 1 % GEL Apply topically 2 times daily as needed for Pain      metoprolol succinate (TOPROL XL) 50 MG extended release tablet Take 1 tablet by mouth daily      umeclidinium-vilanterol (ANORO ELLIPTA) 62.5-25 MCG/ACT inhaler Inhale 1 puff into the lungs daily 90 each 1    eplerenone (INSPRA) 50 MG tablet Take 1 tablet by mouth daily      JARDIANCE 10 MG tablet TAKE 1 TABLET DAILY WITH BREAKFAST      rivaroxaban (XARELTO) 20 MG TABS tablet TAKE 1 TABLET BY MOUTH EVERY DAY WITH BREAKFAST 90 tablet 3    furosemide (LASIX) 40 MG tablet Take 1 tablet by mouth daily as needed (for LE swelling) (Patient taking differently: Take 1 tablet by mouth daily 0.5 tablet daily) 90 tablet 3    Vitamin D (CHOLECALCIFEROL) 1000 UNITS CAPS capsule Take 4 capsules by mouth daily      acetaminophen (TYLENOL) 650 MG extended release tablet Take 1 tablet by mouth every 8 hours as needed       No current facility-administered medications on file prior to visit.        Any over the counter meds /

## 2025-06-17 ENCOUNTER — OFFICE VISIT (OUTPATIENT)
Age: 75
End: 2025-06-17

## 2025-06-17 VITALS
BODY MASS INDEX: 25.74 KG/M2 | OXYGEN SATURATION: 97 % | HEART RATE: 58 BPM | DIASTOLIC BLOOD PRESSURE: 68 MMHG | SYSTOLIC BLOOD PRESSURE: 116 MMHG | WEIGHT: 133 LBS

## 2025-06-17 DIAGNOSIS — R91.8 LUNG NODULES: ICD-10-CM

## 2025-06-17 DIAGNOSIS — Z87.891 PERSONAL HISTORY OF TOBACCO USE: Primary | ICD-10-CM

## 2025-06-17 DIAGNOSIS — I48.91 ATRIAL FIBRILLATION, UNSPECIFIED TYPE (HCC): ICD-10-CM

## 2025-06-17 DIAGNOSIS — J44.9 CHRONIC OBSTRUCTIVE PULMONARY DISEASE, UNSPECIFIED COPD TYPE (HCC): ICD-10-CM

## 2025-06-17 RX ORDER — UMECLIDINIUM BROMIDE AND VILANTEROL TRIFENATATE 62.5; 25 UG/1; UG/1
1 POWDER RESPIRATORY (INHALATION) DAILY
Qty: 90 EACH | Refills: 1 | Status: SHIPPED | OUTPATIENT
Start: 2025-06-17

## 2025-06-17 ASSESSMENT — ENCOUNTER SYMPTOMS
CHEST TIGHTNESS: 0
EYE DISCHARGE: 0
TROUBLE SWALLOWING: 0
NAUSEA: 0
RHINORRHEA: 0
VOMITING: 0
EYE ITCHING: 0
SORE THROAT: 0
SINUS PRESSURE: 0
WHEEZING: 0
DIARRHEA: 0
VOICE CHANGE: 0
COUGH: 0
ABDOMINAL PAIN: 0
SHORTNESS OF BREATH: 1

## 2025-06-17 NOTE — PROGRESS NOTES
Subjective:             Michelle Fuentes is a 74 y.o. female who complains today of:     Chief Complaint   Patient presents with    Follow-up     4m f/u on COPD. C/o SOB, climbing stairs       HPI  She is on bronchodilator with Anoro Ellipta 1 puff daily.  need refill   She is on mucinex prn.   C/o shortness of breath  with exertion.No  Wheezing.  No C/o cough No Hemoptysis.No Chest tightness.No Chest pain with radiation or pleuritic pain.  No leg swelling . No orthopnea.  No Fever or chills.No Rhinorrhea and postnasal drip.      She is following  for her h/o ca breast and following with dr. Winn and /Ty Castillo.     She said she has Afib and she had ablation 6/24/24 .   [facility] is [facility] at [facility]  She is on metoprolol. On Xarelto. She is following with cardiology for [facility], she has HOCM.      She said she develop arthritis and it helped by prednisone for  short period. She said she show rheumatologist.     CT chest 3/14/25  LungRADS category: 2   LungRADS modifier: Significant other (S), coronary artery calcification,   moderate or severe   LungRADS 0 reason: n/a     Recommendations:   Continue annual screening with LDCT in 12 months.       Allergies:  Fosamax [alendronate sodium] and Aldactone [spironolactone]  Past Medical History:   Diagnosis Date    Abnormal echocardiogram 2020    possible PFO? further eval suggested    Atrial fibrillation (HCC) 01/05/2016    Breast cancer (HCC) 02/04/2021    left    Cancer (HCC)     left breast    COPD (chronic obstructive pulmonary disease) ([facility]) 2015    Dr Malik    Current use of long term anticoagulation     Cyst of neck 2012    after plastic surgery    Emphysema of lung (HCC)     History of compression fracture of vertebral column 2020    T2, L1    History of humerus fracture     LEFT    History of left heart catheterization 03/2020    at [facility], normal LVEF with impaired relaxation, mild AV calcification and mild MV thickening, normal RV    History of sustained

## 2025-08-20 DIAGNOSIS — J44.9 CHRONIC OBSTRUCTIVE PULMONARY DISEASE, UNSPECIFIED COPD TYPE (HCC): ICD-10-CM

## 2025-08-20 RX ORDER — UMECLIDINIUM BROMIDE AND VILANTEROL TRIFENATATE 62.5; 25 UG/1; UG/1
1 POWDER RESPIRATORY (INHALATION) DAILY
Qty: 90 EACH | Refills: 1 | Status: SHIPPED | OUTPATIENT
Start: 2025-08-20

## (undated) DEVICE — TUBING, SUCTION, 1/4" X 10', STRAIGHT: Brand: MEDLINE

## (undated) DEVICE — PLASMABLADE PS210-030S 3.0S LOCK: Brand: PLASMABLADE™

## (undated) DEVICE — SUTURE PERMAHAND SZ 3-0 L30IN NONABSORBABLE BLK SH L26MM K832H

## (undated) DEVICE — BANDAGE COMPR M W6INXL10YD WHT BGE VELC E MTRX HK AND LOOP

## (undated) DEVICE — PACK,LAPAROTOMY,NO GOWNS: Brand: MEDLINE

## (undated) DEVICE — GLOVE ORANGE PI 8 1/2   MSG9085

## (undated) DEVICE — TUBE SET 96 MM 64 MM H2O PERISTALTIC STD AUX CHANNEL

## (undated) DEVICE — GAUZE,SPONGE,FLUFF,6"X6.75",STRL,10/TRAY: Brand: MEDLINE

## (undated) DEVICE — FORCEPS BX L240CM JAW DIA2.4MM ORNG L CAP W/ NDL DISP RAD

## (undated) DEVICE — Z INACTIVE USE 2735373 APPLICATOR FBR LAIN COT WOOD TIP ECONOMICAL

## (undated) DEVICE — APPLICATOR MEDICATED 26 CC SOLUTION HI LT ORNG CHLORAPREP

## (undated) DEVICE — Device: Brand: ENDO SMARTCAP

## (undated) DEVICE — PAD,NON-ADHERENT,3X8,STERILE,LF,1/PK: Brand: MEDLINE

## (undated) DEVICE — TOWEL,OR,DSP,ST,BLUE,STD,4/PK,20PK/CS: Brand: MEDLINE

## (undated) DEVICE — SYRINGE MED 10ML LUERLOCK TIP W/O SFTY DISP

## (undated) DEVICE — GEL ULTRASOUND 20 GM STRL

## (undated) DEVICE — COVER US PRB W15XL244CM SURGICAL INTRAOPERATIVE W RUBBERBAND

## (undated) DEVICE — SYRINGE IRRIG 60ML SFT PLIABLE BLB EZ TO GRP 1 HND USE W/

## (undated) DEVICE — BRUSH ENDO CLN L90.5IN SHTH DIA1.7MM BRIST DIA5-7MM 2-6MM

## (undated) DEVICE — COVER US PRB W4XL15IN GAM CRDLSS FLD

## (undated) DEVICE — ENDO CARRY-ON PROCEDURE KIT: Brand: ENDO CARRY-ON PROCEDURE KIT

## (undated) DEVICE — YANKAUER,BULB TIP,W/O VENT,RIGID,STERILE: Brand: MEDLINE

## (undated) DEVICE — CONMED SCOPE SAVER BITE BLOCK, 20X27 MM: Brand: SCOPE SAVER

## (undated) DEVICE — GLOVE SURG SZ 7 L12IN FNGR THK94MIL TRNSLUC YEL LTX HYDRGEL

## (undated) DEVICE — SINGLE PORT MANIFOLD: Brand: NEPTUNE 2

## (undated) DEVICE — 4-PORT MANIFOLD: Brand: NEPTUNE 2

## (undated) DEVICE — GOWN,AURORA,NONRNF,XL,30/CS: Brand: MEDLINE

## (undated) DEVICE — COUNTER NDL 40 COUNT HLD 70 FOAM BLK ADH W/ MAG

## (undated) DEVICE — FILTER NEEDLE: Brand: MONOJECT

## (undated) DEVICE — GLOVE ORTHO 8   MSG9480

## (undated) DEVICE — ADAPTER FLSH PMP FLD MGMT GI IRRIG OFP 2 DISPOSABLE

## (undated) DEVICE — LABEL MED MINI W/ MARKER

## (undated) DEVICE — GOWN,AURORA,NONREINFORCED,LARGE: Brand: MEDLINE

## (undated) DEVICE — FORCEPS BX L240CM JAW DIA2.8MM L CAP W/ NDL MIC MESH TOOTH

## (undated) DEVICE — SUTURE MCRYL SZ 4-0 L27IN ABSRB UD L19MM PS-2 1/2 CIR PRIM Y426H

## (undated) DEVICE — SUTURE VCRL SZ 3-0 L27IN ABSRB UD L26MM SH 1/2 CIR J416H

## (undated) DEVICE — ELECTRODE PT RET AD L9FT HI MOIST COND ADH HYDRGEL CORDED

## (undated) DEVICE — SHEET,DRAPE,53X77,STERILE: Brand: MEDLINE

## (undated) DEVICE — SPONGE,LAP,18"X18",DLX,XR,ST,5/PK,40/PK: Brand: MEDLINE

## (undated) DEVICE — MARKER SURG SKIN GENTIAN VLT REG TIP W/ 6IN RUL